# Patient Record
Sex: FEMALE | Race: WHITE | NOT HISPANIC OR LATINO | URBAN - METROPOLITAN AREA
[De-identification: names, ages, dates, MRNs, and addresses within clinical notes are randomized per-mention and may not be internally consistent; named-entity substitution may affect disease eponyms.]

---

## 2017-05-05 ENCOUNTER — OUTPATIENT (OUTPATIENT)
Dept: OUTPATIENT SERVICES | Facility: HOSPITAL | Age: 77
LOS: 1 days | Discharge: HOME | End: 2017-05-05

## 2017-06-28 DIAGNOSIS — I10 ESSENTIAL (PRIMARY) HYPERTENSION: ICD-10-CM

## 2017-06-28 DIAGNOSIS — Z00.00 ENCOUNTER FOR GENERAL ADULT MEDICAL EXAMINATION WITHOUT ABNORMAL FINDINGS: ICD-10-CM

## 2017-10-27 ENCOUNTER — OUTPATIENT (OUTPATIENT)
Dept: OUTPATIENT SERVICES | Facility: HOSPITAL | Age: 77
LOS: 1 days | Discharge: HOME | End: 2017-10-27

## 2017-10-27 DIAGNOSIS — I10 ESSENTIAL (PRIMARY) HYPERTENSION: ICD-10-CM

## 2017-12-01 ENCOUNTER — OUTPATIENT (OUTPATIENT)
Dept: OUTPATIENT SERVICES | Facility: HOSPITAL | Age: 77
LOS: 1 days | Discharge: HOME | End: 2017-12-01

## 2017-12-01 DIAGNOSIS — E87.1 HYPO-OSMOLALITY AND HYPONATREMIA: ICD-10-CM

## 2017-12-15 ENCOUNTER — OUTPATIENT (OUTPATIENT)
Dept: OUTPATIENT SERVICES | Facility: HOSPITAL | Age: 77
LOS: 1 days | Discharge: HOME | End: 2017-12-15

## 2017-12-15 DIAGNOSIS — E87.1 HYPO-OSMOLALITY AND HYPONATREMIA: ICD-10-CM

## 2018-01-24 ENCOUNTER — OUTPATIENT (OUTPATIENT)
Dept: OUTPATIENT SERVICES | Facility: HOSPITAL | Age: 78
LOS: 1 days | Discharge: HOME | End: 2018-01-24

## 2018-01-24 DIAGNOSIS — Z00.00 ENCOUNTER FOR GENERAL ADULT MEDICAL EXAMINATION WITHOUT ABNORMAL FINDINGS: ICD-10-CM

## 2018-01-24 DIAGNOSIS — E88.9 METABOLIC DISORDER, UNSPECIFIED: ICD-10-CM

## 2018-02-22 ENCOUNTER — OUTPATIENT (OUTPATIENT)
Dept: OUTPATIENT SERVICES | Facility: HOSPITAL | Age: 78
LOS: 1 days | Discharge: HOME | End: 2018-02-22

## 2018-02-22 DIAGNOSIS — I10 ESSENTIAL (PRIMARY) HYPERTENSION: ICD-10-CM

## 2018-03-01 ENCOUNTER — INPATIENT (INPATIENT)
Facility: HOSPITAL | Age: 78
LOS: 1 days | Discharge: HOME | End: 2018-03-03
Attending: HOSPITALIST | Admitting: FAMILY MEDICINE

## 2018-03-01 VITALS
TEMPERATURE: 98 F | HEART RATE: 88 BPM | RESPIRATION RATE: 18 BRPM | OXYGEN SATURATION: 96 % | DIASTOLIC BLOOD PRESSURE: 81 MMHG | SYSTOLIC BLOOD PRESSURE: 185 MMHG

## 2018-03-01 DIAGNOSIS — J44.1 CHRONIC OBSTRUCTIVE PULMONARY DISEASE WITH (ACUTE) EXACERBATION: ICD-10-CM

## 2018-03-01 LAB
ALBUMIN SERPL ELPH-MCNC: 4.1 G/DL — SIGNIFICANT CHANGE UP (ref 3–5.5)
ALP SERPL-CCNC: 68 U/L — SIGNIFICANT CHANGE UP (ref 30–115)
ALT FLD-CCNC: 15 U/L — SIGNIFICANT CHANGE UP (ref 0–41)
ANION GAP SERPL CALC-SCNC: 11 MMOL/L — SIGNIFICANT CHANGE UP (ref 7–14)
APTT BLD: 28.6 SEC — SIGNIFICANT CHANGE UP (ref 27–39.2)
AST SERPL-CCNC: 21 U/L — SIGNIFICANT CHANGE UP (ref 0–41)
BASE EXCESS BLDV CALC-SCNC: 2.3 MMOL/L — HIGH (ref -2–2)
BASOPHILS # BLD AUTO: 0.01 K/UL — SIGNIFICANT CHANGE UP (ref 0–0.2)
BASOPHILS NFR BLD AUTO: 0.1 % — SIGNIFICANT CHANGE UP (ref 0–1)
BILIRUB SERPL-MCNC: 1 MG/DL — SIGNIFICANT CHANGE UP (ref 0.2–1.2)
BUN SERPL-MCNC: 15 MG/DL — SIGNIFICANT CHANGE UP (ref 10–20)
CA-I SERPL-SCNC: 1.16 MMOL/L — SIGNIFICANT CHANGE UP (ref 1.12–1.3)
CALCIUM SERPL-MCNC: 9.3 MG/DL — SIGNIFICANT CHANGE UP (ref 8.5–10.1)
CHLORIDE SERPL-SCNC: 94 MMOL/L — LOW (ref 98–110)
CO2 SERPL-SCNC: 25 MMOL/L — SIGNIFICANT CHANGE UP (ref 17–32)
CREAT SERPL-MCNC: 0.8 MG/DL — SIGNIFICANT CHANGE UP (ref 0.7–1.5)
EOSINOPHIL # BLD AUTO: 0 K/UL — SIGNIFICANT CHANGE UP (ref 0–0.7)
EOSINOPHIL NFR BLD AUTO: 0 % — SIGNIFICANT CHANGE UP (ref 0–8)
GAS PNL BLDV: 130 MMOL/L — LOW (ref 136–145)
GAS PNL BLDV: SIGNIFICANT CHANGE UP
GLUCOSE SERPL-MCNC: 138 MG/DL — HIGH (ref 70–110)
HCO3 BLDV-SCNC: 27 MMOL/L — SIGNIFICANT CHANGE UP (ref 22–29)
HCT VFR BLD CALC: 40 % — SIGNIFICANT CHANGE UP (ref 37–47)
HGB BLD CALC-MCNC: 14.2 G/DL — SIGNIFICANT CHANGE UP (ref 14–18)
HGB BLD-MCNC: 13.5 G/DL — SIGNIFICANT CHANGE UP (ref 12–16)
IMM GRANULOCYTES NFR BLD AUTO: 0.5 % — HIGH (ref 0.1–0.3)
INR BLD: 0.9 RATIO — SIGNIFICANT CHANGE UP (ref 0.65–1.3)
LACTATE BLDV-MCNC: 2 MMOL/L — HIGH (ref 0.5–1.6)
LYMPHOCYTES # BLD AUTO: 0.93 K/UL — LOW (ref 1.2–3.4)
LYMPHOCYTES # BLD AUTO: 8.4 % — LOW (ref 20.5–51.1)
MCHC RBC-ENTMCNC: 30.4 PG — SIGNIFICANT CHANGE UP (ref 27–31)
MCHC RBC-ENTMCNC: 33.8 G/DL — SIGNIFICANT CHANGE UP (ref 32–37)
MCV RBC AUTO: 90.1 FL — SIGNIFICANT CHANGE UP (ref 81–99)
MONOCYTES # BLD AUTO: 0.23 K/UL — SIGNIFICANT CHANGE UP (ref 0.1–0.6)
MONOCYTES NFR BLD AUTO: 2.1 % — SIGNIFICANT CHANGE UP (ref 1.7–9.3)
NEUTROPHILS # BLD AUTO: 9.83 K/UL — HIGH (ref 1.4–6.5)
NEUTROPHILS NFR BLD AUTO: 88.9 % — HIGH (ref 42.2–75.2)
NRBC # BLD: 0 /100 WBCS — SIGNIFICANT CHANGE UP (ref 0–0)
PCO2 BLDV: 41 MMHG — SIGNIFICANT CHANGE UP (ref 41–51)
PH BLDV: 7.43 — SIGNIFICANT CHANGE UP (ref 7.26–7.43)
PLATELET # BLD AUTO: 371 K/UL — SIGNIFICANT CHANGE UP (ref 130–400)
PO2 BLDV: 61 MMHG — HIGH (ref 20–40)
POTASSIUM BLDV-SCNC: 4.6 MMOL/L — SIGNIFICANT CHANGE UP (ref 3.3–5.6)
POTASSIUM SERPL-MCNC: 4.6 MMOL/L — SIGNIFICANT CHANGE UP (ref 3.5–5)
POTASSIUM SERPL-SCNC: 4.6 MMOL/L — SIGNIFICANT CHANGE UP (ref 3.5–5)
PROT SERPL-MCNC: 6.8 G/DL — SIGNIFICANT CHANGE UP (ref 6–8)
PROTHROM AB SERPL-ACNC: 9.7 SEC — LOW (ref 9.95–12.87)
RBC # BLD: 4.44 M/UL — SIGNIFICANT CHANGE UP (ref 4.2–5.4)
RBC # FLD: 14.8 % — HIGH (ref 11.5–14.5)
SAO2 % BLDV: 92 % — SIGNIFICANT CHANGE UP
SODIUM SERPL-SCNC: 130 MMOL/L — LOW (ref 135–146)
WBC # BLD: 11.05 K/UL — HIGH (ref 4.8–10.8)
WBC # FLD AUTO: 11.05 K/UL — HIGH (ref 4.8–10.8)

## 2018-03-01 RX ORDER — BUDESONIDE AND FORMOTEROL FUMARATE DIHYDRATE 160; 4.5 UG/1; UG/1
2 AEROSOL RESPIRATORY (INHALATION)
Qty: 0 | Refills: 0 | Status: DISCONTINUED | OUTPATIENT
Start: 2018-03-01 | End: 2018-03-03

## 2018-03-01 RX ORDER — IPRATROPIUM/ALBUTEROL SULFATE 18-103MCG
3 AEROSOL WITH ADAPTER (GRAM) INHALATION EVERY 4 HOURS
Qty: 0 | Refills: 0 | Status: DISCONTINUED | OUTPATIENT
Start: 2018-03-01 | End: 2018-03-02

## 2018-03-01 RX ORDER — ASPIRIN/CALCIUM CARB/MAGNESIUM 324 MG
1 TABLET ORAL
Qty: 0 | Refills: 0 | COMMUNITY

## 2018-03-01 RX ORDER — ALBUTEROL 90 UG/1
2 AEROSOL, METERED ORAL EVERY 4 HOURS
Qty: 0 | Refills: 0 | Status: DISCONTINUED | OUTPATIENT
Start: 2018-03-01 | End: 2018-03-02

## 2018-03-01 RX ORDER — FLUTICASONE PROPIONATE AND SALMETEROL 50; 250 UG/1; UG/1
1 POWDER ORAL; RESPIRATORY (INHALATION)
Qty: 0 | Refills: 0 | COMMUNITY

## 2018-03-01 RX ORDER — HEPARIN SODIUM 5000 [USP'U]/ML
5000 INJECTION INTRAVENOUS; SUBCUTANEOUS EVERY 8 HOURS
Qty: 0 | Refills: 0 | Status: DISCONTINUED | OUTPATIENT
Start: 2018-03-01 | End: 2018-03-03

## 2018-03-01 RX ORDER — PANTOPRAZOLE SODIUM 20 MG/1
40 TABLET, DELAYED RELEASE ORAL
Qty: 0 | Refills: 0 | Status: DISCONTINUED | OUTPATIENT
Start: 2018-03-01 | End: 2018-03-03

## 2018-03-01 RX ORDER — IPRATROPIUM/ALBUTEROL SULFATE 18-103MCG
3 AEROSOL WITH ADAPTER (GRAM) INHALATION ONCE
Qty: 0 | Refills: 0 | Status: COMPLETED | OUTPATIENT
Start: 2018-03-01 | End: 2018-03-01

## 2018-03-01 RX ORDER — AZITHROMYCIN 500 MG/1
500 TABLET, FILM COATED ORAL ONCE
Qty: 0 | Refills: 0 | Status: COMPLETED | OUTPATIENT
Start: 2018-03-01 | End: 2018-03-01

## 2018-03-01 RX ADMIN — AZITHROMYCIN 255 MILLIGRAM(S): 500 TABLET, FILM COATED ORAL at 17:29

## 2018-03-01 RX ADMIN — Medication 3 MILLILITER(S): at 17:29

## 2018-03-01 RX ADMIN — Medication 125 MILLIGRAM(S): at 17:16

## 2018-03-01 RX ADMIN — Medication 3 MILLILITER(S): at 17:16

## 2018-03-01 RX ADMIN — BUDESONIDE AND FORMOTEROL FUMARATE DIHYDRATE 2 PUFF(S): 160; 4.5 AEROSOL RESPIRATORY (INHALATION) at 22:34

## 2018-03-01 NOTE — H&P ADULT - NSHPSOCIALHISTORY_GEN_ALL_CORE
Patient is , independent with ADL, lives alone. Patient is an active smoker 1-.5 ppd for the past 70 years, drinks socially, denies illicit drug use.

## 2018-03-01 NOTE — ED PROVIDER NOTE - CARE PLAN
Principal Discharge DX:	COPD exacerbation  Secondary Diagnosis:	Wheezing  Secondary Diagnosis:	Cough

## 2018-03-01 NOTE — H&P ADULT - NSHPLABSRESULTS_GEN_ALL_CORE
13.5   11.05 )-----------( 371      ( 01 Mar 2018 17:14 )             40.0       03-01    130<L>  |  94<L>  |  15  ----------------------------<  138<H>  4.6   |  25  |  0.8    Ca    9.3      01 Mar 2018 17:14    TPro  6.8  /  Alb  4.1  /  TBili  1.0  /  DBili  x   /  AST  21  /  ALT  15  /  AlkPhos  68  03-01                  PT/INR - ( 01 Mar 2018 17:14 )   PT: 9.70 sec;   INR: 0.90 ratio         PTT - ( 01 Mar 2018 17:14 )  PTT:28.6 sec

## 2018-03-01 NOTE — ED ADULT NURSE NOTE - OBJECTIVE STATEMENT
pt presents with a complaint of SOB started today worsen with exertion. pt was seen by her PCP today and had low pulse ox. pt with hx of COPD and currently smoking.

## 2018-03-01 NOTE — H&P ADULT - PROBLEM SELECTOR PLAN 1
-Continue solumedrol 60mg Q8H  -Nebs standing and PRN  -Start symbicort  -Pulmonary Toilet  -CXR in AM  -F/U Pulmonary evaluation

## 2018-03-01 NOTE — ED PROVIDER NOTE - PHYSICAL EXAMINATION
VITAL SIGNS: I have reviewed nursing notes and confirm.  CONSTITUTIONAL: Well-developed; well-nourished; in no acute distress.  SKIN: Skin exam is warm and dry, no acute rash.  HEAD: Normocephalic; atraumatic.  EYES: PERRL, EOM intact; conjunctiva and sclera clear.  ENT: No nasal discharge; airway clear. TMs clear.  NECK: Supple; non tender.  CARD: S1, S2 normal; no murmurs, gallops, or rubs. Regular rate and rhythm.  RESP: b/l wheezing  ABD: Normal bowel sounds; soft; non-distended; non-tender; no hepatosplenomegaly.  EXT: Normal ROM. No clubbing, cyanosis or edema.  NEURO: Alert, oriented. Grossly unremarkable. No focal deficits.  PSYCH: Cooperative, appropriate.

## 2018-03-01 NOTE — H&P ADULT - ATTENDING COMMENTS
76 yo F with COPD not on home O2 presents from PMD's office s/p follow up visit, found to have low pulse oximetry, in the low 60s as reported by daughter, as well as worsening SOB not improved on PO prednisone. Patient endorses noted wheezing for the past week, as well as SOB associated with baseline productive cough of whitish sputum, patient denies changes in sputum color or frequency. Patient denies constitutional symptoms, sick contacts or recent travel. Patient is currently 96% on room air, denies current SOB, is able to speak in full sentences.  Patient reported not taking all of her prednisone b/c it sometimes makes her jittery. S/P IV steroids and Nebs she is feeling better    A/A/O x3  Lungs- decreased breath sounds and wheezing - expiratory  Heart - S1S2 - NL , Evie III/VI  Abdomen- soft, NT, ND, (+) BS  Exp. No C/C/E  Neuro - No C/C/E    A/P   COPD exacerbation   - IV steroids/ Neb  -pulse ox monitoring and O12 PRN  - agree w/ starting Symbicort  -DVT prohylaxis and GI prophylaxis   anticipate D/C within 24-48 hours

## 2018-03-01 NOTE — ED PROVIDER NOTE - NS ED ROS FT
Review of Systems:  	•	CONSTITUTIONAL - no fever, no diaphoresis, no weight change  	•	SKIN - no rash  	•	HEMATOLOGIC - no bleeding, no bruising  	•	EYES - no eye pain, no blurred vision  	•	ENT - no change in hearing, no pain  	•	RESPIRATORY - +shortness of breath, + cough, +wheezing  	•	CARDIAC - no chest pain, no palpitations  	•	GI - no abd pain, no nausea, no vomiting, no diarrhea, no constipation, no bleeding  	•	ENDO - no polydypsia, no polyurea, no heat/no cold intolerance  	•	MUSCULOSKELETAL - no joint paint, no swelling, no redness  	•	NEUROLOGIC - no weakness, no headache, no anesthesia, no paresthesias

## 2018-03-01 NOTE — H&P ADULT - NSHPPHYSICALEXAM_GEN_ALL_CORE
PHYSICAL EXAM:  GENERAL: NAD, well-groomed, well-developed  HEAD:  Atraumatic, Normocephalic  EYES: EOMI, PERRLA, conjunctiva and sclera clear  ENMT: No tonsillar erythema, exudates, or enlargement; Moist mucous membranes, Good dentition, No lesions  NECK: Supple, No JVD, Normal thyroid  NERVOUS SYSTEM:  Alert & Oriented X3, Good concentration; Motor Strength 5/5 B/L upper and lower extremities; DTRs 2+ intact and symmetric  CHEST/LUNG: Decreased breath sounds bilaterally, positive apical wheezing bilaterally  HEART: Regular rate and rhythm; No murmurs, rubs, or gallops  ABDOMEN: Soft, Nontender, Nondistended; Bowel sounds present  EXTREMITIES:  2+ Peripheral Pulses, No clubbing, cyanosis, or edema  LYMPH: No lymphadenopathy noted  SKIN: No rashes or lesions

## 2018-03-01 NOTE — H&P ADULT - ASSESSMENT
78 yo F presents with worsening SOB and wheezing for one week's duration likely secondary to COPD exacerbation.

## 2018-03-01 NOTE — H&P ADULT - NSHPREVIEWOFSYSTEMS_GEN_ALL_CORE
REVIEW OF SYSTEMS:  CONSTITUTIONAL: No fever, weight loss, or fatigue  EYES: No eye pain, visual disturbances, or discharge  ENMT:  No difficulty hearing, tinnitus, vertigo; No sinus or throat pain  NECK: No pain or stiffness  BREASTS: No pain, masses, or nipple discharge  RESPIRATORY: positive SOB, wheezing  CARDIOVASCULAR: No chest pain, palpitations, dizziness, or leg swelling  GASTROINTESTINAL: No abdominal or epigastric pain. No nausea, vomiting, or hematemesis; No diarrhea or constipation. No melena or hematochezia.  GENITOURINARY: No dysuria, frequency, hematuria, or incontinence  NEUROLOGICAL: No headaches, memory loss, loss of strength, numbness, or tremors  SKIN: No itching, burning, rashes, or lesions   LYMPH NODES: No enlarged glands  ENDOCRINE: No heat or cold intolerance; No hair loss  MUSCULOSKELETAL: No joint pain or swelling; No muscle, back, or extremity pain  PSYCHIATRIC: No depression, anxiety, mood swings, or difficulty sleeping  HEME/LYMPH: No easy bruising, or bleeding gums  ALLERGY AND IMMUNOLOGIC: No hives or eczema

## 2018-03-01 NOTE — ED PROVIDER NOTE - OBJECTIVE STATEMENT
76 yo f with pmh of copd, sent by dr. quinn for sob and wheezing.  pt says worsening x 1 week.  was seen by pmd last week, started on prednisone.  pt has been using nebs at home.  today at office for f/u, still wheezing and was told hypoxic.  here o2 sat is normal.  no fever, no chills.  +mild cough, nonprod, no cp, no leg swelling or pain

## 2018-03-02 DIAGNOSIS — Z02.9 ENCOUNTER FOR ADMINISTRATIVE EXAMINATIONS, UNSPECIFIED: ICD-10-CM

## 2018-03-02 LAB
ALBUMIN SERPL ELPH-MCNC: 3.7 G/DL — SIGNIFICANT CHANGE UP (ref 3–5.5)
ALP SERPL-CCNC: 62 U/L — SIGNIFICANT CHANGE UP (ref 30–115)
ALT FLD-CCNC: 13 U/L — SIGNIFICANT CHANGE UP (ref 0–41)
ANION GAP SERPL CALC-SCNC: 7 MMOL/L — SIGNIFICANT CHANGE UP (ref 7–14)
AST SERPL-CCNC: 17 U/L — SIGNIFICANT CHANGE UP (ref 0–41)
BILIRUB SERPL-MCNC: 0.8 MG/DL — SIGNIFICANT CHANGE UP (ref 0.2–1.2)
BUN SERPL-MCNC: 13 MG/DL — SIGNIFICANT CHANGE UP (ref 10–20)
CALCIUM SERPL-MCNC: 8.9 MG/DL — SIGNIFICANT CHANGE UP (ref 8.5–10.1)
CHLORIDE SERPL-SCNC: 96 MMOL/L — LOW (ref 98–110)
CK MB BLD-MCNC: 13 % — HIGH (ref 0–4)
CK MB CFR SERPL CALC: 3 NG/ML — SIGNIFICANT CHANGE UP (ref 0.6–6.3)
CK SERPL-CCNC: 23 U/L — SIGNIFICANT CHANGE UP (ref 0–225)
CO2 SERPL-SCNC: 24 MMOL/L — SIGNIFICANT CHANGE UP (ref 17–32)
CREAT SERPL-MCNC: 0.6 MG/DL — LOW (ref 0.7–1.5)
GLUCOSE SERPL-MCNC: 130 MG/DL — HIGH (ref 70–110)
HCT VFR BLD CALC: 36.2 % — LOW (ref 37–47)
HGB BLD-MCNC: 12.3 G/DL — SIGNIFICANT CHANGE UP (ref 12–16)
MAGNESIUM SERPL-MCNC: 2 MG/DL — SIGNIFICANT CHANGE UP (ref 1.8–2.4)
MCHC RBC-ENTMCNC: 30.4 PG — SIGNIFICANT CHANGE UP (ref 27–31)
MCHC RBC-ENTMCNC: 34 G/DL — SIGNIFICANT CHANGE UP (ref 32–37)
MCV RBC AUTO: 89.6 FL — SIGNIFICANT CHANGE UP (ref 81–99)
NRBC # BLD: 0 /100 WBCS — SIGNIFICANT CHANGE UP (ref 0–0)
PLATELET # BLD AUTO: 346 K/UL — SIGNIFICANT CHANGE UP (ref 130–400)
POTASSIUM SERPL-MCNC: 4.3 MMOL/L — SIGNIFICANT CHANGE UP (ref 3.5–5)
POTASSIUM SERPL-SCNC: 4.3 MMOL/L — SIGNIFICANT CHANGE UP (ref 3.5–5)
PROT SERPL-MCNC: 5.9 G/DL — LOW (ref 6–8)
RBC # BLD: 4.04 M/UL — LOW (ref 4.2–5.4)
RBC # FLD: 14.8 % — HIGH (ref 11.5–14.5)
SODIUM SERPL-SCNC: 127 MMOL/L — LOW (ref 135–146)
TROPONIN I SERPL-MCNC: <0.02 NG/ML — SIGNIFICANT CHANGE UP (ref 0–0.05)
WBC # BLD: 5.85 K/UL — SIGNIFICANT CHANGE UP (ref 4.8–10.8)
WBC # FLD AUTO: 5.85 K/UL — SIGNIFICANT CHANGE UP (ref 4.8–10.8)

## 2018-03-02 RX ORDER — LABETALOL HCL 100 MG
100 TABLET ORAL ONCE
Qty: 0 | Refills: 0 | Status: COMPLETED | OUTPATIENT
Start: 2018-03-02 | End: 2018-03-02

## 2018-03-02 RX ORDER — ALBUTEROL 90 UG/1
2.5 AEROSOL, METERED ORAL EVERY 6 HOURS
Qty: 0 | Refills: 0 | Status: DISCONTINUED | OUTPATIENT
Start: 2018-03-02 | End: 2018-03-03

## 2018-03-02 RX ORDER — TIOTROPIUM BROMIDE 18 UG/1
1 CAPSULE ORAL; RESPIRATORY (INHALATION) DAILY
Qty: 0 | Refills: 0 | Status: DISCONTINUED | OUTPATIENT
Start: 2018-03-02 | End: 2018-03-03

## 2018-03-02 RX ORDER — AMLODIPINE BESYLATE 2.5 MG/1
5 TABLET ORAL DAILY
Qty: 0 | Refills: 0 | Status: DISCONTINUED | OUTPATIENT
Start: 2018-03-02 | End: 2018-03-03

## 2018-03-02 RX ORDER — AMLODIPINE BESYLATE 2.5 MG/1
5 TABLET ORAL ONCE
Qty: 0 | Refills: 0 | Status: COMPLETED | OUTPATIENT
Start: 2018-03-02 | End: 2018-03-02

## 2018-03-02 RX ADMIN — TIOTROPIUM BROMIDE 1 CAPSULE(S): 18 CAPSULE ORAL; RESPIRATORY (INHALATION) at 11:30

## 2018-03-02 RX ADMIN — Medication 3 MILLILITER(S): at 08:45

## 2018-03-02 RX ADMIN — Medication 40 MILLIGRAM(S): at 17:17

## 2018-03-02 RX ADMIN — AMLODIPINE BESYLATE 5 MILLIGRAM(S): 2.5 TABLET ORAL at 12:01

## 2018-03-02 RX ADMIN — Medication 60 MILLIGRAM(S): at 05:50

## 2018-03-02 RX ADMIN — ALBUTEROL 2.5 MILLIGRAM(S): 90 AEROSOL, METERED ORAL at 13:20

## 2018-03-02 RX ADMIN — BUDESONIDE AND FORMOTEROL FUMARATE DIHYDRATE 2 PUFF(S): 160; 4.5 AEROSOL RESPIRATORY (INHALATION) at 21:17

## 2018-03-02 RX ADMIN — Medication 100 MILLIGRAM(S): at 03:08

## 2018-03-02 RX ADMIN — AMLODIPINE BESYLATE 5 MILLIGRAM(S): 2.5 TABLET ORAL at 06:44

## 2018-03-02 RX ADMIN — Medication 3 MILLILITER(S): at 00:07

## 2018-03-02 NOTE — PROGRESS NOTE ADULT - ASSESSMENT
76 yo F presents with worsening SOB and wheezing for one week's duration likely secondary to COPD exacerbation.     Acute on Chronic COPD exacerbation   - Continue solumedrol 60mg Q8H - consider medrol pack for 5 days starting tomorrow.   - pulse ox monitoring and O1 PRN  - c/w home inhaler spiriva+symbicort  - outpatient follow up with pulmonology     Hyponatremia   - Hx of SIADH with low sodium chronically  - O/p follow up     HTN  - Will increase to amlodipine 10mg po if uncontrolled    DVT ppx and GI ppx  anticipate D/C within 24-48 hours

## 2018-03-02 NOTE — PROGRESS NOTE ADULT - SUBJECTIVE AND OBJECTIVE BOX
s: shortness of breath improved but still not back to normal per patient     Vital Signs Last 24 Hrs  T(C): 35.7 (02 Mar 2018 03:20), Max: 36.7 (02 Mar 2018 02:12)  T(F): 96.2 (02 Mar 2018 03:20), Max: 98 (02 Mar 2018 02:12)  HR: 83 (02 Mar 2018 06:17) (82 - 94)  BP: 183/79 (02 Mar 2018 06:17) (180/80 - 185/81)  BP(mean): --  RR: 18 (02 Mar 2018 03:20) (18 - 18)  SpO2: 92% (02 Mar 2018 06:17) (92% - 96%)    PHYSICAL EXAM:  GENERAL: NAD, well-developed  HEAD:  Atraumatic, Normocephalic  EYES: EOMI, PERRLA, conjunctiva and sclera clear  NECK: Supple, No JVD  CHEST/LUNG: Bilateral expiratory rhonchi   HEART: Regular rate and rhythm; No murmurs, rubs, or gallops  ABDOMEN: Soft, Nontender, Nondistended; Bowel sounds present  EXTREMITIES:  2+ Peripheral Pulses, No clubbing, cyanosis, or edema  PSYCH: AAOx3  NEUROLOGY: non-focal  SKIN: No rashes or lesions

## 2018-03-02 NOTE — PROGRESS NOTE ADULT - ASSESSMENT
assessment and plan     1) Acute COPD exacerbation : improved     C/W IV steroids today   C/W nebs and inhalers   patient get jittery with prednisone can consider medrol pack for 5 days starting tomorrow     2) Hyponatremia: patient says that she was diagnosed with SIADH last year and sodium is chronically low she is going to have further evaluation as OPT     3) HTN: not well controlled, patient is on amlodipine and labetalol. recheck BP if still high increase amlodipine dose to 10 mg po qday     plan was discussed with patient, house staff     4) likely d/C tomorrow pending improvement with respiratory status

## 2018-03-02 NOTE — CONSULT NOTE ADULT - SUBJECTIVE AND OBJECTIVE BOX
Patient is a 77y old  Female who presents with a chief complaint of worsening SOB for 1 week's duration sent from PMD (01 Mar 2018 20:50)    Consult: Patient currently stable. No SOB or audible wheeze appreciated. Patient is AAO x3, sitting up in bed and is able to complete full sentences with no trouble.     HPI:  76 yo F with COPD not on home O2 presents from PMD's office s/p follow up visit, found to have low pulse oximetry, in the low 60s as reported by daughter, as well as worsening SOB not improved on PO prednisone. Patient endorses noted wheezing for the past week, as well as SOB associated with baseline productive cough of whitish sputum, patient denies changes in sputum color or frequency. Patient denies constitutional symptoms, sick contacts or recent travel. Patient is currently 96% on room air, denies current SOB, is able to speak in full sentences. Patient was started on Prednisone taper last Wednesday, at 60mg Q3days, then 40mg Q3days, did not complete last two days of 20mg daily.    In ED, received 125mg of Solumedrol IV x1, Albuterol treatment x1.    CODE STATUS: DNR/DNI    PMD: Dr. Adam  Pulmonologist: Dr. oChn (last seen more than 4 years ago, lost to follow up) (01 Mar 2018 20:50)      PAST MEDICAL & SURGICAL HISTORY:  COPD (chronic obstructive pulmonary disease)      SOCIAL HX:   Smoking 70 Pack/Year history. Currently still smokes 1 PPD                        ETOH                            Other    FAMILY HISTORY:       REVIEW OF SYSTEMS    General: No fever, weight loss, or fatigue	    Skin/Breast:  No itching, burning, rashes, or lesions, No pain, masses, or nipple discharge  	  Ophthalmologic: No eye pain, visual disturbances, or discharge  	  ENMT: No difficulty hearing, tinnitus, vertigo; No sinus or throat pain, No pain or stiffness	    Respiratory and Thorax: no current SOB or wheezing  	  Cardiovascular:	No chest pain, palpitations, dizziness, or leg swelling    Gastrointestinal:	No abdominal or epigastric pain. No nausea, vomiting, or hematemesis; No diarrhea or constipation. No melena or hematochezia.    Musculoskeletal: No joint pain or swelling; No muscle, back, or extremity pain    Neurological: No headaches, memory loss, loss of strength, numbness, or tremors	    Psychiatric: No depression, anxiety, mood swings, or difficulty sleeping	    Hematology/Lymphatics:	 No enlarged glands, No easy bruising, or bleeding gums    Endocrine:  No heat or cold intolerance; No hair loss	    Allergic/Immunologic: No hives or eczema	    Allergies: No Known Allergies      PHYSICAL EXAM  Vital Signs Last 24 Hrs  T(C): 35.7 (02 Mar 2018 03:20), Max: 36.7 (02 Mar 2018 02:12)  T(F): 96.2 (02 Mar 2018 03:20), Max: 98 (02 Mar 2018 02:12)  HR: 83 (02 Mar 2018 06:17) (82 - 94)  BP: 183/79 (02 Mar 2018 06:17) (180/80 - 185/81)  BP(mean): --  RR: 18 (02 Mar 2018 03:20) (18 - 18)  SpO2: 92% (02 Mar 2018 06:17) (92% - 96%)    General: NAD, well-groomed, well-developed  HEENT: Atraumatic, Normocephalic, No tonsillar erythema, exudates, or enlargement; Moist mucous membranes, Good dentition, No lesions            Lymph Nodes: No lymphadenopathy noted  Neck: Supple, No JVD, Normal thyroid      Lungs: Bilateral air entry, no wheezing or stridor  Cardiovascular: Regular rate and rhythm; No murmurs, rubs, or gallops  Abdomen: Soft, Nontender, Nondistended; Bowel sounds present  Extremities: 2+ Peripheral Pulses, No clubbing, cyanosis, or edema  Skin:  No rashes or lesion  Neurological: Alert & Oriented X3, Good concentration; Motor Strength 5/5 B/L upper and lower extremities; DTRs 2+ intact and symmetric      LABS:                          12.3   5.85  )-----------( 346      ( 02 Mar 2018 05:15 )             36.2                                               03-02    127<L>  |  96<L>  |  13  ----------------------------<  130<H>  4.3   |  24  |  0.6<L>    Ca    8.9      02 Mar 2018 05:15  Mg     2.0     03-02    TPro  5.9<L>  /  Alb  3.7  /  TBili  0.8  /  DBili  x   /  AST  17  /  ALT  13  /  AlkPhos  62  03-02      PT/INR - ( 01 Mar 2018 17:14 )   PT: 9.70 sec;   INR: 0.90 ratio         PTT - ( 01 Mar 2018 17:14 )  PTT:28.6 sec                                           CARDIAC MARKERS ( 01 Mar 2018 23:38 )  <0.02 ng/mL / x     / 23 U/L / x     / 3.0 ng/mL                                            LIVER FUNCTIONS - ( 02 Mar 2018 05:15 )  Alb: 3.7 g/dL / Pro: 5.9 g/dL / ALK PHOS: 62 U/L / ALT: 13 U/L / AST: 17 U/L / GGT: x                                                                                                MEDICATIONS  (STANDING):  ALBUTerol    0.083% 2.5 milliGRAM(s) Nebulizer every 6 hours  amLODIPine   Tablet 5 milliGRAM(s) Oral daily  buDESOnide 160 MICROgram(s)/formoterol 4.5 MICROgram(s) Inhaler 2 Puff(s) Inhalation two times a day  heparin  Injectable 5000 Unit(s) SubCutaneous every 8 hours  methylPREDNISolone sodium succinate Injectable 40 milliGRAM(s) IV Push two times a day  pantoprazole    Tablet 40 milliGRAM(s) Oral before breakfast  tiotropium 18 MICROgram(s) Capsule 1 Capsule(s) Inhalation daily    MEDICATIONS  (PRN): Patient is a 77y old  Female who presents with a chief complaint of worsening SOB for 1 week's duration sent from PMD (01 Mar 2018 20:50)    Consult: Patient currently stable. No SOB or audible wheeze appreciated. Patient is AAO x3, sitting up in bed and is able to complete full sentences with no trouble.     HPI:  78 yo F with COPD not on home O2 presents from PMD's office s/p follow up visit, found to have low pulse oximetry, in the low 60s as reported by daughter, as well as worsening SOB not improved on PO prednisone. Patient endorses noted wheezing for the past week, as well as SOB associated with baseline productive cough of whitish sputum, patient denies changes in sputum color or frequency. Patient denies constitutional symptoms, sick contacts or recent travel. Patient is currently 96% on room air, denies current SOB, is able to speak in full sentences. Patient was started on Prednisone taper last Wednesday, at 60mg Q3days, then 40mg Q3days, did not complete last two days of 20mg daily.    In ED, received 125mg of Solumedrol IV x1, Albuterol treatment x1.    CODE STATUS: DNR/DNI    PMD: Dr. Adam  Pulmonologist: Dr. Cohn (last seen more than 4 years ago, lost to follow up) (01 Mar 2018 20:50)      PAST MEDICAL & SURGICAL HISTORY:  COPD (chronic obstructive pulmonary disease)      SOCIAL HX:   Smoking 70 Pack/Year history. Currently still smokes 1 PPD                          FAMILY HISTORY:       REVIEW OF SYSTEMS    General: No fever, weight loss, or fatigue	    Skin/Breast:  No itching, burning, rashes, or lesions, No pain, masses, or nipple discharge  	  Ophthalmologic: No eye pain, visual disturbances, or discharge  	  ENMT: No difficulty hearing, tinnitus, vertigo; No sinus or throat pain, No pain or stiffness	    Respiratory and Thorax: no current SOB or wheezing  	  Cardiovascular:	No chest pain, palpitations, dizziness, or leg swelling    Gastrointestinal:	No abdominal or epigastric pain. No nausea, vomiting, or hematemesis; No diarrhea or constipation. No melena or hematochezia.    Musculoskeletal: No joint pain or swelling; No muscle, back, or extremity pain    Neurological: No headaches, memory loss, loss of strength, numbness, or tremors	    Psychiatric: No depression, anxiety, mood swings, or difficulty sleeping	    Hematology/Lymphatics:	 No enlarged glands, No easy bruising, or bleeding gums    Endocrine:  No heat or cold intolerance; No hair loss	    Allergic/Immunologic: No hives or eczema	    Allergies: No Known Allergies      PHYSICAL EXAM  Vital Signs Last 24 Hrs  T(C): 35.7 (02 Mar 2018 03:20), Max: 36.7 (02 Mar 2018 02:12)  T(F): 96.2 (02 Mar 2018 03:20), Max: 98 (02 Mar 2018 02:12)  HR: 83 (02 Mar 2018 06:17) (82 - 94)  BP: 183/79 (02 Mar 2018 06:17) (180/80 - 185/81)  BP(mean): --  RR: 18 (02 Mar 2018 03:20) (18 - 18)  SpO2: 92% (02 Mar 2018 06:17) (92% - 96%)    General: NAD, well-groomed, well-developed  HEENT: Atraumatic, Normocephalic, No tonsillar erythema, exudates, or enlargement; Moist mucous membranes, Good dentition, No lesions            Lymph Nodes: No lymphadenopathy noted  Neck: Supple, No JVD, Normal thyroid      Lungs: Bilateral air entry, no wheezing or stridor  Cardiovascular: Regular rate and rhythm; No murmurs, rubs, or gallops  Abdomen: Soft, Nontender, Nondistended; Bowel sounds present  Extremities: 2+ Peripheral Pulses, No clubbing, cyanosis, or edema  Skin:  No rashes or lesion  Neurological: Alert & Oriented X3, Good concentration; Motor Strength 5/5 B/L upper and lower extremities; DTRs 2+ intact and symmetric      LABS:                          12.3   5.85  )-----------( 346      ( 02 Mar 2018 05:15 )             36.2                                               03-02    127<L>  |  96<L>  |  13  ----------------------------<  130<H>  4.3   |  24  |  0.6<L>    Ca    8.9      02 Mar 2018 05:15  Mg     2.0     03-02    TPro  5.9<L>  /  Alb  3.7  /  TBili  0.8  /  DBili  x   /  AST  17  /  ALT  13  /  AlkPhos  62  03-02      PT/INR - ( 01 Mar 2018 17:14 )   PT: 9.70 sec;   INR: 0.90 ratio         PTT - ( 01 Mar 2018 17:14 )  PTT:28.6 sec                                           CARDIAC MARKERS ( 01 Mar 2018 23:38 )  <0.02 ng/mL / x     / 23 U/L / x     / 3.0 ng/mL                                            LIVER FUNCTIONS - ( 02 Mar 2018 05:15 )  Alb: 3.7 g/dL / Pro: 5.9 g/dL / ALK PHOS: 62 U/L / ALT: 13 U/L / AST: 17 U/L / GGT: x                                                                                                MEDICATIONS  (STANDING):  ALBUTerol    0.083% 2.5 milliGRAM(s) Nebulizer every 6 hours  amLODIPine   Tablet 5 milliGRAM(s) Oral daily  buDESOnide 160 MICROgram(s)/formoterol 4.5 MICROgram(s) Inhaler 2 Puff(s) Inhalation two times a day  heparin  Injectable 5000 Unit(s) SubCutaneous every 8 hours  methylPREDNISolone sodium succinate Injectable 40 milliGRAM(s) IV Push two times a day  pantoprazole    Tablet 40 milliGRAM(s) Oral before breakfast  tiotropium 18 MICROgram(s) Capsule 1 Capsule(s) Inhalation daily    MEDICATIONS  (PRN):

## 2018-03-02 NOTE — CONSULT NOTE ADULT - ASSESSMENT
Acute excarbation of COPD  active smoker        iv steroids for now  po prednisone starting tomorrow  c/w home inhaler spiriva+symbicort  dvt ppx  needs out pt pulm follow up

## 2018-03-02 NOTE — PROGRESS NOTE ADULT - SUBJECTIVE AND OBJECTIVE BOX
SUBJECTIVE:    Patient is a 77y old Female who presents with a chief complaint of sent from PMD for worsening SOB for 1 week's duration (01 Mar 2018 20:50)    Currently admitted to medicine with the primary diagnosis of COPD exacerbation     Today is hospital day 1d. This morning she is resting comfortably in bed and reports no new issues or overnight events.     PAST MEDICAL & SURGICAL HISTORY  COPD (chronic obstructive pulmonary disease)    SOCIAL HISTORY:  Negative for smoking/alcohol/drug use.     ALLERGIES:  No Known Allergies    MEDICATIONS:  STANDING MEDICATIONS  ALBUTerol    0.083% 2.5 milliGRAM(s) Nebulizer every 6 hours  amLODIPine   Tablet 5 milliGRAM(s) Oral daily  buDESOnide 160 MICROgram(s)/formoterol 4.5 MICROgram(s) Inhaler 2 Puff(s) Inhalation two times a day  heparin  Injectable 5000 Unit(s) SubCutaneous every 8 hours  methylPREDNISolone sodium succinate Injectable 40 milliGRAM(s) IV Push two times a day  pantoprazole    Tablet 40 milliGRAM(s) Oral before breakfast  tiotropium 18 MICROgram(s) Capsule 1 Capsule(s) Inhalation daily    PRN MEDICATIONS    VITALS:   T(F): 96.7  HR: 88  BP: 140/63  RR: 18  SpO2: 92%    LABS:                        12.3   5.85  )-----------( 346      ( 02 Mar 2018 05:15 )             36.2     03-02    127<L>  |  96<L>  |  13  ----------------------------<  130<H>  4.3   |  24  |  0.6<L>    Ca    8.9      02 Mar 2018 05:15  Mg     2.0     03-02    TPro  5.9<L>  /  Alb  3.7  /  TBili  0.8  /  DBili  x   /  AST  17  /  ALT  13  /  AlkPhos  62  03-02    PT/INR - ( 01 Mar 2018 17:14 )   PT: 9.70 sec;   INR: 0.90 ratio         PTT - ( 01 Mar 2018 17:14 )  PTT:28.6 sec      Creatine Kinase, Serum: 23 U/L (03-01-18 @ 23:38)  Troponin I, Serum: <0.02 ng/mL (03-01-18 @ 23:38)      CARDIAC MARKERS ( 01 Mar 2018 23:38 )  <0.02 ng/mL / x     / 23 U/L / x     / 3.0 ng/mL      RADIOLOGY:    < from: Xray Chest 1 View- PORTABLE-Routine (03.02.18 @ 07:28) >    No radiographic evidence of acute cardiopulmonary disease.    < end of copied text >    PHYSICAL EXAM:  GEN: No acute distress  LUNGS: Clear to auscultation bilaterally. No wheezing, rales, rhonchi   HEART: S1/S2 present. RRR.   ABD: Soft, non-tender, non-distended. Bowel sounds present  EXT: NC/NC/NE/2+PP/MERCER  NEURO: AAOX3

## 2018-03-03 ENCOUNTER — TRANSCRIPTION ENCOUNTER (OUTPATIENT)
Age: 78
End: 2018-03-03

## 2018-03-03 VITALS — DIASTOLIC BLOOD PRESSURE: 59 MMHG | HEART RATE: 76 BPM | SYSTOLIC BLOOD PRESSURE: 128 MMHG

## 2018-03-03 RX ORDER — PANTOPRAZOLE SODIUM 20 MG/1
1 TABLET, DELAYED RELEASE ORAL
Qty: 7 | Refills: 0
Start: 2018-03-03 | End: 2018-03-09

## 2018-03-03 RX ORDER — BUDESONIDE AND FORMOTEROL FUMARATE DIHYDRATE 160; 4.5 UG/1; UG/1
2 AEROSOL RESPIRATORY (INHALATION)
Qty: 6 | Refills: 0
Start: 2018-03-03 | End: 2018-03-16

## 2018-03-03 RX ORDER — PREGABALIN 225 MG/1
0 CAPSULE ORAL
Qty: 0 | Refills: 0 | COMMUNITY

## 2018-03-03 RX ORDER — AMLODIPINE BESYLATE 2.5 MG/1
1 TABLET ORAL
Qty: 14 | Refills: 0
Start: 2018-03-03 | End: 2018-03-16

## 2018-03-03 RX ADMIN — ALBUTEROL 2.5 MILLIGRAM(S): 90 AEROSOL, METERED ORAL at 08:08

## 2018-03-03 RX ADMIN — PANTOPRAZOLE SODIUM 40 MILLIGRAM(S): 20 TABLET, DELAYED RELEASE ORAL at 05:45

## 2018-03-03 RX ADMIN — AMLODIPINE BESYLATE 5 MILLIGRAM(S): 2.5 TABLET ORAL at 05:45

## 2018-03-03 RX ADMIN — Medication 40 MILLIGRAM(S): at 05:45

## 2018-03-03 NOTE — DISCHARGE NOTE ADULT - CARE PROVIDER_API CALL
Nancy Adam (DO), Family Medicine  [address] [place] [address]  Phone: [phone]  Fax: [phone]    Varghese Cohn), Critical Care Medicine; Pulmonary Disease; Sleep Medicine  [address] [place] [address]  Phone: [phone]  Fax: [phone]

## 2018-03-03 NOTE — DISCHARGE NOTE ADULT - MEDICATION SUMMARY - MEDICATIONS TO TAKE
I will START or STAY ON the medications listed below when I get home from the hospital:    Spiriva 18 mcg inhalation capsule  -- 1 cap(s) inhaled once a day  -- Indication: For COPD (chronic obstructive pulmonary disease)    ProAir HFA  -- inhaled 4 times a day, As Needed  -- Indication: For COPD (chronic obstructive pulmonary disease)    cyanocobalamin  -- 1 tab(s) by mouth once a day  -- Indication: For Supplement    Vitamin D3 2000 intl units oral tablet  -- 1 tab(s) by mouth once a day  -- Indication: For Supplement I will START or STAY ON the medications listed below when I get home from the hospital:    Medrol Dosepak 4 mg oral tablet  -- 1 application by mouth once a day x 6 days  Dosepak: tapered dosing as prescribed,   -- It is very important that you take or use this exactly as directed.  Do not skip doses or discontinue unless directed by your doctor.  Obtain medical advice before taking any non-prescription drugs as some may affect the action of this medication.  Take with food or milk.    -- Indication: For COPD (chronic obstructive pulmonary disease)    budesonide-formoterol 160 mcg-4.5 mcg/inh inhalation aerosol  -- 2 puff(s) inhaled 2 times a day   -- Indication: For COPD (chronic obstructive pulmonary disease)    Spiriva 18 mcg inhalation capsule  -- 1 cap(s) inhaled once a day  -- Indication: For COPD (chronic obstructive pulmonary disease)    ProAir HFA  -- inhaled 4 times a day, As Needed  -- Indication: For COPD (chronic obstructive pulmonary disease)    amLODIPine 5 mg oral tablet  -- 1 tab(s) by mouth once a day  -- Indication: For Hypertension    pantoprazole 40 mg oral delayed release tablet  -- 1 tab(s) by mouth once a day (before a meal)  -- Indication: For GERD 2/2 Steroid use     Vitamin D3 2000 intl units oral tablet  -- 1 tab(s) by mouth once a day  -- Indication: For Supplement    cyanocobalamin  -- 1 tab(s) by mouth once a day  -- Indication: For Supplement

## 2018-03-03 NOTE — DISCHARGE NOTE ADULT - PLAN OF CARE
Medical Management Acute on Chronic COPD exacerbation   -Complete course of steroids: Medropak starting tomorrow.   - c/w home inhaler spiriva+symbicort with rescue inhaler as needed  - outpatient follow up with pulmonologist in 1 week     Hyponatremia   - Hx of SIADH with low sodium chronically  - O/p follow up Repeat BMP in 1 week with PMD; workup if still abnormal. Encourage oral hydration.

## 2018-03-03 NOTE — DISCHARGE NOTE ADULT - MEDICATION SUMMARY - MEDICATIONS TO STOP TAKING
I will STOP taking the medications listed below when I get home from the hospital:    Advair Diskus 500 mcg-50 mcg inhalation powder  -- 1 puff(s) inhaled 2 times a day    predniSONE 20 mg oral tablet    Aspir 81 oral delayed release tablet  -- 1 tab(s) by mouth once a day

## 2018-03-03 NOTE — DISCHARGE NOTE ADULT - HOSPITAL COURSE
76 yo F presents with worsening SOB and wheezing for one week's duration secondary to COPD exacerbation.   Acute on Chronic COPD exacerbation   - Completed solumedrol 60mg Q8H will taper with PO medrol pack as patient does not tolerated prednisone well   - c/w home inhaler spiriva+symbicort with rescue inhaler   - Plan for outpatient follow up with pulmonology in 1 week.   - Plan to f/u with PMD in 1-2 weeks for routine blood work check BMP

## 2018-03-03 NOTE — DISCHARGE NOTE ADULT - CARE PLAN
Principal Discharge DX:	COPD (chronic obstructive pulmonary disease)  Goal:	Medical Management  Assessment and plan of treatment:	Acute on Chronic COPD exacerbation   -Complete course of steroids: Medropak starting tomorrow.   - c/w home inhaler spiriva+symbicort with rescue inhaler as needed  - outpatient follow up with pulmonologist in 1 week     Hyponatremia   - Hx of SIADH with low sodium chronically  - O/p follow up Principal Discharge DX:	COPD (chronic obstructive pulmonary disease)  Goal:	Medical Management  Assessment and plan of treatment:	Acute on Chronic COPD exacerbation   -Complete course of steroids: Medropak starting tomorrow.   - c/w home inhaler spiriva+symbicort with rescue inhaler as needed  - outpatient follow up with pulmonologist in 1 week     Hyponatremia   - Hx of SIADH with low sodium chronically  - O/p follow up  Secondary Diagnosis:	Hyponatremia  Goal:	Medical Management  Assessment and plan of treatment:	Repeat BMP in 1 week with PMD; workup if still abnormal. Encourage oral hydration.

## 2018-03-03 NOTE — DISCHARGE NOTE ADULT - PATIENT PORTAL LINK FT
You can access the [facility], offered by [facility], by registering with the following website: [email or web address]

## 2018-03-07 DIAGNOSIS — R06.02 SHORTNESS OF BREATH: ICD-10-CM

## 2018-03-07 DIAGNOSIS — E22.2 SYNDROME OF INAPPROPRIATE SECRETION OF ANTIDIURETIC HORMONE: ICD-10-CM

## 2018-03-07 DIAGNOSIS — Z66 DO NOT RESUSCITATE: ICD-10-CM

## 2018-03-07 DIAGNOSIS — F17.210 NICOTINE DEPENDENCE, CIGARETTES, UNCOMPLICATED: ICD-10-CM

## 2018-03-07 DIAGNOSIS — J44.1 CHRONIC OBSTRUCTIVE PULMONARY DISEASE WITH (ACUTE) EXACERBATION: ICD-10-CM

## 2018-03-07 DIAGNOSIS — I10 ESSENTIAL (PRIMARY) HYPERTENSION: ICD-10-CM

## 2018-05-02 ENCOUNTER — OUTPATIENT (OUTPATIENT)
Dept: OUTPATIENT SERVICES | Facility: HOSPITAL | Age: 78
LOS: 1 days | Discharge: HOME | End: 2018-05-02

## 2018-05-02 DIAGNOSIS — E26.09 OTHER PRIMARY HYPERALDOSTERONISM: ICD-10-CM

## 2018-05-02 DIAGNOSIS — I10 ESSENTIAL (PRIMARY) HYPERTENSION: ICD-10-CM

## 2018-05-02 DIAGNOSIS — E87.1 HYPO-OSMOLALITY AND HYPONATREMIA: ICD-10-CM

## 2018-05-02 DIAGNOSIS — E27.1 PRIMARY ADRENOCORTICAL INSUFFICIENCY: ICD-10-CM

## 2018-05-02 DIAGNOSIS — E55.9 VITAMIN D DEFICIENCY, UNSPECIFIED: ICD-10-CM

## 2018-05-02 PROBLEM — J44.9 CHRONIC OBSTRUCTIVE PULMONARY DISEASE, UNSPECIFIED: Chronic | Status: ACTIVE | Noted: 2018-03-01

## 2018-10-12 ENCOUNTER — OUTPATIENT (OUTPATIENT)
Dept: OUTPATIENT SERVICES | Facility: HOSPITAL | Age: 78
LOS: 1 days | Discharge: HOME | End: 2018-10-12

## 2018-10-12 DIAGNOSIS — E11.9 TYPE 2 DIABETES MELLITUS WITHOUT COMPLICATIONS: ICD-10-CM

## 2018-10-12 DIAGNOSIS — D51.9 VITAMIN B12 DEFICIENCY ANEMIA, UNSPECIFIED: ICD-10-CM

## 2018-10-12 DIAGNOSIS — I10 ESSENTIAL (PRIMARY) HYPERTENSION: ICD-10-CM

## 2018-10-12 DIAGNOSIS — E55.9 VITAMIN D DEFICIENCY, UNSPECIFIED: ICD-10-CM

## 2018-10-19 ENCOUNTER — OUTPATIENT (OUTPATIENT)
Dept: OUTPATIENT SERVICES | Facility: HOSPITAL | Age: 78
LOS: 1 days | Discharge: HOME | End: 2018-10-19

## 2018-10-19 DIAGNOSIS — E87.1 HYPO-OSMOLALITY AND HYPONATREMIA: ICD-10-CM

## 2018-10-19 DIAGNOSIS — E87.8 OTHER DISORDERS OF ELECTROLYTE AND FLUID BALANCE, NOT ELSEWHERE CLASSIFIED: ICD-10-CM

## 2019-03-05 ENCOUNTER — OUTPATIENT (OUTPATIENT)
Dept: OUTPATIENT SERVICES | Facility: HOSPITAL | Age: 79
LOS: 1 days | Discharge: HOME | End: 2019-03-05

## 2019-03-05 DIAGNOSIS — J44.9 CHRONIC OBSTRUCTIVE PULMONARY DISEASE, UNSPECIFIED: ICD-10-CM

## 2019-03-05 DIAGNOSIS — R06.00 DYSPNEA, UNSPECIFIED: ICD-10-CM

## 2019-03-19 ENCOUNTER — OUTPATIENT (OUTPATIENT)
Dept: OUTPATIENT SERVICES | Facility: HOSPITAL | Age: 79
LOS: 1 days | Discharge: HOME | End: 2019-03-19

## 2019-03-19 DIAGNOSIS — R07.9 CHEST PAIN, UNSPECIFIED: ICD-10-CM

## 2019-03-19 DIAGNOSIS — R06.00 DYSPNEA, UNSPECIFIED: ICD-10-CM

## 2019-07-03 ENCOUNTER — OUTPATIENT (OUTPATIENT)
Dept: OUTPATIENT SERVICES | Facility: HOSPITAL | Age: 79
LOS: 1 days | Discharge: HOME | End: 2019-07-03

## 2019-07-03 DIAGNOSIS — Z00.00 ENCOUNTER FOR GENERAL ADULT MEDICAL EXAMINATION WITHOUT ABNORMAL FINDINGS: ICD-10-CM

## 2019-07-03 DIAGNOSIS — I10 ESSENTIAL (PRIMARY) HYPERTENSION: ICD-10-CM

## 2022-04-18 PROBLEM — Z00.00 ENCOUNTER FOR PREVENTIVE HEALTH EXAMINATION: Status: ACTIVE | Noted: 2022-04-18

## 2022-05-27 ENCOUNTER — APPOINTMENT (OUTPATIENT)
Age: 82
End: 2022-05-27

## 2022-07-13 ENCOUNTER — APPOINTMENT (OUTPATIENT)
Dept: NEUROSURGERY | Facility: CLINIC | Age: 82
End: 2022-07-13

## 2022-07-13 VITALS — BODY MASS INDEX: 18.65 KG/M2 | HEIGHT: 60 IN | WEIGHT: 95 LBS

## 2022-07-13 PROCEDURE — 99204 OFFICE O/P NEW MOD 45 MIN: CPT

## 2022-07-13 NOTE — PLAN
[FreeTextEntry1] : I discussed the MRI and clinical findings with Ms. Roberto in detail.  She has symptoms correlating with her compression fracture and she has failed over 6 weeks of conservative management.  I recommend a T10, 11 kyphoplasty.  I discussed risks and benefits.  She wishes to proceed.

## 2022-07-13 NOTE — HISTORY OF PRESENT ILLNESS
[FreeTextEntry1] : mid back pain [de-identified] : Ms. Roberto is an 81 year-old female who presents with mid back pain after she bent over to make her bed several weeks ago.  Imaging demonstrated T10 and T11 compression fractures.  She tried physician directed medical management, physical therapy and bracing which made it worse.  She is now using having difficulty ambulating secondary to pain and uses assistive devices.  \par \par MRI demonstrates significant T10, 11 compression fractures with retropulsion at T10, however mild canal stenosis

## 2022-07-27 ENCOUNTER — RESULT REVIEW (OUTPATIENT)
Age: 82
End: 2022-07-27

## 2022-07-27 ENCOUNTER — OUTPATIENT (OUTPATIENT)
Dept: OUTPATIENT SERVICES | Facility: HOSPITAL | Age: 82
LOS: 1 days | Discharge: HOME | End: 2022-07-27

## 2022-07-27 VITALS
SYSTOLIC BLOOD PRESSURE: 170 MMHG | WEIGHT: 85.98 LBS | TEMPERATURE: 98 F | HEART RATE: 98 BPM | RESPIRATION RATE: 18 BRPM | OXYGEN SATURATION: 94 % | DIASTOLIC BLOOD PRESSURE: 80 MMHG

## 2022-07-27 DIAGNOSIS — S22.010D: ICD-10-CM

## 2022-07-27 DIAGNOSIS — Z01.818 ENCOUNTER FOR OTHER PREPROCEDURAL EXAMINATION: ICD-10-CM

## 2022-07-27 DIAGNOSIS — Z98.890 OTHER SPECIFIED POSTPROCEDURAL STATES: Chronic | ICD-10-CM

## 2022-07-27 LAB
ALBUMIN SERPL ELPH-MCNC: 4.3 G/DL — SIGNIFICANT CHANGE UP (ref 3.5–5.2)
ALP SERPL-CCNC: 115 U/L — SIGNIFICANT CHANGE UP (ref 30–115)
ALT FLD-CCNC: 12 U/L — SIGNIFICANT CHANGE UP (ref 0–41)
ANION GAP SERPL CALC-SCNC: 14 MMOL/L — SIGNIFICANT CHANGE UP (ref 7–14)
APTT BLD: 35.9 SEC — SIGNIFICANT CHANGE UP (ref 27–39.2)
AST SERPL-CCNC: 17 U/L — SIGNIFICANT CHANGE UP (ref 0–41)
BASOPHILS # BLD AUTO: 0.05 K/UL — SIGNIFICANT CHANGE UP (ref 0–0.2)
BASOPHILS NFR BLD AUTO: 0.6 % — SIGNIFICANT CHANGE UP (ref 0–1)
BILIRUB SERPL-MCNC: 0.6 MG/DL — SIGNIFICANT CHANGE UP (ref 0.2–1.2)
BLD GP AB SCN SERPL QL: SIGNIFICANT CHANGE UP
BUN SERPL-MCNC: 11 MG/DL — SIGNIFICANT CHANGE UP (ref 10–20)
CALCIUM SERPL-MCNC: 9.5 MG/DL — SIGNIFICANT CHANGE UP (ref 8.5–10.1)
CHLORIDE SERPL-SCNC: 91 MMOL/L — LOW (ref 98–110)
CO2 SERPL-SCNC: 26 MMOL/L — SIGNIFICANT CHANGE UP (ref 17–32)
CREAT SERPL-MCNC: <0.5 MG/DL — LOW (ref 0.7–1.5)
EGFR: 93 ML/MIN/1.73M2 — SIGNIFICANT CHANGE UP
EOSINOPHIL # BLD AUTO: 0.05 K/UL — SIGNIFICANT CHANGE UP (ref 0–0.7)
EOSINOPHIL NFR BLD AUTO: 0.6 % — SIGNIFICANT CHANGE UP (ref 0–8)
GLUCOSE SERPL-MCNC: 102 MG/DL — HIGH (ref 70–99)
HCT VFR BLD CALC: 40.8 % — SIGNIFICANT CHANGE UP (ref 37–47)
HGB BLD-MCNC: 13.1 G/DL — SIGNIFICANT CHANGE UP (ref 12–16)
IMM GRANULOCYTES NFR BLD AUTO: 0.5 % — HIGH (ref 0.1–0.3)
INR BLD: 0.96 RATIO — SIGNIFICANT CHANGE UP (ref 0.65–1.3)
LYMPHOCYTES # BLD AUTO: 1.22 K/UL — SIGNIFICANT CHANGE UP (ref 1.2–3.4)
LYMPHOCYTES # BLD AUTO: 14.8 % — LOW (ref 20.5–51.1)
MCHC RBC-ENTMCNC: 30.3 PG — SIGNIFICANT CHANGE UP (ref 27–31)
MCHC RBC-ENTMCNC: 32.1 G/DL — SIGNIFICANT CHANGE UP (ref 32–37)
MCV RBC AUTO: 94.2 FL — SIGNIFICANT CHANGE UP (ref 81–99)
MONOCYTES # BLD AUTO: 0.81 K/UL — HIGH (ref 0.1–0.6)
MONOCYTES NFR BLD AUTO: 9.8 % — HIGH (ref 1.7–9.3)
NEUTROPHILS # BLD AUTO: 6.1 K/UL — SIGNIFICANT CHANGE UP (ref 1.4–6.5)
NEUTROPHILS NFR BLD AUTO: 73.7 % — SIGNIFICANT CHANGE UP (ref 42.2–75.2)
NRBC # BLD: 0 /100 WBCS — SIGNIFICANT CHANGE UP (ref 0–0)
PLATELET # BLD AUTO: 413 K/UL — HIGH (ref 130–400)
POTASSIUM SERPL-MCNC: 4.6 MMOL/L — SIGNIFICANT CHANGE UP (ref 3.5–5)
POTASSIUM SERPL-SCNC: 4.6 MMOL/L — SIGNIFICANT CHANGE UP (ref 3.5–5)
PROT SERPL-MCNC: 7.3 G/DL — SIGNIFICANT CHANGE UP (ref 6–8)
PROTHROM AB SERPL-ACNC: 11.1 SEC — SIGNIFICANT CHANGE UP (ref 9.95–12.87)
RBC # BLD: 4.33 M/UL — SIGNIFICANT CHANGE UP (ref 4.2–5.4)
RBC # FLD: 13.6 % — SIGNIFICANT CHANGE UP (ref 11.5–14.5)
SODIUM SERPL-SCNC: 131 MMOL/L — LOW (ref 135–146)
WBC # BLD: 8.27 K/UL — SIGNIFICANT CHANGE UP (ref 4.8–10.8)
WBC # FLD AUTO: 8.27 K/UL — SIGNIFICANT CHANGE UP (ref 4.8–10.8)

## 2022-07-27 PROCEDURE — 71046 X-RAY EXAM CHEST 2 VIEWS: CPT | Mod: 26

## 2022-07-27 PROCEDURE — 93010 ELECTROCARDIOGRAM REPORT: CPT

## 2022-07-27 RX ORDER — PREGABALIN 225 MG/1
1 CAPSULE ORAL
Qty: 0 | Refills: 0 | DISCHARGE

## 2022-07-27 RX ORDER — CHOLECALCIFEROL (VITAMIN D3) 125 MCG
1 CAPSULE ORAL
Qty: 0 | Refills: 0 | DISCHARGE

## 2022-07-27 NOTE — H&P PST ADULT - TEACHING/LEARNING EDUCATIONAL LEVEL
The area was draped and prepped and the anatomic location of the suspected foreign body was explored in a bloodless field./forceps used to remove FB [facility]

## 2022-07-27 NOTE — H&P PST ADULT - HISTORY OF PRESENT ILLNESS
Patient is a 81 year old female presenting to PAST in preparation for THORACIC 10, THORACIC 11 KYPHOPLASTY on 8/12 under gen anesthesia by Dr. Vick  Reports history of compression fracture to spine and has in creased pain. Reports pain is 6/10 when she is moving and 0 when she is resting. has been advised to have above  PATIENT CURRENTLY DENIES CHEST PAIN  SHORTNESS OF BREATH  PALPITATIONS,  DYSURIA, OR UPPER RESPIRATORY INFECTION IN PAST 2 WEEKS  EXERCISE  TOLERANCE  1/2 FLIGHT OF STAIRS  WITHOUT SHORTNESS OF BREATH    Anesthesia Alert  NO--Difficult Airway  NO--History of neck surgery or radiation  NO--Limited ROM of neck  NO--History of Malignant hyperthermia  NO--Personal or family history of Pseudocholinesterase deficiency  NO--Prior Anesthesia Complication  NO--Latex Allergy  yes--Loose teeth ( upper and lower dentures)  NO--History of Rheumatoid Arthritis  NO--NICHOLE  NO-- BLEEDING RISK  NO--Other_____    As per patient, this is their complete medical and surgical history, including medications both prescribed or over the counter.  Patient verbalized understanding of instructions and was given the opportunity to ask questions and have them answered.  Patient denies any signs or symptoms of COVID 19 and denies contact with known positive individuals.  They have an appointment for COVID testing pre-procedure and acknowledge its time and place.  They were instructed to quarantine pre-procedure, practice exposure control measures, continue to self-monitor and report any concerns to their proceduralist.

## 2022-07-27 NOTE — H&P PST ADULT - NSICDXPASTMEDICALHX_GEN_ALL_CORE_FT
PAST MEDICAL HISTORY:  COPD (chronic obstructive pulmonary disease)     H/O cholelithiasis     Smoker

## 2022-07-29 ENCOUNTER — APPOINTMENT (OUTPATIENT)
Age: 82
End: 2022-07-29

## 2022-07-29 ENCOUNTER — OUTPATIENT (OUTPATIENT)
Dept: OUTPATIENT SERVICES | Facility: HOSPITAL | Age: 82
LOS: 1 days | Discharge: HOME | End: 2022-07-29

## 2022-07-29 DIAGNOSIS — Z02.9 ENCOUNTER FOR ADMINISTRATIVE EXAMINATIONS, UNSPECIFIED: ICD-10-CM

## 2022-07-29 DIAGNOSIS — Z98.890 OTHER SPECIFIED POSTPROCEDURAL STATES: Chronic | ICD-10-CM

## 2022-07-29 PROBLEM — F17.200 NICOTINE DEPENDENCE, UNSPECIFIED, UNCOMPLICATED: Chronic | Status: ACTIVE | Noted: 2022-07-27

## 2022-07-29 PROBLEM — Z87.19 PERSONAL HISTORY OF OTHER DISEASES OF THE DIGESTIVE SYSTEM: Chronic | Status: ACTIVE | Noted: 2022-07-27

## 2022-07-29 LAB
MRSA PCR RESULT.: NEGATIVE — SIGNIFICANT CHANGE UP
SODIUM SERPL-SCNC: 130 MMOL/L — LOW (ref 135–146)

## 2022-08-03 ENCOUNTER — APPOINTMENT (OUTPATIENT)
Age: 82
End: 2022-08-03

## 2022-08-03 VITALS
DIASTOLIC BLOOD PRESSURE: 70 MMHG | RESPIRATION RATE: 14 BRPM | SYSTOLIC BLOOD PRESSURE: 120 MMHG | WEIGHT: 90 LBS | HEIGHT: 60 IN | BODY MASS INDEX: 17.67 KG/M2

## 2022-08-03 VITALS — OXYGEN SATURATION: 98 %

## 2022-08-03 DIAGNOSIS — Z01.811 ENCOUNTER FOR PREPROCEDURAL RESPIRATORY EXAMINATION: ICD-10-CM

## 2022-08-03 PROCEDURE — 99214 OFFICE O/P EST MOD 30 MIN: CPT

## 2022-08-03 NOTE — HISTORY OF PRESENT ILLNESS
[TextBox_4] : COPD STILL ACTIVELY SMOKING\par SP CXR NEW AYLA NODULE\par BACK PAIN FOR KYPHOPLASTY\par INTERMITTENT COUGH

## 2022-08-03 NOTE — DISCUSSION/SUMMARY
[FreeTextEntry1] : COPD ACTIVE SMOKER ON TRIPLE THERAPY\par LUNG MASS ? HIGH RISK CHEST CT\par PULMONARY STANDPOINT NO CONTRAINDICATION TO PLANNED PROCEDURE, INTERMEDIATE RISK, NEEDS TO USE HER INHALERS AS PRESCRIBED\par \par FOR CXR FINDINGS PATENT / DAUGHTER WELL AWARE HIGH RISK FOR MALIGNANCY/ CHEST CT CAN BE DONE AFTER SX\par \par SMOKING COUNSELING

## 2022-08-16 ENCOUNTER — LABORATORY RESULT (OUTPATIENT)
Age: 82
End: 2022-08-16

## 2022-08-19 ENCOUNTER — RESULT REVIEW (OUTPATIENT)
Age: 82
End: 2022-08-19

## 2022-08-19 ENCOUNTER — TRANSCRIPTION ENCOUNTER (OUTPATIENT)
Age: 82
End: 2022-08-19

## 2022-08-19 ENCOUNTER — APPOINTMENT (OUTPATIENT)
Dept: NEUROSURGERY | Facility: HOSPITAL | Age: 82
End: 2022-08-19

## 2022-08-19 ENCOUNTER — OUTPATIENT (OUTPATIENT)
Dept: OUTPATIENT SERVICES | Facility: HOSPITAL | Age: 82
LOS: 1 days | Discharge: HOME | End: 2022-08-19

## 2022-08-19 VITALS
DIASTOLIC BLOOD PRESSURE: 65 MMHG | OXYGEN SATURATION: 93 % | WEIGHT: 85.98 LBS | SYSTOLIC BLOOD PRESSURE: 160 MMHG | HEART RATE: 84 BPM | TEMPERATURE: 98 F | RESPIRATION RATE: 18 BRPM

## 2022-08-19 VITALS
HEART RATE: 90 BPM | RESPIRATION RATE: 17 BRPM | DIASTOLIC BLOOD PRESSURE: 78 MMHG | OXYGEN SATURATION: 95 % | SYSTOLIC BLOOD PRESSURE: 158 MMHG

## 2022-08-19 DIAGNOSIS — Z98.890 OTHER SPECIFIED POSTPROCEDURAL STATES: Chronic | ICD-10-CM

## 2022-08-19 LAB — SODIUM SERPL-SCNC: 134 MMOL/L — LOW (ref 135–146)

## 2022-08-19 PROCEDURE — 22515 PERQ VERTEBRAL AUGMENTATION: CPT | Mod: AS

## 2022-08-19 PROCEDURE — 22513 PERQ VERTEBRAL AUGMENTATION: CPT

## 2022-08-19 PROCEDURE — 88305 TISSUE EXAM BY PATHOLOGIST: CPT | Mod: 26

## 2022-08-19 PROCEDURE — 22515 PERQ VERTEBRAL AUGMENTATION: CPT

## 2022-08-19 PROCEDURE — 88342 IMHCHEM/IMCYTCHM 1ST ANTB: CPT | Mod: 26

## 2022-08-19 PROCEDURE — 88311 DECALCIFY TISSUE: CPT | Mod: 26

## 2022-08-19 PROCEDURE — 22513 PERQ VERTEBRAL AUGMENTATION: CPT | Mod: AS

## 2022-08-19 RX ORDER — ACETAMINOPHEN 500 MG
1000 TABLET ORAL ONCE
Refills: 0 | Status: COMPLETED | OUTPATIENT
Start: 2022-08-19 | End: 2022-08-19

## 2022-08-19 RX ORDER — APREPITANT 80 MG/1
40 CAPSULE ORAL ONCE
Refills: 0 | Status: COMPLETED | OUTPATIENT
Start: 2022-08-19 | End: 2022-08-19

## 2022-08-19 RX ORDER — SODIUM CHLORIDE 9 MG/ML
1000 INJECTION, SOLUTION INTRAVENOUS
Refills: 0 | Status: DISCONTINUED | OUTPATIENT
Start: 2022-08-19 | End: 2022-09-02

## 2022-08-19 RX ORDER — OXYCODONE AND ACETAMINOPHEN 5; 325 MG/1; MG/1
1 TABLET ORAL
Qty: 15 | Refills: 0
Start: 2022-08-19 | End: 2022-08-23

## 2022-08-19 RX ORDER — DOCUSATE SODIUM 100 MG
1 CAPSULE ORAL
Qty: 15 | Refills: 0
Start: 2022-08-19 | End: 2022-08-23

## 2022-08-19 RX ORDER — HYDROMORPHONE HYDROCHLORIDE 2 MG/ML
0.5 INJECTION INTRAMUSCULAR; INTRAVENOUS; SUBCUTANEOUS
Refills: 0 | Status: DISCONTINUED | OUTPATIENT
Start: 2022-08-19 | End: 2022-08-19

## 2022-08-19 RX ADMIN — Medication 1000 MILLIGRAM(S): at 07:14

## 2022-08-19 NOTE — BRIEF OPERATIVE NOTE - NSICDXBRIEFPROCEDURE_GEN_ALL_CORE_FT
PROCEDURES:  Thoracic kyphoplasty 19-Aug-2022 09:04:17  Silke Vick  Vertegeneva augment, percut, thorac, incl cavity create [facility] device, w/ bilat cannulat, add'l single 19-Aug-2022 09:04:25  Silke Vick

## 2022-08-19 NOTE — CHART NOTE - NSCHARTNOTEFT_GEN_A_CORE
PACU ANESTHESIA ADMISSION NOTE      Procedure: Thoracic kyphoplasty    Verteb augment, percut, thorac, incl cavity create using mech device, w/ bilat cannulat, add&#x27;l single      Post op diagnosis:      ____  Intubated  TV:______       Rate: ______      FiO2: ______    __x__  Patent Airway    __x__  Full return of protective reflexes    __x__  Full recovery from anesthesia / back to baseline status    Vitals:  T(C): 36.7 (08-19-22 @ 07:15), Max: 36.7 (08-19-22 @ 06:57)  HR: 84 (08-19-22 @ 07:15) (84 - 84)  BP: 160/65 (08-19-22 @ 07:15) (160/65 - 160/65)  RR: 18 (08-19-22 @ 07:15) (18 - 18)  SpO2: 93% (08-19-22 @ 07:15) (93% - 93%)    Mental Status:  __x__ Awake   ___x__ Alert   _____ Drowsy   _____ Sedated    Nausea/Vomiting:  __x__ NO  ______Yes,   See Post - Op Orders          Pain Scale (0-10):  _____    Treatment: ____ None    __x__ See Post - Op/PCA Orders    Post - Operative Fluids:   ____ Oral   __x__ See Post - Op Orders    Plan: Discharge:   __x__Home       _____Floor     _____Critical Care    _____  Other:_________________    Comments: Patient had smooth intraoperative event, no anesthesia complication.  PACU Vital signs: HR:  95          BP:   130     /  63        RR:    17         O2 Sat:  100     %     Temp 97F

## 2022-08-19 NOTE — ASU DISCHARGE PLAN (ADULT/PEDIATRIC) - ACCOMPANIED BY
Ortho Note    Pt comfortable without complaints, pain controlled  Denies CP, SOB, N/V, numbness/tingling     Vital Signs Last 24 Hrs  T(C): 36.7 (09-25-19 @ 06:01), Max: 36.7 (09-25-19 @ 06:01)  T(F): 98 (09-25-19 @ 06:01), Max: 98 (09-25-19 @ 06:01)  HR: 80 (09-25-19 @ 06:01) (80 - 80)  BP: 112/70 (09-25-19 @ 06:01) (112/70 - 112/70)  BP(mean): --  RR: 18 (09-25-19 @ 06:01) (18 - 18)  SpO2: 95% (09-25-19 @ 06:01) (95% - 95%)  AVSS    General: Pt Alert and oriented, NAD  DSG - aquacel to L hip CDI  Pulses: +2DP, WWP feet  Sensation: SILT  Motor: 5/5 EHL/FHL/TA/GS                          10.6   14.17 )-----------( 194      ( 24 Sep 2019 05:59 )             32.9   24 Sep 2019 05:59    141    |  106    |  14     ----------------------------<  130    4.3     |  25     |  0.62           A/P: 60yFemale POD#2 s/p left total hip replacement (superior)  - Stable  - Pain Control  - DVT ppx: ASA bid  - PT, WBS: WBAT  - continue bowel regimen  - OOB for meals, I/S  - dispo: home with [facility], pending clearance    Ortho Pager [phone] son/Family

## 2022-08-19 NOTE — ASU DISCHARGE PLAN (ADULT/PEDIATRIC) - NS MD DC FALL RISK RISK
For information on Fall & Injury Prevention, visit: [email or web address] OR  [email or web address] OR  https://www.cdc.gov/steadi/patient.html

## 2022-08-19 NOTE — ASU DISCHARGE PLAN (ADULT/PEDIATRIC) - ASU DC SPECIAL INSTRUCTIONSFT
- Upon discharge,  please call to schedule a follow up with Dr. Vick in 1-2 weeks.  - Upon discharge, please call to make a follow up appointment with your primary care provider to discuss your recent hospitalization/operation.  - Keep dressings dry for 48 hours after which you may remove dressing and cleanse with soap and water in the shower (no scrubbing).  Run water and soap over incision sites and pat dry (no scrubbing). No submerging your incision sites in water (i.e. no swimming or baths) for 2-3 weeks and avoid exposing the area to jets/streams of water.   - You can resume your normal activities as tolerated, but avoid heavy (>15lb.) lifting and strenuous exercise for 4-6 weeks.    - You were prescribed percocet (oxycodone-acetaminophen) for pain, take these only as needed.  Your pain should subside over the next few days.  While taking narcotic pain medications, you should not drive or operate heavy machinery. If taking with other medications containing acetaminophen (Tylenol), reduce your dose of percocet so that you  do not exceed 3000mg of acetaminophen per day.  - Narcotic pain medicine tends to cause constipation, you have been prescribed colace 3 times a day to help prevent that. Hold the colace if you start to have loose stools.  - If you experience fevers, chills, increasing abdominal pain, nausea, vomiting, inability to pass stool or gas, bleeding, or any other acute symptoms, please call your doctor and report to the emergency room immediately for further management.

## 2022-08-19 NOTE — PRE-ANESTHESIA EVALUATION ADULT - NSATTENDATTESTRD_GEN_ALL_CORE
See ACC TE. The patient has been re-examined and I agree with the above assessment or I updated with my findings.

## 2022-08-19 NOTE — ASU DISCHARGE PLAN (ADULT/PEDIATRIC) - CARE PROVIDER_API CALL
Silke Vick)  Neurosurgery  [address], [address]  [place] [address]  Phone: [phone]  Fax: [phone]  Follow Up Time:
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
## 2022-08-22 DIAGNOSIS — M48.54XA COLLAPSED VERTEBRA, NOT ELSEWHERE CLASSIFIED, THORACIC REGION, INITIAL ENCOUNTER FOR FRACTURE: ICD-10-CM
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
## 2022-08-22 LAB — SURGICAL PATHOLOGY STUDY: SIGNIFICANT CHANGE UP

## 2022-09-14 ENCOUNTER — APPOINTMENT (OUTPATIENT)
Dept: NEUROSURGERY | Facility: CLINIC | Age: 82
End: 2022-09-14

## 2022-09-14 VITALS — BODY MASS INDEX: 17.08 KG/M2 | HEIGHT: 60 IN | WEIGHT: 87 LBS

## 2022-09-14 PROCEDURE — 99024 POSTOP FOLLOW-UP VISIT: CPT

## 2022-09-14 RX ORDER — ACETAMINOPHEN AND CODEINE 300; 30 MG/1; MG/1
300-30 TABLET ORAL
Qty: 28 | Refills: 0 | Status: DISCONTINUED | COMMUNITY
Start: 2022-08-18 | End: 2022-09-14

## 2022-09-14 RX ORDER — DOCUSATE SODIUM 100 MG/1
100 CAPSULE ORAL
Qty: 14 | Refills: 0 | Status: DISCONTINUED | COMMUNITY
Start: 2022-08-18 | End: 2022-09-14

## 2022-09-15 NOTE — HISTORY OF PRESENT ILLNESS
[FreeTextEntry1] : Ms. Roberto, s/p T10, T11 kyphoplasty on 8/19. Report of low back discomfort, and 2 weeks ago started having left rib pain. Denies falls. Removed sutures. Surgical site is healed. \par Has not started PT.

## 2022-11-28 DIAGNOSIS — M54.50 LOW BACK PAIN, UNSPECIFIED: ICD-10-CM

## 2022-12-14 ENCOUNTER — APPOINTMENT (OUTPATIENT)
Dept: NEUROSURGERY | Facility: CLINIC | Age: 82
End: 2022-12-14

## 2022-12-14 VITALS — HEIGHT: 60 IN | BODY MASS INDEX: 17.08 KG/M2 | WEIGHT: 87 LBS

## 2022-12-14 DIAGNOSIS — R29.898 OTHER SYMPTOMS AND SIGNS INVOLVING THE MUSCULOSKELETAL SYSTEM: ICD-10-CM

## 2022-12-14 DIAGNOSIS — S22.000A WEDGE COMPRESSION FRACTURE OF UNSPECIFIED THORACIC VERTEBRA, INITIAL ENCOUNTER FOR CLOSED FRACTURE: ICD-10-CM

## 2022-12-14 PROCEDURE — 99214 OFFICE O/P EST MOD 30 MIN: CPT

## 2022-12-14 RX ORDER — TIOTROPIUM BROMIDE INHALATION SPRAY 1.56 UG/1
SPRAY, METERED RESPIRATORY (INHALATION)
Refills: 0 | Status: ACTIVE | COMMUNITY

## 2022-12-14 RX ORDER — FLUTICASONE PROPION/SALMETEROL 500-50 MCG
BLISTER, WITH INHALATION DEVICE INHALATION
Refills: 0 | Status: ACTIVE | COMMUNITY

## 2022-12-14 NOTE — REASON FOR VISIT
[Follow-Up: _____] : a [unfilled] follow-up visit [Family Member] : family member [FreeTextEntry1] : This is an 81-year-old female who presents for neurosurgical revisit, she has a history of T10 and T11 kyphoplasty which took place on August 19, 2022.  With regards to her isolated thoracic pain she notes improvements with a near complete resolution of her preoperative discomfort.  What remains most troublesome is a weakness within the lower extremities.  She notes continued difficulty with ambulation for even brief distances.  She often requires a rolling walker when leaving the house.  She remains largely frustrated with regards to her physical limitations and notes that she had done well while enrolled in at home physical therapy.  I have discussed with the patient and her daughter her x-ray findings at this time.  There is a prior evidence of thoracic compression deformity at T10 and T11 status post kyphoplasty.  With regards to her lumbar spine there is evidence of diffuse degenerative changes but overall no acute findings detected.\par \par PHYSICAL EXAM: \par \par Constitutional: Well appearing, no distress\par HEENT: Normocephalic Atraumatic\par Psychiatric: Alert and oriented to all spheres, normal mood\par Pulmonary: No respiratory distress\par \par Neurologic: \par CN II-XII grossly intact\par Palpation: no pain to palpation \par Strength: Strength reduced diffusely within the lower extremities, 4/5 throughout all muscle groups tested with appreciated muscle atrophy throughout her lower extremities. \par Sensation: Full sensation to light touch in all extremities\par Reflexes: \par  2+ patellar\par  2+ ankle jerk\par \par ROM limited\par \par Signs:\par SLR negative\par \par Gait: unsteady, walking w/ assistance.\par

## 2022-12-14 NOTE — ASSESSMENT
[FreeTextEntry1] : This is an 81-year-old female who presents for neurosurgical revisit with regards to lower extremity weakness complaints.  She has responded well to a kyphoplasty at T10 and T11 which took place in August 2022.  She had done quite well with at home physical therapy and I have encouraged her to proceed with outpatient physical therapy and the appropriate requisition has been given at this time.  She and her daughter are largely agreeable that through exercise she can build the strength within the lower extremities and be more self-sufficient within her home.  She has a goal of ambulating without a rolling walker and will convey her desire on to her physical therapist in order to achieve such goals.\par \par All questions and concerns have been answered at this time and the patient return to the office in approximately 2 to 3 months for additional care and treatment efforts.  She can contact us with any questions or concerns in the interim.\par \par Sanjana Lyons PA-C\par Silke Vick MD

## 2023-01-20 ENCOUNTER — APPOINTMENT (OUTPATIENT)
Age: 83
End: 2023-01-20

## 2023-08-25 RX ORDER — PREDNISONE 20 MG/1
20 TABLET ORAL
Qty: 15 | Refills: 0 | Status: ACTIVE | COMMUNITY
Start: 2022-08-03 | End: 1900-01-01

## 2023-09-22 ENCOUNTER — APPOINTMENT (OUTPATIENT)
Dept: PULMONOLOGY | Facility: CLINIC | Age: 83
End: 2023-09-22
Payer: MEDICARE

## 2023-09-22 VITALS
OXYGEN SATURATION: 91 % | BODY MASS INDEX: 16.3 KG/M2 | HEART RATE: 89 BPM | SYSTOLIC BLOOD PRESSURE: 120 MMHG | HEIGHT: 60 IN | WEIGHT: 83 LBS | DIASTOLIC BLOOD PRESSURE: 80 MMHG

## 2023-09-22 DIAGNOSIS — R91.1 SOLITARY PULMONARY NODULE: ICD-10-CM

## 2023-09-22 DIAGNOSIS — J44.9 CHRONIC OBSTRUCTIVE PULMONARY DISEASE, UNSPECIFIED: ICD-10-CM

## 2023-09-22 PROCEDURE — 99214 OFFICE O/P EST MOD 30 MIN: CPT

## 2023-09-22 RX ORDER — ALBUTEROL SULFATE 2.5 MG/3ML
(2.5 MG/3ML) SOLUTION RESPIRATORY (INHALATION) 4 TIMES DAILY
Qty: 1 | Refills: 3 | Status: ACTIVE | COMMUNITY
Start: 2023-09-22 | End: 1900-01-01

## 2023-12-25 ENCOUNTER — INPATIENT (INPATIENT)
Facility: HOSPITAL | Age: 83
LOS: 4 days | Discharge: HOME | DRG: 480 | End: 2023-12-30
Attending: STUDENT IN AN ORGANIZED HEALTH CARE EDUCATION/TRAINING PROGRAM | Admitting: INTERNAL MEDICINE
Payer: MEDICARE

## 2023-12-25 VITALS
TEMPERATURE: 98 F | DIASTOLIC BLOOD PRESSURE: 85 MMHG | HEART RATE: 90 BPM | WEIGHT: 110.89 LBS | OXYGEN SATURATION: 96 % | HEIGHT: 61 IN | SYSTOLIC BLOOD PRESSURE: 131 MMHG | RESPIRATION RATE: 18 BRPM

## 2023-12-25 DIAGNOSIS — S72.102A UNSPECIFIED TROCHANTERIC FRACTURE OF LEFT FEMUR, INITIAL ENCOUNTER FOR CLOSED FRACTURE: ICD-10-CM

## 2023-12-25 DIAGNOSIS — Z98.890 OTHER SPECIFIED POSTPROCEDURAL STATES: Chronic | ICD-10-CM

## 2023-12-25 LAB
ALBUMIN SERPL ELPH-MCNC: 4 G/DL — SIGNIFICANT CHANGE UP (ref 3.5–5.2)
ALBUMIN SERPL ELPH-MCNC: 4 G/DL — SIGNIFICANT CHANGE UP (ref 3.5–5.2)
ALP SERPL-CCNC: 115 U/L — SIGNIFICANT CHANGE UP (ref 30–115)
ALP SERPL-CCNC: 115 U/L — SIGNIFICANT CHANGE UP (ref 30–115)
ALT FLD-CCNC: 10 U/L — SIGNIFICANT CHANGE UP (ref 0–41)
ALT FLD-CCNC: 10 U/L — SIGNIFICANT CHANGE UP (ref 0–41)
ANION GAP SERPL CALC-SCNC: 15 MMOL/L — HIGH (ref 7–14)
ANION GAP SERPL CALC-SCNC: 15 MMOL/L — HIGH (ref 7–14)
APTT BLD: 32.6 SEC — SIGNIFICANT CHANGE UP (ref 27–39.2)
APTT BLD: 32.6 SEC — SIGNIFICANT CHANGE UP (ref 27–39.2)
AST SERPL-CCNC: 18 U/L — SIGNIFICANT CHANGE UP (ref 0–41)
AST SERPL-CCNC: 18 U/L — SIGNIFICANT CHANGE UP (ref 0–41)
BASOPHILS # BLD AUTO: 0.07 K/UL — SIGNIFICANT CHANGE UP (ref 0–0.2)
BASOPHILS # BLD AUTO: 0.07 K/UL — SIGNIFICANT CHANGE UP (ref 0–0.2)
BASOPHILS NFR BLD AUTO: 0.4 % — SIGNIFICANT CHANGE UP (ref 0–1)
BASOPHILS NFR BLD AUTO: 0.4 % — SIGNIFICANT CHANGE UP (ref 0–1)
BILIRUB SERPL-MCNC: 0.6 MG/DL — SIGNIFICANT CHANGE UP (ref 0.2–1.2)
BILIRUB SERPL-MCNC: 0.6 MG/DL — SIGNIFICANT CHANGE UP (ref 0.2–1.2)
BLD GP AB SCN SERPL QL: SIGNIFICANT CHANGE UP
BLD GP AB SCN SERPL QL: SIGNIFICANT CHANGE UP
BUN SERPL-MCNC: 13 MG/DL — SIGNIFICANT CHANGE UP (ref 10–20)
BUN SERPL-MCNC: 13 MG/DL — SIGNIFICANT CHANGE UP (ref 10–20)
CALCIUM SERPL-MCNC: 9.1 MG/DL — SIGNIFICANT CHANGE UP (ref 8.4–10.5)
CALCIUM SERPL-MCNC: 9.1 MG/DL — SIGNIFICANT CHANGE UP (ref 8.4–10.5)
CHLORIDE SERPL-SCNC: 92 MMOL/L — LOW (ref 98–110)
CHLORIDE SERPL-SCNC: 92 MMOL/L — LOW (ref 98–110)
CK SERPL-CCNC: 46 U/L — SIGNIFICANT CHANGE UP (ref 0–225)
CK SERPL-CCNC: 46 U/L — SIGNIFICANT CHANGE UP (ref 0–225)
CO2 SERPL-SCNC: 23 MMOL/L — SIGNIFICANT CHANGE UP (ref 17–32)
CO2 SERPL-SCNC: 23 MMOL/L — SIGNIFICANT CHANGE UP (ref 17–32)
CREAT SERPL-MCNC: 0.8 MG/DL — SIGNIFICANT CHANGE UP (ref 0.7–1.5)
CREAT SERPL-MCNC: 0.8 MG/DL — SIGNIFICANT CHANGE UP (ref 0.7–1.5)
EGFR: 72 ML/MIN/1.73M2 — SIGNIFICANT CHANGE UP
EGFR: 72 ML/MIN/1.73M2 — SIGNIFICANT CHANGE UP
EOSINOPHIL # BLD AUTO: 0.03 K/UL — SIGNIFICANT CHANGE UP (ref 0–0.7)
EOSINOPHIL # BLD AUTO: 0.03 K/UL — SIGNIFICANT CHANGE UP (ref 0–0.7)
EOSINOPHIL NFR BLD AUTO: 0.2 % — SIGNIFICANT CHANGE UP (ref 0–8)
EOSINOPHIL NFR BLD AUTO: 0.2 % — SIGNIFICANT CHANGE UP (ref 0–8)
GLUCOSE SERPL-MCNC: 134 MG/DL — HIGH (ref 70–99)
GLUCOSE SERPL-MCNC: 134 MG/DL — HIGH (ref 70–99)
HCT VFR BLD CALC: 37.1 % — SIGNIFICANT CHANGE UP (ref 37–47)
HCT VFR BLD CALC: 37.1 % — SIGNIFICANT CHANGE UP (ref 37–47)
HGB BLD-MCNC: 12.5 G/DL — SIGNIFICANT CHANGE UP (ref 12–16)
HGB BLD-MCNC: 12.5 G/DL — SIGNIFICANT CHANGE UP (ref 12–16)
IMM GRANULOCYTES NFR BLD AUTO: 0.4 % — HIGH (ref 0.1–0.3)
IMM GRANULOCYTES NFR BLD AUTO: 0.4 % — HIGH (ref 0.1–0.3)
INR BLD: 1.04 RATIO — SIGNIFICANT CHANGE UP (ref 0.65–1.3)
INR BLD: 1.04 RATIO — SIGNIFICANT CHANGE UP (ref 0.65–1.3)
LACTATE SERPL-SCNC: 1.5 MMOL/L — SIGNIFICANT CHANGE UP (ref 0.7–2)
LACTATE SERPL-SCNC: 1.5 MMOL/L — SIGNIFICANT CHANGE UP (ref 0.7–2)
LIDOCAIN IGE QN: 25 U/L — SIGNIFICANT CHANGE UP (ref 7–60)
LIDOCAIN IGE QN: 25 U/L — SIGNIFICANT CHANGE UP (ref 7–60)
LYMPHOCYTES # BLD AUTO: 1.17 K/UL — LOW (ref 1.2–3.4)
LYMPHOCYTES # BLD AUTO: 1.17 K/UL — LOW (ref 1.2–3.4)
LYMPHOCYTES # BLD AUTO: 7 % — LOW (ref 20.5–51.1)
LYMPHOCYTES # BLD AUTO: 7 % — LOW (ref 20.5–51.1)
MCHC RBC-ENTMCNC: 30.5 PG — SIGNIFICANT CHANGE UP (ref 27–31)
MCHC RBC-ENTMCNC: 30.5 PG — SIGNIFICANT CHANGE UP (ref 27–31)
MCHC RBC-ENTMCNC: 33.7 G/DL — SIGNIFICANT CHANGE UP (ref 32–37)
MCHC RBC-ENTMCNC: 33.7 G/DL — SIGNIFICANT CHANGE UP (ref 32–37)
MCV RBC AUTO: 90.5 FL — SIGNIFICANT CHANGE UP (ref 81–99)
MCV RBC AUTO: 90.5 FL — SIGNIFICANT CHANGE UP (ref 81–99)
MONOCYTES # BLD AUTO: 1.05 K/UL — HIGH (ref 0.1–0.6)
MONOCYTES # BLD AUTO: 1.05 K/UL — HIGH (ref 0.1–0.6)
MONOCYTES NFR BLD AUTO: 6.3 % — SIGNIFICANT CHANGE UP (ref 1.7–9.3)
MONOCYTES NFR BLD AUTO: 6.3 % — SIGNIFICANT CHANGE UP (ref 1.7–9.3)
NEUTROPHILS # BLD AUTO: 14.23 K/UL — HIGH (ref 1.4–6.5)
NEUTROPHILS # BLD AUTO: 14.23 K/UL — HIGH (ref 1.4–6.5)
NEUTROPHILS NFR BLD AUTO: 85.7 % — HIGH (ref 42.2–75.2)
NEUTROPHILS NFR BLD AUTO: 85.7 % — HIGH (ref 42.2–75.2)
NRBC # BLD: 0 /100 WBCS — SIGNIFICANT CHANGE UP (ref 0–0)
NRBC # BLD: 0 /100 WBCS — SIGNIFICANT CHANGE UP (ref 0–0)
PLATELET # BLD AUTO: 368 K/UL — SIGNIFICANT CHANGE UP (ref 130–400)
PLATELET # BLD AUTO: 368 K/UL — SIGNIFICANT CHANGE UP (ref 130–400)
PMV BLD: 9.4 FL — SIGNIFICANT CHANGE UP (ref 7.4–10.4)
PMV BLD: 9.4 FL — SIGNIFICANT CHANGE UP (ref 7.4–10.4)
POTASSIUM SERPL-MCNC: 4.9 MMOL/L — SIGNIFICANT CHANGE UP (ref 3.5–5)
POTASSIUM SERPL-MCNC: 4.9 MMOL/L — SIGNIFICANT CHANGE UP (ref 3.5–5)
POTASSIUM SERPL-SCNC: 4.9 MMOL/L — SIGNIFICANT CHANGE UP (ref 3.5–5)
POTASSIUM SERPL-SCNC: 4.9 MMOL/L — SIGNIFICANT CHANGE UP (ref 3.5–5)
PROT SERPL-MCNC: 6.9 G/DL — SIGNIFICANT CHANGE UP (ref 6–8)
PROT SERPL-MCNC: 6.9 G/DL — SIGNIFICANT CHANGE UP (ref 6–8)
PROTHROM AB SERPL-ACNC: 11.9 SEC — SIGNIFICANT CHANGE UP (ref 9.95–12.87)
PROTHROM AB SERPL-ACNC: 11.9 SEC — SIGNIFICANT CHANGE UP (ref 9.95–12.87)
RBC # BLD: 4.1 M/UL — LOW (ref 4.2–5.4)
RBC # BLD: 4.1 M/UL — LOW (ref 4.2–5.4)
RBC # FLD: 13.8 % — SIGNIFICANT CHANGE UP (ref 11.5–14.5)
RBC # FLD: 13.8 % — SIGNIFICANT CHANGE UP (ref 11.5–14.5)
SODIUM SERPL-SCNC: 130 MMOL/L — LOW (ref 135–146)
SODIUM SERPL-SCNC: 130 MMOL/L — LOW (ref 135–146)
TROPONIN T, HIGH SENSITIVITY RESULT: 22 NG/L — HIGH (ref 6–13)
TROPONIN T, HIGH SENSITIVITY RESULT: 22 NG/L — HIGH (ref 6–13)
WBC # BLD: 16.62 K/UL — HIGH (ref 4.8–10.8)
WBC # BLD: 16.62 K/UL — HIGH (ref 4.8–10.8)
WBC # FLD AUTO: 16.62 K/UL — HIGH (ref 4.8–10.8)
WBC # FLD AUTO: 16.62 K/UL — HIGH (ref 4.8–10.8)

## 2023-12-25 PROCEDURE — 84075 ASSAY ALKALINE PHOSPHATASE: CPT

## 2023-12-25 PROCEDURE — 99223 1ST HOSP IP/OBS HIGH 75: CPT

## 2023-12-25 PROCEDURE — 87635 SARS-COV-2 COVID-19 AMP PRB: CPT

## 2023-12-25 PROCEDURE — 74177 CT ABD & PELVIS W/CONTRAST: CPT | Mod: 26,MA

## 2023-12-25 PROCEDURE — 86334 IMMUNOFIX E-PHORESIS SERUM: CPT

## 2023-12-25 PROCEDURE — 97116 GAIT TRAINING THERAPY: CPT | Mod: GP

## 2023-12-25 PROCEDURE — C1769: CPT

## 2023-12-25 PROCEDURE — 86900 BLOOD TYPING SEROLOGIC ABO: CPT

## 2023-12-25 PROCEDURE — 84484 ASSAY OF TROPONIN QUANT: CPT

## 2023-12-25 PROCEDURE — 86850 RBC ANTIBODY SCREEN: CPT

## 2023-12-25 PROCEDURE — 83615 LACTATE (LD) (LDH) ENZYME: CPT

## 2023-12-25 PROCEDURE — 97162 PT EVAL MOD COMPLEX 30 MIN: CPT | Mod: GP

## 2023-12-25 PROCEDURE — 84165 PROTEIN E-PHORESIS SERUM: CPT

## 2023-12-25 PROCEDURE — 93010 ELECTROCARDIOGRAM REPORT: CPT

## 2023-12-25 PROCEDURE — 84100 ASSAY OF PHOSPHORUS: CPT

## 2023-12-25 PROCEDURE — 72170 X-RAY EXAM OF PELVIS: CPT | Mod: 26

## 2023-12-25 PROCEDURE — 80053 COMPREHEN METABOLIC PANEL: CPT

## 2023-12-25 PROCEDURE — C9399: CPT

## 2023-12-25 PROCEDURE — 97166 OT EVAL MOD COMPLEX 45 MIN: CPT | Mod: GO

## 2023-12-25 PROCEDURE — 84155 ASSAY OF PROTEIN SERUM: CPT

## 2023-12-25 PROCEDURE — 86923 COMPATIBILITY TEST ELECTRIC: CPT

## 2023-12-25 PROCEDURE — 82553 CREATINE MB FRACTION: CPT

## 2023-12-25 PROCEDURE — 99285 EMERGENCY DEPT VISIT HI MDM: CPT | Mod: GC

## 2023-12-25 PROCEDURE — 83970 ASSAY OF PARATHORMONE: CPT

## 2023-12-25 PROCEDURE — 73564 X-RAY EXAM KNEE 4 OR MORE: CPT | Mod: 26,LT

## 2023-12-25 PROCEDURE — 85025 COMPLETE CBC W/AUTO DIFF WBC: CPT

## 2023-12-25 PROCEDURE — 73700 CT LOWER EXTREMITY W/O DYE: CPT | Mod: LT

## 2023-12-25 PROCEDURE — A9579: CPT

## 2023-12-25 PROCEDURE — 88305 TISSUE EXAM BY PATHOLOGIST: CPT

## 2023-12-25 PROCEDURE — 70450 CT HEAD/BRAIN W/O DYE: CPT | Mod: 26,MA

## 2023-12-25 PROCEDURE — 88331 PATH CONSLTJ SURG 1 BLK 1SPC: CPT

## 2023-12-25 PROCEDURE — 80048 BASIC METABOLIC PNL TOTAL CA: CPT

## 2023-12-25 PROCEDURE — 72125 CT NECK SPINE W/O DYE: CPT | Mod: 26,MA

## 2023-12-25 PROCEDURE — 71260 CT THORAX DX C+: CPT | Mod: 26,MA

## 2023-12-25 PROCEDURE — 85027 COMPLETE CBC AUTOMATED: CPT

## 2023-12-25 PROCEDURE — 83735 ASSAY OF MAGNESIUM: CPT

## 2023-12-25 PROCEDURE — 86901 BLOOD TYPING SEROLOGIC RH(D): CPT

## 2023-12-25 PROCEDURE — 36430 TRANSFUSION BLD/BLD COMPNT: CPT

## 2023-12-25 PROCEDURE — 73552 X-RAY EXAM OF FEMUR 2/>: CPT | Mod: 26,LT

## 2023-12-25 PROCEDURE — 88311 DECALCIFY TISSUE: CPT

## 2023-12-25 PROCEDURE — 94640 AIRWAY INHALATION TREATMENT: CPT

## 2023-12-25 PROCEDURE — 97530 THERAPEUTIC ACTIVITIES: CPT | Mod: GP

## 2023-12-25 PROCEDURE — 73501 X-RAY EXAM HIP UNI 1 VIEW: CPT | Mod: LT

## 2023-12-25 PROCEDURE — 85610 PROTHROMBIN TIME: CPT

## 2023-12-25 PROCEDURE — 88342 IMHCHEM/IMCYTCHM 1ST ANTB: CPT

## 2023-12-25 PROCEDURE — 82652 VIT D 1 25-DIHYDROXY: CPT

## 2023-12-25 PROCEDURE — 93005 ELECTROCARDIOGRAM TRACING: CPT

## 2023-12-25 PROCEDURE — 36415 COLL VENOUS BLD VENIPUNCTURE: CPT

## 2023-12-25 PROCEDURE — 97110 THERAPEUTIC EXERCISES: CPT | Mod: GP

## 2023-12-25 PROCEDURE — P9040: CPT

## 2023-12-25 PROCEDURE — 82310 ASSAY OF CALCIUM: CPT

## 2023-12-25 PROCEDURE — 73720 MRI LWR EXTREMITY W/O&W/DYE: CPT | Mod: LT

## 2023-12-25 PROCEDURE — 71045 X-RAY EXAM CHEST 1 VIEW: CPT | Mod: 26

## 2023-12-25 PROCEDURE — C1713: CPT

## 2023-12-25 PROCEDURE — 85730 THROMBOPLASTIN TIME PARTIAL: CPT

## 2023-12-25 RX ORDER — FLUTICASONE PROPIONATE AND SALMETEROL 50; 250 UG/1; UG/1
1 POWDER ORAL; RESPIRATORY (INHALATION)
Refills: 0 | DISCHARGE

## 2023-12-25 RX ORDER — HYDROMORPHONE HYDROCHLORIDE 2 MG/ML
0.5 INJECTION INTRAMUSCULAR; INTRAVENOUS; SUBCUTANEOUS ONCE
Refills: 0 | Status: DISCONTINUED | OUTPATIENT
Start: 2023-12-25 | End: 2023-12-25

## 2023-12-25 RX ORDER — POLYETHYLENE GLYCOL 3350 17 G/17G
17 POWDER, FOR SOLUTION ORAL DAILY
Refills: 0 | Status: DISCONTINUED | OUTPATIENT
Start: 2023-12-25 | End: 2023-12-26

## 2023-12-25 RX ORDER — ALBUTEROL 90 UG/1
0 AEROSOL, METERED ORAL
Qty: 0 | Refills: 0 | DISCHARGE

## 2023-12-25 RX ORDER — ENOXAPARIN SODIUM 100 MG/ML
40 INJECTION SUBCUTANEOUS ONCE
Refills: 0 | Status: COMPLETED | OUTPATIENT
Start: 2023-12-25 | End: 2023-12-25

## 2023-12-25 RX ORDER — MORPHINE SULFATE 50 MG/1
2 CAPSULE, EXTENDED RELEASE ORAL ONCE
Refills: 0 | Status: DISCONTINUED | OUTPATIENT
Start: 2023-12-25 | End: 2023-12-25

## 2023-12-25 RX ORDER — BUDESONIDE AND FORMOTEROL FUMARATE DIHYDRATE 160; 4.5 UG/1; UG/1
2 AEROSOL RESPIRATORY (INHALATION)
Refills: 0 | Status: DISCONTINUED | OUTPATIENT
Start: 2023-12-25 | End: 2023-12-26

## 2023-12-25 RX ORDER — IPRATROPIUM/ALBUTEROL SULFATE 18-103MCG
3 AEROSOL WITH ADAPTER (GRAM) INHALATION EVERY 6 HOURS
Refills: 0 | Status: DISCONTINUED | OUTPATIENT
Start: 2023-12-25 | End: 2023-12-26

## 2023-12-25 RX ORDER — TIOTROPIUM BROMIDE 18 UG/1
2 CAPSULE ORAL; RESPIRATORY (INHALATION) DAILY
Refills: 0 | Status: DISCONTINUED | OUTPATIENT
Start: 2023-12-25 | End: 2023-12-26

## 2023-12-25 RX ORDER — TIOTROPIUM BROMIDE 18 UG/1
1 CAPSULE ORAL; RESPIRATORY (INHALATION)
Qty: 0 | Refills: 0 | DISCHARGE

## 2023-12-25 RX ORDER — SENNA PLUS 8.6 MG/1
2 TABLET ORAL AT BEDTIME
Refills: 0 | Status: DISCONTINUED | OUTPATIENT
Start: 2023-12-25 | End: 2023-12-26

## 2023-12-25 RX ORDER — MORPHINE SULFATE 50 MG/1
4 CAPSULE, EXTENDED RELEASE ORAL ONCE
Refills: 0 | Status: DISCONTINUED | OUTPATIENT
Start: 2023-12-25 | End: 2023-12-25

## 2023-12-25 RX ADMIN — MORPHINE SULFATE 4 MILLIGRAM(S): 50 CAPSULE, EXTENDED RELEASE ORAL at 15:42

## 2023-12-25 RX ADMIN — HYDROMORPHONE HYDROCHLORIDE 0.5 MILLIGRAM(S): 2 INJECTION INTRAMUSCULAR; INTRAVENOUS; SUBCUTANEOUS at 18:18

## 2023-12-25 RX ADMIN — SENNA PLUS 2 TABLET(S): 8.6 TABLET ORAL at 21:39

## 2023-12-25 RX ADMIN — MORPHINE SULFATE 2 MILLIGRAM(S): 50 CAPSULE, EXTENDED RELEASE ORAL at 22:15

## 2023-12-25 RX ADMIN — ENOXAPARIN SODIUM 40 MILLIGRAM(S): 100 INJECTION SUBCUTANEOUS at 21:39

## 2023-12-25 NOTE — ED ADULT NURSE NOTE - NS ED NURSE LEVEL OF CONSCIOUSNESS ORIENTATION
[de-identified] : This is an 84 yo M recently hospitalized for hemoptysis, hiatal hernia, new\par recently trouble holding a fork\par Has [place] for his legs.  \par Walking with a walker with help and a belt.  \par Home 24 /7 5 different people.  \par [facility]  \par OPhtho:  Dr Clark [address]  \par [facility] aids- [facility]  
Oriented - self; Oriented - place; Oriented - time

## 2023-12-25 NOTE — PRE PROCEDURE NOTE - PRE PROCEDURE EVALUATION
ORTHOPEDIC SURGERY PRE-OP NOTE    Diagnosis: Left intertochanteric femur fracture   Planned Procedure: Left femur open reduction internal fixation     Consent: TO BE OBTAINED BY ATTENDING. Risks/benefits/alternatives were discussed with the patient/family and they wish to proceed with surgery.     ANTICIPATED DATE OF PROCEDURE: 12/26  SCHEDULED CASE OR ADD-ON CASE: Add on case    Clearances:   [ ] Medicine  [ ] Pulmonary     T(C): 36.4 (12-25-23 @ 14:52), Max: 36.4 (12-25-23 @ 14:52)  HR: 90 (12-25-23 @ 14:52) (90 - 90)  BP: 131/85 (12-25-23 @ 14:52) (131/85 - 131/85)  RR: 18 (12-25-23 @ 14:52) (18 - 18)  SpO2: 96% (12-25-23 @ 14:52) (96% - 96%)    Labs:                        12.5   16.62 )-----------( 368      ( 25 Dec 2023 15:22 )             37.1     12-25    130<L>  |  92<L>  |  13  ----------------------------<  134<H>  4.9   |  23  |  0.8    Ca    9.1      25 Dec 2023 15:22    TPro  6.9  /  Alb  4.0  /  TBili  0.6  /  DBili  x   /  AST  18  /  ALT  10  /  AlkPhos  115  12-25    PT/INR - ( 25 Dec 2023 15:22 )   PT: 11.90 sec;   INR: 1.04 ratio         PTT - ( 25 Dec 2023 15:22 )  PTT:32.6 sec  Type and Screen x2  [x] 12/25 [ ] pending           [X] EKG - 12/25  [X] CXR - 12/25    DIET: NPO after midnight  IVF: Per primary team    ANTICOAGULATION STATUS: Per primary, Hold AM dose for OR     A/P: Patient is a 83y y/o Female pending left femur open reduction internal fixation tomorrow, 12/26.    - NPO and IVF @ midnight  - Type and Screen x2 required, 2u RBCs on hold for OR  - Pre-Op CBC, CMP/BMP, PT/PTT/INR, Type & Screen x2, CXR, EKG, COVID test  - Patient requires Internal Medicine pre-op clearance / risk stratification / medical optimization  - Pain control/analgesia PRN per primary team   - Incentive Spirometry   - F/U clearance  - F/U pending Labs    Notify Ortho with any questions - Spectra [id number]    [X] ABOVE WAS DISCUSSED WITH PRIMARY TEAM MEMBER: Luis Angel Nogueira  [X] Date and Time DISCUSSED WITH PRIMARY TEAM MEMBER: 10pm

## 2023-12-25 NOTE — ED PROVIDER NOTE - PROGRESS NOTE DETAILS
TWIN Muse.- Spoke to pt and daughter regarding pulmonary nodules. They state they are aware of nodues and they follow up w. Dr. Ledesma.

## 2023-12-25 NOTE — H&P ADULT - NSHPPHYSICALEXAM_GEN_ALL_CORE
General: well appearing elderly female in no acute distress  Head: normocephalic, nontraumatic   Cardio: RRR  Resp: LCTA b/l  Abdomen: soft, nontender, nondistended  Ext: no edema, limited ROM LLE due to pain, skin abrasion on L shin  Neuro: A&Ox3, no focal deficits

## 2023-12-25 NOTE — ED ADULT TRIAGE NOTE - CHIEF COMPLAINT QUOTE
PT TEQUILA had slip and fall off bed around 1230 due to comforter. Family belives she was on the floor for about 2 hours prior to someone finding her. LEft hip pain noted with obvious shortening and rotation

## 2023-12-25 NOTE — ED PROVIDER NOTE - PHYSICAL EXAMINATION
VITAL SIGNS: I have reviewed nursing notes and confirm.  CONSTITUTIONAL: Uncomfortable   SKIN: 2cm skin tear to L shin   HEAD: NCAT  EYES: EOMI, PERRLA  NECK: Supple; non tender. Full ROM. No cervical LAD  CARD: RRR, no murmurs, rubs or gallops  RESP: + expiratory wheezing bilat.   ABD: soft, + BS, non-tender, non-distended, no rebound or guarding  EXT: L leg shortened and externally rotated, + L pedal pulse, + sensation  NEURO: Normal sensory   PSYCH: Cooperative, appropriate.

## 2023-12-25 NOTE — ED ADULT NURSE NOTE - CAS TRG GEN SKIN COLOR
on the discharge service for the patient. I have reviewed and made amendments to the documentation where necessary. Normal for race

## 2023-12-25 NOTE — H&P ADULT - ASSESSMENT
82 y/o female with a PMHx COPD and chronic back pain s/p multiple surgeries presenting to the ER after slipping off her bed. Not on AC, no HT, no LOC. Found to have L intertrochanteric femur fracture.     #Left Intertrochanteric Femur Fracture  - Vitals on admission --> /85, HR 90, RR 18, T97.6, O2 96% RA  - Labs on admission -->   - ECG --> NSR w/ PVCs  - CTH --> nothing acute, 6mm degen anterolisthesis of C7 on T1, mod/sev chronic microvascular ischemic changes  - CT CS --> nothing acute  - CT CAP --> Acute comminuted left intertrochanteric femur fracture, Moderate to severe centrilobular emphysema, Secretions seen in the right mainstem bronchus, Mild bilateral bronchial wall thickening with lower lobe endobronchial opacities, Left upper lobe linear stellate density seen on series 6 image 98 with areas of fat density within, 8mm LL nodule, 9mm LL nodule  - XR Pelvis --> acute left intertrochanteric femur fracture w/ varus angulation, multilevel degenerative changes of the spine and R hip joint  - Ortho Consult --> bed rest, pain control, hold DVT ppx in AM, medicine preop clearance, preop labs / ECG / CXR / ECG, trend Hb, 2u PRBC on hold for OR, NPO for surgery tomorrow 12/26  - S/p morphine and dilaudid in ED  - Admitted to medicine for further management  - Preop labs ordered for 23:30  - 2u PRBC ordered on hold for OR   - Pain control PRN    #Hx of COPD  - C/w home meds    #Hx of Chronic Back Pain  - C/w home meds    #MISC  - Diet: NPO for surgery tomorrow  - DVT: lovenox x 1 dose, holding AM dose for surgery   - GI: protonix  - Activity: Bed Rest  - Pending: preop labs 82 y/o female with a PMHx COPD, OA and chronic back pain s/p multiple surgeries presenting to the ER after slipping off her bed. Not on AC, no HT, no LOC. Found to have L intertrochanteric femur fracture.     #Left Intertrochanteric Femur Fracture  - Vitals on admission --> /85, HR 90, RR 18, T97.6, O2 96% RA  - Labs on admission --> WBC 16, Hb 12.5, HS Trop 22, Na 130, K 4.9, AG 15, CK 46  - ECG --> NSR w/ PVCs  - CTH --> nothing acute, 6mm degen anterolisthesis of C7 on T1, mod/sev chronic microvascular ischemic changes  - CT CS --> nothing acute  - CT CAP --> Acute comminuted left intertrochanteric femur fracture, Moderate to severe centrilobular emphysema, Secretions seen in the right mainstem bronchus, Mild bilateral bronchial wall thickening with lower lobe endobronchial opacities, Left upper lobe linear stellate density seen on series 6 image 98 with areas of fat density within, 8mm LL nodule, 9mm LL nodule  - XR Pelvis --> acute left intertrochanteric femur fracture w/ varus angulation, multilevel degenerative changes of the spine and R hip joint  - Ortho Consult --> bed rest, pain control, hold DVT ppx in AM, medicine preop clearance, preop labs / ECG / CXR / ECG, trend Hb, 2u PRBC on hold for OR, NPO for surgery tomorrow 12/26  - S/p morphine and dilaudid in ED  - Admitted to medicine for further management  - Preop labs ordered for 23:30  - 2u PRBC ordered on hold for OR   - Pain control PRN  - Bowel regimen    #Leukocytosis, likely reactive  - WBC 16 on admission   - No sepsis POA  - Monitor     #Hx of COPD  #Current Smoker  - On Spiriva and Advair at home, unsure of doses, pharmacy closed  - Will start Tiotropium 2.5mcg 2 puffs QD  - Will start Budesonide/Formoterol 80/4.5mcg 2 puffs BID  - Please confirm doses with pharmacy in AM ([facility] [phone])    #Hx of Chronic Back Pain  #Hx of Compression Fracture  #Hx of OA  - Not on home meds  - Monitor     #MISC  - Diet: NPO for surgery tomorrow  - DVT: lovenox x 1 dose, holding AM dose for surgery   - GI: protonix  - Activity: Bed Rest  - Pending: preop labs 82 y/o female with a PMHx COPD, OA and chronic back pain s/p multiple surgeries presenting to the ER after slipping off her bed. Not on AC, no HT, no LOC. Found to have L intertrochanteric femur fracture.     #Left Intertrochanteric Femur Fracture  - Vitals on admission --> /85, HR 90, RR 18, T97.6, O2 96% RA  - Labs on admission --> WBC 16, Hb 12.5, HS Trop 22, Na 130, K 4.9, AG 15, CK 46  - ECG --> NSR w/ PVCs  - CTH --> nothing acute, 6mm degen anterolisthesis of C7 on T1, mod/sev chronic microvascular ischemic changes  - CT CS --> nothing acute  - CT CAP --> Acute comminuted left intertrochanteric femur fracture, Moderate to severe centrilobular emphysema, Secretions seen in the right mainstem bronchus, Mild bilateral bronchial wall thickening with lower lobe endobronchial opacities, Left upper lobe linear stellate density seen on series 6 image 98 with areas of fat density within, 8mm LL nodule, 9mm LL nodule  - XR Pelvis --> acute left intertrochanteric femur fracture w/ varus angulation, multilevel degenerative changes of the spine and R hip joint  - Ortho Consult --> bed rest, pain control, hold DVT ppx in AM, medicine preop clearance, preop labs / ECG / CXR / ECG, trend Hb, 2u PRBC on hold for OR, NPO for surgery tomorrow 12/26  - S/p morphine and dilaudid in ED  - Admitted to medicine for further management  - Preop labs ordered for 23:30  - 2u PRBC ordered on hold for OR   - Pain control PRN  - Bowel regimen    #Leukocytosis, likely reactive  - WBC 16 on admission   - No sepsis POA  - Monitor     #Hx of COPD  #Current Smoker  - On Spiriva and Advair at home, unsure of doses, pharmacy closed  - Will start Tiotropium 2.5mcg 2 puffs QD  - Will start Budesonide/Formoterol 80/4.5mcg 2 puffs BID  - Please confirm doses with pharmacy in AM ([facility] [phone])    #Hx of Chronic Back Pain  #Hx of Compression Fracture  #Hx of OA  - Not on home meds  - Monitor     #MISC  - Diet: NPO for surgery tomorrow  - DVT: lovenox x 1 dose, holding AM dose for surgery   - GI: protonix  - Activity: Bed Rest  - Pending: preop labs 84 y/o female with a PMHx COPD, OA and chronic back pain s/p multiple surgeries presenting to the ER after slipping off her bed. Not on AC, no HT, no LOC. Found to have L intertrochanteric femur fracture.     #Left Intertrochanteric Femur Fracture  - Vitals on admission --> /85, HR 90, RR 18, T97.6, O2 96% RA  - Labs on admission --> WBC 16, Hb 12.5, HS Trop 22, Na 130, K 4.9, AG 15, CK 46  - ECG --> NSR w/ PVCs  - CTH --> nothing acute, 6mm degen anterolisthesis of C7 on T1, mod/sev chronic microvascular ischemic changes  - CT CS --> nothing acute  - CT CAP --> Acute comminuted left intertrochanteric femur fracture, Moderate to severe centrilobular emphysema, Secretions seen in the right mainstem bronchus, Mild bilateral bronchial wall thickening with lower lobe endobronchial opacities, Left upper lobe linear stellate density seen on series 6 image 98 with areas of fat density within, 8mm LL nodule, 9mm LL nodule  - XR Pelvis --> acute left intertrochanteric femur fracture w/ varus angulation, multilevel degenerative changes of the spine and R hip joint  - Ortho Consult --> bed rest, pain control, hold DVT ppx in AM, medicine preop clearance, preop labs / ECG / CXR / ECG, trend Hb, 2u PRBC on hold for OR, NPO for surgery tomorrow 12/26  - S/p morphine and dilaudid in ED  - Admitted to medicine for further management  - PT consult after surgery  - Preop labs ordered for 23:30  - 2u PRBC ordered on hold for OR   - Pain control PRN  - Bowel regimen    #Leukocytosis, likely reactive  - WBC 16 on admission   - No sepsis POA  - Monitor     #Hx of COPD  #Current Smoker  - On Spiriva and Advair at home, unsure of doses, pharmacy closed  - Will start Tiotropium 2.5mcg 2 puffs QD  - Will start Budesonide/Formoterol 80/4.5mcg 2 puffs BID  - Please confirm doses with pharmacy in AM ([facility] [phone])    #Hx of Chronic Back Pain  #Hx of Compression Fracture  #Hx of OA  - Not on home meds  - Monitor     #MISC  - Diet: NPO for surgery tomorrow  - DVT: lovenox x 1 dose, holding AM dose for surgery   - GI: protonix  - Activity: Bed rest for now, pending ortho recs after surgery   - Pending: preop labs 82 y/o female with a PMHx COPD, OA and chronic back pain s/p multiple surgeries presenting to the ER after slipping off her bed. Not on AC, no HT, no LOC. Found to have L intertrochanteric femur fracture.     #Left Intertrochanteric Femur Fracture  - Vitals on admission --> /85, HR 90, RR 18, T97.6, O2 96% RA  - Labs on admission --> WBC 16, Hb 12.5, HS Trop 22, Na 130, K 4.9, AG 15, CK 46  - ECG --> NSR w/ PVCs  - CTH --> nothing acute, 6mm degen anterolisthesis of C7 on T1, mod/sev chronic microvascular ischemic changes  - CT CS --> nothing acute  - CT CAP --> Acute comminuted left intertrochanteric femur fracture, Moderate to severe centrilobular emphysema, Secretions seen in the right mainstem bronchus, Mild bilateral bronchial wall thickening with lower lobe endobronchial opacities, Left upper lobe linear stellate density seen on series 6 image 98 with areas of fat density within, 8mm LL nodule, 9mm LL nodule  - XR Pelvis --> acute left intertrochanteric femur fracture w/ varus angulation, multilevel degenerative changes of the spine and R hip joint  - Ortho Consult --> bed rest, pain control, hold DVT ppx in AM, medicine preop clearance, preop labs / ECG / CXR / ECG, trend Hb, 2u PRBC on hold for OR, NPO for surgery tomorrow 12/26  - S/p morphine and dilaudid in ED  - Admitted to medicine for further management  - PT consult after surgery  - Preop labs ordered for 23:30  - 2u PRBC ordered on hold for OR   - Pain control PRN  - Bowel regimen    #Leukocytosis, likely reactive  - WBC 16 on admission   - No sepsis POA  - Monitor     #Hx of COPD  #Current Smoker  - On Spiriva and Advair at home, unsure of doses, pharmacy closed  - Will start Tiotropium 2.5mcg 2 puffs QD  - Will start Budesonide/Formoterol 80/4.5mcg 2 puffs BID  - Please confirm doses with pharmacy in AM ([facility] [phone])    #Hx of Chronic Back Pain  #Hx of Compression Fracture  #Hx of OA  - Not on home meds  - Monitor     #MISC  - Diet: NPO for surgery tomorrow  - DVT: lovenox x 1 dose, holding AM dose for surgery   - GI: protonix  - Activity: Bed rest for now, pending ortho recs after surgery   - Pending: preop labs

## 2023-12-25 NOTE — ED ADULT NURSE NOTE - NSFALLHARMRISKINTERV_ED_ALL_ED
Assistance OOB with selected safe patient handling equipment if applicable/Assistance with ambulation/Communicate risk of Fall with Harm to all staff, patient, and family/Monitor gait and stability/Provide patient with walking aids/Provide visual cue: red socks, yellow wristband, yellow gown, etc/Reinforce activity limits and safety measures with patient and family/Bed in lowest position, wheels locked, appropriate side rails in place/Call bell, personal items and telephone in reach/Instruct patient to call for assistance before getting out of bed/chair/stretcher/Non-slip footwear applied when patient is off stretcher/Couch to call system/Physically safe environment - no spills, clutter or unnecessary equipment/Purposeful Proactive Rounding/Room/bathroom lighting operational, light cord in reach Assistance OOB with selected safe patient handling equipment if applicable/Assistance with ambulation/Communicate risk of Fall with Harm to all staff, patient, and family/Monitor gait and stability/Provide patient with walking aids/Provide visual cue: red socks, yellow wristband, yellow gown, etc/Reinforce activity limits and safety measures with patient and family/Bed in lowest position, wheels locked, appropriate side rails in place/Call bell, personal items and telephone in reach/Instruct patient to call for assistance before getting out of bed/chair/stretcher/Non-slip footwear applied when patient is off stretcher/[place] to call system/Physically safe environment - no spills, clutter or unnecessary equipment/Purposeful Proactive Rounding/Room/bathroom lighting operational, light cord in reach

## 2023-12-25 NOTE — H&P ADULT - ATTENDING COMMENTS
82 y/o female with a PMHx COPD, OA and chronic back pain s/p multiple surgeries presenting to the ER after slipping off her bed. Not on AC, no HT, no LOC. Fell on coccyx, unable to ambulate after the fall.  Complaining of pain to left hip / upper leg area and a skin scrape on the left shin. Sensation intact b/l LE, denies numbness/tingling, CP/SOB. Remainder of ROS negative. - Ortho Consult --> plan for OR tomorrow     In the ED:   - Vitals on admission --> /85, HR 90, RR 18, T97.6, O2 96% RA  - Labs on admission --> WBC 16, Hb 12.5, HS Trop 22, Na 130, K 4.9, AG 15, CK 46      - S/p morphine and dilaudid in ED  - Admitted to medicine for further management    GENERAL: NAD, lying in bed comfortably  CHEST/LUNG: diffuse expiratory wheeze. prolonged expiratory phase.   HEART: Regular rate and rhythm; No murmurs, rubs, or gallops  ABDOMEN: Bowel sounds present; Soft, Nontender, Nondistended.    EXTREMITIES:  no edema  NERVOUS SYSTEM:  Alert & Oriented X3, speech clear. No deficits   MSK: LLE swelling on lateral side    82 y/o female with a PMHx COPD, OA and chronic back pain s/p multiple surgeries presenting to the ER after slipping off her bed. Not on AC, no HT, no LOC. Found to have L intertrochanteric femur fracture.     #Left Intertrochanteric Femur Fracture  - ECG --> NSR w/ PVCs, RBBB  - CT CS reviewed by me --> significant Emphysema   - discussed with ortho patient will be undergoing general anesthesia  - actively wheezing on exam. no dyspnea or cough. not far from baseline  - Troponin 22- will repeat troponin  - PT consult after surgery  - Preop labs ordered for 23:30  - 2u PRBC ordered on hold for OR   - Pain control PRN  - Bowel regimen  - Pulmonary consult   - Cardio consult if troponin continue to elevate.   - Patient is able to walk up 16 steps . Her functional ability is limited due to fear ever since she suffered a vertebral fracture and underwent a Thoracic kyphoplasty in 2022. However she is still walking around the house and outside when she is with her daughter. She denies any complaints of chest pain, orthopnea, LE edema. On exam patient is euvolemic and no murmur are heard. However patient is actively wheezing and an active smoker. As per her NSQIP, patient has an above average risk for any serious complication. This serves only as a shabbir-operative medical risk stratification and evaluation to predict medical complications risk and mortality. The decision to proceed with the surgery/procedure is made by the performing physician and the patient. If any acute changes happen prior to surgery, this medical clearance is void and will need a new medical clearance.       #Leukocytosis, likely reactive  - WBC 16 on admission   - No sepsis POA  - Monitor     #Hx of COPD  #Current Smoker  - On Spiriva and Advair at home, unsure of doses, pharmacy closed  - Will start Tiotropium 2.5mcg 2 puffs QD  - Will start Budesonide/Formoterol 80/4.5mcg 2 puffs BID  - duoneb prn  - Please confirm doses with pharmacy in AM ([facility] [phone])  - pulm consult     #Hx of Chronic Back Pain  #Hx of Compression Fracture  #Hx of OA  - Not on home meds  - Monitor     #MISC  - Diet: NPO for surgery tomorrow  - DVT: lovenox x 1 dose, holding AM dose for surgery   - GI: protonix  - Activity: Bed rest for now, pending ortho recs after surgery   - Pending: preop labs    the following   --------------------------------Complexity of data reviewed ----------------------------------------------  The Patients complexity of data reviewed  is Low [ ] Moderate [x ] High [ ] due to the following:   Reviewed prior external records [ x]  Considering/ Ordered a unique test:  Labs [x ] Imaging [ ] Stress Test  [ ] Other: Specify [ ]  Reviewed each unique test result [x ]   Assessment requiring an independent historian [ ]  Independent interpretation of:   X-Ray [x ] EKG [ ]  Other: Specify  [CT chest ]  Discussion of management of tests with clinician outside of my group [x ]  -------------------------------------Risk of Morbidity-------------------------------------------------------  The Patients risk of morbidity is Low [ ] Moderate [ ] High [x ] due to the following:   Prescription Drug Management [ ]  Decision regarding elective or emergent: minor surgery [ ] major surgery [ ]  Decision regarding hospitalization or escalation of hospital-level care [ ]  SDOH: Hearing/Vision impaired [ ] Food insecurity [ ] Homelessness/unsafe condition [ ]   Financial strain [ ] un/underemployed [ ] Lack of Transport [ ]  DNR or De-Escalation of care because of poor prognosis [ ]  Drug Therapy requiring intensive monitoring for toxicity [ ]  Parenteral controlled substance [x ] 84 y/o female with a PMHx COPD, OA and chronic back pain s/p multiple surgeries presenting to the ER after slipping off her bed. Not on AC, no HT, no LOC. Fell on coccyx, unable to ambulate after the fall.  Complaining of pain to left hip / upper leg area and a skin scrape on the left shin. Sensation intact b/l LE, denies numbness/tingling, CP/SOB. Remainder of ROS negative. - Ortho Consult --> plan for OR tomorrow     In the ED:   - Vitals on admission --> /85, HR 90, RR 18, T97.6, O2 96% RA  - Labs on admission --> WBC 16, Hb 12.5, HS Trop 22, Na 130, K 4.9, AG 15, CK 46      - S/p morphine and dilaudid in ED  - Admitted to medicine for further management    GENERAL: NAD, lying in bed comfortably  CHEST/LUNG: diffuse expiratory wheeze. prolonged expiratory phase.   HEART: Regular rate and rhythm; No murmurs, rubs, or gallops  ABDOMEN: Bowel sounds present; Soft, Nontender, Nondistended.    EXTREMITIES:  no edema  NERVOUS SYSTEM:  Alert & Oriented X3, speech clear. No deficits   MSK: LLE swelling on lateral side    84 y/o female with a PMHx COPD, OA and chronic back pain s/p multiple surgeries presenting to the ER after slipping off her bed. Not on AC, no HT, no LOC. Found to have L intertrochanteric femur fracture.     #Left Intertrochanteric Femur Fracture  - ECG --> NSR w/ PVCs, RBBB  - CT CS reviewed by me --> significant Emphysema   - discussed with ortho patient will be undergoing general anesthesia  - actively wheezing on exam. no dyspnea or cough. not far from baseline  - Troponin 22- will repeat troponin  - PT consult after surgery  - Preop labs ordered for 23:30  - 2u PRBC ordered on hold for OR   - Pain control PRN  - Bowel regimen  - Pulmonary consult   - Cardio consult if troponin continue to elevate.   - Patient is able to walk up 16 steps . Her functional ability is limited due to fear ever since she suffered a vertebral fracture and underwent a Thoracic kyphoplasty in 2022. However she is still walking around the house and outside when she is with her daughter. She denies any complaints of chest pain, orthopnea, LE edema. On exam patient is euvolemic and no murmur are heard. However patient is actively wheezing and an active smoker. As per her NSQIP, patient has an above average risk for any serious complication. This serves only as a shabbir-operative medical risk stratification and evaluation to predict medical complications risk and mortality. The decision to proceed with the surgery/procedure is made by the performing physician and the patient. If any acute changes happen prior to surgery, this medical clearance is void and will need a new medical clearance.       #Leukocytosis, likely reactive  - WBC 16 on admission   - No sepsis POA  - Monitor     #Hx of COPD  #Current Smoker  - On Spiriva and Advair at home, unsure of doses, pharmacy closed  - Will start Tiotropium 2.5mcg 2 puffs QD  - Will start Budesonide/Formoterol 80/4.5mcg 2 puffs BID  - duoneb prn  - Please confirm doses with pharmacy in AM ([facility] [phone])  - pulm consult     #Hx of Chronic Back Pain  #Hx of Compression Fracture  #Hx of OA  - Not on home meds  - Monitor     #MISC  - Diet: NPO for surgery tomorrow  - DVT: lovenox x 1 dose, holding AM dose for surgery   - GI: protonix  - Activity: Bed rest for now, pending ortho recs after surgery   - Pending: preop labs    the following   --------------------------------Complexity of data reviewed ----------------------------------------------  The Patients complexity of data reviewed  is Low [ ] Moderate [x ] High [ ] due to the following:   Reviewed prior external records [ x]  Considering/ Ordered a unique test:  Labs [x ] Imaging [ ] Stress Test  [ ] Other: Specify [ ]  Reviewed each unique test result [x ]   Assessment requiring an independent historian [ ]  Independent interpretation of:   X-Ray [x ] EKG [ ]  Other: Specify  [CT chest ]  Discussion of management of tests with clinician outside of my group [x ]  -------------------------------------Risk of Morbidity-------------------------------------------------------  The Patients risk of morbidity is Low [ ] Moderate [ ] High [x ] due to the following:   Prescription Drug Management [ ]  Decision regarding elective or emergent: minor surgery [ ] major surgery [ ]  Decision regarding hospitalization or escalation of hospital-level care [ ]  SDOH: Hearing/Vision impaired [ ] Food insecurity [ ] Homelessness/unsafe condition [ ]   Financial strain [ ] un/underemployed [ ] Lack of Transport [ ]  DNR or De-Escalation of care because of poor prognosis [ ]  Drug Therapy requiring intensive monitoring for toxicity [ ]  Parenteral controlled substance [x ]

## 2023-12-25 NOTE — H&P ADULT - HISTORY OF PRESENT ILLNESS
82 y/o female with a PMHx COPD and chronic back pain s/p multiple surgeries presenting to the ER after slipping off her bed. Not on AC, no HT, no LOC. Fell on coccyx, unable to ambulate after the fall. Complaining of pain to left hip / upper leg area and a skin scrape on the left shin.     In the ED:   - Vitals on admission --> /85, HR 90, RR 18, T97.6, O2 96% RA  - Labs on admission -->   - ECG --> NSR w/ PVCs  - CTH --> nothing acute, 6mm degen anterolisthesis of C7 on T1, mod/sev chronic microvascular ischemic changes  - CT CS --> nothing acute  - CT CAP --> Acute comminuted left intertrochanteric femur fracture, Moderate to severe centrilobular emphysema, Secretions seen in the right mainstem bronchus, Mild bilateral bronchial wall thickening with lower lobe endobronchial opacities, Left upper lobe linear stellate density seen on series 6 image 98 with areas of fat density within, 8mm LL nodule, 9mm LL nodule  - XR Pelvis --> acute left intertrochanteric femur fracture w/ varus angulation, multilevel degenerative changes of the spine and R hip joint  - Ortho Consult --> bed rest, pain control, hold DVT ppx in AM, medicine preop clearance, preop labs / ECG / CXR / ECG, trend Hb, 2u PRBC on hold for OR, NPO for surgery tomorrow 12/26  - S/p morphine and dilaudid in ED  - Admitted to medicine for further management   82 y/o female with a PMHx COPD, OA and chronic back pain s/p multiple surgeries presenting to the ER after slipping off her bed. Not on AC, no HT, no LOC. Fell on coccyx, unable to ambulate after the fall.  Complaining of pain to left hip / upper leg area and a skin scrape on the left shin. Sensation intact b/l LE, denies numbness/tingling, CP/SOB. Remainder of ROS negative.     In the ED:   - Vitals on admission --> /85, HR 90, RR 18, T97.6, O2 96% RA  - Labs on admission --> WBC 16, Hb 12.5, HS Trop 22, Na 130, K 4.9, AG 15, CK 46  - ECG --> NSR w/ PVCs  - CTH --> nothing acute, 6mm degen anterolisthesis of C7 on T1, mod/sev chronic microvascular ischemic changes  - CT CS --> nothing acute  - CT CAP --> Acute comminuted left intertrochanteric femur fracture, Moderate to severe centrilobular emphysema, Secretions seen in the right mainstem bronchus, Mild bilateral bronchial wall thickening with lower lobe endobronchial opacities, Left upper lobe linear stellate density seen on series 6 image 98 with areas of fat density within, 8mm LL nodule, 9mm LL nodule  - XR Pelvis --> acute left intertrochanteric femur fracture w/ varus angulation, multilevel degenerative changes of the spine and R hip joint  - Ortho Consult --> bed rest, pain control, hold DVT ppx in AM, medicine preop clearance, preop labs / ECG / CXR / ECG, trend Hb, 2u PRBC on hold for OR, NPO for surgery tomorrow 12/26  - S/p morphine and dilaudid in ED  - Admitted to medicine for further management   84 y/o female with a PMHx COPD, OA and chronic back pain s/p multiple surgeries presenting to the ER after slipping off her bed. Not on AC, no HT, no LOC. Fell on coccyx, unable to ambulate after the fall.  Complaining of pain to left hip / upper leg area and a skin scrape on the left shin. Sensation intact b/l LE, denies numbness/tingling, CP/SOB. Remainder of ROS negative.     In the ED:   - Vitals on admission --> /85, HR 90, RR 18, T97.6, O2 96% RA  - Labs on admission --> WBC 16, Hb 12.5, HS Trop 22, Na 130, K 4.9, AG 15, CK 46  - ECG --> NSR w/ PVCs  - CTH --> nothing acute, 6mm degen anterolisthesis of C7 on T1, mod/sev chronic microvascular ischemic changes  - CT CS --> nothing acute  - CT CAP --> Acute comminuted left intertrochanteric femur fracture, Moderate to severe centrilobular emphysema, Secretions seen in the right mainstem bronchus, Mild bilateral bronchial wall thickening with lower lobe endobronchial opacities, Left upper lobe linear stellate density seen on series 6 image 98 with areas of fat density within, 8mm LL nodule, 9mm LL nodule  - XR Pelvis --> acute left intertrochanteric femur fracture w/ varus angulation, multilevel degenerative changes of the spine and R hip joint  - Ortho Consult --> bed rest, pain control, hold DVT ppx in AM, medicine preop clearance, preop labs / ECG / CXR / ECG, trend Hb, 2u PRBC on hold for OR, NPO for surgery tomorrow 12/26  - S/p morphine and dilaudid in ED  - Admitted to medicine for further management

## 2023-12-25 NOTE — ED PROVIDER NOTE - CONSIDERATION OF ADMISSION OBSERVATION
Consideration of Admission/Observation Patient s/p mechanical fall with (+) hip fracture. Will require admission for orthopedic intervention

## 2023-12-25 NOTE — ED ADULT TRIAGE NOTE - BP NONINVASIVE DIASTOLIC (MM HG)
Called and offered cancellation for 11/17. Patient is currently sick. Will call and offer at another time   83

## 2023-12-25 NOTE — ED ADULT NURSE NOTE - OBJECTIVE STATEMENT
PT TEQUILA had slip and fall off bed around 1230 due to comforter. Family believes she was on the floor for about 2 hours prior to someone finding her. LEft hip pain noted with obvious shortening and rotation.

## 2023-12-25 NOTE — ED PROVIDER NOTE - CLINICAL SUMMARY MEDICAL DECISION MAKING FREE TEXT BOX
Patient presented status post mechanical fall as documented, complaining primarily of left hip pain.  No head trauma or LOC.  Otherwise on arrival, patient afebrile, hemodynamically stable, fully neurovascularly intact, GCS 15, but tenderness to the left hip with deformity.  Obtained labs which were grossly unremarkable including no significant leukocytosis, anemia, signs of dehydration/ALAN, transaminitis or significant electrolyte abnormalities.  Trauma workup in ED revealed left intertrochanteric fracture as documented.  No other traumatic injury.  Consulted Ortho who evaluated the patient in the ED and agreed with plan for admission for likely surgical intervention.  Patient agreeable with plan. Hemodynamically stable at time of admission.

## 2023-12-25 NOTE — H&P ADULT - NSHPLABSRESULTS_GEN_ALL_CORE
LABS:                         12.5   16.62 )-----------( 368      ( 25 Dec 2023 15:22 )             37.1     12-25    130<L>  |  92<L>  |  13  ----------------------------<  134<H>  4.9   |  23  |  0.8    Ca    9.1      25 Dec 2023 15:22    TPro  6.9  /  Alb  4.0  /  TBili  0.6  /  DBili  x   /  AST  18  /  ALT  10  /  AlkPhos  115  12-25      Urinalysis Basic - ( 25 Dec 2023 15:22 )    Color: x / Appearance: x / SG: x / pH: x  Gluc: 134 mg/dL / Ketone: x  / Bili: x / Urobili: x   Blood: x / Protein: x / Nitrite: x   Leuk Esterase: x / RBC: x / WBC x   Sq Epi: x / Non Sq Epi: x / Bacteria: x      RADIOLOGY:    - CTH --> nothing acute, 6mm degen anterolisthesis of C7 on T1, mod/sev chronic microvascular ischemic changes  - CT CS --> nothing acute  - CT CAP --> Acute comminuted left intertrochanteric femur fracture, Moderate to severe centrilobular emphysema, Secretions seen in the right mainstem bronchus, Mild bilateral bronchial wall thickening with lower lobe endobronchial opacities, Left upper lobe linear stellate density seen on series 6 image 98 with areas of fat density within, 8mm LL nodule, 9mm LL nodule  - XR Pelvis --> acute left intertrochanteric femur fracture w/ varus angulation, multilevel degenerative changes of the spine and R hip joint

## 2023-12-25 NOTE — ED PROVIDER NOTE - OBJECTIVE STATEMENT
83yoF, PMH of COPD and multiple back pain surgeries, presents to ED s/p slid down to bed onto carpet floor. Pt. not on AC, denies trauma to head or LOC, but endorses hitting floor with coccyx. Pt. unable to ambulate post fall, L leg noted externally rotated, increased pain to upper leg/hip area. Skin tear to L shin, last tetanus shot x 2 years ago.

## 2023-12-25 NOTE — CONSULT NOTE ADULT - SUBJECTIVE AND OBJECTIVE BOX
ORTHOPAEDIC SURGERY CONSULT NOTE    Reason for Consult: Left intertrochanteric femur fracture     Patient is AAOx3 s/p mechanical fall, reports pain and inability to ambulate after the fall.  Reports pain to the left hip region. Prior to the fall the patient ambulated with rolling walker. Not on blood thinners.   The patient and family are in agreement with the plan for surgery. Risks, benefits and alternatives have been discused and the patient and family.    PMH   COPD    No Known Allergies    Physical exam:  T(C): 36.4 (12-25-23 @ 14:52), Max: 36.4 (12-25-23 @ 14:52)  HR: 90 (12-25-23 @ 14:52) (90 - 90)  BP: 131/85 (12-25-23 @ 14:52) (131/85 - 131/85)  RR: 18 (12-25-23 @ 14:52) (18 - 18)  SpO2: 96% (12-25-23 @ 14:52) (96% - 96%)    LLE:  The LLE is shortened and externally rotated  +TTP groin and hip  NTTP thigh, knee, tib/fib, foot/ankle  ROM of hip deferred   Axial load and log roll deferred   ROM of the foot/ankle full and painless  NVID distally in all distributions with 2+ pulses    Labs:                        12.5   16.62 )-----------( 368      ( 25 Dec 2023 15:22 )             37.1     12-25    130<L>  |  92<L>  |  13  ----------------------------<  134<H>  4.9   |  23  |  0.8    Ca    9.1      25 Dec 2023 15:22    TPro  6.9  /  Alb  4.0  /  TBili  0.6  /  DBili  x   /  AST  18  /  ALT  10  /  AlkPhos  115  12-25    PT/INR - ( 25 Dec 2023 15:22 )   PT: 11.90 sec;   INR: 1.04 ratio         PTT - ( 25 Dec 2023 15:22 )  PTT:32.6 sec    Imaging XR:   Pelvis/Left hip/femur - Displaced intertrochanteric femur fracture     Assesment/Plan: 83 year old w/ a left intertrochanteric femur fracture.    Patient will require surgery to return to ambulation, and decrease morbidity/mortality.  R/B/A of surgery discussed with patient and they wish to proceed with surgery. All questions answered.    - Bed rest  - Pain control per primary team  - DVT PPx per primary, to be held on morning of procedure  - Patient requires Internal Medicine pre-op clearance / risk stratification / medical optimization  - Pre-Op CBC, CMP/BMP, PT/PTT/INR, Type & Screen x2, CXR, EKG, COVID test  - Trend Hgb  - 2u RBC on hold for surgery  - NPO for surgery tomorrow 12/26

## 2023-12-26 LAB
ALBUMIN SERPL ELPH-MCNC: 3.4 G/DL — LOW (ref 3.5–5.2)
ALP SERPL-CCNC: 96 U/L — SIGNIFICANT CHANGE UP (ref 30–115)
ALP SERPL-CCNC: 96 U/L — SIGNIFICANT CHANGE UP (ref 30–115)
ALP SERPL-CCNC: 97 U/L — SIGNIFICANT CHANGE UP (ref 30–115)
ALP SERPL-CCNC: 97 U/L — SIGNIFICANT CHANGE UP (ref 30–115)
ALT FLD-CCNC: 9 U/L — SIGNIFICANT CHANGE UP (ref 0–41)
ANION GAP SERPL CALC-SCNC: 13 MMOL/L — SIGNIFICANT CHANGE UP (ref 7–14)
ANION GAP SERPL CALC-SCNC: 13 MMOL/L — SIGNIFICANT CHANGE UP (ref 7–14)
ANION GAP SERPL CALC-SCNC: 8 MMOL/L — SIGNIFICANT CHANGE UP (ref 7–14)
ANION GAP SERPL CALC-SCNC: 8 MMOL/L — SIGNIFICANT CHANGE UP (ref 7–14)
APTT BLD: 39.9 SEC — HIGH (ref 27–39.2)
APTT BLD: 39.9 SEC — HIGH (ref 27–39.2)
AST SERPL-CCNC: 13 U/L — SIGNIFICANT CHANGE UP (ref 0–41)
BILIRUB SERPL-MCNC: 0.7 MG/DL — SIGNIFICANT CHANGE UP (ref 0.2–1.2)
BUN SERPL-MCNC: 15 MG/DL — SIGNIFICANT CHANGE UP (ref 10–20)
BUN SERPL-MCNC: 15 MG/DL — SIGNIFICANT CHANGE UP (ref 10–20)
BUN SERPL-MCNC: 18 MG/DL — SIGNIFICANT CHANGE UP (ref 10–20)
BUN SERPL-MCNC: 18 MG/DL — SIGNIFICANT CHANGE UP (ref 10–20)
CALCIUM SERPL-MCNC: 8.8 MG/DL — SIGNIFICANT CHANGE UP (ref 8.4–10.5)
CHLORIDE SERPL-SCNC: 90 MMOL/L — LOW (ref 98–110)
CHLORIDE SERPL-SCNC: 90 MMOL/L — LOW (ref 98–110)
CHLORIDE SERPL-SCNC: 93 MMOL/L — LOW (ref 98–110)
CHLORIDE SERPL-SCNC: 93 MMOL/L — LOW (ref 98–110)
CO2 SERPL-SCNC: 23 MMOL/L — SIGNIFICANT CHANGE UP (ref 17–32)
CO2 SERPL-SCNC: 23 MMOL/L — SIGNIFICANT CHANGE UP (ref 17–32)
CO2 SERPL-SCNC: 28 MMOL/L — SIGNIFICANT CHANGE UP (ref 17–32)
CO2 SERPL-SCNC: 28 MMOL/L — SIGNIFICANT CHANGE UP (ref 17–32)
CREAT SERPL-MCNC: 0.7 MG/DL — SIGNIFICANT CHANGE UP (ref 0.7–1.5)
CREAT SERPL-MCNC: 0.7 MG/DL — SIGNIFICANT CHANGE UP (ref 0.7–1.5)
CREAT SERPL-MCNC: 0.9 MG/DL — SIGNIFICANT CHANGE UP (ref 0.7–1.5)
CREAT SERPL-MCNC: 0.9 MG/DL — SIGNIFICANT CHANGE UP (ref 0.7–1.5)
EGFR: 63 ML/MIN/1.73M2 — SIGNIFICANT CHANGE UP
EGFR: 63 ML/MIN/1.73M2 — SIGNIFICANT CHANGE UP
EGFR: 85 ML/MIN/1.73M2 — SIGNIFICANT CHANGE UP
EGFR: 85 ML/MIN/1.73M2 — SIGNIFICANT CHANGE UP
GLUCOSE SERPL-MCNC: 100 MG/DL — HIGH (ref 70–99)
GLUCOSE SERPL-MCNC: 100 MG/DL — HIGH (ref 70–99)
GLUCOSE SERPL-MCNC: 108 MG/DL — HIGH (ref 70–99)
GLUCOSE SERPL-MCNC: 108 MG/DL — HIGH (ref 70–99)
HCT VFR BLD CALC: 29.1 % — LOW (ref 37–47)
HCT VFR BLD CALC: 29.1 % — LOW (ref 37–47)
HCT VFR BLD CALC: 32.1 % — LOW (ref 37–47)
HCT VFR BLD CALC: 32.1 % — LOW (ref 37–47)
HGB BLD-MCNC: 10.8 G/DL — LOW (ref 12–16)
HGB BLD-MCNC: 10.8 G/DL — LOW (ref 12–16)
HGB BLD-MCNC: 9.6 G/DL — LOW (ref 12–16)
HGB BLD-MCNC: 9.6 G/DL — LOW (ref 12–16)
INR BLD: 1.08 RATIO — SIGNIFICANT CHANGE UP (ref 0.65–1.3)
INR BLD: 1.08 RATIO — SIGNIFICANT CHANGE UP (ref 0.65–1.3)
MAGNESIUM SERPL-MCNC: 1.9 MG/DL — SIGNIFICANT CHANGE UP (ref 1.8–2.4)
MAGNESIUM SERPL-MCNC: 1.9 MG/DL — SIGNIFICANT CHANGE UP (ref 1.8–2.4)
MAGNESIUM SERPL-MCNC: 2 MG/DL — SIGNIFICANT CHANGE UP (ref 1.8–2.4)
MAGNESIUM SERPL-MCNC: 2 MG/DL — SIGNIFICANT CHANGE UP (ref 1.8–2.4)
MCHC RBC-ENTMCNC: 30.2 PG — SIGNIFICANT CHANGE UP (ref 27–31)
MCHC RBC-ENTMCNC: 30.2 PG — SIGNIFICANT CHANGE UP (ref 27–31)
MCHC RBC-ENTMCNC: 30.4 PG — SIGNIFICANT CHANGE UP (ref 27–31)
MCHC RBC-ENTMCNC: 30.4 PG — SIGNIFICANT CHANGE UP (ref 27–31)
MCHC RBC-ENTMCNC: 33 G/DL — SIGNIFICANT CHANGE UP (ref 32–37)
MCHC RBC-ENTMCNC: 33 G/DL — SIGNIFICANT CHANGE UP (ref 32–37)
MCHC RBC-ENTMCNC: 33.6 G/DL — SIGNIFICANT CHANGE UP (ref 32–37)
MCHC RBC-ENTMCNC: 33.6 G/DL — SIGNIFICANT CHANGE UP (ref 32–37)
MCV RBC AUTO: 90.4 FL — SIGNIFICANT CHANGE UP (ref 81–99)
MCV RBC AUTO: 90.4 FL — SIGNIFICANT CHANGE UP (ref 81–99)
MCV RBC AUTO: 91.5 FL — SIGNIFICANT CHANGE UP (ref 81–99)
MCV RBC AUTO: 91.5 FL — SIGNIFICANT CHANGE UP (ref 81–99)
NRBC # BLD: 0 /100 WBCS — SIGNIFICANT CHANGE UP (ref 0–0)
PLATELET # BLD AUTO: 296 K/UL — SIGNIFICANT CHANGE UP (ref 130–400)
PLATELET # BLD AUTO: 296 K/UL — SIGNIFICANT CHANGE UP (ref 130–400)
PLATELET # BLD AUTO: 297 K/UL — SIGNIFICANT CHANGE UP (ref 130–400)
PLATELET # BLD AUTO: 297 K/UL — SIGNIFICANT CHANGE UP (ref 130–400)
PMV BLD: 9.5 FL — SIGNIFICANT CHANGE UP (ref 7.4–10.4)
PMV BLD: 9.5 FL — SIGNIFICANT CHANGE UP (ref 7.4–10.4)
PMV BLD: 9.8 FL — SIGNIFICANT CHANGE UP (ref 7.4–10.4)
PMV BLD: 9.8 FL — SIGNIFICANT CHANGE UP (ref 7.4–10.4)
POTASSIUM SERPL-MCNC: 4.8 MMOL/L — SIGNIFICANT CHANGE UP (ref 3.5–5)
POTASSIUM SERPL-MCNC: 4.8 MMOL/L — SIGNIFICANT CHANGE UP (ref 3.5–5)
POTASSIUM SERPL-MCNC: 5.4 MMOL/L — HIGH (ref 3.5–5)
POTASSIUM SERPL-MCNC: 5.4 MMOL/L — HIGH (ref 3.5–5)
POTASSIUM SERPL-SCNC: 4.8 MMOL/L — SIGNIFICANT CHANGE UP (ref 3.5–5)
POTASSIUM SERPL-SCNC: 4.8 MMOL/L — SIGNIFICANT CHANGE UP (ref 3.5–5)
POTASSIUM SERPL-SCNC: 5.4 MMOL/L — HIGH (ref 3.5–5)
POTASSIUM SERPL-SCNC: 5.4 MMOL/L — HIGH (ref 3.5–5)
PROT SERPL-MCNC: 5.9 G/DL — LOW (ref 6–8)
PROT SERPL-MCNC: 5.9 G/DL — LOW (ref 6–8)
PROT SERPL-MCNC: 6.1 G/DL — SIGNIFICANT CHANGE UP (ref 6–8)
PROT SERPL-MCNC: 6.1 G/DL — SIGNIFICANT CHANGE UP (ref 6–8)
PROTHROM AB SERPL-ACNC: 12.3 SEC — SIGNIFICANT CHANGE UP (ref 9.95–12.87)
PROTHROM AB SERPL-ACNC: 12.3 SEC — SIGNIFICANT CHANGE UP (ref 9.95–12.87)
RBC # BLD: 3.18 M/UL — LOW (ref 4.2–5.4)
RBC # BLD: 3.18 M/UL — LOW (ref 4.2–5.4)
RBC # BLD: 3.55 M/UL — LOW (ref 4.2–5.4)
RBC # BLD: 3.55 M/UL — LOW (ref 4.2–5.4)
RBC # FLD: 13.7 % — SIGNIFICANT CHANGE UP (ref 11.5–14.5)
RBC # FLD: 13.7 % — SIGNIFICANT CHANGE UP (ref 11.5–14.5)
RBC # FLD: 13.8 % — SIGNIFICANT CHANGE UP (ref 11.5–14.5)
RBC # FLD: 13.8 % — SIGNIFICANT CHANGE UP (ref 11.5–14.5)
SODIUM SERPL-SCNC: 126 MMOL/L — LOW (ref 135–146)
SODIUM SERPL-SCNC: 126 MMOL/L — LOW (ref 135–146)
SODIUM SERPL-SCNC: 129 MMOL/L — LOW (ref 135–146)
SODIUM SERPL-SCNC: 129 MMOL/L — LOW (ref 135–146)
TROPONIN T, HIGH SENSITIVITY RESULT: 29 NG/L — HIGH (ref 6–13)
TROPONIN T, HIGH SENSITIVITY RESULT: 29 NG/L — HIGH (ref 6–13)
TROPONIN T, HIGH SENSITIVITY RESULT: 30 NG/L — HIGH (ref 6–13)
TROPONIN T, HIGH SENSITIVITY RESULT: 30 NG/L — HIGH (ref 6–13)
WBC # BLD: 11.28 K/UL — HIGH (ref 4.8–10.8)
WBC # BLD: 11.28 K/UL — HIGH (ref 4.8–10.8)
WBC # BLD: 9.12 K/UL — SIGNIFICANT CHANGE UP (ref 4.8–10.8)
WBC # BLD: 9.12 K/UL — SIGNIFICANT CHANGE UP (ref 4.8–10.8)
WBC # FLD AUTO: 11.28 K/UL — HIGH (ref 4.8–10.8)
WBC # FLD AUTO: 11.28 K/UL — HIGH (ref 4.8–10.8)
WBC # FLD AUTO: 9.12 K/UL — SIGNIFICANT CHANGE UP (ref 4.8–10.8)
WBC # FLD AUTO: 9.12 K/UL — SIGNIFICANT CHANGE UP (ref 4.8–10.8)

## 2023-12-26 PROCEDURE — 99232 SBSQ HOSP IP/OBS MODERATE 35: CPT

## 2023-12-26 PROCEDURE — 99223 1ST HOSP IP/OBS HIGH 75: CPT

## 2023-12-26 PROCEDURE — 73720 MRI LWR EXTREMITY W/O&W/DYE: CPT | Mod: 26,LT

## 2023-12-26 RX ORDER — MORPHINE SULFATE 50 MG/1
2 CAPSULE, EXTENDED RELEASE ORAL EVERY 6 HOURS
Refills: 0 | Status: DISCONTINUED | OUTPATIENT
Start: 2023-12-26 | End: 2023-12-27

## 2023-12-26 RX ORDER — TIOTROPIUM BROMIDE 18 UG/1
1 CAPSULE ORAL; RESPIRATORY (INHALATION)
Refills: 0 | DISCHARGE

## 2023-12-26 RX ORDER — POLYETHYLENE GLYCOL 3350 17 G/17G
17 POWDER, FOR SOLUTION ORAL DAILY
Refills: 0 | Status: DISCONTINUED | OUTPATIENT
Start: 2023-12-26 | End: 2023-12-27

## 2023-12-26 RX ORDER — SODIUM CHLORIDE 9 MG/ML
1000 INJECTION, SOLUTION INTRAVENOUS
Refills: 0 | Status: DISCONTINUED | OUTPATIENT
Start: 2023-12-26 | End: 2023-12-27

## 2023-12-26 RX ORDER — ONDANSETRON 8 MG/1
4 TABLET, FILM COATED ORAL ONCE
Refills: 0 | Status: COMPLETED | OUTPATIENT
Start: 2023-12-26 | End: 2023-12-26

## 2023-12-26 RX ORDER — MORPHINE SULFATE 50 MG/1
2 CAPSULE, EXTENDED RELEASE ORAL EVERY 6 HOURS
Refills: 0 | Status: DISCONTINUED | OUTPATIENT
Start: 2023-12-26 | End: 2023-12-26

## 2023-12-26 RX ORDER — KETOROLAC TROMETHAMINE 30 MG/ML
15 SYRINGE (ML) INJECTION ONCE
Refills: 0 | Status: DISCONTINUED | OUTPATIENT
Start: 2023-12-26 | End: 2023-12-26

## 2023-12-26 RX ORDER — TIOTROPIUM BROMIDE 18 UG/1
2 CAPSULE ORAL; RESPIRATORY (INHALATION) DAILY
Refills: 0 | Status: DISCONTINUED | OUTPATIENT
Start: 2023-12-26 | End: 2023-12-27

## 2023-12-26 RX ORDER — SENNA PLUS 8.6 MG/1
2 TABLET ORAL AT BEDTIME
Refills: 0 | Status: DISCONTINUED | OUTPATIENT
Start: 2023-12-26 | End: 2023-12-27

## 2023-12-26 RX ORDER — SODIUM CHLORIDE 9 MG/ML
1000 INJECTION, SOLUTION INTRAVENOUS
Refills: 0 | Status: DISCONTINUED | OUTPATIENT
Start: 2023-12-26 | End: 2023-12-26

## 2023-12-26 RX ORDER — FLUTICASONE PROPIONATE AND SALMETEROL 50; 250 UG/1; UG/1
1 POWDER ORAL; RESPIRATORY (INHALATION)
Refills: 0 | DISCHARGE

## 2023-12-26 RX ORDER — BUDESONIDE AND FORMOTEROL FUMARATE DIHYDRATE 160; 4.5 UG/1; UG/1
2 AEROSOL RESPIRATORY (INHALATION)
Refills: 0 | Status: DISCONTINUED | OUTPATIENT
Start: 2023-12-26 | End: 2023-12-27

## 2023-12-26 RX ORDER — IPRATROPIUM/ALBUTEROL SULFATE 18-103MCG
3 AEROSOL WITH ADAPTER (GRAM) INHALATION EVERY 6 HOURS
Refills: 0 | Status: DISCONTINUED | OUTPATIENT
Start: 2023-12-26 | End: 2023-12-27

## 2023-12-26 RX ADMIN — Medication 40 MILLIGRAM(S): at 22:45

## 2023-12-26 RX ADMIN — TIOTROPIUM BROMIDE 2 PUFF(S): 18 CAPSULE ORAL; RESPIRATORY (INHALATION) at 08:10

## 2023-12-26 RX ADMIN — Medication 3 MILLILITER(S): at 23:04

## 2023-12-26 RX ADMIN — Medication 3 MILLILITER(S): at 08:06

## 2023-12-26 RX ADMIN — Medication 3 MILLILITER(S): at 13:28

## 2023-12-26 RX ADMIN — Medication 60 MILLIGRAM(S): at 07:59

## 2023-12-26 RX ADMIN — MORPHINE SULFATE 2 MILLIGRAM(S): 50 CAPSULE, EXTENDED RELEASE ORAL at 07:58

## 2023-12-26 RX ADMIN — BUDESONIDE AND FORMOTEROL FUMARATE DIHYDRATE 2 PUFF(S): 160; 4.5 AEROSOL RESPIRATORY (INHALATION) at 22:46

## 2023-12-26 RX ADMIN — SENNA PLUS 2 TABLET(S): 8.6 TABLET ORAL at 22:47

## 2023-12-26 RX ADMIN — MORPHINE SULFATE 2 MILLIGRAM(S): 50 CAPSULE, EXTENDED RELEASE ORAL at 14:06

## 2023-12-26 RX ADMIN — Medication 15 MILLIGRAM(S): at 00:19

## 2023-12-26 RX ADMIN — BUDESONIDE AND FORMOTEROL FUMARATE DIHYDRATE 2 PUFF(S): 160; 4.5 AEROSOL RESPIRATORY (INHALATION) at 08:12

## 2023-12-26 RX ADMIN — ONDANSETRON 4 MILLIGRAM(S): 8 TABLET, FILM COATED ORAL at 09:35

## 2023-12-26 RX ADMIN — Medication 3 MILLILITER(S): at 02:21

## 2023-12-26 NOTE — CONSULT NOTE ADULT - SUBJECTIVE AND OBJECTIVE BOX
HPI  84 y/o female with a PMHx COPD, OA and chronic back pain s/p multiple surgeries presenting to the ER after slipping off her bed. Not on AC, no HT, no LOC. Fell on coccyx, unable to ambulate after the fall.  Complaining of pain to left hip / upper leg area and a skin scrape on the left shin. Sensation intact b/l LE, denies numbness/tingling, CP/SOB. Remainder of ROS negative.     In the ED:   - Vitals on admission --> /85, HR 90, RR 18, T97.6, O2 96% RA  - Labs on admission --> WBC 16, Hb 12.5, HS Trop 22, Na 130, K 4.9, AG 15, CK 46  - ECG --> NSR w/ PVCs  - CTH --> nothing acute, 6mm degen anterolisthesis of C7 on T1, mod/sev chronic microvascular ischemic changes  - CT CS --> nothing acute  - CT CAP --> Acute comminuted left intertrochanteric femur fracture, Moderate to severe centrilobular emphysema, Secretions seen in the right mainstem bronchus, Mild bilateral bronchial wall thickening with lower lobe endobronchial opacities, Left upper lobe linear stellate density seen on series 6 image 98 with areas of fat density within, 8mm LL nodule, 9mm LL nodule  - XR Pelvis --> acute left intertrochanteric femur fracture w/ varus angulation, multilevel degenerative changes of the spine and R hip joint  - Ortho Consult --> bed rest, pain control, hold DVT ppx in AM, medicine preop clearance, preop labs / ECG / CXR / ECG, trend Hb, 2u PRBC on hold for OR, NPO for surgery tomorrow 12/26  - S/p morphine and dilaudid in ED  - Admitted to medicine for further management    CARDIOLOGY CONSULT    CArdio  PAST MEDICAL & SURGICAL HISTORY  COPD (chronic obstructive pulmonary disease)    H/O cholelithiasis    Smoker    History of surgery  orif right ankle        FAMILY HISTORY  FAMILY HISTORY:  Known health problems: none (Father, Mother)        SOCIAL HISTORY  []smoker  []Alcohol  []Drug    ALLERGIES  No Known Allergies      MEDICATIONS:  MEDICATIONS  (STANDING):  albuterol/ipratropium for Nebulization 3 milliLiter(s) Nebulizer every 6 hours  budesonide  80 MICROgram(s)/formoterol 4.5 MICROgram(s) Inhaler 2 Puff(s) Inhalation two times a day  methylPREDNISolone sodium succinate Injectable 40 milliGRAM(s) IV Push every 12 hours  polyethylene glycol 3350 17 Gram(s) Oral daily  senna 2 Tablet(s) Oral at bedtime  tiotropium 2.5 MICROgram(s) Inhaler 2 Puff(s) Inhalation daily    MEDICATIONS  (PRN):  morphine  - Injectable 2 milliGRAM(s) IV Push every 6 hours PRN Moderate Pain (4 - 6)      HOME MEDICATIONS  Home Medications:  Advair Diskus 500 mcg-50 mcg inhalation powder: 1 puff(s) inhaled 2 times a day (26 Dec 2023 10:09)  Spiriva 18 mcg inhalation capsule: 1 cap(s) inhaled once a day (25 Dec 2023 21:11)      VITALS   T(F): 96.1 (12-26 @ 08:22), Max: 98.8 (12-25 @ 22:37)  HR: 109 (12-26 @ 10:50) (72 - 109)  BP: 104/55 (12-26 @ 10:50) (104/55 - 131/85)  BP(mean): --  RR: 18 (12-26 @ 10:50) (17 - 18)  SpO2: 98% (12-26 @ 10:50) (96% - 98%)    I&O's Summary      REVIEW OF SYSTEMS  CONSTITUTIONAL: No weakness, fevers or chills  EYES: No visual changes  ENT: No vertigo or throat pain   NECK: No pain or stiffness  RESPIRATORY: No cough, wheezing, hemoptysis; No shortness of breath  CARDIOVASCULAR: No chest pain or palpitations  GASTROINTESTINAL: No abdominal or epigastric pain. No nausea, vomiting, or hematemesis; No diarrhea or constipation. No melena or hematochezia.  GENITOURINARY: No dysuria, frequency or hematuria  NEUROLOGICAL: No numbness or weakness  SKIN: No itching, no rashes  MSK: No pain    PHYSICAL EXAM  NEURO: patient is awake , alert and oriented  GEN: Not in acute distress  NECK: no thyroid enlargement, no JVD  LUNGS: Clear to auscultation bilaterally   CARDIOVASCULAR: S1/S2 present, RRR , no murmurs or rubs, no carotid bruits,  + PP bilaterally  ABD: Soft, non-tender, non-distended, +BS  EXT: No MARCIE  SKIN: Intact    LABS:                        9.6    11.28 )-----------( 297      ( 26 Dec 2023 07:08 )             29.1     12-26    129<L>  |  93<L>  |  18  ----------------------------<  100<H>  5.4<H>   |  23  |  0.9    Ca    8.8      26 Dec 2023 07:08  Mg     2.0     12-26    TPro  5.9<L>  /  Alb  3.4<L>  /  TBili  0.7  /  DBili  x   /  AST  13  /  ALT  9   /  AlkPhos  96  12-26    PT/INR - ( 26 Dec 2023 00:43 )   PT: 12.30 sec;   INR: 1.08 ratio         PTT - ( 26 Dec 2023 00:43 )  PTT:39.9 sec    Troponin Trend:            RADIOLOGY  -CXR:  -TTE:  -CCTA:  -STRESS TEST:  -CATHETERIZATION:    ECG    TELEMETRY EVENTS   HPI  82 y/o female with a PMHx COPD, OA and chronic back pain s/p multiple surgeries presenting to the ER after slipping off her bed. Not on AC, no HT, no LOC. Fell on coccyx, unable to ambulate after the fall.  Complaining of pain to left hip / upper leg area and a skin scrape on the left shin. Sensation intact b/l LE, denies numbness/tingling, CP/SOB. Remainder of ROS negative.     In the ED:   - Vitals on admission --> /85, HR 90, RR 18, T97.6, O2 96% RA  - Labs on admission --> WBC 16, Hb 12.5, HS Trop 22, Na 130, K 4.9, AG 15, CK 46  - ECG --> NSR w/ PVCs  - CTH --> nothing acute, 6mm degen anterolisthesis of C7 on T1, mod/sev chronic microvascular ischemic changes  - CT CS --> nothing acute  - CT CAP --> Acute comminuted left intertrochanteric femur fracture, Moderate to severe centrilobular emphysema, Secretions seen in the right mainstem bronchus, Mild bilateral bronchial wall thickening with lower lobe endobronchial opacities, Left upper lobe linear stellate density seen on series 6 image 98 with areas of fat density within, 8mm LL nodule, 9mm LL nodule  - XR Pelvis --> acute left intertrochanteric femur fracture w/ varus angulation, multilevel degenerative changes of the spine and R hip joint  - Ortho Consult --> bed rest, pain control, hold DVT ppx in AM, medicine preop clearance, preop labs / ECG / CXR / ECG, trend Hb, 2u PRBC on hold for OR, NPO for surgery tomorrow 12/26  - S/p morphine and dilaudid in ED  - Admitted to medicine for further management    CARDIOLOGY CONSULT    CArdio  PAST MEDICAL & SURGICAL HISTORY  COPD (chronic obstructive pulmonary disease)    H/O cholelithiasis    Smoker    History of surgery  orif right ankle        FAMILY HISTORY  FAMILY HISTORY:  Known health problems: none (Father, Mother)        SOCIAL HISTORY  []smoker  []Alcohol  []Drug    ALLERGIES  No Known Allergies      MEDICATIONS:  MEDICATIONS  (STANDING):  albuterol/ipratropium for Nebulization 3 milliLiter(s) Nebulizer every 6 hours  budesonide  80 MICROgram(s)/formoterol 4.5 MICROgram(s) Inhaler 2 Puff(s) Inhalation two times a day  methylPREDNISolone sodium succinate Injectable 40 milliGRAM(s) IV Push every 12 hours  polyethylene glycol 3350 17 Gram(s) Oral daily  senna 2 Tablet(s) Oral at bedtime  tiotropium 2.5 MICROgram(s) Inhaler 2 Puff(s) Inhalation daily    MEDICATIONS  (PRN):  morphine  - Injectable 2 milliGRAM(s) IV Push every 6 hours PRN Moderate Pain (4 - 6)      HOME MEDICATIONS  Home Medications:  Advair Diskus 500 mcg-50 mcg inhalation powder: 1 puff(s) inhaled 2 times a day (26 Dec 2023 10:09)  Spiriva 18 mcg inhalation capsule: 1 cap(s) inhaled once a day (25 Dec 2023 21:11)      VITALS   T(F): 96.1 (12-26 @ 08:22), Max: 98.8 (12-25 @ 22:37)  HR: 109 (12-26 @ 10:50) (72 - 109)  BP: 104/55 (12-26 @ 10:50) (104/55 - 131/85)  BP(mean): --  RR: 18 (12-26 @ 10:50) (17 - 18)  SpO2: 98% (12-26 @ 10:50) (96% - 98%)    I&O's Summary      REVIEW OF SYSTEMS  CONSTITUTIONAL: No weakness, fevers or chills  EYES: No visual changes  ENT: No vertigo or throat pain   NECK: No pain or stiffness  RESPIRATORY: No cough, wheezing, hemoptysis; No shortness of breath  CARDIOVASCULAR: No chest pain or palpitations  GASTROINTESTINAL: No abdominal or epigastric pain. No nausea, vomiting, or hematemesis; No diarrhea or constipation. No melena or hematochezia.  GENITOURINARY: No dysuria, frequency or hematuria  NEUROLOGICAL: No numbness or weakness  SKIN: No itching, no rashes  MSK: No pain    PHYSICAL EXAM  NEURO: patient is awake , alert and oriented  GEN: Not in acute distress  NECK: no thyroid enlargement, no JVD  LUNGS: Clear to auscultation bilaterally   CARDIOVASCULAR: S1/S2 present, RRR , no murmurs or rubs, no carotid bruits,  + PP bilaterally  ABD: Soft, non-tender, non-distended, +BS  EXT: No MARCIE  SKIN: Intact    LABS:                        9.6    11.28 )-----------( 297      ( 26 Dec 2023 07:08 )             29.1     12-26    129<L>  |  93<L>  |  18  ----------------------------<  100<H>  5.4<H>   |  23  |  0.9    Ca    8.8      26 Dec 2023 07:08  Mg     2.0     12-26    TPro  5.9<L>  /  Alb  3.4<L>  /  TBili  0.7  /  DBili  x   /  AST  13  /  ALT  9   /  AlkPhos  96  12-26    PT/INR - ( 26 Dec 2023 00:43 )   PT: 12.30 sec;   INR: 1.08 ratio         PTT - ( 26 Dec 2023 00:43 )  PTT:39.9 sec    Troponin Trend:            RADIOLOGY  -CXR:  -TTE:  -CCTA:  -STRESS TEST:  -CATHETERIZATION:    ECG    TELEMETRY EVENTS   HPI  82 y/o female with a PMHx COPD, OA and chronic back pain s/p multiple surgeries presenting to the ER after slipping off her bed. Not on AC, no HT, no LOC. Fell on coccyx, unable to ambulate after the fall.  Complaining of pain to left hip / upper leg area and a skin scrape on the left shin. Sensation intact b/l LE, denies numbness/tingling, CP/SOB. Remainder of ROS negative.     In the ED:   - Vitals on admission --> /85, HR 90, RR 18, T97.6, O2 96% RA  - Labs on admission --> WBC 16, Hb 12.5, HS Trop 22, Na 130, K 4.9, AG 15, CK 46  - ECG --> NSR w/ PVCs  - CTH --> nothing acute, 6mm degen anterolisthesis of C7 on T1, mod/sev chronic microvascular ischemic changes  - CT CS --> nothing acute  - CT CAP --> Acute comminuted left intertrochanteric femur fracture, Moderate to severe centrilobular emphysema, Secretions seen in the right mainstem bronchus, Mild bilateral bronchial wall thickening with lower lobe endobronchial opacities, Left upper lobe linear stellate density seen on series 6 image 98 with areas of fat density within, 8mm LL nodule, 9mm LL nodule  - XR Pelvis --> acute left intertrochanteric femur fracture w/ varus angulation, multilevel degenerative changes of the spine and R hip joint  - Ortho Consult --> bed rest, pain control, hold DVT ppx in AM, medicine preop clearance, preop labs / ECG / CXR / ECG, trend Hb, 2u PRBC on hold for OR, NPO for surgery tomorrow 12/26  - S/p morphine and dilaudid in ED  - Admitted to medicine for further management    CARDIOLOGY CONSULT    Cardio was consulted Preop risk stratification. Pt has no prior cardiac history. Says follows Dr. Martin but takes no cardiac medication nor has any pathology. She is able to climb 1 flight of stairs slowly, can walk 2 blocks but has to stop 2-3 times due to breathing difficulty. She has no active complaints now.       PAST MEDICAL & SURGICAL HISTORY  COPD (chronic obstructive pulmonary disease)  H/O cholelithiasis  Smoker  History of surgery  orif right ankle    FAMILY HISTORY  FAMILY HISTORY:  Known health problems: none (Father, Mother)    SOCIAL HISTORY  Active smoker    ALLERGIES  No Known Allergies      MEDICATIONS:  MEDICATIONS  (STANDING):  albuterol/ipratropium for Nebulization 3 milliLiter(s) Nebulizer every 6 hours  budesonide  80 MICROgram(s)/formoterol 4.5 MICROgram(s) Inhaler 2 Puff(s) Inhalation two times a day  methylPREDNISolone sodium succinate Injectable 40 milliGRAM(s) IV Push every 12 hours  polyethylene glycol 3350 17 Gram(s) Oral daily  senna 2 Tablet(s) Oral at bedtime  tiotropium 2.5 MICROgram(s) Inhaler 2 Puff(s) Inhalation daily    MEDICATIONS  (PRN):  morphine  - Injectable 2 milliGRAM(s) IV Push every 6 hours PRN Moderate Pain (4 - 6)      HOME MEDICATIONS  Home Medications:  Advair Diskus 500 mcg-50 mcg inhalation powder: 1 puff(s) inhaled 2 times a day (26 Dec 2023 10:09)  Spiriva 18 mcg inhalation capsule: 1 cap(s) inhaled once a day (25 Dec 2023 21:11)      VITALS   T(F): 96.1 (12-26 @ 08:22), Max: 98.8 (12-25 @ 22:37)  HR: 109 (12-26 @ 10:50) (72 - 109)  BP: 104/55 (12-26 @ 10:50) (104/55 - 131/85)  BP(mean): --  RR: 18 (12-26 @ 10:50) (17 - 18)  SpO2: 98% (12-26 @ 10:50) (96% - 98%)    I&O's Summary      REVIEW OF SYSTEMS  CONSTITUTIONAL: No weakness, fevers or chills  EYES: No visual changes  ENT: No vertigo or throat pain   NECK: No pain or stiffness  RESPIRATORY: No cough, wheezing, hemoptysis; No shortness of breath  CARDIOVASCULAR: No chest pain or palpitations  GASTROINTESTINAL: No abdominal or epigastric pain. No nausea, vomiting, or hematemesis; No diarrhea or constipation. No melena or hematochezia.  GENITOURINARY: No dysuria, frequency or hematuria  NEUROLOGICAL: No numbness or weakness  SKIN: No itching, no rashes  MSK: No pain    PHYSICAL EXAM  NEURO: patient is awake , alert and oriented  GEN: Not in acute distress  NECK: no thyroid enlargement, no JVD  LUNGS: Clear to auscultation bilaterally   CARDIOVASCULAR: S1/S2 present, RRR , no murmurs or rubs, no carotid bruits,  + PP bilaterally  ABD: Soft, non-tender, non-distended, +BS  EXT: No MARCIE  SKIN: Intact    LABS:                        9.6    11.28 )-----------( 297      ( 26 Dec 2023 07:08 )             29.1     12-26    129<L>  |  93<L>  |  18  ----------------------------<  100<H>  5.4<H>   |  23  |  0.9    Ca    8.8      26 Dec 2023 07:08  Mg     2.0     12-26    TPro  5.9<L>  /  Alb  3.4<L>  /  TBili  0.7  /  DBili  x   /  AST  13  /  ALT  9   /  AlkPhos  96  12-26    PT/INR - ( 26 Dec 2023 00:43 )   PT: 12.30 sec;   INR: 1.08 ratio         PTT - ( 26 Dec 2023 00:43 )  PTT:39.9 sec    Troponin Trend: 22--->30---->29       RADIOLOGY  -CXR:Bibasilar atelectasis. No pneumothorax.  -TTE: NA    ECG  - RBBB with PAC's, no obvious ischemia changes    HPI  84 y/o female with a PMHx COPD, OA and chronic back pain s/p multiple surgeries presenting to the ER after slipping off her bed. Not on AC, no HT, no LOC. Fell on coccyx, unable to ambulate after the fall.  Complaining of pain to left hip / upper leg area and a skin scrape on the left shin. Sensation intact b/l LE, denies numbness/tingling, CP/SOB. Remainder of ROS negative.     In the ED:   - Vitals on admission --> /85, HR 90, RR 18, T97.6, O2 96% RA  - Labs on admission --> WBC 16, Hb 12.5, HS Trop 22, Na 130, K 4.9, AG 15, CK 46  - ECG --> NSR w/ PVCs  - CTH --> nothing acute, 6mm degen anterolisthesis of C7 on T1, mod/sev chronic microvascular ischemic changes  - CT CS --> nothing acute  - CT CAP --> Acute comminuted left intertrochanteric femur fracture, Moderate to severe centrilobular emphysema, Secretions seen in the right mainstem bronchus, Mild bilateral bronchial wall thickening with lower lobe endobronchial opacities, Left upper lobe linear stellate density seen on series 6 image 98 with areas of fat density within, 8mm LL nodule, 9mm LL nodule  - XR Pelvis --> acute left intertrochanteric femur fracture w/ varus angulation, multilevel degenerative changes of the spine and R hip joint  - Ortho Consult --> bed rest, pain control, hold DVT ppx in AM, medicine preop clearance, preop labs / ECG / CXR / ECG, trend Hb, 2u PRBC on hold for OR, NPO for surgery tomorrow 12/26  - S/p morphine and dilaudid in ED  - Admitted to medicine for further management    CARDIOLOGY CONSULT    Cardio was consulted Preop risk stratification. Pt has no prior cardiac history. Says follows Dr. Martin but takes no cardiac medication nor has any pathology. She is able to climb 1 flight of stairs slowly, can walk 2 blocks but has to stop 2-3 times due to breathing difficulty. She has no active complaints now.         PAST MEDICAL & SURGICAL HISTORY  COPD (chronic obstructive pulmonary disease)  H/O cholelithiasis  Smoker  History of surgery  orif right ankle    FAMILY HISTORY  Known health problems: none (Father, Mother)    SOCIAL HISTORY  Active smoker    ALLERGIES  No Known Allergies      MEDICATIONS:  MEDICATIONS  (STANDING):  albuterol/ipratropium for Nebulization 3 milliLiter(s) Nebulizer every 6 hours  budesonide  80 MICROgram(s)/formoterol 4.5 MICROgram(s) Inhaler 2 Puff(s) Inhalation two times a day  methylPREDNISolone sodium succinate Injectable 40 milliGRAM(s) IV Push every 12 hours  polyethylene glycol 3350 17 Gram(s) Oral daily  senna 2 Tablet(s) Oral at bedtime  tiotropium 2.5 MICROgram(s) Inhaler 2 Puff(s) Inhalation daily    MEDICATIONS  (PRN):  morphine  - Injectable 2 milliGRAM(s) IV Push every 6 hours PRN Moderate Pain (4 - 6)      HOME MEDICATIONS  Home Medications:  Advair Diskus 500 mcg-50 mcg inhalation powder: 1 puff(s) inhaled 2 times a day (26 Dec 2023 10:09)  Spiriva 18 mcg inhalation capsule: 1 cap(s) inhaled once a day (25 Dec 2023 21:11)      VITALS   T(F): 96.1 (12-26 @ 08:22), Max: 98.8 (12-25 @ 22:37)  HR: 109 (12-26 @ 10:50) (72 - 109)  BP: 104/55 (12-26 @ 10:50) (104/55 - 131/85)  BP(mean): --  RR: 18 (12-26 @ 10:50) (17 - 18)  SpO2: 98% (12-26 @ 10:50) (96% - 98%)      REVIEW OF SYSTEMS:  CONSTITUTIONAL: No fever, weight loss, fatigue  NECK: No pain or stiffness  RESPIRATORY: See HPI  CARDIOVASCULAR: See HPI  GASTROINTESTINAL: No abdominal/epigastric pain, nausea, vomiting, hematemesis, diarrhea, constipation, melena or hematochezia  GENITOURINARY: No dysuria, frequency, hematuria, incontinence  NEUROLOGICAL: No headaches, memory loss, loss of strength, numbness, tremors  SKIN: No itching, burning, rashes, lesions   ENDOCRINE: No heat/cold intolerance or hair loss  MUSCULOSKELETAL: No joint pain or swelling  HEME/LYMPH: No easy bruising or bleeding gums      PHYSICAL EXAM:  General: NAD, AAOx3  HEENT: NCAT  Neck: supple, no JVD  CV: RRR, S1S2 nl, no murmurs, no edema  Respiratory: CTA bilaterally, no wheeze, rales or rhonchi	  Abdomen: soft, NT/ND, +BS  Extremities: warm, no edema      LABS:                        9.6    11.28 )-----------( 297      ( 26 Dec 2023 07:08 )             29.1     12-26    129<L>  |  93<L>  |  18  ----------------------------<  100<H>  5.4<H>   |  23  |  0.9    Ca    8.8      26 Dec 2023 07:08  Mg     2.0     12-26    TPro  5.9<L>  /  Alb  3.4<L>  /  TBili  0.7  /  DBili  x   /  AST  13  /  ALT  9   /  AlkPhos  96  12-26    PT/INR - ( 26 Dec 2023 00:43 )   PT: 12.30 sec;   INR: 1.08 ratio      PTT - ( 26 Dec 2023 00:43 )  PTT:39.9 sec    Troponin Trend: 22--->30---->29       RADIOLOGY  -CXR:Bibasilar atelectasis. No pneumothorax.  -TTE: NA    ECG  - RBBB with PAC's, no obvious ischemia changes    HPI  82 y/o female with a PMHx COPD, OA and chronic back pain s/p multiple surgeries presenting to the ER after slipping off her bed. Not on AC, no HT, no LOC. Fell on coccyx, unable to ambulate after the fall.  Complaining of pain to left hip / upper leg area and a skin scrape on the left shin. Sensation intact b/l LE, denies numbness/tingling, CP/SOB. Remainder of ROS negative.     In the ED:   - Vitals on admission --> /85, HR 90, RR 18, T97.6, O2 96% RA  - Labs on admission --> WBC 16, Hb 12.5, HS Trop 22, Na 130, K 4.9, AG 15, CK 46  - ECG --> NSR w/ PVCs  - CTH --> nothing acute, 6mm degen anterolisthesis of C7 on T1, mod/sev chronic microvascular ischemic changes  - CT CS --> nothing acute  - CT CAP --> Acute comminuted left intertrochanteric femur fracture, Moderate to severe centrilobular emphysema, Secretions seen in the right mainstem bronchus, Mild bilateral bronchial wall thickening with lower lobe endobronchial opacities, Left upper lobe linear stellate density seen on series 6 image 98 with areas of fat density within, 8mm LL nodule, 9mm LL nodule  - XR Pelvis --> acute left intertrochanteric femur fracture w/ varus angulation, multilevel degenerative changes of the spine and R hip joint  - Ortho Consult --> bed rest, pain control, hold DVT ppx in AM, medicine preop clearance, preop labs / ECG / CXR / ECG, trend Hb, 2u PRBC on hold for OR, NPO for surgery tomorrow 12/26  - S/p morphine and dilaudid in ED  - Admitted to medicine for further management    CARDIOLOGY CONSULT    Cardio was consulted Preop risk stratification. Pt has no prior cardiac history. Says follows Dr. Martin but takes no cardiac medication nor has any pathology. She is able to climb 1 flight of stairs slowly, can walk 2 blocks but has to stop 2-3 times due to breathing difficulty. She has no active complaints now.         PAST MEDICAL & SURGICAL HISTORY  COPD (chronic obstructive pulmonary disease)  H/O cholelithiasis  Smoker  History of surgery  orif right ankle    FAMILY HISTORY  Known health problems: none (Father, Mother)    SOCIAL HISTORY  Active smoker    ALLERGIES  No Known Allergies      MEDICATIONS:  MEDICATIONS  (STANDING):  albuterol/ipratropium for Nebulization 3 milliLiter(s) Nebulizer every 6 hours  budesonide  80 MICROgram(s)/formoterol 4.5 MICROgram(s) Inhaler 2 Puff(s) Inhalation two times a day  methylPREDNISolone sodium succinate Injectable 40 milliGRAM(s) IV Push every 12 hours  polyethylene glycol 3350 17 Gram(s) Oral daily  senna 2 Tablet(s) Oral at bedtime  tiotropium 2.5 MICROgram(s) Inhaler 2 Puff(s) Inhalation daily    MEDICATIONS  (PRN):  morphine  - Injectable 2 milliGRAM(s) IV Push every 6 hours PRN Moderate Pain (4 - 6)      HOME MEDICATIONS  Home Medications:  Advair Diskus 500 mcg-50 mcg inhalation powder: 1 puff(s) inhaled 2 times a day (26 Dec 2023 10:09)  Spiriva 18 mcg inhalation capsule: 1 cap(s) inhaled once a day (25 Dec 2023 21:11)      VITALS   T(F): 96.1 (12-26 @ 08:22), Max: 98.8 (12-25 @ 22:37)  HR: 109 (12-26 @ 10:50) (72 - 109)  BP: 104/55 (12-26 @ 10:50) (104/55 - 131/85)  BP(mean): --  RR: 18 (12-26 @ 10:50) (17 - 18)  SpO2: 98% (12-26 @ 10:50) (96% - 98%)      REVIEW OF SYSTEMS:  CONSTITUTIONAL: No fever, weight loss, fatigue  NECK: No pain or stiffness  RESPIRATORY: See HPI  CARDIOVASCULAR: See HPI  GASTROINTESTINAL: No abdominal/epigastric pain, nausea, vomiting, hematemesis, diarrhea, constipation, melena or hematochezia  GENITOURINARY: No dysuria, frequency, hematuria, incontinence  NEUROLOGICAL: No headaches, memory loss, loss of strength, numbness, tremors  SKIN: No itching, burning, rashes, lesions   ENDOCRINE: No heat/cold intolerance or hair loss  MUSCULOSKELETAL: No joint pain or swelling  HEME/LYMPH: No easy bruising or bleeding gums      PHYSICAL EXAM:  General: NAD, AAOx3  HEENT: NCAT  Neck: supple, no JVD  CV: RRR, S1S2 nl, no murmurs, no edema  Respiratory: CTA bilaterally, no wheeze, rales or rhonchi	  Abdomen: soft, NT/ND, +BS  Extremities: warm, no edema      LABS:                        9.6    11.28 )-----------( 297      ( 26 Dec 2023 07:08 )             29.1     12-26    129<L>  |  93<L>  |  18  ----------------------------<  100<H>  5.4<H>   |  23  |  0.9    Ca    8.8      26 Dec 2023 07:08  Mg     2.0     12-26    TPro  5.9<L>  /  Alb  3.4<L>  /  TBili  0.7  /  DBili  x   /  AST  13  /  ALT  9   /  AlkPhos  96  12-26    PT/INR - ( 26 Dec 2023 00:43 )   PT: 12.30 sec;   INR: 1.08 ratio      PTT - ( 26 Dec 2023 00:43 )  PTT:39.9 sec    Troponin Trend: 22--->30---->29       RADIOLOGY  -CXR:Bibasilar atelectasis. No pneumothorax.  -TTE: NA    ECG  - RBBB with PAC's, no obvious ischemia changes

## 2023-12-26 NOTE — PATIENT PROFILE ADULT - FUNCTIONAL ASSESSMENT - BASIC MOBILITY 6.
2-calculated by average/Not able to assess (calculate score using [facility] averaging method)  2-calculated by average/Not able to assess (calculate score using [facility] averaging method)

## 2023-12-26 NOTE — CONSULT NOTE ADULT - ASSESSMENT
ASSESSMENT    82 y/o female with a PMHx COPD, OA and chronic back pain s/p multiple surgeries presenting to the ER after slipping off her bed. Not on AC, no HT, no LOC. Fell on coccyx, unable to ambulate after the fall.      High-Risk Patient  [  ] Recent (<30 days) or active MI  [  ] Unstable or severe angina  [  ] Decompensated heart failure, or worsening or new-onset heart failure  [  ] Severe valvular disease  [  ] Significant arrhythmia (Tachy- or Bradyarrhythmia)    Revised Cardiac Risk Index (RCRI)  [  ] History of ischemic heart disease   [  ] History of congestive heart failure   [  ] History of cerebrovascular disease (stroke or transient ischemic attack)   [  ] History of diabetes requiring preoperative insulin use   [  ] Chronic kidney disease (creatinine > 2 mg/dL)   [  ] Undergoing suprainguinal vascular, intraperitoneal, or intrathoracic surgery    - Overall this patient's RCRI score is 0 predictors = 3.9%  - Class I risk: 3.9% risk of major cardiac event (defined as death, myocardial infarction, or cardiac arrest at 30 days after noncardiac surgery)    Patient-Based Characteristics (Functional Capacity)     [ x  ] Patient is NOT able to achieve more than 4 MET    Surgery/Procedure-Based Characteristics (Type of Surgery)  [  ] Low-risk procedure (outpatient procedure, elective, endoscopy, etc...)  [ x ] Elevated or Moderate-risk procedure (Inpatient)  [  ] High-risk procedure (urgent/emergent procedure, Intrathoracic, vascular, etc...)    IMPRESSION   - Pt has no prior cardiac history   - ECG - RBBB, PAC's; no ischemia changes   - Elevated troponins - Likely demand ischemia, troponins stable after peaking at 30    RECOMMENDATIONS  - Moderate-risk patient for a Low (<1%) / Moderate / High (>7%) -risk surgery/procedure  - No further cardiac work-up is needed at the moment  - There are no current cardiac contraindications to prevent from proceeding with the scheduled surgery.    This consult serves only as a shabbir-operative cardiac risk stratification and evaluation to predict 30-day cardiac complications, risks, and mortality. The decision to proceed with the surgery/procedure is made by the performing physician and the patient.   ASSESSMENT    82 y/o female with a PMHx COPD, OA and chronic back pain s/p multiple surgeries presenting to the ER after slipping off her bed. Not on AC, no HT, no LOC. Fell on coccyx, unable to ambulate after the fall.      High-Risk Patient  [  ] Recent (<30 days) or active MI  [  ] Unstable or severe angina  [  ] Decompensated heart failure, or worsening or new-onset heart failure  [  ] Severe valvular disease  [  ] Significant arrhythmia (Tachy- or Bradyarrhythmia)    Revised Cardiac Risk Index (RCRI)  [  ] History of ischemic heart disease   [  ] History of congestive heart failure   [  ] History of cerebrovascular disease (stroke or transient ischemic attack)   [  ] History of diabetes requiring preoperative insulin use   [  ] Chronic kidney disease (creatinine > 2 mg/dL)   [  ] Undergoing suprainguinal vascular, intraperitoneal, or intrathoracic surgery    - Overall this patient's RCRI score is 0 predictors = 3.9%  - Class I risk: 3.9% risk of major cardiac event (defined as death, myocardial infarction, or cardiac arrest at 30 days after noncardiac surgery)    Patient-Based Characteristics (Functional Capacity)     [ x  ] Patient is NOT able to achieve more than 4 MET    Surgery/Procedure-Based Characteristics (Type of Surgery)  [  ] Low-risk procedure (outpatient procedure, elective, endoscopy, etc...)  [ x ] Elevated or Moderate-risk procedure (Inpatient)  [  ] High-risk procedure (urgent/emergent procedure, Intrathoracic, vascular, etc...)    IMPRESSION   - Pt has no prior cardiac history   - ECG - RBBB, PAC's; no ischemia changes   - Elevated troponins - Likely demand ischemia, troponins stable after peaking at 30    RECOMMENDATIONS  - Moderate-risk patient for a Moderate-risk surgery  - No further cardiac work-up is needed at the moment  - There are no current cardiac contraindications to prevent from proceeding with the scheduled surgery.    This consult serves only as a shabbir-operative cardiac risk stratification and evaluation to predict 30-day cardiac complications, risks, and mortality. The decision to proceed with the surgery/procedure is made by the performing physician and the patient.   ASSESSMENT    84 y/o female with a PMHx COPD, OA and chronic back pain s/p multiple surgeries presenting to the ER after slipping off her bed. Not on AC, no HT, no LOC. Fell on coccyx, unable to ambulate after the fall.      High-Risk Patient  [  ] Recent (<30 days) or active MI  [  ] Unstable or severe angina  [  ] Decompensated heart failure, or worsening or new-onset heart failure  [  ] Severe valvular disease  [  ] Significant arrhythmia (Tachy- or Bradyarrhythmia)    Revised Cardiac Risk Index (RCRI)  [  ] History of ischemic heart disease   [  ] History of congestive heart failure   [  ] History of cerebrovascular disease (stroke or transient ischemic attack)   [  ] History of diabetes requiring preoperative insulin use   [  ] Chronic kidney disease (creatinine > 2 mg/dL)   [  ] Undergoing suprainguinal vascular, intraperitoneal, or intrathoracic surgery    - Overall this patient's RCRI score is 0 predictors = 3.9%  - Class I risk: 3.9% risk of major cardiac event (defined as death, myocardial infarction, or cardiac arrest at 30 days after noncardiac surgery)    Patient-Based Characteristics (Functional Capacity)     [ x  ] Patient is NOT able to achieve more than 4 MET    Surgery/Procedure-Based Characteristics (Type of Surgery)  [  ] Low-risk procedure (outpatient procedure, elective, endoscopy, etc...)  [ x ] Elevated or Moderate-risk procedure (Inpatient)  [  ] High-risk procedure (urgent/emergent procedure, Intrathoracic, vascular, etc...)    IMPRESSION   - Pt has no prior cardiac history   - ECG - RBBB, PAC's; no ischemia changes   - Elevated troponins - Likely demand ischemia, troponins stable after peaking at 30    RECOMMENDATIONS  - Moderate-risk patient for a Moderate-risk surgery  - No further cardiac work-up is needed at the moment  - There are no current cardiac contraindications to prevent from proceeding with the scheduled surgery.    This consult serves only as a shabbir-operative cardiac risk stratification and evaluation to predict 30-day cardiac complications, risks, and mortality. The decision to proceed with the surgery/procedure is made by the performing physician and the patient.   ASSESSMENT    82 y/o female with a PMHx COPD, OA and chronic back pain s/p multiple surgeries presenting to the ER after slipping off her bed. Not on AC, no HT, no LOC. Fell on coccyx, unable to ambulate after the fall.      High-Risk Patient  [  ] Recent (<30 days) or active MI  [  ] Unstable or severe angina  [  ] Decompensated heart failure, or worsening or new-onset heart failure  [  ] Severe valvular disease  [  ] Significant arrhythmia (Tachy- or Bradyarrhythmia)    Revised Cardiac Risk Index (RCRI)  [  ] History of ischemic heart disease   [  ] History of congestive heart failure   [  ] History of cerebrovascular disease (stroke or transient ischemic attack)   [  ] History of diabetes requiring preoperative insulin use   [  ] Chronic kidney disease (creatinine > 2 mg/dL)   [  ] Undergoing suprainguinal vascular, intraperitoneal, or intrathoracic surgery    - Overall this patient's RCRI score is 0 predictors = 3.9%  - Class I risk: 3.9% risk of major cardiac event (defined as death, myocardial infarction, or cardiac arrest at 30 days after noncardiac surgery)    Patient-Based Characteristics (Functional Capacity)     [ x  ] Patient is NOT able to achieve more than 4 MET    Surgery/Procedure-Based Characteristics (Type of Surgery)  [  ] Low-risk procedure (outpatient procedure, elective, endoscopy, etc...)  [ x ] Elevated or Moderate-risk procedure (Inpatient)  [  ] High-risk procedure (urgent/emergent procedure, Intrathoracic, vascular, etc...)    IMPRESSION   - Pt has no prior cardiac history   - ECG - RBBB, PAC's; no ischemia changes   - Elevated troponins - Likely demand ischemia - stable after peaking at 30    RECOMMENDATIONS  - Moderate to high-risk patient for a Moderate-risk surgery  - No further cardiac work-up is needed at the moment  - There are no current cardiac contraindications to prevent from proceeding with the scheduled surgery.    This consult serves only as a shabbir-operative cardiac risk stratification and evaluation to predict 30-day cardiac complications, risks, and mortality. The decision to proceed with the surgery/procedure is made by the performing physician and the patient.   ASSESSMENT    82 y/o female with a PMHx COPD, OA and chronic back pain s/p multiple surgeries presenting to the ER after slipping off her bed. Not on AC, no HT, no LOC. Fell on coccyx, unable to ambulate after the fall.      High-Risk Patient  [  ] Recent (<30 days) or active MI  [  ] Unstable or severe angina  [  ] Decompensated heart failure, or worsening or new-onset heart failure  [  ] Severe valvular disease  [  ] Significant arrhythmia (Tachy- or Bradyarrhythmia)    Revised Cardiac Risk Index (RCRI)  [  ] History of ischemic heart disease   [  ] History of congestive heart failure   [  ] History of cerebrovascular disease (stroke or transient ischemic attack)   [  ] History of diabetes requiring preoperative insulin use   [  ] Chronic kidney disease (creatinine > 2 mg/dL)   [  ] Undergoing suprainguinal vascular, intraperitoneal, or intrathoracic surgery    - Overall this patient's RCRI score is 0 predictors = 3.9%  - Class I risk: 3.9% risk of major cardiac event (defined as death, myocardial infarction, or cardiac arrest at 30 days after noncardiac surgery)    Patient-Based Characteristics (Functional Capacity)  [ x ] Patient is NOT able to achieve more than 4 MET    Surgery/Procedure-Based Characteristics (Type of Surgery)  [ x ] Elevated or Moderate-risk procedure (Inpatient)      IMPRESSION   - Pt has no prior cardiac history   - ECG - RBBB, PAC's; no ischemic changes   - Elevated troponins likely demand ischemia      RECOMMENDATIONS  - High-risk patient for a moderate-risk surgery  - There are no current cardiac contraindications to prevent from proceeding with the scheduled surgery    This consult serves only as a shabbir-operative cardiac risk stratification and evaluation to predict 30-day cardiac complications, risks, and mortality. The decision to proceed with the surgery/procedure is made by the performing physician and the patient.   ASSESSMENT    84 y/o female with a PMHx COPD, OA and chronic back pain s/p multiple surgeries presenting to the ER after slipping off her bed. Not on AC, no HT, no LOC. Fell on coccyx, unable to ambulate after the fall.      High-Risk Patient  [  ] Recent (<30 days) or active MI  [  ] Unstable or severe angina  [  ] Decompensated heart failure, or worsening or new-onset heart failure  [  ] Severe valvular disease  [  ] Significant arrhythmia (Tachy- or Bradyarrhythmia)    Revised Cardiac Risk Index (RCRI)  [  ] History of ischemic heart disease   [  ] History of congestive heart failure   [  ] History of cerebrovascular disease (stroke or transient ischemic attack)   [  ] History of diabetes requiring preoperative insulin use   [  ] Chronic kidney disease (creatinine > 2 mg/dL)   [  ] Undergoing suprainguinal vascular, intraperitoneal, or intrathoracic surgery    - Overall this patient's RCRI score is 0 predictors = 3.9%  - Class I risk: 3.9% risk of major cardiac event (defined as death, myocardial infarction, or cardiac arrest at 30 days after noncardiac surgery)    Patient-Based Characteristics (Functional Capacity)  [ x ] Patient is NOT able to achieve more than 4 MET    Surgery/Procedure-Based Characteristics (Type of Surgery)  [ x ] Elevated or Moderate-risk procedure (Inpatient)      IMPRESSION   - Pt has no prior cardiac history   - ECG - RBBB, PAC's; no ischemic changes   - Elevated troponins likely demand ischemia      RECOMMENDATIONS  - High-risk patient for a moderate-risk surgery  - There are no current cardiac contraindications to prevent from proceeding with the scheduled surgery    This consult serves only as a shabbir-operative cardiac risk stratification and evaluation to predict 30-day cardiac complications, risks, and mortality. The decision to proceed with the surgery/procedure is made by the performing physician and the patient.

## 2023-12-26 NOTE — PROGRESS NOTE ADULT - SUBJECTIVE AND OBJECTIVE BOX
ORTHOPEDIC UPDATE NOTE    83F with left IT fracture sustained after ground level fall. She has a history of COPD and underwent pulmonology and cardiology clearance today.    Upon further review of the imaging today, it was noted that there was a questionable pathologic lytic lesion in the left femoral shaft, distal to the fracture site. Due to the concern of a newly diagnosed lesion, the possibility of a bone tumor (metastatic vs MM vs possible primary sarcoma) cannot be excluded. Therefore, the surgery was canceled in order to obtain further workup.    Plan:  -Surgery will be canceled tonight in order to obtain further workup given concern for lytic lesion in the left femur  -Diet OK for tonight, no plans for surgery until further workup is obtained  -Please obtain CT L femur  -Please obtain MRI L femur  -Please obtain the following tumor labs: SPEP, UPEP, PTH, Vit D, Alk phos, LDH, Ca, Phos  -Please give one stat dose of LVX tonight given high VTE risk given prolonged immobilization  -Please hold LVX tomorrow in preparation for possible surgery  -Full surgical plan to follow based on above workup  -Please keep NPO / IVF tonight in preparation for surgery tomorrow ORTHOPEDIC UPDATE NOTE    83F with left IT fracture sustained after ground level fall. She has a history of COPD and underwent pulmonology and cardiology clearance today.    Upon further review of the imaging today, it was noted that there was a questionable pathologic lytic lesion in the left femoral shaft, distal to the fracture site. Due to the concern of a newly diagnosed lesion, the possibility of a bone tumor (metastatic vs MM vs possible primary sarcoma) cannot be excluded. Therefore, the surgery was canceled in order to obtain further workup.    Plan:  -Surgery will be canceled tonight in order to obtain further workup given concern for lytic lesion in the left femur  -Diet OK for tonight, no plans for surgery until further workup is obtained  -Please obtain CT L femur w/ and w/out contrast  -Please obtain MRI L femur w/ and w/out contrast  -Please obtain the following tumor labs: SPEP, UPEP, PTH, Vit D, Alk phos, LDH, Ca, Phos  -Please give one stat dose of LVX tonight given high VTE risk given prolonged immobilization  -Please hold LVX tomorrow in preparation for possible surgery  -Full surgical plan to follow based on above workup  -Please keep NPO / IVF tonight in preparation for surgery tomorrow

## 2023-12-26 NOTE — PATIENT PROFILE ADULT - FALL HARM RISK - HARM RISK INTERVENTIONS
Assistance with ambulation/Assistance OOB with selected safe patient handling equipment/Communicate Risk of Fall with Harm to all staff/Discuss with provider need for PT consult/Monitor gait and stability/Provide patient with walking aids - walker, cane, crutches/Reinforce activity limits and safety measures with patient and family/Tailored Fall Risk Interventions/Visual Cue: Yellow wristband and red socks/Bed in lowest position, wheels locked, appropriate side rails in place/Call bell, personal items and telephone in reach/Instruct patient to call for assistance before getting out of bed or chair/Non-slip footwear when patient is out of bed/[place] to call system/Physically safe environment - no spills, clutter or unnecessary equipment/Purposeful Proactive Rounding/Room/bathroom lighting operational, light cord in reach Assistance with ambulation/Assistance OOB with selected safe patient handling equipment/Communicate Risk of Fall with Harm to all staff/Discuss with provider need for PT consult/Monitor gait and stability/Provide patient with walking aids - walker, cane, crutches/Reinforce activity limits and safety measures with patient and family/Tailored Fall Risk Interventions/Visual Cue: Yellow wristband and red socks/Bed in lowest position, wheels locked, appropriate side rails in place/Call bell, personal items and telephone in reach/Instruct patient to call for assistance before getting out of bed or chair/Non-slip footwear when patient is out of bed/[place] to call system/Physically safe environment - no spills, clutter or unnecessary equipment/Purposeful Proactive Rounding/Room/bathroom lighting operational, light cord in reach

## 2023-12-26 NOTE — PRE PROCEDURE NOTE - PRE PROCEDURE EVALUATION
ORTHOPEDIC SURGERY PRE-OP NOTE    Diagnosis: Left intertochanteric femur fracture   Planned Procedure: Left femur open reduction internal fixation     Consent: TO BE OBTAINED BY ATTENDING. Risks/benefits/alternatives were discussed with the patient/family and they wish to proceed with surgery.     ANTICIPATED DATE OF PROCEDURE: 12/27  SCHEDULED CASE OR ADD-ON CASE: Scheduled case    Clearances:   [x] Medicine  [x] Pulmonary   [x] Cardiology    T(C): 36.4 (12-25-23 @ 14:52), Max: 36.4 (12-25-23 @ 14:52)  HR: 90 (12-25-23 @ 14:52) (90 - 90)  BP: 131/85 (12-25-23 @ 14:52) (131/85 - 131/85)  RR: 18 (12-25-23 @ 14:52) (18 - 18)  SpO2: 96% (12-25-23 @ 14:52) (96% - 96%)    Labs:                        12.5   16.62 )-----------( 368      ( 25 Dec 2023 15:22 )             37.1     12-25    130<L>  |  92<L>  |  13  ----------------------------<  134<H>  4.9   |  23  |  0.8    Ca    9.1      25 Dec 2023 15:22    TPro  6.9  /  Alb  4.0  /  TBili  0.6  /  DBili  x   /  AST  18  /  ALT  10  /  AlkPhos  115  12-25    PT/INR - ( 25 Dec 2023 15:22 )   PT: 11.90 sec;   INR: 1.04 ratio         PTT - ( 25 Dec 2023 15:22 )  PTT:32.6 sec  Type and Screen x2  [x] 12/25 [x] 12/26          [X] EKG - 12/25  [X] CXR - 12/25    DIET: NPO after midnight  IVF: Per primary team    ANTICOAGULATION STATUS: Per primary, Hold AM dose for OR     A/P: Patient is a 83y y/o Female pending left femur open reduction internal fixation tomorrow, 12/27.    - NPO and IVF @ midnight  - Type and Screen x2 required, 2u RBCs on hold for OR  -Please obtain CT L femur w/ and w/out contrast  -Please obtain MRI L femur w/ and w/out contrast  -Please obtain the following tumor labs: SPEP, UPEP, PTH, Vit D, Alk phos, LDH, Ca, Phos  -Please give one stat dose of LVX tonight given high VTE risk given prolonged immobilization  -Please hold LVX tomorrow in preparation for possible surgery  -Full surgical plan to follow based on above workup  -Please keep NPO / IVF tonight in preparation for surgery tomorrow    Notify Ortho with any questions - [facility] [phone]    [X] ABOVE WAS DISCUSSED WITH PRIMARY TEAM MEMBER: Javy Juares  [X] Date and Time DISCUSSED WITH PRIMARY TEAM MEMBER: [id number] ORTHOPEDIC SURGERY PRE-OP NOTE    Diagnosis: Left intertochanteric femur fracture   Planned Procedure: Left femur open reduction internal fixation     Consent: TO BE OBTAINED BY ATTENDING. Risks/benefits/alternatives were discussed with the patient/family and they wish to proceed with surgery.     ANTICIPATED DATE OF PROCEDURE: 12/27  SCHEDULED CASE OR ADD-ON CASE: Scheduled case    Clearances:   [x] Medicine  [x] Pulmonary   [x] Cardiology    T(C): 36.4 (12-25-23 @ 14:52), Max: 36.4 (12-25-23 @ 14:52)  HR: 90 (12-25-23 @ 14:52) (90 - 90)  BP: 131/85 (12-25-23 @ 14:52) (131/85 - 131/85)  RR: 18 (12-25-23 @ 14:52) (18 - 18)  SpO2: 96% (12-25-23 @ 14:52) (96% - 96%)    Labs:                        12.5   16.62 )-----------( 368      ( 25 Dec 2023 15:22 )             37.1     12-25    130<L>  |  92<L>  |  13  ----------------------------<  134<H>  4.9   |  23  |  0.8    Ca    9.1      25 Dec 2023 15:22    TPro  6.9  /  Alb  4.0  /  TBili  0.6  /  DBili  x   /  AST  18  /  ALT  10  /  AlkPhos  115  12-25    PT/INR - ( 25 Dec 2023 15:22 )   PT: 11.90 sec;   INR: 1.04 ratio         PTT - ( 25 Dec 2023 15:22 )  PTT:32.6 sec  Type and Screen x2  [x] 12/25 [x] 12/26          [X] EKG - 12/25  [X] CXR - 12/25    DIET: NPO after midnight  IVF: Per primary team    ANTICOAGULATION STATUS: Per primary, Hold AM dose for OR     A/P: Patient is a 83y y/o Female pending left femur open reduction internal fixation tomorrow, 12/27.    - NPO and IVF @ midnight  - Type and Screen x2 required, 2u RBCs on hold for OR  -Please obtain CT L femur w/ and w/out contrast  -Please obtain MRI L femur w/ and w/out contrast  -Please obtain the following tumor labs: SPEP, UPEP, PTH, Vit D, Alk phos, LDH, Ca, Phos  -Please give one stat dose of LVX tonight given high VTE risk given prolonged immobilization  -Please hold LVX tomorrow in preparation for possible surgery  -Full surgical plan to follow based on above workup  -Please keep NPO / IVF tonight in preparation for surgery tomorrow    Notify Ortho with any questions - [facility] [phone]    [X] ABOVE WAS DISCUSSED WITH PRIMARY TEAM MEMBER: Javy Juares  [X] Date and Time DISCUSSED WITH PRIMARY TEAM MEMBER: [id number]

## 2023-12-26 NOTE — PRE PROCEDURE NOTE - ATTENDING COMMENTS
Dx: Left IT fx  Procedure: ORIF L hip  I met patient and daughter. Discussed goals of surgery including early WB fracture healing and mobilization. Discussed risks including infection, wound healing, non union, malunion, shabbir implant fracture intraop or post op, bleeding, nerve injury, organ damage, blood clot, death. Patient is higher risk. Medical notes labs and imaging reviewed. Informed consent signed.    Addendum: upon further review of Left femur xray I saw lesion in diaphysis of bone. Made decision to cancel case until can work up lesion better. Will get CT femur to confirm bony lesion and based on that can make decision on IMR vs ORIF vs biopsy

## 2023-12-26 NOTE — PROGRESS NOTE ADULT - ASSESSMENT
84 y/o female with a PMHx COPD, OA and chronic back pain s/p multiple surgeries presenting to the ER after slipping off her bed. Not on AC, no HT, no LOC. Found to have L intertrochanteric femur fracture.     #Left Intertrochanteric Femur Fracture  ECG --> NSR w/ PVCs, RBBB  CT CS reviewed by me --> significant Emphysema   - discussed with ortho patient will be undergoing general anesthesia  - PT consult after surgery  - Preop labs ordered for 23:30  - 2u PRBC ordered on hold for OR   - Pain control PRN  - Bowel regimen  - Plan for ORIF today       - Patient would be at elevated risk for moderate risk procedure. Further cardiac/pulmonary workup is not indicated. Recommend extubation to BIPAP prior to downtitrating to NC/RA.   This serves only as a shabbir-operative medical risk stratification and evaluation to predict medical complications risk and mortality. The decision to proceed with the surgery/procedure is made by the performing physician and the patient.    #Hx of COPD  #Current Smoker  - On Spiriva and Advair at home, unsure of doses, pharmacy closed  - Cont Tiotropium 2.5mcg 2 puffs QD  - Cont Budesonide/Formoterol 80/4.5mcg 2 puffs BID  - duoneb prn  - pulm consult   - Continue IV solumedrol 40 BID for now    #Hx of Chronic Back Pain  #Hx of Compression Fracture  #Hx of OA  - Not on home meds  - Monitor     Diet: NPO for surgery tomorrow  DVT ppx, held for procedure    #Progress Note Handoff  Pending (specify): ORIF  Family discussion: diego pt and family regarding pending ORIF  Disposition: SELENE 82 y/o female with a PMHx COPD, OA and chronic back pain s/p multiple surgeries presenting to the ER after slipping off her bed. Not on AC, no HT, no LOC. Found to have L intertrochanteric femur fracture.     #Left Intertrochanteric Femur Fracture  ECG --> NSR w/ PVCs, RBBB  CT CS reviewed by me --> significant Emphysema   - discussed with ortho patient will be undergoing general anesthesia  - PT consult after surgery  - Preop labs ordered for 23:30  - 2u PRBC ordered on hold for OR   - Pain control PRN  - Bowel regimen  - Plan for ORIF today       - Patient would be at elevated risk for moderate risk procedure. Further cardiac/pulmonary workup is not indicated. Recommend extubation to BIPAP prior to downtitrating to NC/RA.   This serves only as a shabbir-operative medical risk stratification and evaluation to predict medical complications risk and mortality. The decision to proceed with the surgery/procedure is made by the performing physician and the patient.    #Hx of COPD  #Current Smoker  - On Spiriva and Advair at home, unsure of doses, pharmacy closed  - Cont Tiotropium 2.5mcg 2 puffs QD  - Cont Budesonide/Formoterol 80/4.5mcg 2 puffs BID  - duoneb prn  - pulm consult   - Continue IV solumedrol 40 BID for now    #Hx of Chronic Back Pain  #Hx of Compression Fracture  #Hx of OA  - Not on home meds  - Monitor     Diet: NPO for surgery tomorrow  DVT ppx, held for procedure    #Progress Note Handoff  Pending (specify): ORIF  Family discussion: diego pt and family regarding pending ORIF  Disposition: SELENE 82 y/o female with a PMHx COPD, OA and chronic back pain s/p multiple surgeries presenting to the ER after slipping off her bed. Not on AC, no HT, no LOC. Found to have L intertrochanteric femur fracture.     #Left Intertrochanteric Femur Fracture  ECG --> NSR w/ PVCs, RBBB  CT CS reviewed by me --> significant Emphysema   - discussed with ortho patient will be undergoing general anesthesia  - PT consult after surgery  - Preop labs ordered for 23:30  - 2u PRBC ordered on hold for OR   - Pain control PRN  - Bowel regimen  - Plan for ORIF today       - Patient would be at elevated risk for moderate risk procedure. Troponins elevated and patient is wheezing. Recommend pulmonary and cardio eval prior to procedure. Recommend extubation to BIPAP prior to downtitrating to NC/RA.   This serves only as a shabbri-operative medical risk stratification and evaluation to predict medical complications risk and mortality. The decision to proceed with the surgery/procedure is made by the performing physician and the patient.    #Hx of COPD  #Current Smoker  - On Spiriva and Advair at home, unsure of doses, pharmacy closed  - Cont Tiotropium 2.5mcg 2 puffs QD  - Cont Budesonide/Formoterol 80/4.5mcg 2 puffs BID  - duoneb prn  - pulm consult   - Continue IV solumedrol 40 BID for now    #Hx of Chronic Back Pain  #Hx of Compression Fracture  #Hx of OA  - Not on home meds  - Monitor     DVT ppx, held for procedure    #Progress Note Handoff  Pending (specify): ORIF  Family discussion: diego pt and family regarding pending ORIF  Disposition: SELENE 82 y/o female with a PMHx COPD, OA and chronic back pain s/p multiple surgeries presenting to the ER after slipping off her bed. Not on AC, no HT, no LOC. Found to have L intertrochanteric femur fracture.     #Left Intertrochanteric Femur Fracture  ECG --> NSR w/ PVCs, RBBB  CT CS reviewed by me --> significant Emphysema   - discussed with ortho patient will be undergoing general anesthesia  - PT consult after surgery  - Preop labs ordered for 23:30  - 2u PRBC ordered on hold for OR   - Pain control PRN  - Bowel regimen  - Plan for ORIF today       - Patient would be at elevated risk for moderate risk procedure. Troponins elevated and patient is wheezing. Recommend pulmonary and cardio eval prior to procedure. Recommend extubation to BIPAP prior to downtitrating to NC/RA.   This serves only as a shabbir-operative medical risk stratification and evaluation to predict medical complications risk and mortality. The decision to proceed with the surgery/procedure is made by the performing physician and the patient.    #Hx of COPD  #Current Smoker  - On Spiriva and Advair at home, unsure of doses, pharmacy closed  - Cont Tiotropium 2.5mcg 2 puffs QD  - Cont Budesonide/Formoterol 80/4.5mcg 2 puffs BID  - duoneb prn  - pulm consult   - Continue IV solumedrol 40 BID for now    #Hx of Chronic Back Pain  #Hx of Compression Fracture  #Hx of OA  - Not on home meds  - Monitor     DVT ppx, held for procedure    #Progress Note Handoff  Pending (specify): ORIF  Family discussion: diego pt and family regarding pending ORIF  Disposition: SELENE 82 y/o female with a PMHx COPD, OA and chronic back pain s/p multiple surgeries presenting to the ER after slipping off her bed. Not on AC, no HT, no LOC. Found to have L intertrochanteric femur fracture.     #Left Intertrochanteric Femur Fracture  ECG --> NSR w/ PVCs, RBBB  CT CS reviewed by me --> significant Emphysema   - discussed with ortho patient will be undergoing general anesthesia  - PT consult after surgery  - Preop labs ordered for 23:30  - 2u PRBC ordered on hold for OR   - Pain control PRN  - Bowel regimen  - Plan for ORIF today       - Patient would be at elevated risk for moderate risk procedure. Further cardiac workup not indicated prior to surgery. Recommend extubation to BIPAP prior to downtitrating to NC/RA.   This serves only as a shabbir-operative medical risk stratification and evaluation to predict medical complications risk and mortality. The decision to proceed with the surgery/procedure is made by the performing physician and the patient.    #Hx of COPD  #Current Smoker  - On Spiriva and Advair at home, unsure of doses, pharmacy closed  - Cont Tiotropium 2.5mcg 2 puffs QD  - Cont Budesonide/Formoterol 80/4.5mcg 2 puffs BID  - duoneb prn  - pulm consult   - Continue IV solumedrol 40 BID for now    #Hx of Chronic Back Pain  #Hx of Compression Fracture  #Hx of OA  - Not on home meds  - Monitor     DVT ppx, held for procedure    #Progress Note Handoff  Pending (specify): ORIF  Family discussion: diego pt and family regarding pending ORIF  Disposition: SELENE 84 y/o female with a PMHx COPD, OA and chronic back pain s/p multiple surgeries presenting to the ER after slipping off her bed. Not on AC, no HT, no LOC. Found to have L intertrochanteric femur fracture.     #Left Intertrochanteric Femur Fracture  ECG --> NSR w/ PVCs, RBBB  CT CS reviewed by me --> significant Emphysema   - discussed with ortho patient will be undergoing general anesthesia  - PT consult after surgery  - Preop labs ordered for 23:30  - 2u PRBC ordered on hold for OR   - Pain control PRN  - Bowel regimen  - Plan for ORIF today       - Patient would be at elevated risk for moderate risk procedure. Further cardiac workup not indicated prior to surgery. Recommend extubation to BIPAP prior to downtitrating to NC/RA.   This serves only as a shabbir-operative medical risk stratification and evaluation to predict medical complications risk and mortality. The decision to proceed with the surgery/procedure is made by the performing physician and the patient.    #Hx of COPD  #Current Smoker  - On Spiriva and Advair at home, unsure of doses, pharmacy closed  - Cont Tiotropium 2.5mcg 2 puffs QD  - Cont Budesonide/Formoterol 80/4.5mcg 2 puffs BID  - duoneb prn  - pulm consult   - Continue IV solumedrol 40 BID for now    #Hx of Chronic Back Pain  #Hx of Compression Fracture  #Hx of OA  - Not on home meds  - Monitor     DVT ppx, held for procedure    #Progress Note Handoff  Pending (specify): ORIF  Family discussion: diego pt and family regarding pending ORIF  Disposition: SELENE 82 y/o female with a PMHx COPD, OA and chronic back pain s/p multiple surgeries presenting to the ER after slipping off her bed. Not on AC, no HT, no LOC. Found to have L intertrochanteric femur fracture.     #Left Intertrochanteric Femur Fracture  ECG --> NSR w/ PVCs, RBBB  CT CS reviewed by me --> significant Emphysema   - discussed with ortho patient will be undergoing general anesthesia  - PT consult after surgery  - Preop labs ordered for 23:30  - 2u PRBC ordered on hold for OR   - Pain control PRN  - Bowel regimen  - Plan for ORIF today       - Patient would be at elevated risk for moderate risk procedure. Further cardiac workup not indicated prior to surgery. Recommend extubation to BIPAP prior to downtitrating to NC/RA. Anesthesiology team requesting pulm and cardio eval prior to procedure   This serves only as a shabbir-operative medical risk stratification and evaluation to predict medical complications risk and mortality. The decision to proceed with the surgery/procedure is made by the performing physician and the patient.    #Hx of COPD  #Current Smoker  - On Spiriva and Advair at home  - Cont Tiotropium 2.5mcg 2 puffs QD  - Cont Budesonide/Formoterol 80/4.5mcg 2 puffs BID  - duoneb prn  - pulm consult   - Continue IV solumedrol 40 BID for now    #Elevated troponins  Likely demand ischemia, troponins stable after peaking at 30  - f/u cardiology eval    #Hx of Chronic Back Pain  #Hx of Compression Fracture  #Hx of OA  - Not on home meds  - Monitor     DVT ppx, held for procedure    #Progress Note Handoff  Pending (specify): ORIF  Family discussion: diego pt and family regarding pending ORIF  Disposition: TBD

## 2023-12-26 NOTE — CONSULT NOTE ADULT - ATTENDING COMMENTS
Type 2 MI secondary to demand ischemia and presumed underlying CAD  COPD  Hyponatremia  Hyperkalemia  Current smoker    High-risk for perioperative major adverse cardiac events  If surgery is postponed, check Echo to evaluate LVEF (although it will not change overall risk assessment)  Correct electrolyte abnormalities  Avoid volume overload    This consult serves only as a perioperative cardiac risk stratification and evaluation to predict 30-day cardiac complications risk and mortality.  The decision to proceed with the surgery/procedure is made by the performing physician and the patient. Type 2 MI secondary to demand ischemia and presumed underlying CAD  COPD  Hyponatremia  Hyperkalemia  Current smoker    High-risk for perioperative major adverse cardiac events  If surgery is postponed, check Echo to evaluate LVEF (although it will not change overall risk assessment)  Correct electrolyte abnormalities  Avoid volume overload  Check CK-MB    This consult serves only as a perioperative cardiac risk stratification and evaluation to predict 30-day cardiac complications risk and mortality.  The decision to proceed with the surgery/procedure is made by the performing physician and the patient.

## 2023-12-26 NOTE — CONSULT NOTE ADULT - ASSESSMENT
Impression    COPD not on Home o2  LLL lung nodules  Chronic back pain  Left intertrochanteric femur fracture    Plan     Impression    COPD not on Home o2  BL lung nodules  Chronic back pain  Left intertrochanteric femur fracture  Current smoker    Plan    ARISCAT score 51  Pt is High risk for Moderate risk procedure  Discussed with patient and daughter at bedside, they understand the risks and would like to proceed with the surgery anyway  Duonebs PRN  Restart home inhalers  S/p dose of solumedrol prior to surgery, c/w for 5 days total  Wean off o2 as tolerated goal SaO2 > 92-96%  HOB 45 degrees  Perioperative pulmonary precautions  Aspiration precautions post procedure  NIV post extubation  Cardiac to follow up elevated troponin level  Social work for smoking cessation counseling  Nicotine replacement therapy  Pain control PRN   Followup outpatient regarding lung nodules and COPD Impression    COPD not on Home o2  Bilateral lung nodules likely MAC.  Can't RO malignancy   Chronic back pain  Left intertrochanteric femur fracture  Current smoker    Plan;    Duonebs PRN  Restart home inhalers  S/p dose of solumedrol prior to surgery, c/w for 5 days total  Wean off o2 as tolerated goal SaO2 > 92-96%  HOB 45 degrees  Perioperative pulmonary precautions  Aspiration precautions post procedure  Cardiac to follow up elevated troponin level  Social work for smoking cessation counseling  Nicotine replacement therapy  Pain control PRN   Followup outpatient with Dr. Gentile.

## 2023-12-26 NOTE — PROGRESS NOTE ADULT - SUBJECTIVE AND OBJECTIVE BOX
SHANELL MORENO  83y, Female  Allergy: No Known Allergies    Hospital Day: 1d    Patient seen and examined. No acute events overnight    PMH/PSH:  PAST MEDICAL & SURGICAL HISTORY:  COPD (chronic obstructive pulmonary disease)      H/O cholelithiasis      Smoker      History of surgery  orif right ankle          VITALS:  T(F): 96.1 (12-26-23 @ 08:22), Max: 98.8 (12-25-23 @ 22:37)  HR: 109 (12-26-23 @ 08:22)  BP: 104/55 (12-26-23 @ 08:22) (104/55 - 131/85)  RR: 18 (12-26-23 @ 08:22)  SpO2: 98% (12-26-23 @ 08:22)    TESTS & MEASUREMENTS:  Weight (Kg): 50.3 (12-25-23 @ 14:52)  BMI (kg/m2): 21 (12-25)                          9.6    11.28 )-----------( 297      ( 26 Dec 2023 07:08 )             29.1     PT/INR - ( 26 Dec 2023 00:43 )   PT: 12.30 sec;   INR: 1.08 ratio         PTT - ( 26 Dec 2023 00:43 )  PTT:39.9 sec  12-26    129<L>  |  93<L>  |  18  ----------------------------<  100<H>  5.4<H>   |  23  |  0.9    Ca    8.8      26 Dec 2023 07:08  Mg     2.0     12-26    TPro  5.9<L>  /  Alb  3.4<L>  /  TBili  0.7  /  DBili  x   /  AST  13  /  ALT  9   /  AlkPhos  96  12-26    LIVER FUNCTIONS - ( 26 Dec 2023 07:08 )  Alb: 3.4 g/dL / Pro: 5.9 g/dL / ALK PHOS: 96 U/L / ALT: 9 U/L / AST: 13 U/L / GGT: x           CARDIAC MARKERS ( 25 Dec 2023 15:22 )  x     / x     / 46 U/L / x     / x            Urinalysis Basic - ( 26 Dec 2023 07:08 )    Color: x / Appearance: x / SG: x / pH: x  Gluc: 100 mg/dL / Ketone: x  / Bili: x / Urobili: x   Blood: x / Protein: x / Nitrite: x   Leuk Esterase: x / RBC: x / WBC x   Sq Epi: x / Non Sq Epi: x / Bacteria: x        RADIOLOGY & ADDITIONAL TESTS:    RECENT DIAGNOSTIC ORDERS:  Magnesium: AM Sched. Collection: 27-Dec-2023 04:30 (12-26-23 @ 10:44)  Comprehensive Metabolic Panel: AM Sched. Collection: 27-Dec-2023 04:30 (12-26-23 @ 10:44)  Complete Blood Count + Automated Diff: AM Sched. Collection: 27-Dec-2023 04:30 (12-26-23 @ 10:44)  Basic Metabolic Panel: 16:00 (12-26-23 @ 08:46)  Packed Red Cells Order:  1 Unit  Indication: Other: To hold for orthopedic surgery tomorrow  Infuse Unit : 3 Hours --- Please hold for OR tomorrow (12-25-23 @ 20:23)  Packed Red Cells Order:  1 Unit  Indication: Other: To hold for orthopedic surgery tomorrow  Infuse Unit : 2.5 Hours --- Please hold for OR tomorrow (12-25-23 @ 20:23)  Diet, NPO after Midnight:      NPO Start Date: 25-Dec-2023,   NPO Start Time: 23:59  Except Medications (12-25-23 @ 20:23)  COVID-19 PCR: STAT  Specimen Source: Nasopharyngeal (12-25-23 @ 20:23)  ABO Rh Confirmatory Specimen: 23:30  Addl Info: Conditional: ABO Rh Confirmatory Specimen (12-25-23 @ 20:23)  ABO Rh Confirmatory Specimen: 23:30  Addl Info: Conditional: ABO Rh Confirmatory Specimen (12-25-23 @ 20:23)  POCUS ED Chest: Urgent   Indication: trauma  Transport: Portable (12-25-23 @ 15:09)  POCUS ED Abdomen Limited: Urgent   Indication: trauma  Transport: Portable (12-25-23 @ 15:09)  Xray Knee 4 Views, Left: Urgent   Indication: fall  Transport: Stretcher-Crib  Exam Completed (12-25-23 @ 15:06)  Xray Femur 2 Views, Left: Urgent   Indication: fall  Transport: Stretcher-Crib  Exam Completed (12-25-23 @ 15:06)  Xray Chest 1 View AP/PA: Urgent   Indication: Trauma Code  Transport: Portable  Exam Completed (12-25-23 @ 15:05)  ABO Rh Confirmatory Specimen: STAT (12-25-23 @ 15:05)  Urinalysis: STAT (12-25-23 @ 15:05)      MEDICATIONS:  MEDICATIONS  (STANDING):  albuterol/ipratropium for Nebulization 3 milliLiter(s) Nebulizer every 6 hours  budesonide  80 MICROgram(s)/formoterol 4.5 MICROgram(s) Inhaler 2 Puff(s) Inhalation two times a day  polyethylene glycol 3350 17 Gram(s) Oral daily  senna 2 Tablet(s) Oral at bedtime  tiotropium 2.5 MICROgram(s) Inhaler 2 Puff(s) Inhalation daily    MEDICATIONS  (PRN):  morphine  - Injectable 2 milliGRAM(s) IV Push every 6 hours PRN Moderate Pain (4 - 6)      HOME MEDICATIONS:  Advair Diskus 500 mcg-50 mcg inhalation powder (12-26)  Spiriva 18 mcg inhalation capsule (12-25)      REVIEW OF SYSTEMS:  All other review of systems is negative unless indicated above.     PHYSICAL EXAM:  PHYSICAL EXAM:  GENERAL: NAD  HEAD:  Atraumatic, Normocephalic  NECK: Supple, No JVD  CHEST/LUNG: Mild expiratory wheezes b/l  HEART: Regular rate and rhythm; No murmurs, rubs, or gallops  ABDOMEN: Soft, Nontender, Nondistended; Bowel sounds present  EXTREMITIES:  2+ Peripheral Pulses, No clubbing, cyanosis, or edema  SKIN: No rashes or lesions

## 2023-12-26 NOTE — CONSULT NOTE ADULT - SUBJECTIVE AND OBJECTIVE BOX
Patient is a 83y old  Female who presents with a chief complaint of Fall (26 Dec 2023 10:50)      HPI:  84 y/o female with a PMHx COPD, OA and chronic back pain s/p multiple surgeries presenting to the ER after slipping off her bed. Not on AC, no HT, no LOC. Fell on coccyx, unable to ambulate after the fall.  Complaining of pain to left hip / upper leg area and a skin scrape on the left shin. Sensation intact b/l LE, denies numbness/tingling, CP/SOB. Remainder of ROS negative.     In the ED:   - Vitals on admission --> /85, HR 90, RR 18, T97.6, O2 96% RA  - Labs on admission --> WBC 16, Hb 12.5, HS Trop 22, Na 130, K 4.9, AG 15, CK 46  - ECG --> NSR w/ PVCs  - CTH --> nothing acute, 6mm degen anterolisthesis of C7 on T1, mod/sev chronic microvascular ischemic changes  - CT CS --> nothing acute  - CT CAP --> Acute comminuted left intertrochanteric femur fracture, Moderate to severe centrilobular emphysema, Secretions seen in the right mainstem bronchus, Mild bilateral bronchial wall thickening with lower lobe endobronchial opacities, Left upper lobe linear stellate density seen on series 6 image 98 with areas of fat density within, 8mm LL nodule, 9mm LL nodule  - XR Pelvis --> acute left intertrochanteric femur fracture w/ varus angulation, multilevel degenerative changes of the spine and R hip joint  - Ortho Consult --> bed rest, pain control, hold DVT ppx in AM, medicine preop clearance, preop labs / ECG / CXR / ECG, trend Hb, 2u PRBC on hold for OR, NPO for surgery tomorrow 12/26  - S/p morphine and dilaudid in ED  - Admitted to medicine for further management   (25 Dec 2023 20:24)      PAST MEDICAL & SURGICAL HISTORY:  COPD (chronic obstructive pulmonary disease)      H/O cholelithiasis      Smoker      History of surgery  orif right ankle          SOCIAL HX:   Smoking                         ETOH                            Other    FAMILY HISTORY:  Known health problems: none (Father, Mother)    .  No cardiovascular or pulmonary family history     REVIEW OF SYSTEMS:    All ROS are negative exept per HPI       Allergies    No Known Allergies    Intolerances          PHYSICAL EXAM  Vital Signs Last 24 Hrs  T(C): 35.6 (26 Dec 2023 10:50), Max: 37.1 (25 Dec 2023 22:37)  T(F): 96.1 (26 Dec 2023 08:22), Max: 98.8 (25 Dec 2023 22:37)  HR: 109 (26 Dec 2023 10:50) (72 - 109)  BP: 104/55 (26 Dec 2023 10:50) (104/55 - 131/85)  BP(mean): --  RR: 18 (26 Dec 2023 10:50) (17 - 18)  SpO2: 98% (26 Dec 2023 10:50) (96% - 98%)    Parameters below as of 25 Dec 2023 14:52  Patient On (Oxygen Delivery Method): room air        CONSTITUTIONAL:  Well nourished.  NAD    ENT:   Airway patent,   No thrush    EYES:   Clear bilaterally,   pupils equal,   round and reactive to light.    CARDIAC:   Normal rate,   regular rhythm.    no edema      RESPIRATORY:   No wheezing   Normal chest expansion  Not tachypneic,  No use of accessory muscles    GASTROINTESTINAL:  Abdomen soft, non-tender,   No guarding,   Positive BS    MUSCULOSKELETAL:   Range of motion is not limited,  No clubbing, cyanosis    NEUROLOGICAL:   Alert and oriented   No motor deficits.    SKIN:   Skin normal color for race,   No evidence of rash.      HEME LYMPH:   No cervical  lymphadenopathy.  no inguinal lymphadenopathy          LABS:                          9.6    11.28 )-----------( 297      ( 26 Dec 2023 07:08 )             29.1                                               12-26    129<L>  |  93<L>  |  18  ----------------------------<  100<H>  5.4<H>   |  23  |  0.9    Ca    8.8      26 Dec 2023 07:08  Mg     2.0     12-26    TPro  5.9<L>  /  Alb  3.4<L>  /  TBili  0.7  /  DBili  x   /  AST  13  /  ALT  9   /  AlkPhos  96  12-26      PT/INR - ( 26 Dec 2023 00:43 )   PT: 12.30 sec;   INR: 1.08 ratio         PTT - ( 26 Dec 2023 00:43 )  PTT:39.9 sec                                       Urinalysis Basic - ( 26 Dec 2023 07:08 )    Color: x / Appearance: x / SG: x / pH: x  Gluc: 100 mg/dL / Ketone: x  / Bili: x / Urobili: x   Blood: x / Protein: x / Nitrite: x   Leuk Esterase: x / RBC: x / WBC x   Sq Epi: x / Non Sq Epi: x / Bacteria: x        CARDIAC MARKERS ( 25 Dec 2023 15:22 )  x     / x     / 46 U/L / x     / x                                                LIVER FUNCTIONS - ( 26 Dec 2023 07:08 )  Alb: 3.4 g/dL / Pro: 5.9 g/dL / ALK PHOS: 96 U/L / ALT: 9 U/L / AST: 13 U/L / GGT: x                                                                                                MEDICATIONS  (STANDING):  albuterol/ipratropium for Nebulization 3 milliLiter(s) Nebulizer every 6 hours  budesonide  80 MICROgram(s)/formoterol 4.5 MICROgram(s) Inhaler 2 Puff(s) Inhalation two times a day  methylPREDNISolone sodium succinate Injectable 40 milliGRAM(s) IV Push every 12 hours  polyethylene glycol 3350 17 Gram(s) Oral daily  senna 2 Tablet(s) Oral at bedtime  tiotropium 2.5 MICROgram(s) Inhaler 2 Puff(s) Inhalation daily    MEDICATIONS  (PRN):  morphine  - Injectable 2 milliGRAM(s) IV Push every 6 hours PRN Moderate Pain (4 - 6)      X-Rays reviewed: Patient is a 83y old  Female who presents with a chief complaint of Fall (26 Dec 2023 10:50)      HPI:  82 y/o female with a PMHx COPD, OA and chronic back pain s/p multiple surgeries presenting to the ER after slipping off her bed. Not on AC, no HT, no LOC. Fell on coccyx, unable to ambulate after the fall.  Complaining of pain to left hip / upper leg area and a skin scrape on the left shin. Sensation intact b/l LE, denies numbness/tingling, CP/SOB. Remainder of ROS negative.     In the ED:   - Vitals on admission --> /85, HR 90, RR 18, T97.6, O2 96% RA  - Labs on admission --> WBC 16, Hb 12.5, HS Trop 22, Na 130, K 4.9, AG 15, CK 46  - ECG --> NSR w/ PVCs  - CTH --> nothing acute, 6mm degen anterolisthesis of C7 on T1, mod/sev chronic microvascular ischemic changes  - CT CS --> nothing acute  - CT CAP --> Acute comminuted left intertrochanteric femur fracture, Moderate to severe centrilobular emphysema, Secretions seen in the right mainstem bronchus, Mild bilateral bronchial wall thickening with lower lobe endobronchial opacities, Left upper lobe linear stellate density seen on series 6 image 98 with areas of fat density within, 8mm LL nodule, 9mm LL nodule  - XR Pelvis --> acute left intertrochanteric femur fracture w/ varus angulation, multilevel degenerative changes of the spine and R hip joint  - Ortho Consult --> bed rest, pain control, hold DVT ppx in AM, medicine preop clearance, preop labs / ECG / CXR / ECG, trend Hb, 2u PRBC on hold for OR, NPO for surgery tomorrow 12/26  - S/p morphine and dilaudid in ED  - Admitted to medicine for further management   (25 Dec 2023 20:24)      PAST MEDICAL & SURGICAL HISTORY:  COPD (chronic obstructive pulmonary disease)      H/O cholelithiasis      Smoker      History of surgery  orif right ankle          SOCIAL HX:   Smoking                         ETOH                            Other    FAMILY HISTORY:  Known health problems: none (Father, Mother)    .  No cardiovascular or pulmonary family history     REVIEW OF SYSTEMS:    All ROS are negative exept per HPI       Allergies    No Known Allergies    Intolerances          PHYSICAL EXAM  Vital Signs Last 24 Hrs  T(C): 35.6 (26 Dec 2023 10:50), Max: 37.1 (25 Dec 2023 22:37)  T(F): 96.1 (26 Dec 2023 08:22), Max: 98.8 (25 Dec 2023 22:37)  HR: 109 (26 Dec 2023 10:50) (72 - 109)  BP: 104/55 (26 Dec 2023 10:50) (104/55 - 131/85)  BP(mean): --  RR: 18 (26 Dec 2023 10:50) (17 - 18)  SpO2: 98% (26 Dec 2023 10:50) (96% - 98%)    Parameters below as of 25 Dec 2023 14:52  Patient On (Oxygen Delivery Method): room air        CONSTITUTIONAL:  Well nourished.  NAD    ENT:   Airway patent,   No thrush    EYES:   Clear bilaterally,   pupils equal,   round and reactive to light.    CARDIAC:   Normal rate,   regular rhythm.    no edema      RESPIRATORY:   No wheezing   Normal chest expansion  Not tachypneic,  No use of accessory muscles    GASTROINTESTINAL:  Abdomen soft, non-tender,   No guarding,   Positive BS    MUSCULOSKELETAL:   Range of motion is not limited,  No clubbing, cyanosis    NEUROLOGICAL:   Alert and oriented   No motor deficits.    SKIN:   Skin normal color for race,   No evidence of rash.      HEME LYMPH:   No cervical  lymphadenopathy.  no inguinal lymphadenopathy          LABS:                          9.6    11.28 )-----------( 297      ( 26 Dec 2023 07:08 )             29.1                                               12-26    129<L>  |  93<L>  |  18  ----------------------------<  100<H>  5.4<H>   |  23  |  0.9    Ca    8.8      26 Dec 2023 07:08  Mg     2.0     12-26    TPro  5.9<L>  /  Alb  3.4<L>  /  TBili  0.7  /  DBili  x   /  AST  13  /  ALT  9   /  AlkPhos  96  12-26      PT/INR - ( 26 Dec 2023 00:43 )   PT: 12.30 sec;   INR: 1.08 ratio         PTT - ( 26 Dec 2023 00:43 )  PTT:39.9 sec                                       Urinalysis Basic - ( 26 Dec 2023 07:08 )    Color: x / Appearance: x / SG: x / pH: x  Gluc: 100 mg/dL / Ketone: x  / Bili: x / Urobili: x   Blood: x / Protein: x / Nitrite: x   Leuk Esterase: x / RBC: x / WBC x   Sq Epi: x / Non Sq Epi: x / Bacteria: x        CARDIAC MARKERS ( 25 Dec 2023 15:22 )  x     / x     / 46 U/L / x     / x                                                LIVER FUNCTIONS - ( 26 Dec 2023 07:08 )  Alb: 3.4 g/dL / Pro: 5.9 g/dL / ALK PHOS: 96 U/L / ALT: 9 U/L / AST: 13 U/L / GGT: x                                                                                                MEDICATIONS  (STANDING):  albuterol/ipratropium for Nebulization 3 milliLiter(s) Nebulizer every 6 hours  budesonide  80 MICROgram(s)/formoterol 4.5 MICROgram(s) Inhaler 2 Puff(s) Inhalation two times a day  methylPREDNISolone sodium succinate Injectable 40 milliGRAM(s) IV Push every 12 hours  polyethylene glycol 3350 17 Gram(s) Oral daily  senna 2 Tablet(s) Oral at bedtime  tiotropium 2.5 MICROgram(s) Inhaler 2 Puff(s) Inhalation daily    MEDICATIONS  (PRN):  morphine  - Injectable 2 milliGRAM(s) IV Push every 6 hours PRN Moderate Pain (4 - 6)      X-Rays reviewed: Patient is a 83y old  Female who presents with a chief complaint of Fall (26 Dec 2023 10:50)      HPI:  82 y/o female with a PMHx COPD, OA and chronic back pain s/p multiple surgeries presenting to the ER after slipping off her bed. Not on AC, no HT, no LOC. Fell on coccyx, unable to ambulate after the fall.  Complaining of pain to left hip / upper leg area and a skin scrape on the left shin. Sensation intact b/l LE, denies numbness/tingling, CP/SOB. Remainder of ROS negative.     In the ED:   - Vitals on admission --> /85, HR 90, RR 18, T97.6, O2 96% RA  - Labs on admission --> WBC 16, Hb 12.5, HS Trop 22, Na 130, K 4.9, AG 15, CK 46  - ECG --> NSR w/ PVCs  - CTH --> nothing acute, 6mm degen anterolisthesis of C7 on T1, mod/sev chronic microvascular ischemic changes  - CT CS --> nothing acute  - CT CAP --> Acute comminuted left intertrochanteric femur fracture, Moderate to severe centrilobular emphysema, Secretions seen in the right mainstem bronchus, Mild bilateral bronchial wall thickening with lower lobe endobronchial opacities, Left upper lobe linear stellate density seen on series 6 image 98 with areas of fat density within, 8mm LL nodule, 9mm LL nodule  - XR Pelvis --> acute left intertrochanteric femur fracture w/ varus angulation, multilevel degenerative changes of the spine and R hip joint  - Ortho Consult --> bed rest, pain control, hold DVT ppx in AM, medicine preop clearance, preop labs / ECG / CXR / ECG, trend Hb, 2u PRBC on hold for OR, NPO for surgery tomorrow 12/26  - S/p morphine and dilaudid in ED  - Admitted to medicine for further management   (25 Dec 2023 20:24)      PAST MEDICAL & SURGICAL HISTORY:  COPD (chronic obstructive pulmonary disease)      H/O cholelithiasis      Smoker      History of surgery  orif right ankle          SOCIAL HX:   Smoking      40pack/year, current smoker                   ETOH                            Other    FAMILY HISTORY:  Known health problems: none (Father, Mother)    .  No cardiovascular or pulmonary family history     REVIEW OF SYSTEMS:    All ROS are negative exept per HPI       Allergies    No Known Allergies    Intolerances        PHYSICAL EXAM  Vital Signs Last 24 Hrs  T(C): 35.6 (26 Dec 2023 10:50), Max: 37.1 (25 Dec 2023 22:37)  T(F): 96.1 (26 Dec 2023 08:22), Max: 98.8 (25 Dec 2023 22:37)  HR: 109 (26 Dec 2023 10:50) (72 - 109)  BP: 104/55 (26 Dec 2023 10:50) (104/55 - 131/85)  BP(mean): --  RR: 18 (26 Dec 2023 10:50) (17 - 18)  SpO2: 98% (26 Dec 2023 10:50) (96% - 98%)    Parameters below as of 25 Dec 2023 14:52  Patient On (Oxygen Delivery Method): room air      CONSTITUTIONAL:  Appears uncomfortable from her hip pain    CARDIAC:   Normal rate,   regular rhythm.    no edema    RESPIRATORY:   No wheezing   Normal chest expansion  Not tachypneic,  No use of accessory muscles    GASTROINTESTINAL:  Abdomen soft, non-tender,   No guarding,   Positive BS    MUSCULOSKELETAL:   Left hip pain      SKIN:   Skin normal color for race,   No evidence of rash.          LABS:                          9.6    11.28 )-----------( 297      ( 26 Dec 2023 07:08 )             29.1                                               12-26    129<L>  |  93<L>  |  18  ----------------------------<  100<H>  5.4<H>   |  23  |  0.9    Ca    8.8      26 Dec 2023 07:08  Mg     2.0     12-26    TPro  5.9<L>  /  Alb  3.4<L>  /  TBili  0.7  /  DBili  x   /  AST  13  /  ALT  9   /  AlkPhos  96  12-26      PT/INR - ( 26 Dec 2023 00:43 )   PT: 12.30 sec;   INR: 1.08 ratio         PTT - ( 26 Dec 2023 00:43 )  PTT:39.9 sec                                       Urinalysis Basic - ( 26 Dec 2023 07:08 )    Color: x / Appearance: x / SG: x / pH: x  Gluc: 100 mg/dL / Ketone: x  / Bili: x / Urobili: x   Blood: x / Protein: x / Nitrite: x   Leuk Esterase: x / RBC: x / WBC x   Sq Epi: x / Non Sq Epi: x / Bacteria: x        CARDIAC MARKERS ( 25 Dec 2023 15:22 )  x     / x     / 46 U/L / x     / x                                                LIVER FUNCTIONS - ( 26 Dec 2023 07:08 )  Alb: 3.4 g/dL / Pro: 5.9 g/dL / ALK PHOS: 96 U/L / ALT: 9 U/L / AST: 13 U/L / GGT: x                                                                                                MEDICATIONS  (STANDING):  albuterol/ipratropium for Nebulization 3 milliLiter(s) Nebulizer every 6 hours  budesonide  80 MICROgram(s)/formoterol 4.5 MICROgram(s) Inhaler 2 Puff(s) Inhalation two times a day  methylPREDNISolone sodium succinate Injectable 40 milliGRAM(s) IV Push every 12 hours  polyethylene glycol 3350 17 Gram(s) Oral daily  senna 2 Tablet(s) Oral at bedtime  tiotropium 2.5 MICROgram(s) Inhaler 2 Puff(s) Inhalation daily    MEDICATIONS  (PRN):  morphine  - Injectable 2 milliGRAM(s) IV Push every 6 hours PRN Moderate Pain (4 - 6)      X-Rays reviewed:

## 2023-12-27 ENCOUNTER — RESULT REVIEW (OUTPATIENT)
Age: 83
End: 2023-12-27

## 2023-12-27 LAB
ALBUMIN SERPL ELPH-MCNC: 3.3 G/DL — LOW (ref 3.5–5.2)
ALBUMIN SERPL ELPH-MCNC: 3.3 G/DL — LOW (ref 3.5–5.2)
ALP SERPL-CCNC: 100 U/L — SIGNIFICANT CHANGE UP (ref 30–115)
ALP SERPL-CCNC: 100 U/L — SIGNIFICANT CHANGE UP (ref 30–115)
ALP SERPL-CCNC: 98 U/L — SIGNIFICANT CHANGE UP (ref 30–115)
ALP SERPL-CCNC: 98 U/L — SIGNIFICANT CHANGE UP (ref 30–115)
ALT FLD-CCNC: 12 U/L — SIGNIFICANT CHANGE UP (ref 0–41)
ALT FLD-CCNC: 12 U/L — SIGNIFICANT CHANGE UP (ref 0–41)
ANION GAP SERPL CALC-SCNC: 13 MMOL/L — SIGNIFICANT CHANGE UP (ref 7–14)
ANION GAP SERPL CALC-SCNC: 13 MMOL/L — SIGNIFICANT CHANGE UP (ref 7–14)
ANION GAP SERPL CALC-SCNC: 14 MMOL/L — SIGNIFICANT CHANGE UP (ref 7–14)
ANION GAP SERPL CALC-SCNC: 14 MMOL/L — SIGNIFICANT CHANGE UP (ref 7–14)
AST SERPL-CCNC: 18 U/L — SIGNIFICANT CHANGE UP (ref 0–41)
AST SERPL-CCNC: 18 U/L — SIGNIFICANT CHANGE UP (ref 0–41)
BASOPHILS # BLD AUTO: 0 K/UL — SIGNIFICANT CHANGE UP (ref 0–0.2)
BASOPHILS # BLD AUTO: 0 K/UL — SIGNIFICANT CHANGE UP (ref 0–0.2)
BASOPHILS NFR BLD AUTO: 0 % — SIGNIFICANT CHANGE UP (ref 0–1)
BASOPHILS NFR BLD AUTO: 0 % — SIGNIFICANT CHANGE UP (ref 0–1)
BILIRUB SERPL-MCNC: 0.5 MG/DL — SIGNIFICANT CHANGE UP (ref 0.2–1.2)
BILIRUB SERPL-MCNC: 0.5 MG/DL — SIGNIFICANT CHANGE UP (ref 0.2–1.2)
BUN SERPL-MCNC: 33 MG/DL — HIGH (ref 10–20)
BUN SERPL-MCNC: 33 MG/DL — HIGH (ref 10–20)
BUN SERPL-MCNC: 37 MG/DL — HIGH (ref 10–20)
BUN SERPL-MCNC: 37 MG/DL — HIGH (ref 10–20)
CALCIUM SERPL-MCNC: 8.1 MG/DL — LOW (ref 8.4–10.5)
CALCIUM SERPL-MCNC: 8.1 MG/DL — LOW (ref 8.4–10.5)
CALCIUM SERPL-MCNC: 8.2 MG/DL — LOW (ref 8.4–10.5)
CALCIUM SERPL-MCNC: 8.2 MG/DL — LOW (ref 8.4–10.5)
CALCIUM SERPL-MCNC: 8.4 MG/DL — SIGNIFICANT CHANGE UP (ref 8.4–10.5)
CALCIUM SERPL-MCNC: 8.4 MG/DL — SIGNIFICANT CHANGE UP (ref 8.4–10.5)
CHLORIDE SERPL-SCNC: 91 MMOL/L — LOW (ref 98–110)
CO2 SERPL-SCNC: 23 MMOL/L — SIGNIFICANT CHANGE UP (ref 17–32)
CO2 SERPL-SCNC: 23 MMOL/L — SIGNIFICANT CHANGE UP (ref 17–32)
CO2 SERPL-SCNC: 24 MMOL/L — SIGNIFICANT CHANGE UP (ref 17–32)
CO2 SERPL-SCNC: 24 MMOL/L — SIGNIFICANT CHANGE UP (ref 17–32)
CREAT SERPL-MCNC: 1.3 MG/DL — SIGNIFICANT CHANGE UP (ref 0.7–1.5)
CREAT SERPL-MCNC: 1.3 MG/DL — SIGNIFICANT CHANGE UP (ref 0.7–1.5)
CREAT SERPL-MCNC: 1.4 MG/DL — SIGNIFICANT CHANGE UP (ref 0.7–1.5)
CREAT SERPL-MCNC: 1.4 MG/DL — SIGNIFICANT CHANGE UP (ref 0.7–1.5)
EGFR: 37 ML/MIN/1.73M2 — LOW
EGFR: 37 ML/MIN/1.73M2 — LOW
EGFR: 40 ML/MIN/1.73M2 — LOW
EGFR: 40 ML/MIN/1.73M2 — LOW
EOSINOPHIL # BLD AUTO: 0 K/UL — SIGNIFICANT CHANGE UP (ref 0–0.7)
EOSINOPHIL # BLD AUTO: 0 K/UL — SIGNIFICANT CHANGE UP (ref 0–0.7)
EOSINOPHIL NFR BLD AUTO: 0 % — SIGNIFICANT CHANGE UP (ref 0–8)
EOSINOPHIL NFR BLD AUTO: 0 % — SIGNIFICANT CHANGE UP (ref 0–8)
GLUCOSE SERPL-MCNC: 122 MG/DL — HIGH (ref 70–99)
GLUCOSE SERPL-MCNC: 122 MG/DL — HIGH (ref 70–99)
GLUCOSE SERPL-MCNC: 140 MG/DL — HIGH (ref 70–99)
GLUCOSE SERPL-MCNC: 140 MG/DL — HIGH (ref 70–99)
HCT VFR BLD CALC: 22.9 % — LOW (ref 37–47)
HCT VFR BLD CALC: 22.9 % — LOW (ref 37–47)
HGB BLD-MCNC: 7.8 G/DL — LOW (ref 12–16)
HGB BLD-MCNC: 7.8 G/DL — LOW (ref 12–16)
LDH SERPL L TO P-CCNC: 322 — HIGH (ref 50–242)
LDH SERPL L TO P-CCNC: 322 — HIGH (ref 50–242)
LYMPHOCYTES # BLD AUTO: 0.22 K/UL — LOW (ref 1.2–3.4)
LYMPHOCYTES # BLD AUTO: 0.22 K/UL — LOW (ref 1.2–3.4)
LYMPHOCYTES # BLD AUTO: 0.9 % — LOW (ref 20.5–51.1)
LYMPHOCYTES # BLD AUTO: 0.9 % — LOW (ref 20.5–51.1)
MAGNESIUM SERPL-MCNC: 2 MG/DL — SIGNIFICANT CHANGE UP (ref 1.8–2.4)
MAGNESIUM SERPL-MCNC: 2 MG/DL — SIGNIFICANT CHANGE UP (ref 1.8–2.4)
MCHC RBC-ENTMCNC: 30.6 PG — SIGNIFICANT CHANGE UP (ref 27–31)
MCHC RBC-ENTMCNC: 30.6 PG — SIGNIFICANT CHANGE UP (ref 27–31)
MCHC RBC-ENTMCNC: 34.1 G/DL — SIGNIFICANT CHANGE UP (ref 32–37)
MCHC RBC-ENTMCNC: 34.1 G/DL — SIGNIFICANT CHANGE UP (ref 32–37)
MCV RBC AUTO: 89.8 FL — SIGNIFICANT CHANGE UP (ref 81–99)
MCV RBC AUTO: 89.8 FL — SIGNIFICANT CHANGE UP (ref 81–99)
MONOCYTES # BLD AUTO: 0.64 K/UL — HIGH (ref 0.1–0.6)
MONOCYTES # BLD AUTO: 0.64 K/UL — HIGH (ref 0.1–0.6)
MONOCYTES NFR BLD AUTO: 2.6 % — SIGNIFICANT CHANGE UP (ref 1.7–9.3)
MONOCYTES NFR BLD AUTO: 2.6 % — SIGNIFICANT CHANGE UP (ref 1.7–9.3)
NEUTROPHILS # BLD AUTO: 23.85 K/UL — HIGH (ref 1.4–6.5)
NEUTROPHILS # BLD AUTO: 23.85 K/UL — HIGH (ref 1.4–6.5)
NEUTROPHILS NFR BLD AUTO: 96.5 % — HIGH (ref 42.2–75.2)
NEUTROPHILS NFR BLD AUTO: 96.5 % — HIGH (ref 42.2–75.2)
PHOSPHATE SERPL-MCNC: 7.2 MG/DL — HIGH (ref 2.1–4.9)
PHOSPHATE SERPL-MCNC: 7.2 MG/DL — HIGH (ref 2.1–4.9)
PLATELET # BLD AUTO: 310 K/UL — SIGNIFICANT CHANGE UP (ref 130–400)
PLATELET # BLD AUTO: 310 K/UL — SIGNIFICANT CHANGE UP (ref 130–400)
PMV BLD: 10.1 FL — SIGNIFICANT CHANGE UP (ref 7.4–10.4)
PMV BLD: 10.1 FL — SIGNIFICANT CHANGE UP (ref 7.4–10.4)
POTASSIUM SERPL-MCNC: 5.1 MMOL/L — HIGH (ref 3.5–5)
POTASSIUM SERPL-MCNC: 5.1 MMOL/L — HIGH (ref 3.5–5)
POTASSIUM SERPL-MCNC: 5.8 MMOL/L — HIGH (ref 3.5–5)
POTASSIUM SERPL-MCNC: 5.8 MMOL/L — HIGH (ref 3.5–5)
POTASSIUM SERPL-SCNC: 5.1 MMOL/L — HIGH (ref 3.5–5)
POTASSIUM SERPL-SCNC: 5.1 MMOL/L — HIGH (ref 3.5–5)
POTASSIUM SERPL-SCNC: 5.8 MMOL/L — HIGH (ref 3.5–5)
POTASSIUM SERPL-SCNC: 5.8 MMOL/L — HIGH (ref 3.5–5)
PROT SERPL-MCNC: 5.6 G/DL — LOW (ref 6–8)
PROT SERPL-MCNC: 5.6 G/DL — LOW (ref 6–8)
PTH-INTACT FLD-MCNC: 18 PG/ML — SIGNIFICANT CHANGE UP (ref 15–65)
PTH-INTACT FLD-MCNC: 18 PG/ML — SIGNIFICANT CHANGE UP (ref 15–65)
RBC # BLD: 2.55 M/UL — LOW (ref 4.2–5.4)
RBC # BLD: 2.55 M/UL — LOW (ref 4.2–5.4)
RBC # FLD: 14 % — SIGNIFICANT CHANGE UP (ref 11.5–14.5)
RBC # FLD: 14 % — SIGNIFICANT CHANGE UP (ref 11.5–14.5)
SODIUM SERPL-SCNC: 128 MMOL/L — LOW (ref 135–146)
WBC # BLD: 24.72 K/UL — HIGH (ref 4.8–10.8)
WBC # BLD: 24.72 K/UL — HIGH (ref 4.8–10.8)
WBC # FLD AUTO: 24.72 K/UL — HIGH (ref 4.8–10.8)
WBC # FLD AUTO: 24.72 K/UL — HIGH (ref 4.8–10.8)

## 2023-12-27 PROCEDURE — 99233 SBSQ HOSP IP/OBS HIGH 50: CPT

## 2023-12-27 PROCEDURE — 27245 TREAT THIGH FRACTURE: CPT | Mod: LT

## 2023-12-27 PROCEDURE — 88342 IMHCHEM/IMCYTCHM 1ST ANTB: CPT | Mod: 26

## 2023-12-27 PROCEDURE — 88311 DECALCIFY TISSUE: CPT | Mod: 26

## 2023-12-27 PROCEDURE — 88331 PATH CONSLTJ SURG 1 BLK 1SPC: CPT | Mod: 26

## 2023-12-27 PROCEDURE — 73700 CT LOWER EXTREMITY W/O DYE: CPT | Mod: 26,LT

## 2023-12-27 PROCEDURE — 88305 TISSUE EXAM BY PATHOLOGIST: CPT | Mod: 26

## 2023-12-27 RX ORDER — TIOTROPIUM BROMIDE 18 UG/1
2 CAPSULE ORAL; RESPIRATORY (INHALATION) DAILY
Refills: 0 | Status: DISCONTINUED | OUTPATIENT
Start: 2023-12-27 | End: 2023-12-30

## 2023-12-27 RX ORDER — MORPHINE SULFATE 50 MG/1
2 CAPSULE, EXTENDED RELEASE ORAL EVERY 6 HOURS
Refills: 0 | Status: DISCONTINUED | OUTPATIENT
Start: 2023-12-27 | End: 2023-12-30

## 2023-12-27 RX ORDER — ENOXAPARIN SODIUM 100 MG/ML
40 INJECTION SUBCUTANEOUS ONCE
Refills: 0 | Status: DISCONTINUED | OUTPATIENT
Start: 2023-12-28 | End: 2023-12-28

## 2023-12-27 RX ORDER — BUDESONIDE AND FORMOTEROL FUMARATE DIHYDRATE 160; 4.5 UG/1; UG/1
2 AEROSOL RESPIRATORY (INHALATION)
Refills: 0 | Status: DISCONTINUED | OUTPATIENT
Start: 2023-12-27 | End: 2023-12-30

## 2023-12-27 RX ORDER — SENNA PLUS 8.6 MG/1
2 TABLET ORAL AT BEDTIME
Refills: 0 | Status: DISCONTINUED | OUTPATIENT
Start: 2023-12-27 | End: 2023-12-30

## 2023-12-27 RX ORDER — INSULIN HUMAN 100 [IU]/ML
5 INJECTION, SOLUTION SUBCUTANEOUS ONCE
Refills: 0 | Status: COMPLETED | OUTPATIENT
Start: 2023-12-27 | End: 2023-12-27

## 2023-12-27 RX ORDER — SODIUM ZIRCONIUM CYCLOSILICATE 10 G/10G
10 POWDER, FOR SUSPENSION ORAL ONCE
Refills: 0 | Status: COMPLETED | OUTPATIENT
Start: 2023-12-27 | End: 2023-12-27

## 2023-12-27 RX ORDER — DEXTROSE 50 % IN WATER 50 %
50 SYRINGE (ML) INTRAVENOUS ONCE
Refills: 0 | Status: COMPLETED | OUTPATIENT
Start: 2023-12-27 | End: 2023-12-27

## 2023-12-27 RX ORDER — SODIUM ZIRCONIUM CYCLOSILICATE 10 G/10G
5 POWDER, FOR SUSPENSION ORAL ONCE
Refills: 0 | Status: COMPLETED | OUTPATIENT
Start: 2023-12-27 | End: 2023-12-27

## 2023-12-27 RX ORDER — ENOXAPARIN SODIUM 100 MG/ML
40 INJECTION SUBCUTANEOUS ONCE
Refills: 0 | Status: COMPLETED | OUTPATIENT
Start: 2023-12-27 | End: 2023-12-27

## 2023-12-27 RX ORDER — SODIUM CHLORIDE 9 MG/ML
1000 INJECTION, SOLUTION INTRAVENOUS
Refills: 0 | Status: DISCONTINUED | OUTPATIENT
Start: 2023-12-27 | End: 2023-12-30

## 2023-12-27 RX ORDER — HYDROMORPHONE HYDROCHLORIDE 2 MG/ML
0.5 INJECTION INTRAMUSCULAR; INTRAVENOUS; SUBCUTANEOUS
Refills: 0 | Status: DISCONTINUED | OUTPATIENT
Start: 2023-12-27 | End: 2023-12-28

## 2023-12-27 RX ORDER — POLYETHYLENE GLYCOL 3350 17 G/17G
17 POWDER, FOR SOLUTION ORAL DAILY
Refills: 0 | Status: DISCONTINUED | OUTPATIENT
Start: 2023-12-27 | End: 2023-12-30

## 2023-12-27 RX ORDER — CEFAZOLIN SODIUM 1 G
1000 VIAL (EA) INJECTION EVERY 8 HOURS
Refills: 0 | Status: COMPLETED | OUTPATIENT
Start: 2023-12-28 | End: 2023-12-28

## 2023-12-27 RX ADMIN — Medication 50 MILLILITER(S): at 04:03

## 2023-12-27 RX ADMIN — Medication 3 MILLILITER(S): at 13:33

## 2023-12-27 RX ADMIN — SODIUM CHLORIDE 100 MILLILITER(S): 9 INJECTION, SOLUTION INTRAVENOUS at 22:06

## 2023-12-27 RX ADMIN — SODIUM CHLORIDE 100 MILLILITER(S): 9 INJECTION, SOLUTION INTRAVENOUS at 14:28

## 2023-12-27 RX ADMIN — Medication 3 MILLILITER(S): at 08:37

## 2023-12-27 RX ADMIN — SODIUM ZIRCONIUM CYCLOSILICATE 10 GRAM(S): 10 POWDER, FOR SUSPENSION ORAL at 04:16

## 2023-12-27 RX ADMIN — INSULIN HUMAN 5 UNIT(S): 100 INJECTION, SOLUTION SUBCUTANEOUS at 04:01

## 2023-12-27 RX ADMIN — Medication 40 MILLIGRAM(S): at 10:11

## 2023-12-27 RX ADMIN — BUDESONIDE AND FORMOTEROL FUMARATE DIHYDRATE 2 PUFF(S): 160; 4.5 AEROSOL RESPIRATORY (INHALATION) at 08:37

## 2023-12-27 RX ADMIN — TIOTROPIUM BROMIDE 2 PUFF(S): 18 CAPSULE ORAL; RESPIRATORY (INHALATION) at 09:47

## 2023-12-27 RX ADMIN — SODIUM ZIRCONIUM CYCLOSILICATE 5 GRAM(S): 10 POWDER, FOR SUSPENSION ORAL at 10:11

## 2023-12-27 RX ADMIN — ENOXAPARIN SODIUM 40 MILLIGRAM(S): 100 INJECTION SUBCUTANEOUS at 02:52

## 2023-12-27 RX ADMIN — POLYETHYLENE GLYCOL 3350 17 GRAM(S): 17 POWDER, FOR SOLUTION ORAL at 11:42

## 2023-12-27 NOTE — BRIEF OPERATIVE NOTE - SPECIMENS
L femur reamings - Reamings sent as frozen to pathology. Pathology confirmed intra-op no evidence of malignancy on reamings.

## 2023-12-27 NOTE — PROGRESS NOTE ADULT - SUBJECTIVE AND OBJECTIVE BOX
Orthopaedic Surgery Postoperative Check Note    Procedure: Open reduction internal fixation left femur   EBL: 150mL    Pt S&E after above procedure. Pt resting comfortably. Pain well controlled. Denies f/c/cp/sob/n/v/d    Vitals:  T(C): 36.5 (12-27-23 @ 22:00), Max: 36.7 (12-27-23 @ 16:08)  HR: 92 (12-27-23 @ 22:45) (92 - 103)  BP: 155/71 (12-27-23 @ 22:45) (111/55 - 192/82)  RR: 11 (12-27-23 @ 22:45) (11 - 27)  SpO2: 94% (12-27-23 @ 22:45) (93% - 98%)    P/E:  NAD, Awake, alert  Nonlabored breathing    LLE  Dressing in place, c/d/i  Compartments soft/compressible, no pain w/ passive stretch  SILT sp/dp/t/sural/saph  Firing ta/ehl/fhl/gs  DP pulse 2+, foot wwp    Labs:                        7.8    24.72 )-----------( 310      ( 27 Dec 2023 06:32 )             22.9       A/P:   Pt in stable condition after above procedure    Weight bearing: WBAT LLE  AM labs  DVT ppx: lovenox/heparin, scds  Postop abx for 24 hrs  Diet  Pain control  Home medications  PT/OT/rehab  Please page Ortho w/ any questions/concerns

## 2023-12-27 NOTE — PRE-ANESTHESIA EVALUATION ADULT - NSANTHOSAYNRD_GEN_A_CORE
No. NICHOLE screening performed.  STOP BANG Legend: 0-2 = LOW Risk; 3-4 = INTERMEDIATE Risk; 5-8 = HIGH Risk
No. NICHOLE screening performed.  STOP BANG Legend: 0-2 = LOW Risk; 3-4 = INTERMEDIATE Risk; 5-8 = HIGH Risk

## 2023-12-27 NOTE — PROGRESS NOTE ADULT - ASSESSMENT
82 y/o female with a PMHx COPD, OA and chronic back pain s/p multiple surgeries presenting to the ER after slipping off her bed. Not on AC, no HT, no LOC. Found to have L intertrochanteric femur fracture.     #Left Intertrochanteric Femur Fracture with hemorrhage  #Acute blood loss anemia   -ECG --> NSR w/ PVCs, RBBB  -CT and MRI reviewed; concern for hemorrhage   - discussed with ortho patient will be undergoing general anesthesia  - PT consult after surgery   -giving 1u PRBC now   - 2u PRBC ordered on hold for OR   - Pain control PRN  - Bowel regimen   - Plan for ORIF today  -cardio and pulm consults appreciated    #Hx of COPD  #Current Smoker  - On Spiriva and Advair at home  - Cont Tiotropium 2.5mcg 2 puffs QD  - Cont Budesonide/Formoterol 80/4.5mcg 2 puffs BID  - duoneb prn  - pulm consult appreciated   - Continue IV solumedrol 40 BID for now and for 5 days total     #Elevated troponins  -Likely demand ischemia, troponins stable after peaking at 30  -cardio eval appreciated:   High-risk for perioperative major adverse cardiac events  If surgery is postponed, check Echo to evaluate LVEF (although it will not change overall risk assessment)- can do after surgery   Correct electrolyte abnormalities  Avoid volume overload  Check CK-MB     #Hx of Chronic Back Pain  #Hx of Compression Fracture  #Hx of OA  - Not on home meds  - Monitor     DVT ppx: restart when cleared by ortho       #Progress Note Handoff  Pending (specify): ORIF, monitor Hb, PT/rehab after procedure   Family discussion: Updated daughter Mary at bedside, answered all questions.  Disposition: Acute  High risk given above acuity and comorbidities.     84 y/o female with a PMHx COPD, OA and chronic back pain s/p multiple surgeries presenting to the ER after slipping off her bed. Not on AC, no HT, no LOC. Found to have L intertrochanteric femur fracture.     #Left Intertrochanteric Femur Fracture with hemorrhage  #Acute blood loss anemia   -ECG --> NSR w/ PVCs, RBBB  -CT and MRI reviewed; concern for hemorrhage   - discussed with ortho patient will be undergoing general anesthesia  - PT consult after surgery   -giving 1u PRBC now   - 2u PRBC ordered on hold for OR   - Pain control PRN  - Bowel regimen   - Plan for ORIF today  -cardio and pulm consults appreciated    #Hx of COPD  #Current Smoker  - On Spiriva and Advair at home  - Cont Tiotropium 2.5mcg 2 puffs QD  - Cont Budesonide/Formoterol 80/4.5mcg 2 puffs BID  - duoneb prn  - pulm consult appreciated   - Continue IV solumedrol 40 BID for now and for 5 days total     #Elevated troponins  -Likely demand ischemia, troponins stable after peaking at 30  -cardio eval appreciated:   High-risk for perioperative major adverse cardiac events  If surgery is postponed, check Echo to evaluate LVEF (although it will not change overall risk assessment)- can do after surgery   Correct electrolyte abnormalities  Avoid volume overload  Check CK-MB     #Hx of Chronic Back Pain  #Hx of Compression Fracture  #Hx of OA  - Not on home meds  - Monitor     DVT ppx: restart when cleared by ortho       #Progress Note Handoff  Pending (specify): ORIF, monitor Hb, PT/rehab after procedure   Family discussion: Updated daughter Mary at bedside, answered all questions.  Disposition: Acute  High risk given above acuity and comorbidities.     84 y/o female with a PMHx COPD, OA and chronic back pain s/p multiple surgeries presenting to the ER after slipping off her bed. Not on AC, no HT, no LOC. Found to have L intertrochanteric femur fracture.     #Left Intertrochanteric Femur Fracture with hemorrhage  #Acute blood loss anemia   -ECG --> NSR w/ PVCs, RBBB  -CT and MRI reviewed; concern for hemorrhage   - discussed with ortho patient will be undergoing general anesthesia  - PT consult after surgery   -giving 1u PRBC now   - 2u PRBC ordered on hold for OR   - Pain control PRN  - Bowel regimen   - Plan for ORIF today  -cardio and pulm consults appreciated    #Hx of COPD  #Current Smoker  - On Spiriva and Advair at home  - Cont Tiotropium 2.5mcg 2 puffs QD  - Cont Budesonide/Formoterol 80/4.5mcg 2 puffs BID  - duoneb prn  - pulm consult appreciated   - Continue IV solumedrol 40 BID for now and for 5 days total     #Hyponatremia  #H/o SIADH  -sodium always high 120's low 130's   -monitor BMP closely    #Hyperkalemia- improved   -s/p Lokelma; continue prn if K >5.5    #Leukocytosis  -on steroids; no fevers, monitor  -also losing blood, could be worsened by anemia     #Elevated troponins  -Likely demand ischemia, troponins stable after peaking at 30  -cardio eval appreciated:   High-risk for perioperative major adverse cardiac events  If surgery is postponed, check Echo to evaluate LVEF (although it will not change overall risk assessment)- can do after surgery   Correct electrolyte abnormalities  Avoid volume overload  Check CK-MB     #Hx of Chronic Back Pain  #Hx of Compression Fracture  #Hx of OA  - Not on home meds  - Monitor     DVT ppx: restart when cleared by ortho       #Progress Note Handoff  Pending (specify): ORIF, monitor Hb, PT/rehab after procedure   Family discussion: Updated daughter Mary at bedside, answered all questions.  Disposition: Acute  High risk given above acuity and comorbidities.

## 2023-12-27 NOTE — PROGRESS NOTE ADULT - SUBJECTIVE AND OBJECTIVE BOX
SHANELL MORENO  SI-N 4C (Back) 026 A (SI-N 4C (Back))    ***My note supersedes ALL resident notes that I sign.  My corrections for their notes are in my note.***    Patient is a 83y old  Female who presents with a chief complaint of Fall (26 Dec 2023 17:07)        Interval events:   Patient seen and examined at bedside. She says she has pain in leg when moving. Getting PRBC now. No chest pain, denies SOB though is on NC. Daughter Mary at bedside.     -PMHx: COPD (chronic obstructive pulmonary disease)    H/O cholelithiasis    Smoker      -PSHx:History of surgery            REVIEW OF SYSTEMS:  CONSTITUTIONAL: No fever, weight loss, or fatigue  RESPIRATORY: No cough, wheezing, chills or hemoptysis; No shortness of breath  CARDIOVASCULAR: No chest pain, palpitations, dizziness, or leg swelling  GASTROINTESTINAL: No abdominal or epigastric pain. No nausea, vomiting, or hematemesis; No diarrhea or constipation. No melena or hematochezia.  NEUROLOGICAL: No headaches  LYMPH NODES: No enlarged glands  MUSCULOSKELETAL: as above       T(C): , Max: 37.2 (12-26-23 @ 16:00)  HR: 97 (12-27-23 @ 09:08)  BP: 117/57 (12-27-23 @ 09:08)  RR: 18 (12-27-23 @ 09:08)  SpO2: 97% (12-27-23 @ 09:08)  CAPILLARY BLOOD GLUCOSE          PHYSICAL EXAM:  GENERAL: NAD; chronically ill-appearing, on NC; thin   HEAD:  Atraumatic, Normocephalic  NECK: Supple, No JVD  CHEST/LUNG: Mild expiratory wheezes b/l  HEART: Regular rate and rhythm; No murmurs, rubs, or gallops  ABDOMEN: Soft, Nontender, Nondistended; Bowel sounds present  EXTREMITIES:  2+ Peripheral Pulses, No clubbing, cyanosis, or edema  SKIN: No rashes or lesions    Consultant(s) Notes Reviewed:  [x ] YES  [ ] NO  Care Discussed with Consultants/Other Providers [ x] YES  [ ] NO      LABS:          7.8  24.72  )-------(310          22.9  N=96.5  L=0.9  MCV=89.8    128<L>|91<L>|37<H>  ------------------<140<H>  5.1<H>|24|1.3  eGFR:--  Ca:8.1<L>  128<L>|91<L>|33<H>  ------------------<122<H>  5.8<H>|23|1.4  eGFR:--  Ca:8.2<L>      PT/INR - ( 26 Dec 2023 00:43 )   PT: 12.30 sec;   INR: 1.08 ratio         PTT - ( 26 Dec 2023 00:43 )  PTT:39.9 sec      Microbiology:      RADIOLOGY & ADDITIONAL TESTS:  < from: CT Femur No Cont, Left (12.27.23 @ 00:47) >  IMPRESSION:    1. Acute, comminuted intertrochanteric fracture of the left femur with   varus angulation.  2. Scattered lesions in the intramedullary canal, likely foci of   hemorrhage. Nodefinite evidence for pathologic femoral lesion.    < end of copied text >    < from: MR Femur w/wo IV Cont, Left (12.26.23 @ 20:38) >    IMPRESSION:    1. Acute, displaced intertrochanteric fracture of the left femur with   varus angulation.  2. Evidence of hemorrhagic foci throughout the intramedullary canal. No   abnormal enhancing femoral masses identified.    < end of copied text >    < from: 12 Lead ECG (12.25.23 @ 15:10) >    Diagnosis Line Sinus rhythm with Premature supraventricular complexes  Indeterminate axis  Right bundle branch block  Abnormal ECG    < end of copied text >        Medications:  albuterol/ipratropium for Nebulization 3 milliLiter(s) Nebulizer every 6 hours  budesonide  80 MICROgram(s)/formoterol 4.5 MICROgram(s) Inhaler 2 Puff(s) Inhalation two times a day  lactated ringers. 1000 milliLiter(s) IV Continuous <Continuous>  methylPREDNISolone sodium succinate Injectable 40 milliGRAM(s) IV Push every 12 hours  morphine  - Injectable 2 milliGRAM(s) IV Push every 6 hours PRN  polyethylene glycol 3350 17 Gram(s) Oral daily  senna 2 Tablet(s) Oral at bedtime  tiotropium 2.5 MICROgram(s) Inhaler 2 Puff(s) Inhalation daily

## 2023-12-27 NOTE — BRIEF OPERATIVE NOTE - OPERATION/FINDINGS
11 x 380 nail  85mm lag screw  45mm distal interlock  47.5mm distal interlock    Reamings sent as frozen to pathology. Pathology confirmed intra-op no evidence of malignancy on reamings.

## 2023-12-27 NOTE — CHART NOTE - NSCHARTNOTEFT_GEN_A_CORE
PACU ANESTHESIA ADMISSION NOTE      Procedure:   Post op diagnosis:      ____  Intubated  TV:______       Rate: ______      FiO2: ______    __x__  Patent Airway    __x__  Full return of protective reflexes    __x__  Full recovery from anesthesia / back to baseline status    Vitals  HR: 100  BP: 163/70  RR: 18  O2 Sat: 95%  Temp: 97.6    Mental Status:  __x__ Awake   _____ Alert   _____ Drowsy   _____ Sedated    Nausea/Vomiting:  __x__ NO  ______Yes,   See Post - Op Orders          Pain Scale (0-10):  _____    Treatment: ____ None    __x__ See Post - Op/PCA Orders    Post - Operative Fluids:   ____ Oral   __x__ See Post - Op Orders    Plan: Discharge when criteria met:   ____Home       __x___Floor     _____Critical Care   Other:_________________    Comments: Patient had smooth intraoperative event, no anesthesia complication.
PACU ANESTHESIA ADMISSION NOTE      Procedure: L hip ORIF - case cancelled in OR prior to procedure due to CT finding  Post op diagnosis:  L hip fx    ____  Intubated  TV:______       Rate: ______      FiO2: ______    __x__  Patent Airway    __x__  Full return of protective reflexes    __x__  Full recovery from anesthesia / back to baseline status    Vitals:  T(C): 37.2 (12-26-23 @ 16:00), Max: 37.2 (12-26-23 @ 16:00)  HR: 111 (12-26-23 @ 16:00) (72 - 111)  BP: 96/50 (12-26-23 @ 16:00) (96/50 - 128/60)  RR: 15 (12-26-23 @ 16:00) (15 - 18)  SpO2: 95% (12-26-23 @ 16:00) (95% - 98%)    Mental Status:  __x__ Awake   ___x__ Alert   _____ Drowsy   _____ Sedated    Nausea/Vomiting:  __x__ NO  ______Yes,   See Post - Op Orders          Pain Scale (0-10):  _____    Treatment: ____ None    __x__ See Post - Op/PCA Orders    Post - Operative Fluids:   ____ Oral   __x__ See Post - Op Orders    Plan: Discharge:   ____Home       __x___Floor     _____Critical Care    _____  Other:_________________    Comments: Patient had smooth intraoperative event, no anesthesia complication.

## 2023-12-28 LAB
ANION GAP SERPL CALC-SCNC: 11 MMOL/L — SIGNIFICANT CHANGE UP (ref 7–14)
ANION GAP SERPL CALC-SCNC: 11 MMOL/L — SIGNIFICANT CHANGE UP (ref 7–14)
ANION GAP SERPL CALC-SCNC: 16 MMOL/L — HIGH (ref 7–14)
ANION GAP SERPL CALC-SCNC: 16 MMOL/L — HIGH (ref 7–14)
BUN SERPL-MCNC: 33 MG/DL — HIGH (ref 10–20)
BUN SERPL-MCNC: 33 MG/DL — HIGH (ref 10–20)
BUN SERPL-MCNC: 37 MG/DL — HIGH (ref 10–20)
BUN SERPL-MCNC: 37 MG/DL — HIGH (ref 10–20)
CALCIUM SERPL-MCNC: 7.8 MG/DL — LOW (ref 8.4–10.5)
CALCIUM SERPL-MCNC: 7.8 MG/DL — LOW (ref 8.4–10.5)
CALCIUM SERPL-MCNC: 8.1 MG/DL — LOW (ref 8.4–10.5)
CALCIUM SERPL-MCNC: 8.1 MG/DL — LOW (ref 8.4–10.5)
CHLORIDE SERPL-SCNC: 95 MMOL/L — LOW (ref 98–110)
CK MB CFR SERPL CALC: 8.9 NG/ML — HIGH (ref 0.6–6.3)
CK MB CFR SERPL CALC: 8.9 NG/ML — HIGH (ref 0.6–6.3)
CO2 SERPL-SCNC: 21 MMOL/L — SIGNIFICANT CHANGE UP (ref 17–32)
CO2 SERPL-SCNC: 21 MMOL/L — SIGNIFICANT CHANGE UP (ref 17–32)
CO2 SERPL-SCNC: 26 MMOL/L — SIGNIFICANT CHANGE UP (ref 17–32)
CO2 SERPL-SCNC: 26 MMOL/L — SIGNIFICANT CHANGE UP (ref 17–32)
CREAT SERPL-MCNC: 0.7 MG/DL — SIGNIFICANT CHANGE UP (ref 0.7–1.5)
CREAT SERPL-MCNC: 0.7 MG/DL — SIGNIFICANT CHANGE UP (ref 0.7–1.5)
CREAT SERPL-MCNC: 0.8 MG/DL — SIGNIFICANT CHANGE UP (ref 0.7–1.5)
CREAT SERPL-MCNC: 0.8 MG/DL — SIGNIFICANT CHANGE UP (ref 0.7–1.5)
EGFR: 72 ML/MIN/1.73M2 — SIGNIFICANT CHANGE UP
EGFR: 72 ML/MIN/1.73M2 — SIGNIFICANT CHANGE UP
EGFR: 85 ML/MIN/1.73M2 — SIGNIFICANT CHANGE UP
EGFR: 85 ML/MIN/1.73M2 — SIGNIFICANT CHANGE UP
GLUCOSE SERPL-MCNC: 109 MG/DL — HIGH (ref 70–99)
GLUCOSE SERPL-MCNC: 109 MG/DL — HIGH (ref 70–99)
GLUCOSE SERPL-MCNC: 142 MG/DL — HIGH (ref 70–99)
GLUCOSE SERPL-MCNC: 142 MG/DL — HIGH (ref 70–99)
HCT VFR BLD CALC: 25.7 % — LOW (ref 37–47)
HCT VFR BLD CALC: 25.7 % — LOW (ref 37–47)
HGB BLD-MCNC: 8.8 G/DL — LOW (ref 12–16)
HGB BLD-MCNC: 8.8 G/DL — LOW (ref 12–16)
MCHC RBC-ENTMCNC: 31.4 PG — HIGH (ref 27–31)
MCHC RBC-ENTMCNC: 31.4 PG — HIGH (ref 27–31)
MCHC RBC-ENTMCNC: 34.2 G/DL — SIGNIFICANT CHANGE UP (ref 32–37)
MCHC RBC-ENTMCNC: 34.2 G/DL — SIGNIFICANT CHANGE UP (ref 32–37)
MCV RBC AUTO: 91.8 FL — SIGNIFICANT CHANGE UP (ref 81–99)
MCV RBC AUTO: 91.8 FL — SIGNIFICANT CHANGE UP (ref 81–99)
NRBC # BLD: 0 /100 WBCS — SIGNIFICANT CHANGE UP (ref 0–0)
NRBC # BLD: 0 /100 WBCS — SIGNIFICANT CHANGE UP (ref 0–0)
PLATELET # BLD AUTO: 277 K/UL — SIGNIFICANT CHANGE UP (ref 130–400)
PLATELET # BLD AUTO: 277 K/UL — SIGNIFICANT CHANGE UP (ref 130–400)
PMV BLD: 9.9 FL — SIGNIFICANT CHANGE UP (ref 7.4–10.4)
PMV BLD: 9.9 FL — SIGNIFICANT CHANGE UP (ref 7.4–10.4)
POTASSIUM SERPL-MCNC: 4.9 MMOL/L — SIGNIFICANT CHANGE UP (ref 3.5–5)
POTASSIUM SERPL-MCNC: 4.9 MMOL/L — SIGNIFICANT CHANGE UP (ref 3.5–5)
POTASSIUM SERPL-MCNC: 5.6 MMOL/L — HIGH (ref 3.5–5)
POTASSIUM SERPL-MCNC: 5.6 MMOL/L — HIGH (ref 3.5–5)
POTASSIUM SERPL-SCNC: 4.9 MMOL/L — SIGNIFICANT CHANGE UP (ref 3.5–5)
POTASSIUM SERPL-SCNC: 4.9 MMOL/L — SIGNIFICANT CHANGE UP (ref 3.5–5)
POTASSIUM SERPL-SCNC: 5.6 MMOL/L — HIGH (ref 3.5–5)
POTASSIUM SERPL-SCNC: 5.6 MMOL/L — HIGH (ref 3.5–5)
RBC # BLD: 2.8 M/UL — LOW (ref 4.2–5.4)
RBC # BLD: 2.8 M/UL — LOW (ref 4.2–5.4)
RBC # FLD: 13.7 % — SIGNIFICANT CHANGE UP (ref 11.5–14.5)
RBC # FLD: 13.7 % — SIGNIFICANT CHANGE UP (ref 11.5–14.5)
SODIUM SERPL-SCNC: 132 MMOL/L — LOW (ref 135–146)
VIT D25+D1,25 OH+D1,25 PNL SERPL-MCNC: 30.6 PG/ML — SIGNIFICANT CHANGE UP (ref 19.9–79.3)
VIT D25+D1,25 OH+D1,25 PNL SERPL-MCNC: 30.6 PG/ML — SIGNIFICANT CHANGE UP (ref 19.9–79.3)
WBC # BLD: 20.61 K/UL — HIGH (ref 4.8–10.8)
WBC # BLD: 20.61 K/UL — HIGH (ref 4.8–10.8)
WBC # FLD AUTO: 20.61 K/UL — HIGH (ref 4.8–10.8)
WBC # FLD AUTO: 20.61 K/UL — HIGH (ref 4.8–10.8)

## 2023-12-28 PROCEDURE — 99233 SBSQ HOSP IP/OBS HIGH 50: CPT

## 2023-12-28 RX ORDER — SODIUM ZIRCONIUM CYCLOSILICATE 10 G/10G
10 POWDER, FOR SUSPENSION ORAL ONCE
Refills: 0 | Status: COMPLETED | OUTPATIENT
Start: 2023-12-28 | End: 2023-12-28

## 2023-12-28 RX ORDER — OXYCODONE HYDROCHLORIDE 5 MG/1
5 TABLET ORAL ONCE
Refills: 0 | Status: DISCONTINUED | OUTPATIENT
Start: 2023-12-28 | End: 2023-12-28

## 2023-12-28 RX ORDER — ENOXAPARIN SODIUM 100 MG/ML
40 INJECTION SUBCUTANEOUS EVERY 24 HOURS
Refills: 0 | Status: DISCONTINUED | OUTPATIENT
Start: 2023-12-28 | End: 2023-12-30

## 2023-12-28 RX ADMIN — SENNA PLUS 2 TABLET(S): 8.6 TABLET ORAL at 21:07

## 2023-12-28 RX ADMIN — Medication 40 MILLIGRAM(S): at 19:00

## 2023-12-28 RX ADMIN — SODIUM ZIRCONIUM CYCLOSILICATE 10 GRAM(S): 10 POWDER, FOR SUSPENSION ORAL at 18:19

## 2023-12-28 RX ADMIN — Medication 40 MILLIGRAM(S): at 05:30

## 2023-12-28 RX ADMIN — POLYETHYLENE GLYCOL 3350 17 GRAM(S): 17 POWDER, FOR SOLUTION ORAL at 11:49

## 2023-12-28 RX ADMIN — TIOTROPIUM BROMIDE 2 PUFF(S): 18 CAPSULE ORAL; RESPIRATORY (INHALATION) at 08:17

## 2023-12-28 RX ADMIN — Medication 100 MILLIGRAM(S): at 18:30

## 2023-12-28 RX ADMIN — Medication 100 MILLIGRAM(S): at 01:34

## 2023-12-28 RX ADMIN — ENOXAPARIN SODIUM 40 MILLIGRAM(S): 100 INJECTION SUBCUTANEOUS at 11:49

## 2023-12-28 RX ADMIN — BUDESONIDE AND FORMOTEROL FUMARATE DIHYDRATE 2 PUFF(S): 160; 4.5 AEROSOL RESPIRATORY (INHALATION) at 08:16

## 2023-12-28 RX ADMIN — Medication 100 MILLIGRAM(S): at 09:15

## 2023-12-28 NOTE — PROGRESS NOTE ADULT - ASSESSMENT
82 y/o female with a PMHx COPD, OA and chronic back pain s/p multiple surgeries presenting to the ER after slipping off her bed. Not on AC, no HT, no LOC. Found to have L intertrochanteric femur fracture.     #Left Intertrochanteric Femur Fracture with hemorrhage; s/p left hip ORIF 12/27/23   #Acute blood loss anemia   -ECG --> NSR w/ PVCs, RBBB  -CT and MRI reviewed; concern for hemorrhage   -pt/ot/rehab  -s/p 1u prbc    - Pain control PRN  - Bowel regimen      #Hx of COPD  #Current Smoker  - On Spiriva and Advair at home  - Cont Tiotropium 2.5mcg 2 puffs QD  - Cont Budesonide/Formoterol 80/4.5mcg 2 puffs BID  - duoneb prn  - pulm consult appreciated   - Continue IV solumedrol 40 BID for now and for 5 days total     #Hyponatremia  #H/o SIADH  -sodium always high 120's low 130's   -monitor BMP closely  -Na 132 today     #Hyperkalemia- worse   -give lokelma prn if >5.5     #Leukocytosis- improved   -on steroids; no fevers, monitor  -also losing blood, could be worsened by anemia     #Elevated troponins  -Likely demand ischemia, troponins stable after peaking at 30  -cardio eval appreciated:    check Echo to evaluate LVEF (although it will not change overall risk assessment)- can do after surgery   Correct electrolyte abnormalities  Avoid volume overload  Check CK-MB     #Hx of Chronic Back Pain  #Hx of Compression Fracture  #Hx of OA  - Not on home meds  - Monitor     DVT ppx: LVX 40mg sq qd       #Progress Note Handoff  Pending (specify): monitor Hb, PT/rehab, wean O2 as tolerated, correct K   Family discussion: Updated daughter Mary at bedside, answered all questions.  Disposition: Acute  High risk given above acuity and comorbidities. 84 y/o female with a PMHx COPD, OA and chronic back pain s/p multiple surgeries presenting to the ER after slipping off her bed. Not on AC, no HT, no LOC. Found to have L intertrochanteric femur fracture.     #Left Intertrochanteric Femur Fracture with hemorrhage; s/p left hip ORIF 12/27/23   #Acute blood loss anemia   -ECG --> NSR w/ PVCs, RBBB  -CT and MRI reviewed; concern for hemorrhage   -pt/ot/rehab  -s/p 1u prbc    - Pain control PRN  - Bowel regimen      #Hx of COPD  #Current Smoker  - On Spiriva and Advair at home  - Cont Tiotropium 2.5mcg 2 puffs QD  - Cont Budesonide/Formoterol 80/4.5mcg 2 puffs BID  - duoneb prn  - pulm consult appreciated   - Continue IV solumedrol 40 BID for now and for 5 days total     #Hyponatremia  #H/o SIADH  -sodium always high 120's low 130's   -monitor BMP closely  -Na 132 today     #Hyperkalemia- worse   -give lokelma prn if >5.5     #Leukocytosis- improved   -on steroids; no fevers, monitor  -also losing blood, could be worsened by anemia     #Elevated troponins  -Likely demand ischemia, troponins stable after peaking at 30  -cardio eval appreciated:    check Echo to evaluate LVEF (although it will not change overall risk assessment)- can do after surgery   Correct electrolyte abnormalities  Avoid volume overload  Check CK-MB     #Hx of Chronic Back Pain  #Hx of Compression Fracture  #Hx of OA  - Not on home meds  - Monitor     DVT ppx: LVX 40mg sq qd       #Progress Note Handoff  Pending (specify): monitor Hb, PT/rehab, wean O2 as tolerated, correct K   Family discussion: Updated daughter Mary at bedside, answered all questions.  Disposition: Acute  High risk given above acuity and comorbidities.

## 2023-12-28 NOTE — PROGRESS NOTE ADULT - SUBJECTIVE AND OBJECTIVE BOX
SHANELL MORENO  SI-N 4C (Back) 026 A ([facility] 4C (Back))    ***My note supersedes ALL resident notes that I sign.  My corrections for their notes are in my note.***    Patient is a 83y old  Female who presents with a chief complaint of Fall (28 Dec 2023 05:44)        Interval events:   Patient seen and examined at bedside. S/p ORIF left femur on 12/27. Says pain is controlled currently. No bleeding. Hb stable. Worked with PT. Says breathing is "fine". Still on 2L NC.     -PMHx: COPD (chronic obstructive pulmonary disease)    H/O cholelithiasis    Smoker      -PSHx:History of surgery            REVIEW OF SYSTEMS:  CONSTITUTIONAL: No fever, weight loss, or fatigue  RESPIRATORY: as above   CARDIOVASCULAR: No chest pain, palpitations, dizziness, or leg swelling  GASTROINTESTINAL: No abdominal or epigastric pain. No nausea, vomiting, or hematemesis; No diarrhea or constipation. No melena or hematochezia.  NEUROLOGICAL: No headaches  LYMPH NODES: No enlarged glands  MUSCULOSKELETAL: as above       T(C): , Max: 36.7 (12-27-23 @ 16:08)  HR: 97 (12-28-23 @ 11:47)  BP: 137/50 (12-28-23 @ 11:47)  RR: 18 (12-28-23 @ 11:47)  SpO2: 90% (12-28-23 @ 11:47)  CAPILLARY BLOOD GLUCOSE        PHYSICAL EXAM:  GENERAL: NAD; chronically ill-appearing, on NC; thin   HEAD:  Atraumatic, Normocephalic  NECK: Supple, No JVD  CHEST/LUNG: Mild expiratory wheezes b/l  HEART: Regular rate and rhythm; No murmurs, rubs, or gallops  ABDOMEN: Soft, Nontender, Nondistended; Bowel sounds present  EXTREMITIES:  2+ Peripheral Pulses, No clubbing, cyanosis, or edema; LLE dressing c/d/i   SKIN: No rashes or lesions     Consultant(s) Notes Reviewed:  [x ] YES  [ ] NO  Care Discussed with Consultants/Other Providers [ x] YES  [ ] NO        LABS:          8.8  20.61  )-------(277          25.7  N=--  L=--   MCV=91.8    132<L>|95<L>|37<H>  ------------------<109<H>  5.6<H>|21|0.8  eGFR:--  Ca:8.1<L>            Microbiology:      RADIOLOGY & ADDITIONAL TESTS:        Medications:  budesonide  80 MICROgram(s)/formoterol 4.5 MICROgram(s) Inhaler 2 Puff(s) Inhalation two times a day  ceFAZolin   IVPB 1000 milliGRAM(s) IV Intermittent every 8 hours  enoxaparin Injectable 40 milliGRAM(s) SubCutaneous every 24 hours  lactated ringers. 1000 milliLiter(s) IV Continuous <Continuous>  methylPREDNISolone sodium succinate Injectable 40 milliGRAM(s) IV Push every 12 hours  morphine  - Injectable 2 milliGRAM(s) IV Push every 6 hours PRN  oxycodone    5 mG/acetaminophen 325 mG 1 Tablet(s) Oral every 6 hours PRN  polyethylene glycol 3350 17 Gram(s) Oral daily  senna 2 Tablet(s) Oral at bedtime  sodium zirconium cyclosilicate 10 Gram(s) Oral once  tiotropium 2.5 MICROgram(s) Inhaler 2 Puff(s) Inhalation daily

## 2023-12-28 NOTE — CONSULT NOTE ADULT - ASSESSMENT
IMPRESSION: Rehab of left hip IT fracture, s/p ORIF. WBAT on the LLE. She requires analgesics before PT. She has COPD and osteoporosis. She amb one step with a RW with max assist with PT today. She lives alone. She is pleasant and cooperative.     PRECAUTIONS: [ x ] Cardiac  [ x ] Respiratory  [  ] Seizures [  ] Contact Isolation  [  ] Droplet Isolation  [  ] Other    Weight Bearing Status:     RECOMMENDATION:  WBAT LLE    Out of Bed to Chair     DVT/Decubiti Prophylaxis    REHAB PLAN:     [ x  ] Bedside P/T 3-5 times a week   [   ]   Bedside O/T  2-3 times a week             [   ] No Rehab Therapy Indicated                   [   ]  Speech Therapy   Conditioning/ROM                                    ADL  Bed Mobility                                               Conditioning/ROM  Transfers                                                     Bed Mobility  Sitting /Standing Balance                         Transfers                                        Gait Training                                               Sitting/Standing Balance  Stair Training [   ]Applicable                    Home equipment Eval                                                                        Splinting  [   ] Only      GOALS:   ADL   [ x  ]   Independent                    Transfers  [ x  ] Independent                          Ambulation  [  x ] Independent     [x    ] With device                            [   ]  CG                                                         [   ]  CG                                                                  [   ] CG                            [    ] Min A                                                   [   ] Min A                                                              [   ] Min  A          DISCHARGE PLAN:   [   ]  Good candidate for Intensive Rehabilitation/Hospital based-[facility]A [facility]                                             Will tolerate 3hrs Intensive Rehab Daily                                       [ x   ]  Short Term Rehab in Skilled Nursing Facility                                       [    ]  Home with Outpatient or VN services                                         [    ]  Possible Candidate for Intensive Hospital based Rehab

## 2023-12-28 NOTE — CONSULT NOTE ADULT - SUBJECTIVE AND OBJECTIVE BOX
HPI:  84 y/o female with a PMHx COPD, OA and chronic back pain s/p multiple surgeries presenting to the ER after slipping off her bed. Not on AC, no HT, no LOC. Fell on coccyx, unable to ambulate after the fall.  Complaining of pain to left hip / upper leg area and a skin scrape on the left shin. Sensation intact b/l LE, denies numbness/tingling, CP/SOB. Remainder of ROS negative.     In the ED:   - Vitals on admission --> /85, HR 90, RR 18, T97.6, O2 96% RA  - Labs on admission --> WBC 16, Hb 12.5, HS Trop 22, Na 130, K 4.9, AG 15, CK 46  - ECG --> NSR w/ PVCs  - CTH --> nothing acute, 6mm degen anterolisthesis of C7 on T1, mod/sev chronic microvascular ischemic changes  - CT CS --> nothing acute  - CT CAP --> Acute comminuted left intertrochanteric femur fracture, Moderate to severe centrilobular emphysema, Secretions seen in the right mainstem bronchus, Mild bilateral bronchial wall thickening with lower lobe endobronchial opacities, Left upper lobe linear stellate density seen on series 6 image 98 with areas of fat density within, 8mm LL nodule, 9mm LL nodule  - XR Pelvis --> acute left intertrochanteric femur fracture w/ varus angulation, multilevel degenerative changes of the spine and R hip joint  - Ortho Consult --> bed rest, pain control, hold DVT ppx in AM, medicine preop clearance, preop labs / ECG / CXR / ECG, trend Hb, 2u PRBC on hold for OR, NPO for surgery tomorrow 12/26  - S/p morphine and dilaudid in ED  - Admitted to medicine for further management   (25 Dec 2023 20:24)      PAST MEDICAL & SURGICAL HISTORY:  COPD (chronic obstructive pulmonary disease)      H/O cholelithiasis      Smoker      History of surgery  orif right ankle          Hospital Course:    TODAY'S SUBJECTIVE & REVIEW OF SYMPTOMS:     Constitutional WNL   Cardio WNL   Resp WNL   GI WNL  Heme WNL  Endo WNL  Skin WNL  MSK WNL  Neuro WNL  Cognitive WNL  Psych WNL      MEDICATIONS  (STANDING):  budesonide  80 MICROgram(s)/formoterol 4.5 MICROgram(s) Inhaler 2 Puff(s) Inhalation two times a day  enoxaparin Injectable 40 milliGRAM(s) SubCutaneous every 24 hours  lactated ringers. 1000 milliLiter(s) (100 mL/Hr) IV Continuous <Continuous>  methylPREDNISolone sodium succinate Injectable 40 milliGRAM(s) IV Push every 12 hours  polyethylene glycol 3350 17 Gram(s) Oral daily  senna 2 Tablet(s) Oral at bedtime  tiotropium 2.5 MICROgram(s) Inhaler 2 Puff(s) Inhalation daily    MEDICATIONS  (PRN):  morphine  - Injectable 2 milliGRAM(s) IV Push every 6 hours PRN Moderate Pain (4 - 6)  oxycodone    5 mG/acetaminophen 325 mG 1 Tablet(s) Oral every 6 hours PRN Severe Pain (7 - 10)      FAMILY HISTORY:  Known health problems: none (Father, Mother)        Allergies    No Known Allergies    Intolerances        SOCIAL HISTORY:    [  ] Etoh  [ x ] Smoking  [  ] Substance abuse     Home Environment:  [x  ] Home Alone  [  ] Lives with Family  [  ] Home Health Aid    Dwelling:  [  ] Apartment  [  ] Private House  [  ] Adult Home  [  ] Skilled Nursing Facility      [  ] Short Term  [  ] Long Term  [x  ] Stairs-5 LUDWIG and 13 inside       Elevator [  ]    FUNCTIONAL STATUS PTA: (Check all that apply)  Ambulation: [x   ]Independent    [  ] Dependent     [  ] Non-Ambulatory  Assistive Device: [  ] SA Cane  [  ]  Q Cane  [  ] Walker  [  ]  Wheelchair  ADL : [ x ] Independent  [  ]  Dependent       Vital Signs Last 24 Hrs  T(C): 36.3 (28 Dec 2023 15:45), Max: 36.5 (27 Dec 2023 22:00)  T(F): 97.3 (28 Dec 2023 15:45), Max: 97.7 (27 Dec 2023 22:00)  HR: 94 (28 Dec 2023 15:45) (92 - 107)  BP: 170/72 (28 Dec 2023 15:45) (105/59 - 192/82)  BP(mean): 95 (27 Dec 2023 23:50) (95 - 95)  RR: 18 (28 Dec 2023 15:45) (11 - 27)  SpO2: 90% (28 Dec 2023 11:47) (90% - 98%)    Parameters below as of 28 Dec 2023 09:00  Patient On (Oxygen Delivery Method): room air          PHYSICAL EXAM: Alert & Oriented X3  GENERAL: NAD, well-groomed, well-developed  HEAD:  Atraumatic, Normocephalic  EYES: EOMI, PERRLA, conjunctiva and sclera clear  NECK: Supple, No JVD, Normal thyroid  CHEST/LUNG: mild wheeze  HEART: Regular rate and rhythm; No murmurs, rubs, or gallops  ABDOMEN: Soft, Nontender, Nondistended; Bowel sounds present  EXTREMITIES:  2+ Peripheral Pulses, No clubbing, cyanosis, or edema    NERVOUS SYSTEM:  Cranial Nerves 2-12 intact [  ] Abnormal  [  ]  ROM: WFL all extremities [  ]  Abnormal [  ]  Motor Strength: WFL all extremities  [  ]  Abnormal [ x ] left hip 2/5; RLE 4+/5  Sensation: intact to light touch [  ] Abnormal [  ]  Reflexes: Symmetric [  ]  Abnormal [  ]    FUNCTIONAL STATUS:  Bed Mobility: Independent [  ]  Supervision [  ]  Needs Assistance [  ]  N/A [  ]  Transfers: Independent [  ]  Supervision [  ]  Needs Assistance [  ]  N/A [  ]   Ambulation: Independent [  ]  Supervision [  ]  Needs Assistance [  ]  N/A [  ]  ADL: Independent [  ] Requires Assistance [  ] N/A [  ]      LABS:                        8.8    20.61 )-----------( 277      ( 28 Dec 2023 06:00 )             25.7     12-28    132<L>  |  95<L>  |  33<H>  ----------------------------<  142<H>  4.9   |  26  |  0.7    Ca    7.8<L>      28 Dec 2023 17:35  Phos  7.2     12-27  Mg     2.0     12-27    TPro  5.6<L>  /  Alb  3.3<L>  /  TBili  0.5  /  DBili  x   /  AST  18  /  ALT  12  /  AlkPhos  100  12-27      Urinalysis Basic - ( 28 Dec 2023 17:35 )    Color: x / Appearance: x / SG: x / pH: x  Gluc: 142 mg/dL / Ketone: x  / Bili: x / Urobili: x   Blood: x / Protein: x / Nitrite: x   Leuk Esterase: x / RBC: x / WBC x   Sq Epi: x / Non Sq Epi: x / Bacteria: x        RADIOLOGY & ADDITIONAL STUDIES:    Assesment: HPI:  82 y/o female with a PMHx COPD, OA and chronic back pain s/p multiple surgeries presenting to the ER after slipping off her bed. Not on AC, no HT, no LOC. Fell on coccyx, unable to ambulate after the fall.  Complaining of pain to left hip / upper leg area and a skin scrape on the left shin. Sensation intact b/l LE, denies numbness/tingling, CP/SOB. Remainder of ROS negative.     In the ED:   - Vitals on admission --> /85, HR 90, RR 18, T97.6, O2 96% RA  - Labs on admission --> WBC 16, Hb 12.5, HS Trop 22, Na 130, K 4.9, AG 15, CK 46  - ECG --> NSR w/ PVCs  - CTH --> nothing acute, 6mm degen anterolisthesis of C7 on T1, mod/sev chronic microvascular ischemic changes  - CT CS --> nothing acute  - CT CAP --> Acute comminuted left intertrochanteric femur fracture, Moderate to severe centrilobular emphysema, Secretions seen in the right mainstem bronchus, Mild bilateral bronchial wall thickening with lower lobe endobronchial opacities, Left upper lobe linear stellate density seen on series 6 image 98 with areas of fat density within, 8mm LL nodule, 9mm LL nodule  - XR Pelvis --> acute left intertrochanteric femur fracture w/ varus angulation, multilevel degenerative changes of the spine and R hip joint  - Ortho Consult --> bed rest, pain control, hold DVT ppx in AM, medicine preop clearance, preop labs / ECG / CXR / ECG, trend Hb, 2u PRBC on hold for OR, NPO for surgery tomorrow 12/26  - S/p morphine and dilaudid in ED  - Admitted to medicine for further management   (25 Dec 2023 20:24)      PAST MEDICAL & SURGICAL HISTORY:  COPD (chronic obstructive pulmonary disease)      H/O cholelithiasis      Smoker      History of surgery  orif right ankle          Hospital Course:    TODAY'S SUBJECTIVE & REVIEW OF SYMPTOMS:     Constitutional WNL   Cardio WNL   Resp WNL   GI WNL  Heme WNL  Endo WNL  Skin WNL  MSK WNL  Neuro WNL  Cognitive WNL  Psych WNL      MEDICATIONS  (STANDING):  budesonide  80 MICROgram(s)/formoterol 4.5 MICROgram(s) Inhaler 2 Puff(s) Inhalation two times a day  enoxaparin Injectable 40 milliGRAM(s) SubCutaneous every 24 hours  lactated ringers. 1000 milliLiter(s) (100 mL/Hr) IV Continuous <Continuous>  methylPREDNISolone sodium succinate Injectable 40 milliGRAM(s) IV Push every 12 hours  polyethylene glycol 3350 17 Gram(s) Oral daily  senna 2 Tablet(s) Oral at bedtime  tiotropium 2.5 MICROgram(s) Inhaler 2 Puff(s) Inhalation daily    MEDICATIONS  (PRN):  morphine  - Injectable 2 milliGRAM(s) IV Push every 6 hours PRN Moderate Pain (4 - 6)  oxycodone    5 mG/acetaminophen 325 mG 1 Tablet(s) Oral every 6 hours PRN Severe Pain (7 - 10)      FAMILY HISTORY:  Known health problems: none (Father, Mother)        Allergies    No Known Allergies    Intolerances        SOCIAL HISTORY:    [  ] Etoh  [ x ] Smoking  [  ] Substance abuse     Home Environment:  [x  ] Home Alone  [  ] Lives with Family  [  ] Home Health Aid    Dwelling:  [  ] Apartment  [  ] Private House  [  ] Adult Home  [  ] Skilled Nursing Facility      [  ] Short Term  [  ] Long Term  [x  ] Stairs-5 LUDWIG and 13 inside       Elevator [  ]    FUNCTIONAL STATUS PTA: (Check all that apply)  Ambulation: [x   ]Independent    [  ] Dependent     [  ] Non-Ambulatory  Assistive Device: [  ] SA Cane  [  ]  Q Cane  [  ] Walker  [  ]  Wheelchair  ADL : [ x ] Independent  [  ]  Dependent       Vital Signs Last 24 Hrs  T(C): 36.3 (28 Dec 2023 15:45), Max: 36.5 (27 Dec 2023 22:00)  T(F): 97.3 (28 Dec 2023 15:45), Max: 97.7 (27 Dec 2023 22:00)  HR: 94 (28 Dec 2023 15:45) (92 - 107)  BP: 170/72 (28 Dec 2023 15:45) (105/59 - 192/82)  BP(mean): 95 (27 Dec 2023 23:50) (95 - 95)  RR: 18 (28 Dec 2023 15:45) (11 - 27)  SpO2: 90% (28 Dec 2023 11:47) (90% - 98%)    Parameters below as of 28 Dec 2023 09:00  Patient On (Oxygen Delivery Method): room air          PHYSICAL EXAM: Alert & Oriented X3  GENERAL: NAD, well-groomed, well-developed  HEAD:  Atraumatic, Normocephalic  EYES: EOMI, PERRLA, conjunctiva and sclera clear  NECK: Supple, No JVD, Normal thyroid  CHEST/LUNG: mild wheeze  HEART: Regular rate and rhythm; No murmurs, rubs, or gallops  ABDOMEN: Soft, Nontender, Nondistended; Bowel sounds present  EXTREMITIES:  2+ Peripheral Pulses, No clubbing, cyanosis, or edema    NERVOUS SYSTEM:  Cranial Nerves 2-12 intact [  ] Abnormal  [  ]  ROM: WFL all extremities [  ]  Abnormal [  ]  Motor Strength: WFL all extremities  [  ]  Abnormal [ x ] left hip 2/5; RLE 4+/5  Sensation: intact to light touch [  ] Abnormal [  ]  Reflexes: Symmetric [  ]  Abnormal [  ]    FUNCTIONAL STATUS:  Bed Mobility: Independent [  ]  Supervision [  ]  Needs Assistance [  ]  N/A [  ]  Transfers: Independent [  ]  Supervision [  ]  Needs Assistance [  ]  N/A [  ]   Ambulation: Independent [  ]  Supervision [  ]  Needs Assistance [  ]  N/A [  ]  ADL: Independent [  ] Requires Assistance [  ] N/A [  ]      LABS:                        8.8    20.61 )-----------( 277      ( 28 Dec 2023 06:00 )             25.7     12-28    132<L>  |  95<L>  |  33<H>  ----------------------------<  142<H>  4.9   |  26  |  0.7    Ca    7.8<L>      28 Dec 2023 17:35  Phos  7.2     12-27  Mg     2.0     12-27    TPro  5.6<L>  /  Alb  3.3<L>  /  TBili  0.5  /  DBili  x   /  AST  18  /  ALT  12  /  AlkPhos  100  12-27      Urinalysis Basic - ( 28 Dec 2023 17:35 )    Color: x / Appearance: x / SG: x / pH: x  Gluc: 142 mg/dL / Ketone: x  / Bili: x / Urobili: x   Blood: x / Protein: x / Nitrite: x   Leuk Esterase: x / RBC: x / WBC x   Sq Epi: x / Non Sq Epi: x / Bacteria: x        RADIOLOGY & ADDITIONAL STUDIES:    Assesment:

## 2023-12-28 NOTE — PHYSICAL THERAPY INITIAL EVALUATION ADULT - GENERAL OBSERVATIONS, REHAB EVAL
Patient was seen from 11:25-11:50 for PT IE. Patient was rec'd and left sitting in b/s chair, +2 L/min O2 via NC, +primafit, +IVL, NAD, agreeable to participate in PT.

## 2023-12-28 NOTE — PROGRESS NOTE ADULT - SUBJECTIVE AND OBJECTIVE BOX
SUBJECTIVE: Patient seen and examined. Pain controlled.  Pt did well o/n  No f/c/n/v/cp/sob.     OBJECTIVE:  NAD  Vital Signs Last 24 Hrs  T(C): 35.9 (28 Dec 2023 04:50), Max: 36.7 (27 Dec 2023 16:08)  T(F): 96.6 (28 Dec 2023 04:50), Max: 98.1 (27 Dec 2023 16:08)  HR: 107 (28 Dec 2023 04:50) (92 - 107)  BP: 105/59 (28 Dec 2023 04:50) (105/59 - 192/82)  BP(mean): 95 (27 Dec 2023 23:50) (94 - 95)  RR: 18 (28 Dec 2023 04:50) (11 - 27)  SpO2: 94% (27 Dec 2023 23:50) (93% - 98%)    Parameters below as of 27 Dec 2023 23:50  Patient On (Oxygen Delivery Method): nasal cannula  O2 Flow (L/min): 2      Affected extremity:   LLE         Dressing: clean/dry/intact            Sensation: SILT         Motor exam: 5/5 TA/GS/EHL         warm well perfused; capillary refill <3 seconds                         7.8    24.72 )-----------( 310      ( 27 Dec 2023 06:32 )             22.9     12-27    128<L>  |  91<L>  |  37<H>  ----------------------------<  140<H>  5.1<H>   |  24  |  1.3    Ca    8.1<L>      27 Dec 2023 06:32  Phos  7.2     12-27  Mg     2.0     12-27    TPro  5.6<L>  /  Alb  3.3<L>  /  TBili  0.5  /  DBili  x   /  AST  18  /  ALT  12  /  AlkPhos  100  12-27    A/P :  Pt is a 82yo Female s/p ORIF left femur on 12/27, POD#1, doing well.     -    Abx 24hrs    -    AM labs; recommend to transfuse if hgb drops <7   -    Pain control  -    DVT ppx: SCD+LVX     -    Weight bearing status: WBAT LLE  -    Physical Therapy/Occupational therapy   -    Dispo: Pending  SUBJECTIVE: Patient seen and examined. Pain controlled.  Pt did well o/n  No f/c/n/v/cp/sob.     OBJECTIVE:  NAD  Vital Signs Last 24 Hrs  T(C): 35.9 (28 Dec 2023 04:50), Max: 36.7 (27 Dec 2023 16:08)  T(F): 96.6 (28 Dec 2023 04:50), Max: 98.1 (27 Dec 2023 16:08)  HR: 107 (28 Dec 2023 04:50) (92 - 107)  BP: 105/59 (28 Dec 2023 04:50) (105/59 - 192/82)  BP(mean): 95 (27 Dec 2023 23:50) (94 - 95)  RR: 18 (28 Dec 2023 04:50) (11 - 27)  SpO2: 94% (27 Dec 2023 23:50) (93% - 98%)    Parameters below as of 27 Dec 2023 23:50  Patient On (Oxygen Delivery Method): nasal cannula  O2 Flow (L/min): 2      Affected extremity:   LLE         Dressing: clean/dry/intact            Sensation: SILT         Motor exam: 5/5 TA/GS/EHL         warm well perfused; capillary refill <3 seconds                         7.8    24.72 )-----------( 310      ( 27 Dec 2023 06:32 )             22.9     12-27    128<L>  |  91<L>  |  37<H>  ----------------------------<  140<H>  5.1<H>   |  24  |  1.3    Ca    8.1<L>      27 Dec 2023 06:32  Phos  7.2     12-27  Mg     2.0     12-27    TPro  5.6<L>  /  Alb  3.3<L>  /  TBili  0.5  /  DBili  x   /  AST  18  /  ALT  12  /  AlkPhos  100  12-27    A/P :  Pt is a 84yo Female s/p ORIF left femur on 12/27, POD#1, doing well.     -    Abx 24hrs    -    AM labs; recommend to transfuse if hgb drops <7   -    Pain control  -    DVT ppx: SCD+LVX     -    Weight bearing status: WBAT LLE  -    Physical Therapy/Occupational therapy   -    Dispo: Pending

## 2023-12-28 NOTE — PHYSICAL THERAPY INITIAL EVALUATION ADULT - PERTINENT HX OF CURRENT PROBLEM, REHAB EVAL
82 y/o female with a PMHx COPD, OA and chronic back pain s/p multiple surgeries presenting to the ER after slipping off her bed. Not on AC, no HT, no LOC. Fell on coccyx, unable to ambulate after the fall.  Complaining of pain to left hip / upper leg area and a skin scrape on the left shin. Sensation intact b/l LE, denies numbness/tingling, CP/SOB. Remainder of ROS negative. 84 y/o female with a PMHx COPD, OA and chronic back pain s/p multiple surgeries presenting to the ER after slipping off her bed. Not on AC, no HT, no LOC. Fell on coccyx, unable to ambulate after the fall.  Complaining of pain to left hip / upper leg area and a skin scrape on the left shin. Sensation intact b/l LE, denies numbness/tingling, CP/SOB. Remainder of ROS negative.

## 2023-12-29 LAB
ALBUMIN SERPL ELPH-MCNC: 3.1 G/DL — LOW (ref 3.5–5.2)
ALBUMIN SERPL ELPH-MCNC: 3.1 G/DL — LOW (ref 3.5–5.2)
ALP SERPL-CCNC: 82 U/L — SIGNIFICANT CHANGE UP (ref 30–115)
ALP SERPL-CCNC: 82 U/L — SIGNIFICANT CHANGE UP (ref 30–115)
ALT FLD-CCNC: 8 U/L — SIGNIFICANT CHANGE UP (ref 0–41)
ALT FLD-CCNC: 8 U/L — SIGNIFICANT CHANGE UP (ref 0–41)
ANION GAP SERPL CALC-SCNC: 12 MMOL/L — SIGNIFICANT CHANGE UP (ref 7–14)
ANION GAP SERPL CALC-SCNC: 12 MMOL/L — SIGNIFICANT CHANGE UP (ref 7–14)
AST SERPL-CCNC: 19 U/L — SIGNIFICANT CHANGE UP (ref 0–41)
AST SERPL-CCNC: 19 U/L — SIGNIFICANT CHANGE UP (ref 0–41)
BASOPHILS # BLD AUTO: 0.02 K/UL — SIGNIFICANT CHANGE UP (ref 0–0.2)
BASOPHILS # BLD AUTO: 0.02 K/UL — SIGNIFICANT CHANGE UP (ref 0–0.2)
BASOPHILS NFR BLD AUTO: 0.2 % — SIGNIFICANT CHANGE UP (ref 0–1)
BASOPHILS NFR BLD AUTO: 0.2 % — SIGNIFICANT CHANGE UP (ref 0–1)
BILIRUB SERPL-MCNC: 0.5 MG/DL — SIGNIFICANT CHANGE UP (ref 0.2–1.2)
BILIRUB SERPL-MCNC: 0.5 MG/DL — SIGNIFICANT CHANGE UP (ref 0.2–1.2)
BUN SERPL-MCNC: 23 MG/DL — HIGH (ref 10–20)
BUN SERPL-MCNC: 23 MG/DL — HIGH (ref 10–20)
CALCIUM SERPL-MCNC: 7.9 MG/DL — LOW (ref 8.4–10.5)
CALCIUM SERPL-MCNC: 7.9 MG/DL — LOW (ref 8.4–10.5)
CHLORIDE SERPL-SCNC: 92 MMOL/L — LOW (ref 98–110)
CHLORIDE SERPL-SCNC: 92 MMOL/L — LOW (ref 98–110)
CO2 SERPL-SCNC: 25 MMOL/L — SIGNIFICANT CHANGE UP (ref 17–32)
CO2 SERPL-SCNC: 25 MMOL/L — SIGNIFICANT CHANGE UP (ref 17–32)
CREAT SERPL-MCNC: 0.6 MG/DL — LOW (ref 0.7–1.5)
CREAT SERPL-MCNC: 0.6 MG/DL — LOW (ref 0.7–1.5)
EGFR: 88 ML/MIN/1.73M2 — SIGNIFICANT CHANGE UP
EGFR: 88 ML/MIN/1.73M2 — SIGNIFICANT CHANGE UP
EOSINOPHIL # BLD AUTO: 0 K/UL — SIGNIFICANT CHANGE UP (ref 0–0.7)
EOSINOPHIL # BLD AUTO: 0 K/UL — SIGNIFICANT CHANGE UP (ref 0–0.7)
EOSINOPHIL NFR BLD AUTO: 0 % — SIGNIFICANT CHANGE UP (ref 0–8)
EOSINOPHIL NFR BLD AUTO: 0 % — SIGNIFICANT CHANGE UP (ref 0–8)
GLUCOSE SERPL-MCNC: 131 MG/DL — HIGH (ref 70–99)
GLUCOSE SERPL-MCNC: 131 MG/DL — HIGH (ref 70–99)
HCT VFR BLD CALC: 21.5 % — LOW (ref 37–47)
HCT VFR BLD CALC: 21.5 % — LOW (ref 37–47)
HGB BLD-MCNC: 7.2 G/DL — LOW (ref 12–16)
HGB BLD-MCNC: 7.2 G/DL — LOW (ref 12–16)
IMM GRANULOCYTES NFR BLD AUTO: 0.6 % — HIGH (ref 0.1–0.3)
IMM GRANULOCYTES NFR BLD AUTO: 0.6 % — HIGH (ref 0.1–0.3)
LYMPHOCYTES # BLD AUTO: 0.37 K/UL — LOW (ref 1.2–3.4)
LYMPHOCYTES # BLD AUTO: 0.37 K/UL — LOW (ref 1.2–3.4)
LYMPHOCYTES # BLD AUTO: 3.3 % — LOW (ref 20.5–51.1)
LYMPHOCYTES # BLD AUTO: 3.3 % — LOW (ref 20.5–51.1)
MAGNESIUM SERPL-MCNC: 1.9 MG/DL — SIGNIFICANT CHANGE UP (ref 1.8–2.4)
MAGNESIUM SERPL-MCNC: 1.9 MG/DL — SIGNIFICANT CHANGE UP (ref 1.8–2.4)
MCHC RBC-ENTMCNC: 30.8 PG — SIGNIFICANT CHANGE UP (ref 27–31)
MCHC RBC-ENTMCNC: 30.8 PG — SIGNIFICANT CHANGE UP (ref 27–31)
MCHC RBC-ENTMCNC: 33.5 G/DL — SIGNIFICANT CHANGE UP (ref 32–37)
MCHC RBC-ENTMCNC: 33.5 G/DL — SIGNIFICANT CHANGE UP (ref 32–37)
MCV RBC AUTO: 91.9 FL — SIGNIFICANT CHANGE UP (ref 81–99)
MCV RBC AUTO: 91.9 FL — SIGNIFICANT CHANGE UP (ref 81–99)
MONOCYTES # BLD AUTO: 0.29 K/UL — SIGNIFICANT CHANGE UP (ref 0.1–0.6)
MONOCYTES # BLD AUTO: 0.29 K/UL — SIGNIFICANT CHANGE UP (ref 0.1–0.6)
MONOCYTES NFR BLD AUTO: 2.6 % — SIGNIFICANT CHANGE UP (ref 1.7–9.3)
MONOCYTES NFR BLD AUTO: 2.6 % — SIGNIFICANT CHANGE UP (ref 1.7–9.3)
NEUTROPHILS # BLD AUTO: 10.57 K/UL — HIGH (ref 1.4–6.5)
NEUTROPHILS # BLD AUTO: 10.57 K/UL — HIGH (ref 1.4–6.5)
NEUTROPHILS NFR BLD AUTO: 93.3 % — HIGH (ref 42.2–75.2)
NEUTROPHILS NFR BLD AUTO: 93.3 % — HIGH (ref 42.2–75.2)
NRBC # BLD: 0 /100 WBCS — SIGNIFICANT CHANGE UP (ref 0–0)
NRBC # BLD: 0 /100 WBCS — SIGNIFICANT CHANGE UP (ref 0–0)
PLATELET # BLD AUTO: 245 K/UL — SIGNIFICANT CHANGE UP (ref 130–400)
PLATELET # BLD AUTO: 245 K/UL — SIGNIFICANT CHANGE UP (ref 130–400)
PMV BLD: 10.1 FL — SIGNIFICANT CHANGE UP (ref 7.4–10.4)
PMV BLD: 10.1 FL — SIGNIFICANT CHANGE UP (ref 7.4–10.4)
POTASSIUM SERPL-MCNC: 4.5 MMOL/L — SIGNIFICANT CHANGE UP (ref 3.5–5)
POTASSIUM SERPL-MCNC: 4.5 MMOL/L — SIGNIFICANT CHANGE UP (ref 3.5–5)
POTASSIUM SERPL-SCNC: 4.5 MMOL/L — SIGNIFICANT CHANGE UP (ref 3.5–5)
POTASSIUM SERPL-SCNC: 4.5 MMOL/L — SIGNIFICANT CHANGE UP (ref 3.5–5)
PROT SERPL-MCNC: 5.4 G/DL — LOW (ref 6–8)
PROT SERPL-MCNC: 5.4 G/DL — LOW (ref 6–8)
RBC # BLD: 2.34 M/UL — LOW (ref 4.2–5.4)
RBC # BLD: 2.34 M/UL — LOW (ref 4.2–5.4)
RBC # FLD: 14.1 % — SIGNIFICANT CHANGE UP (ref 11.5–14.5)
RBC # FLD: 14.1 % — SIGNIFICANT CHANGE UP (ref 11.5–14.5)
SARS-COV-2 RNA SPEC QL NAA+PROBE: SIGNIFICANT CHANGE UP
SARS-COV-2 RNA SPEC QL NAA+PROBE: SIGNIFICANT CHANGE UP
SODIUM SERPL-SCNC: 129 MMOL/L — LOW (ref 135–146)
SODIUM SERPL-SCNC: 129 MMOL/L — LOW (ref 135–146)
WBC # BLD: 11.32 K/UL — HIGH (ref 4.8–10.8)
WBC # BLD: 11.32 K/UL — HIGH (ref 4.8–10.8)
WBC # FLD AUTO: 11.32 K/UL — HIGH (ref 4.8–10.8)
WBC # FLD AUTO: 11.32 K/UL — HIGH (ref 4.8–10.8)

## 2023-12-29 PROCEDURE — 93010 ELECTROCARDIOGRAM REPORT: CPT

## 2023-12-29 PROCEDURE — 99233 SBSQ HOSP IP/OBS HIGH 50: CPT

## 2023-12-29 RX ORDER — CHLORHEXIDINE GLUCONATE 213 G/1000ML
1 SOLUTION TOPICAL
Refills: 0 | Status: DISCONTINUED | OUTPATIENT
Start: 2023-12-29 | End: 2023-12-30

## 2023-12-29 RX ADMIN — Medication 40 MILLIGRAM(S): at 05:20

## 2023-12-29 RX ADMIN — TIOTROPIUM BROMIDE 2 PUFF(S): 18 CAPSULE ORAL; RESPIRATORY (INHALATION) at 07:46

## 2023-12-29 RX ADMIN — POLYETHYLENE GLYCOL 3350 17 GRAM(S): 17 POWDER, FOR SOLUTION ORAL at 11:23

## 2023-12-29 RX ADMIN — ENOXAPARIN SODIUM 40 MILLIGRAM(S): 100 INJECTION SUBCUTANEOUS at 11:23

## 2023-12-29 RX ADMIN — Medication 40 MILLIGRAM(S): at 17:17

## 2023-12-29 NOTE — PROGRESS NOTE ADULT - SUBJECTIVE AND OBJECTIVE BOX
Orthopaedic Surgery Progress Note    S&E at bedside this AM. Pain well controlled. Denies fever/chills/CP/SOB. No other complaints      T(C): 36.6 (12-29-23 @ 04:25), Max: 36.7 (12-28-23 @ 21:07)  HR: 99 (12-29-23 @ 04:25) (94 - 99)  BP: 123/60 (12-29-23 @ 04:25) (115/56 - 170/72)  RR: 18 (12-29-23 @ 04:25) (17 - 18)  SpO2: 90% (12-28-23 @ 11:47) (90% - 90%)    P/E:  Alert, NAD  Nonlabored breathing    LLE  Dressing: clean/dry/intact     Sensation: SILT  Motor exam: 5/5 TA/GS/EHL  warm well perfused; capillary refill <3 seconds     Labs                        8.8    20.61 )-----------( 277      ( 28 Dec 2023 06:00 )             25.7     12-28    132<L>  |  95<L>  |  33<H>  ----------------------------<  142<H>  4.9   |  26  |  0.7    Ca    7.8<L>      28 Dec 2023 17:35      A/P: Pt is a 82yo Female s/p ORIF left femur on 12/27, POD#2, doing well.     -    Abx 24hrs    -    AM labs; recommend to transfuse if hgb drops <7   -    Pain control  -    DVT ppx: SCD+LVX     -    Weight bearing status: WBAT LLE  -    Physical Therapy/Occupational therapy   -    Dispo: Pending

## 2023-12-29 NOTE — PROGRESS NOTE ADULT - TIME BILLING
Total time spent to complete patient's bedside assessment, physical examination, review medical chart including labs & imaging, discuss medical plan of care with housestaff was more than 50 minutes.

## 2023-12-29 NOTE — PROGRESS NOTE ADULT - SUBJECTIVE AND OBJECTIVE BOX
SHANELL MORENO  SI-N 4C (Back) 026 A (SI-N 4C (Back))    ***My note supersedes ALL resident notes that I sign.  My corrections for their notes are in my note.***    Patient is a 83y old  Female who presents with a chief complaint of Fall (29 Dec 2023 14:42)        Interval events:   Patient seen and examined at bedside. No acute events overnight. Denies chest pain. No overt bleeding. Hb low, getting another unit of PRBC. Denies SOB.     -PMHx: COPD (chronic obstructive pulmonary disease)    H/O cholelithiasis    Smoker      -PSHx:History of surgery            REVIEW OF SYSTEMS:  CONSTITUTIONAL: No fever, weight loss, or fatigue  RESPIRATORY: No cough, wheezing, chills or hemoptysis; No shortness of breath  CARDIOVASCULAR: No chest pain, palpitations, dizziness, or leg swelling  GASTROINTESTINAL: No abdominal or epigastric pain. No nausea, vomiting, or hematemesis; No diarrhea or constipation. No melena or hematochezia.  NEUROLOGICAL: No headaches  LYMPH NODES: No enlarged glands  MUSCULOSKELETAL: No joint pain or swelling; No muscle, back, or extremity pain      T(C): , Max: 36.7 (12-28-23 @ 21:07)  HR: 87 (12-29-23 @ 08:36)  BP: 145/65 (12-29-23 @ 08:36)  RR: 18 (12-29-23 @ 08:36)  SpO2: --  CAPILLARY BLOOD GLUCOSE          PHYSICAL EXAM:  GENERAL: NAD; chronically ill-appearing, on NC; thin   HEAD:  Atraumatic, Normocephalic  NECK: Supple, No JVD  CHEST/LUNG: Mild expiratory wheezes b/l  HEART: Regular rate and rhythm; No murmurs, rubs, or gallops  ABDOMEN: Soft, Nontender, Nondistended; Bowel sounds present  EXTREMITIES:  2+ Peripheral Pulses, No clubbing, cyanosis, or edema; LLE dressing c/d/i   SKIN: No rashes or lesions     Consultant(s) Notes Reviewed:  [x ] YES  [ ] NO  Care Discussed with Consultants/Other Providers [ x] YES  [ ] NO      LABS:          7.2  11.32  )-------(245          21.5  N=93.3  L=3.3  MCV=91.9    129<L>|92<L>|23<H>  ------------------<131<H>  4.5|25|0.6<L>  eGFR:--  Ca:7.9<L>  132<L>|95<L>|33<H>  ------------------<142<H>  4.9|26|0.7  eGFR:--  Ca:7.8<L>            Microbiology:      RADIOLOGY & ADDITIONAL TESTS:  < from: 12 Lead ECG (12.25.23 @ 15:10) >    Diagnosis Line Sinus rhythm with Premature supraventricular complexes  Indeterminate axis  Right bundle branch block  Abnormal ECG    < end of copied text >         Medications:  budesonide  80 MICROgram(s)/formoterol 4.5 MICROgram(s) Inhaler 2 Puff(s) Inhalation two times a day  chlorhexidine 4% Liquid 1 Application(s) Topical <User Schedule>  enoxaparin Injectable 40 milliGRAM(s) SubCutaneous every 24 hours  lactated ringers. 1000 milliLiter(s) IV Continuous <Continuous>  methylPREDNISolone sodium succinate Injectable 40 milliGRAM(s) IV Push every 12 hours  morphine  - Injectable 2 milliGRAM(s) IV Push every 6 hours PRN  oxycodone    5 mG/acetaminophen 325 mG 1 Tablet(s) Oral every 6 hours PRN  polyethylene glycol 3350 17 Gram(s) Oral daily  senna 2 Tablet(s) Oral at bedtime  tiotropium 2.5 MICROgram(s) Inhaler 2 Puff(s) Inhalation daily

## 2023-12-29 NOTE — OCCUPATIONAL THERAPY INITIAL EVALUATION ADULT - LEVEL OF INDEPENDENCE: GROOMING, OT EVAL
----- Message from INDIA Moy sent at 8/9/2019  4:31 PM CDT -----  She does not have gonorrhea or chlamydia   seated/independent

## 2023-12-29 NOTE — CONSULT NOTE ADULT - SUBJECTIVE AND OBJECTIVE BOX
Patient is a 83y old  Female who presents with a chief complaint of Fall (29 Dec 2023 07:13)      HPI:  84 y/o female with a PMHx COPD, OA and chronic back pain s/p multiple surgeries presenting to the ER after slipping off her bed. Not on AC, no HT, no LOC. Fell on coccyx, unable to ambulate after the fall.  Complaining of pain to left hip / upper leg area and a skin scrape on the left shin. Sensation intact b/l LE, denies numbness/tingling, CP/SOB. Remainder of ROS negative.     In the ED:   - Vitals on admission --> /85, HR 90, RR 18, T97.6, O2 96% RA  - Labs on admission --> WBC 16, Hb 12.5, HS Trop 22, Na 130, K 4.9, AG 15, CK 46  - ECG --> NSR w/ PVCs  - CTH --> nothing acute, 6mm degen anterolisthesis of C7 on T1, mod/sev chronic microvascular ischemic changes  - CT CS --> nothing acute  - CT CAP --> Acute comminuted left intertrochanteric femur fracture, Moderate to severe centrilobular emphysema, Secretions seen in the right mainstem bronchus, Mild bilateral bronchial wall thickening with lower lobe endobronchial opacities, Left upper lobe linear stellate density seen on series 6 image 98 with areas of fat density within, 8mm LL nodule, 9mm LL nodule  - XR Pelvis --> acute left intertrochanteric femur fracture w/ varus angulation, multilevel degenerative changes of the spine and R hip joint  - Ortho Consult --> bed rest, pain control, hold DVT ppx in AM, medicine preop clearance, preop labs / ECG / CXR / ECG, trend Hb, 2u PRBC on hold for OR, NPO for surgery tomorrow 12/26  - S/p morphine and dilaudid in ED  - Admitted to medicine for further management   (25 Dec 2023 20:24)      PAST MEDICAL & SURGICAL HISTORY:  COPD (chronic obstructive pulmonary disease)      H/O cholelithiasis      Smoker      History of surgery  orif right ankle          PREVIOUS DIAGNOSTIC TESTING:      ECHO  FINDINGS:    STRESS  FINDINGS:    CATHETERIZATION  FINDINGS:    MEDICATIONS  (STANDING):  budesonide  80 MICROgram(s)/formoterol 4.5 MICROgram(s) Inhaler 2 Puff(s) Inhalation two times a day  chlorhexidine 4% Liquid 1 Application(s) Topical <User Schedule>  enoxaparin Injectable 40 milliGRAM(s) SubCutaneous every 24 hours  lactated ringers. 1000 milliLiter(s) (100 mL/Hr) IV Continuous <Continuous>  methylPREDNISolone sodium succinate Injectable 40 milliGRAM(s) IV Push every 12 hours  polyethylene glycol 3350 17 Gram(s) Oral daily  senna 2 Tablet(s) Oral at bedtime  tiotropium 2.5 MICROgram(s) Inhaler 2 Puff(s) Inhalation daily    MEDICATIONS  (PRN):  morphine  - Injectable 2 milliGRAM(s) IV Push every 6 hours PRN Moderate Pain (4 - 6)  oxycodone    5 mG/acetaminophen 325 mG 1 Tablet(s) Oral every 6 hours PRN Severe Pain (7 - 10)      FAMILY HISTORY:  Negative for premature CAD         SOCIAL HISTORY:  CIGARETTES: smoke     ALCOHOL: None     REVIEW OF SYSTEMS:  CONSTITUTIONAL: No fever, weight loss, or fatigue  NECK: No pain or stiffness  RESPIRATORY: No cough, wheezing, chills or hemoptysis; No shortness of breath  CARDIOVASCULAR: No chest pain, palpitations, dizziness, or leg swelling  GASTROINTESTINAL: No abdominal or epigastric pain. No nausea, vomiting, or hematemesis; No diarrhea or constipation. No melena or hematochezia.  GENITOURINARY: No dysuria, frequency, hematuria, or incontinence  NEUROLOGICAL: No headaches, memory loss, loss of strength, numbness, or tremors  SKIN: No itching, burning, rashes, or lesions   ENDOCRINE: No heat or cold intolerance; No hair loss  MUSCULOSKELETAL: No joint pain or swelling; No muscle, back, or extremity pain  HEME/LYMPH: No easy bruising, or bleeding gums          Vital Signs Last 24 Hrs  T(C): 36.6 (29 Dec 2023 08:36), Max: 36.7 (28 Dec 2023 21:07)  T(F): 97.8 (29 Dec 2023 08:36), Max: 98.1 (28 Dec 2023 21:07)  HR: 87 (29 Dec 2023 08:36) (87 - 99)  BP: 145/65 (29 Dec 2023 08:36) (115/56 - 170/72)  BP(mean): --  RR: 18 (29 Dec 2023 08:36) (18 - 18)  SpO2: --            PHYSICAL EXAM:  GENERAL: NAD, well-groomed, well-developed  HEAD:  Atraumatic, Normocephalic  NECK: Supple, No JVD, Normal thyroid  NERVOUS SYSTEM:  Alert & Oriented X3, Good concentration  CHEST/LUNG: Clear to percussion bilaterally; No rales, rhonchi, wheezing, or rubs  HEART: Regular rate and rhythm; + SE murmur. No rubs, or gallops  ABDOMEN: Soft, Nontender, Nondistended; Bowel sounds present  EXTREMITIES:  No clubbing, cyanosis, or edema  SKIN: No rashes or lesions    INTERPRETATION OF TELEMETRY:    ECG: Sinus/RBBB     I&O's Detail    28 Dec 2023 07:01  -  29 Dec 2023 07:00  --------------------------------------------------------  IN:    Lactated Ringers: 1200 mL  Total IN: 1200 mL    OUT:    Voided (mL): 1450 mL  Total OUT: 1450 mL    Total NET: -250 mL          LABS:                        7.2    11.32 )-----------( 245      ( 29 Dec 2023 11:20 )             21.5     12-29    129<L>  |  92<L>  |  23<H>  ----------------------------<  131<H>  4.5   |  25  |  0.6<L>    Ca    7.9<L>      29 Dec 2023 11:20  Mg     1.9     12-29    TPro  5.4<L>  /  Alb  3.1<L>  /  TBili  0.5  /  DBili  x   /  AST  19  /  ALT  8   /  AlkPhos  82  12-29    CARDIAC MARKERS ( 28 Dec 2023 17:35 )  x     / x     / x     / x     / 8.9 ng/mL        Urinalysis Basic - ( 29 Dec 2023 11:20 )    Color: x / Appearance: x / SG: x / pH: x  Gluc: 131 mg/dL / Ketone: x  / Bili: x / Urobili: x   Blood: x / Protein: x / Nitrite: x   Leuk Esterase: x / RBC: x / WBC x   Sq Epi: x / Non Sq Epi: x / Bacteria: x      I&O's Summary    28 Dec 2023 07:01  -  29 Dec 2023 07:00  --------------------------------------------------------  IN: 1200 mL / OUT: 1450 mL / NET: -250 mL        RADIOLOGY & ADDITIONAL STUDIES:

## 2023-12-29 NOTE — CONSULT NOTE ADULT - ASSESSMENT
Continue current care as per medical team   DVT/GI prophylaxis   Trend Troponins  Serial ECGs  Keep Hb > 8  Correct electrolytes   DAPT for now   Physical therapy/Rehab   Will F/U

## 2023-12-29 NOTE — OCCUPATIONAL THERAPY INITIAL EVALUATION ADULT - PERTINENT HX OF CURRENT PROBLEM, REHAB EVAL
84 y/o female with a PMHx COPD, OA and chronic back pain s/p multiple surgeries presenting to the ER after slipping off her bed. Pt sustained a Left Intertrochanteric Femur Fracture and is now s/p L hip ORIF 12/27. 82 y/o female with a PMHx COPD, OA and chronic back pain s/p multiple surgeries presenting to the ER after slipping off her bed. Pt sustained a Left Intertrochanteric Femur Fracture and is now s/p L hip ORIF 12/27.

## 2023-12-29 NOTE — PROGRESS NOTE ADULT - ASSESSMENT
84 y/o female with a PMHx COPD, OA and chronic back pain s/p multiple surgeries presenting to the ER after slipping off her bed. Not on AC, no HT, no LOC. Found to have L intertrochanteric femur fracture.     #Left Intertrochanteric Femur Fracture with hemorrhage; s/p left hip ORIF 12/27/23   #Acute blood loss anemia   -CT and MRI reviewed; concern for hemorrhage   -pt/ot/rehab  -s/p 1u prbc; Hb 7.2 today, getting another PRBC  - Pain control PRN  - Bowel regimen      #Hx of COPD  #Current Smoker  - On Spiriva and Advair at home  - Cont Tiotropium 2.5mcg 2 puffs QD  - Cont Budesonide/Formoterol 80/4.5mcg 2 puffs BID  - duoneb prn  - pulm consult appreciated    - Continue IV solumedrol 40 BID for now and for 5 days total     #Hyponatremia  #H/o SIADH  -sodium always high 120's low 130's   -monitor BMP closely  -Na 129 today     #Hyperkalemia- resolved   -give lokelma prn if >5.5     #Leukocytosis- improved   -on steroids; no fevers, monitor  -also losing blood, could be worsened by anemia     #Elevated troponins   -ECG --> NSR w/ PVCs, RBBB  -Likely demand ischemia, troponins stable after peaking at 30  -cardio Dr. Garcia consulted; will trend trops  -get ECHO      #Hx of Chronic Back Pain  #Hx of Compression Fracture  #Hx of OA  - Not on home meds  - Monitor     DVT ppx: LVX 40mg sq qd       #Progress Note Handoff  Pending (specify): trend trops, ECHO, d/c planning   Family discussion: Updated daughter Selin at bedside, answered all questions.  Disposition: Acute for now   High risk given above acuity and comorbidities.     82 y/o female with a PMHx COPD, OA and chronic back pain s/p multiple surgeries presenting to the ER after slipping off her bed. Not on AC, no HT, no LOC. Found to have L intertrochanteric femur fracture.     #Left Intertrochanteric Femur Fracture with hemorrhage; s/p left hip ORIF 12/27/23   #Acute blood loss anemia   -CT and MRI reviewed; concern for hemorrhage   -pt/ot/rehab  -s/p 1u prbc; Hb 7.2 today, getting another PRBC  - Pain control PRN  - Bowel regimen      #Hx of COPD  #Current Smoker  - On Spiriva and Advair at home  - Cont Tiotropium 2.5mcg 2 puffs QD  - Cont Budesonide/Formoterol 80/4.5mcg 2 puffs BID  - duoneb prn  - pulm consult appreciated    - Continue IV solumedrol 40 BID for now and for 5 days total     #Hyponatremia  #H/o SIADH  -sodium always high 120's low 130's   -monitor BMP closely  -Na 129 today     #Hyperkalemia- resolved   -give lokelma prn if >5.5     #Leukocytosis- improved   -on steroids; no fevers, monitor  -also losing blood, could be worsened by anemia     #Elevated troponins   -ECG --> NSR w/ PVCs, RBBB  -Likely demand ischemia, troponins stable after peaking at 30  -cardio Dr. Garcia consulted; will trend trops  -get ECHO      #Hx of Chronic Back Pain  #Hx of Compression Fracture  #Hx of OA  - Not on home meds  - Monitor     DVT ppx: LVX 40mg sq qd       #Progress Note Handoff  Pending (specify): trend trops, ECHO, d/c planning   Family discussion: Updated daughter Selin at bedside, answered all questions.  Disposition: Acute for now   High risk given above acuity and comorbidities.

## 2023-12-29 NOTE — CONSULT NOTE ADULT - CONSULT REASON
Cardiac risk stratification
Positive troponins
Pre op clearance
Left intertrochanteric femur fracture
left hip IT FX

## 2023-12-29 NOTE — OCCUPATIONAL THERAPY INITIAL EVALUATION ADULT - GENERAL OBSERVATIONS, REHAB EVAL
Pt encountered and left seated in bedside recliner. +2 liters 02 via NC, +IV locked per RN. Daughter at the bedside. Agreeable to OT IE. Pt presents with generalized weakness and fatigue, decreased balance, and decreased weightbearing tolerance LLE impacting functional performance at this time. Patient left as found. RN made aware. Family at the bedside.

## 2023-12-29 NOTE — OCCUPATIONAL THERAPY INITIAL EVALUATION ADULT - ADL RETRAINING, OT EVAL
Pt will increase LB dressing to MOD A by discharge to increase independence and return to life roles.

## 2023-12-30 ENCOUNTER — TRANSCRIPTION ENCOUNTER (OUTPATIENT)
Age: 83
End: 2023-12-30

## 2023-12-30 VITALS — RESPIRATION RATE: 18 BRPM | TEMPERATURE: 98 F | HEART RATE: 88 BPM | OXYGEN SATURATION: 95 %

## 2023-12-30 LAB
BASOPHILS # BLD AUTO: 0.01 K/UL — SIGNIFICANT CHANGE UP (ref 0–0.2)
BASOPHILS # BLD AUTO: 0.01 K/UL — SIGNIFICANT CHANGE UP (ref 0–0.2)
BASOPHILS NFR BLD AUTO: 0.1 % — SIGNIFICANT CHANGE UP (ref 0–1)
BASOPHILS NFR BLD AUTO: 0.1 % — SIGNIFICANT CHANGE UP (ref 0–1)
EOSINOPHIL # BLD AUTO: 0 K/UL — SIGNIFICANT CHANGE UP (ref 0–0.7)
EOSINOPHIL # BLD AUTO: 0 K/UL — SIGNIFICANT CHANGE UP (ref 0–0.7)
EOSINOPHIL NFR BLD AUTO: 0 % — SIGNIFICANT CHANGE UP (ref 0–8)
EOSINOPHIL NFR BLD AUTO: 0 % — SIGNIFICANT CHANGE UP (ref 0–8)
HCT VFR BLD CALC: 29.2 % — LOW (ref 37–47)
HCT VFR BLD CALC: 29.2 % — LOW (ref 37–47)
HGB BLD-MCNC: 10.2 G/DL — LOW (ref 12–16)
HGB BLD-MCNC: 10.2 G/DL — LOW (ref 12–16)
IMM GRANULOCYTES NFR BLD AUTO: 1.1 % — HIGH (ref 0.1–0.3)
IMM GRANULOCYTES NFR BLD AUTO: 1.1 % — HIGH (ref 0.1–0.3)
LYMPHOCYTES # BLD AUTO: 0.63 K/UL — LOW (ref 1.2–3.4)
LYMPHOCYTES # BLD AUTO: 0.63 K/UL — LOW (ref 1.2–3.4)
LYMPHOCYTES # BLD AUTO: 5.8 % — LOW (ref 20.5–51.1)
LYMPHOCYTES # BLD AUTO: 5.8 % — LOW (ref 20.5–51.1)
MCHC RBC-ENTMCNC: 30.2 PG — SIGNIFICANT CHANGE UP (ref 27–31)
MCHC RBC-ENTMCNC: 30.2 PG — SIGNIFICANT CHANGE UP (ref 27–31)
MCHC RBC-ENTMCNC: 34.9 G/DL — SIGNIFICANT CHANGE UP (ref 32–37)
MCHC RBC-ENTMCNC: 34.9 G/DL — SIGNIFICANT CHANGE UP (ref 32–37)
MCV RBC AUTO: 86.4 FL — SIGNIFICANT CHANGE UP (ref 81–99)
MCV RBC AUTO: 86.4 FL — SIGNIFICANT CHANGE UP (ref 81–99)
MONOCYTES # BLD AUTO: 0.53 K/UL — SIGNIFICANT CHANGE UP (ref 0.1–0.6)
MONOCYTES # BLD AUTO: 0.53 K/UL — SIGNIFICANT CHANGE UP (ref 0.1–0.6)
MONOCYTES NFR BLD AUTO: 4.9 % — SIGNIFICANT CHANGE UP (ref 1.7–9.3)
MONOCYTES NFR BLD AUTO: 4.9 % — SIGNIFICANT CHANGE UP (ref 1.7–9.3)
NEUTROPHILS # BLD AUTO: 9.48 K/UL — HIGH (ref 1.4–6.5)
NEUTROPHILS # BLD AUTO: 9.48 K/UL — HIGH (ref 1.4–6.5)
NEUTROPHILS NFR BLD AUTO: 88.1 % — HIGH (ref 42.2–75.2)
NEUTROPHILS NFR BLD AUTO: 88.1 % — HIGH (ref 42.2–75.2)
NRBC # BLD: 0 /100 WBCS — SIGNIFICANT CHANGE UP (ref 0–0)
NRBC # BLD: 0 /100 WBCS — SIGNIFICANT CHANGE UP (ref 0–0)
PLATELET # BLD AUTO: 257 K/UL — SIGNIFICANT CHANGE UP (ref 130–400)
PLATELET # BLD AUTO: 257 K/UL — SIGNIFICANT CHANGE UP (ref 130–400)
PMV BLD: 10 FL — SIGNIFICANT CHANGE UP (ref 7.4–10.4)
PMV BLD: 10 FL — SIGNIFICANT CHANGE UP (ref 7.4–10.4)
RBC # BLD: 3.38 M/UL — LOW (ref 4.2–5.4)
RBC # BLD: 3.38 M/UL — LOW (ref 4.2–5.4)
RBC # FLD: 15.4 % — HIGH (ref 11.5–14.5)
RBC # FLD: 15.4 % — HIGH (ref 11.5–14.5)
TROPONIN T, HIGH SENSITIVITY RESULT: 19 NG/L — HIGH (ref 6–13)
TROPONIN T, HIGH SENSITIVITY RESULT: 19 NG/L — HIGH (ref 6–13)
WBC # BLD: 10.77 K/UL — SIGNIFICANT CHANGE UP (ref 4.8–10.8)
WBC # BLD: 10.77 K/UL — SIGNIFICANT CHANGE UP (ref 4.8–10.8)
WBC # FLD AUTO: 10.77 K/UL — SIGNIFICANT CHANGE UP (ref 4.8–10.8)
WBC # FLD AUTO: 10.77 K/UL — SIGNIFICANT CHANGE UP (ref 4.8–10.8)

## 2023-12-30 PROCEDURE — 99239 HOSP IP/OBS DSCHRG MGMT >30: CPT

## 2023-12-30 RX ORDER — OXYCODONE AND ACETAMINOPHEN 5; 325 MG/1; MG/1
1 TABLET ORAL
Qty: 0 | Refills: 0 | DISCHARGE
Start: 2023-12-30

## 2023-12-30 RX ORDER — POLYETHYLENE GLYCOL 3350 17 G/17G
17 POWDER, FOR SOLUTION ORAL
Qty: 0 | Refills: 0 | DISCHARGE
Start: 2023-12-30

## 2023-12-30 RX ORDER — SENNA PLUS 8.6 MG/1
2 TABLET ORAL
Qty: 0 | Refills: 0 | DISCHARGE
Start: 2023-12-30

## 2023-12-30 RX ORDER — ENOXAPARIN SODIUM 100 MG/ML
40 INJECTION SUBCUTANEOUS
Qty: 0 | Refills: 0 | DISCHARGE
Start: 2023-12-30

## 2023-12-30 RX ORDER — ASPIRIN/CALCIUM CARB/MAGNESIUM 324 MG
1 TABLET ORAL
Qty: 180 | Refills: 0
Start: 2023-12-30 | End: 2024-06-26

## 2023-12-30 RX ADMIN — Medication 40 MILLIGRAM(S): at 05:19

## 2023-12-30 RX ADMIN — MORPHINE SULFATE 2 MILLIGRAM(S): 50 CAPSULE, EXTENDED RELEASE ORAL at 11:25

## 2023-12-30 RX ADMIN — POLYETHYLENE GLYCOL 3350 17 GRAM(S): 17 POWDER, FOR SOLUTION ORAL at 11:04

## 2023-12-30 RX ADMIN — MORPHINE SULFATE 2 MILLIGRAM(S): 50 CAPSULE, EXTENDED RELEASE ORAL at 11:04

## 2023-12-30 NOTE — PROGRESS NOTE ADULT - ASSESSMENT
82 y/o female with a PMHx COPD, OA and chronic back pain s/p multiple surgeries presenting to the ER after slipping off her bed. Not on AC, no HT, no LOC. Found to have L intertrochanteric femur fracture.     Left Intertrochanteric Femur Fracture with hemorrhage; s/p left hip ORIF 12/27/23   Acute blood loss anemia    Hx of COPD/Current Smoker  Hyponatremia/SIADH  Elevated troponins (HS)    Chronic Back Pain/s/p of Compression Fracture/OA      Continue current care as per medical team   Monitor H/H  Pulmonary F/U   Ok to do TTE as out patient   Monitor Na level (improved)   DVT prophylaxis   Ok to maintain her on single antiplatelet for now but DVT prophylaxis until more active   Will F/U as out patient       Plan of care discussed with Dr. Christianson    84 y/o female with a PMHx COPD, OA and chronic back pain s/p multiple surgeries presenting to the ER after slipping off her bed. Not on AC, no HT, no LOC. Found to have L intertrochanteric femur fracture.     Left Intertrochanteric Femur Fracture with hemorrhage; s/p left hip ORIF 12/27/23   Acute blood loss anemia    Hx of COPD/Current Smoker  Hyponatremia/SIADH  Elevated troponins (HS)    Chronic Back Pain/s/p of Compression Fracture/OA      Continue current care as per medical team   Monitor H/H  Pulmonary F/U   Ok to do TTE as out patient   Monitor Na level (improved)   DVT prophylaxis   Ok to maintain her on single antiplatelet for now but DVT prophylaxis until more active   Will F/U as out patient       Plan of care discussed with Dr. Christianson

## 2023-12-30 NOTE — DISCHARGE NOTE PROVIDER - HOSPITAL COURSE
82 y/o female with a PMHx COPD, OA and chronic back pain s/p multiple surgeries presenting to the ER after slipping off her bed. Not on AC, no HT, no LOC. Found to have L intertrochanteric femur fracture. S/p left hip ORIF 12/27/23. Course complicated by acute blood loss anemia, s/p 2u PRBC. Hb stable now. Stable for d/c to SNF.    84 y/o female with a PMHx COPD, OA and chronic back pain s/p multiple surgeries presenting to the ER after slipping off her bed. Not on AC, no HT, no LOC. Found to have L intertrochanteric femur fracture. S/p left hip ORIF 12/27/23. Course complicated by acute blood loss anemia, s/p 2u PRBC. Hb stable now. Stable for d/c to SNF.

## 2023-12-30 NOTE — DISCHARGE NOTE PROVIDER - NSDCMRMEDTOKEN_GEN_ALL_CORE_FT
Advair Diskus 500 mcg-50 mcg inhalation powder: 1 puff(s) inhaled 2 times a day  enoxaparin: 40 milligram(s) subcutaneous once a day  oxycodone-acetaminophen 5 mg-325 mg oral tablet: 1 tab(s) orally every 6 hours As needed Severe Pain (7 - 10)  polyethylene glycol 3350 oral powder for reconstitution: 17 gram(s) orally once a day  predniSONE 20 mg oral tablet: 2 tab(s) orally once a day  senna leaf extract oral tablet: 2 tab(s) orally once a day (at bedtime)  Spiriva 18 mcg inhalation capsule: 1 cap(s) inhaled once a day   Advair Diskus 500 mcg-50 mcg inhalation powder: 1 puff(s) inhaled 2 times a day  aspirin 81 mg oral delayed release tablet: 1 tab(s) orally once a day  enoxaparin: 40 milligram(s) subcutaneous once a day  oxycodone-acetaminophen 5 mg-325 mg oral tablet: 1 tab(s) orally every 6 hours As needed Severe Pain (7 - 10)  polyethylene glycol 3350 oral powder for reconstitution: 17 gram(s) orally once a day  predniSONE 20 mg oral tablet: 2 tab(s) orally once a day  senna leaf extract oral tablet: 2 tab(s) orally once a day (at bedtime)  Spiriva 18 mcg inhalation capsule: 1 cap(s) inhaled once a day

## 2023-12-30 NOTE — DISCHARGE NOTE NURSING/CASE MANAGEMENT/SOCIAL WORK - NSDCPEFALRISK_GEN_ALL_CORE
For information on Fall & Injury Prevention, visit: [email or web address] OR  [email or web address] OR  https://www.cdc.gov/steadi/patient.html For information on Fall & Injury Prevention, visit: [email or web address] OR  [email or web address] OR  https://www.cdc.gov/steadi/patient.html

## 2023-12-30 NOTE — PROGRESS NOTE ADULT - SUBJECTIVE AND OBJECTIVE BOX
Orthopaedic Surgery Progress Note    S&E this AM. Pain well controlled. Denies fever/chills/CP/SOB. No other complaints      T(C): 36.4 (12-30-23 @ 09:36), Max: 36.8 (12-29-23 @ 15:27)  HR: 88 (12-30-23 @ 09:36) (79 - 98)  BP: 165/75 (12-30-23 @ 05:12) (122/63 - 184/86)  RR: 18 (12-30-23 @ 09:36) (18 - 18)  SpO2: 95% (12-30-23 @ 09:36) (92% - 96%)    P/E:  Alert, NAD  Nonlabored breathing    LLE  Dressing c/d/i  Comp soft/compressible  SILT SP/DP/T/Sural/Saph  Firing TA/EHL/FHL/GS  Foot WWP, BCR    Labs                        10.2   10.77 )-----------( 257      ( 29 Dec 2023 23:48 )             29.2     12-29    129<L>  |  92<L>  |  23<H>  ----------------------------<  131<H>  4.5   |  25  |  0.6<L>    Ca    7.9<L>      29 Dec 2023 11:20  Mg     1.9     12-29    TPro  5.4<L>  /  Alb  3.1<L>  /  TBili  0.5  /  DBili  x   /  AST  19  /  ALT  8   /  AlkPhos  82  12-29    LIVER FUNCTIONS - ( 29 Dec 2023 11:20 )  Alb: 3.1 g/dL / Pro: 5.4 g/dL / ALK PHOS: 82 U/L / ALT: 8 U/L / AST: 19 U/L / GGT: x                     A/P:   82yo Female s/p ORIF left femur on 12/27/23, POD#3, doing well. Cleared for discharge from orthopedic standpoint.    -    Abx 24hrs completed  -    AM labs; recommend to transfuse if hgb drops <7   -    Pain control  -    DVT ppx: SCD+LVX     -    Weight bearing status: WBAT LLE  -    Physical Therapy/Occupational therapy   -    Dispo: Pending     - Upon discharge, patient should follow up with Dr. Dariana Schwartz at [address] [facility] phone [phone].  - Patient should be discharged on 6 weeks (from surgery date) of Aspirin 81mg BID for DVT prophylaxis as their mobility/function/ambulation will be decreased due to lower extremity orthopaedic surgery.         Orthopaedic Surgery Progress Note    S&E this AM. Pain well controlled. Denies fever/chills/CP/SOB. No other complaints      T(C): 36.4 (12-30-23 @ 09:36), Max: 36.8 (12-29-23 @ 15:27)  HR: 88 (12-30-23 @ 09:36) (79 - 98)  BP: 165/75 (12-30-23 @ 05:12) (122/63 - 184/86)  RR: 18 (12-30-23 @ 09:36) (18 - 18)  SpO2: 95% (12-30-23 @ 09:36) (92% - 96%)    P/E:  Alert, NAD  Nonlabored breathing    LLE  Dressing c/d/i  Comp soft/compressible  SILT SP/DP/T/Sural/Saph  Firing TA/EHL/FHL/GS  Foot WWP, BCR    Labs                        10.2   10.77 )-----------( 257      ( 29 Dec 2023 23:48 )             29.2     12-29    129<L>  |  92<L>  |  23<H>  ----------------------------<  131<H>  4.5   |  25  |  0.6<L>    Ca    7.9<L>      29 Dec 2023 11:20  Mg     1.9     12-29    TPro  5.4<L>  /  Alb  3.1<L>  /  TBili  0.5  /  DBili  x   /  AST  19  /  ALT  8   /  AlkPhos  82  12-29    LIVER FUNCTIONS - ( 29 Dec 2023 11:20 )  Alb: 3.1 g/dL / Pro: 5.4 g/dL / ALK PHOS: 82 U/L / ALT: 8 U/L / AST: 19 U/L / GGT: x                     A/P:   82yo Female s/p ORIF left femur on 12/27/23, POD#3, doing well. Cleared for discharge from orthopedic standpoint.    -    Abx 24hrs completed  -    AM labs; recommend to transfuse if hgb drops <7   -    Pain control  -    DVT ppx: SCD+LVX     -    Weight bearing status: WBAT LLE  -    Physical Therapy/Occupational therapy   -    Dispo: Pending     - Upon discharge, patient should follow up with Dr. Dariana Schwartz at [address] [facility] phone [phone].  - Patient should be discharged on 6 weeks (from surgery date) of Aspirin 81mg BID for DVT prophylaxis as their mobility/function/ambulation will be decreased due to lower extremity orthopaedic surgery.

## 2023-12-30 NOTE — PROGRESS NOTE ADULT - SUBJECTIVE AND OBJECTIVE BOX
SUBJ:      MEDICATIONS  (STANDING):  budesonide  80 MICROgram(s)/formoterol 4.5 MICROgram(s) Inhaler 2 Puff(s) Inhalation two times a day  chlorhexidine 4% Liquid 1 Application(s) Topical <User Schedule>  enoxaparin Injectable 40 milliGRAM(s) SubCutaneous every 24 hours  lactated ringers. 1000 milliLiter(s) (100 mL/Hr) IV Continuous <Continuous>  methylPREDNISolone sodium succinate Injectable 40 milliGRAM(s) IV Push every 12 hours  polyethylene glycol 3350 17 Gram(s) Oral daily  senna 2 Tablet(s) Oral at bedtime  tiotropium 2.5 MICROgram(s) Inhaler 2 Puff(s) Inhalation daily    MEDICATIONS  (PRN):  morphine  - Injectable 2 milliGRAM(s) IV Push every 6 hours PRN Moderate Pain (4 - 6)  oxycodone    5 mG/acetaminophen 325 mG 1 Tablet(s) Oral every 6 hours PRN Severe Pain (7 - 10)            Vital Signs Last 24 Hrs  T(C): 36.4 (30 Dec 2023 09:36), Max: 36.8 (29 Dec 2023 15:27)  T(F): 97.6 (30 Dec 2023 09:36), Max: 98.2 (29 Dec 2023 15:27)  HR: 88 (30 Dec 2023 09:36) (79 - 98)  BP: 165/75 (30 Dec 2023 05:12) (122/63 - 184/86)  BP(mean): --  RR: 18 (30 Dec 2023 09:36) (18 - 18)  SpO2: 95% (30 Dec 2023 09:36) (92% - 96%)    Parameters below as of 30 Dec 2023 09:36  Patient On (Oxygen Delivery Method): nasal cannula         REVIEW OF SYSTEMS:  CONSTITUTIONAL: No fever, weight loss, or fatigue  CARDIOLOGY: PAtient denies chest pain, shortness of breath or syncopal episodes.   RESPIRATORY: denies shortness of breath, wheezeing.   NEUROLOGICAL: NO weakness, no focal deficits to report.  GI: no BRBPR, no N,V,diarrhea.    PSYCHIATRY: normal mood and affect  HEENT: no nasal discharge, no ecchymosis  SKIN: no ecchymosis, no breakdown  MUSCULOSKELETAL: Full range of motion x4.        PHYSICAL EXAM:  · CONSTITUTIONAL:	Well-developed, well nourished    BMI-  ·RESPIRATORY:   airway patent; breath sounds equal; good air movement; respirations non-labored; clear to auscultation bilaterally; no chest wall tenderness; no intercostal retractions; no rales,rhonchi or wheeze  · CARDIOVASCULAR	regular rate and rhythm  no rub  no murmur  normal PMI  · EXTREMITIES: No cyanosis, clubbing or edema  · VASCULAR: 	Equal and normal pulses (carotid, femoral, dorsalis pedis)  	  TELEMETRY:    ECG:    TTE:    LABS:                        10.2   10.77 )-----------( 257      ( 29 Dec 2023 23:48 )             29.2     12-29    129<L>  |  92<L>  |  23<H>  ----------------------------<  131<H>  4.5   |  25  |  0.6<L>    Ca    7.9<L>      29 Dec 2023 11:20  Mg     1.9     12-29    TPro  5.4<L>  /  Alb  3.1<L>  /  TBili  0.5  /  DBili  x   /  AST  19  /  ALT  8   /  AlkPhos  82  12-29    CARDIAC MARKERS ( 28 Dec 2023 17:35 )  x     / x     / x     / x     / 8.9 ng/mL          I&O's Summary    29 Dec 2023 07:01  -  30 Dec 2023 07:00  --------------------------------------------------------  IN: 0 mL / OUT: 3250 mL / NET: -3250 mL      BNP  RADIOLOGY & ADDITIONAL STUDIES:    IMPRESSION AND PLAN:         Denies any chest pain, SOB or palpitations   She is discharged home today       MEDICATIONS  (STANDING):  budesonide  80 MICROgram(s)/formoterol 4.5 MICROgram(s) Inhaler 2 Puff(s) Inhalation two times a day  chlorhexidine 4% Liquid 1 Application(s) Topical <User Schedule>  enoxaparin Injectable 40 milliGRAM(s) SubCutaneous every 24 hours  lactated ringers. 1000 milliLiter(s) (100 mL/Hr) IV Continuous <Continuous>  methylPREDNISolone sodium succinate Injectable 40 milliGRAM(s) IV Push every 12 hours  polyethylene glycol 3350 17 Gram(s) Oral daily  senna 2 Tablet(s) Oral at bedtime  tiotropium 2.5 MICROgram(s) Inhaler 2 Puff(s) Inhalation daily    MEDICATIONS  (PRN):  morphine  - Injectable 2 milliGRAM(s) IV Push every 6 hours PRN Moderate Pain (4 - 6)  oxycodone    5 mG/acetaminophen 325 mG 1 Tablet(s) Oral every 6 hours PRN Severe Pain (7 - 10)            Vital Signs Last 24 Hrs  T(C): 36.4 (30 Dec 2023 09:36), Max: 36.8 (29 Dec 2023 15:27)  T(F): 97.6 (30 Dec 2023 09:36), Max: 98.2 (29 Dec 2023 15:27)  HR: 88 (30 Dec 2023 09:36) (79 - 98)  BP: 165/75 (30 Dec 2023 05:12) (122/63 - 184/86)  BP(mean): --  RR: 18 (30 Dec 2023 09:36) (18 - 18)  SpO2: 95% (30 Dec 2023 09:36) (92% - 96%)    Parameters below as of 30 Dec 2023 09:36  Patient On (Oxygen Delivery Method): nasal cannula         REVIEW OF SYSTEMS:  CONSTITUTIONAL: No fever, weight loss, or fatigue  CARDIOLOGY: Patient denies chest pain, shortness of breath or syncopal episodes.   RESPIRATORY: denies shortness of breath, wheezeing.   NEUROLOGICAL: NO weakness, no focal deficits to report.  GI: no BRBPR, no N,V,diarrhea.    PSYCHIATRY: normal mood and affect  HEENT: no nasal discharge, no ecchymosis  SKIN: no ecchymosis, no breakdown  MUSCULOSKELETAL: Full range of motion x4.        PHYSICAL EXAM:  · CONSTITUTIONAL:	Well-developed, well nourished     · RESPIRATORY:   airway patent; breath sounds equal; good air movement; respirations non-labored; clear to auscultation bilaterally  · CARDIOVASCULAR	regular rate and rhythm  no rub  + SE murmur  normal PMI  · EXTREMITIES: No cyanosis, clubbing or edema  · VASCULAR: absent PT bilaterally   	    ECG: Sinus/RBBB      LABS:                        10.2   10.77 )-----------( 257      ( 29 Dec 2023 23:48 )             29.2     12-29    129<L>  |  92<L>  |  23<H>  ----------------------------<  131<H>  4.5   |  25  |  0.6<L>    Ca    7.9<L>      29 Dec 2023 11:20  Mg     1.9     12-29    TPro  5.4<L>  /  Alb  3.1<L>  /  TBili  0.5  /  DBili  x   /  AST  19  /  ALT  8   /  AlkPhos  82  12-29    CARDIAC MARKERS ( 28 Dec 2023 17:35 )  x     / x     / x     / x     / 8.9 ng/mL          I&O's Summary    29 Dec 2023 07:01  -  30 Dec 2023 07:00  --------------------------------------------------------  IN: 0 mL / OUT: 3250 mL / NET: -3250 mL      BNP  RADIOLOGY & ADDITIONAL STUDIES:    IMPRESSION AND PLAN:

## 2023-12-30 NOTE — DISCHARGE NOTE PROVIDER - NSDCFUSCHEDAPPT_GEN_ALL_CORE_FT
Efra Gentile  [facility]  PULMMED [address] [place] [address] Av  Scheduled Appointment: 01/08/2024     Efra Gentile  [facility]  PULMMED [address] [place] [address] Av  Scheduled Appointment: 01/08/2024

## 2023-12-30 NOTE — DISCHARGE NOTE PROVIDER - PROVIDER TOKENS
PROVIDER:[TOKEN:[[id number]],FOLLOWUP:[2 weeks]],PROVIDER:[TOKEN:[[id number]],FOLLOWUP:[2 weeks]] PROVIDER:[TOKEN:[[id number]],FOLLOWUP:[2 weeks]],PROVIDER:[TOKEN:[[id number]],FOLLOWUP:[2 weeks]]

## 2023-12-30 NOTE — DISCHARGE NOTE PROVIDER - CARE PROVIDER_API CALL
Dariana Schwartz  Orthopaedic Surgery  [address] [place] [address]  Phone: [phone]  Fax: [phone]  Follow Up Time: 2 weeks    Stevan Garcia  Interventional Cardiology  [address] [place] [address]  Phone: [phone]  Fax: [phone]  Follow Up Time: 2 weeks   Dariana Schwartz  Orthopaedic Surgery  [address] [place] [address]  Phone: [phone]  Fax: [phone]  Follow Up Time: 2 weeks    Stevan Garcia  Interventional Cardiology  [address] [place] [address]  Phone: [phone]  Fax: [phone]  Follow Up Time: 2 weeks

## 2023-12-30 NOTE — DISCHARGE NOTE PROVIDER - ATTENDING DISCHARGE PHYSICAL EXAMINATION:
Patient seen and examined at bedside. No acute events overnight. Says her breathing is fine and pain under control. No bleeding. Patient stable for d/c.      PHYSICAL EXAM:  GENERAL: NAD; chronically ill-appearing, on NC; thin   HEAD:  Atraumatic, Normocephalic  NECK: Supple, No JVD  CHEST/LUNG: Mild expiratory wheezes b/l  HEART: Regular rate and rhythm; No murmurs, rubs, or gallops  ABDOMEN: Soft, Nontender, Nondistended; Bowel sounds present  EXTREMITIES:  2+ Peripheral Pulses, No clubbing, cyanosis, or edema; LLE dressing c/d/i   SKIN: No rashes or lesions     Consultant(s) Notes Reviewed:  [x ] YES  [ ] NO  Care Discussed with Consultants/Other Providers [ x] YES  [ ] NO    Vital Signs Last 24 Hrs  T(C): 36.4 (30 Dec 2023 09:36), Max: 36.8 (29 Dec 2023 15:27)  T(F): 97.6 (30 Dec 2023 09:36), Max: 98.2 (29 Dec 2023 15:27)  HR: 88 (30 Dec 2023 09:36) (79 - 98)  BP: 165/75 (30 Dec 2023 05:12) (122/63 - 184/86)  BP(mean): --  RR: 18 (30 Dec 2023 09:36) (18 - 18)  SpO2: 95% (30 Dec 2023 09:36) (92% - 96%)    Parameters below as of 30 Dec 2023 09:36  Patient On (Oxygen Delivery Method): nasal cannula     Patient seen and examined at bedside. No acute events overnight. Says her breathing is fine and pain under control. No bleeding. Patient stable for d/c. Spoke with cardiologist marin Corey with doing LVX as dvt ppx and only doing ASA 81mg qd for now.       PHYSICAL EXAM:  GENERAL: NAD; chronically ill-appearing, on NC; thin   HEAD:  Atraumatic, Normocephalic  NECK: Supple, No JVD  CHEST/LUNG: Mild expiratory wheezes b/l  HEART: Regular rate and rhythm; No murmurs, rubs, or gallops  ABDOMEN: Soft, Nontender, Nondistended; Bowel sounds present  EXTREMITIES:  2+ Peripheral Pulses, No clubbing, cyanosis, or edema; LLE dressing c/d/i   SKIN: No rashes or lesions     Consultant(s) Notes Reviewed:  [x ] YES  [ ] NO  Care Discussed with Consultants/Other Providers [ x] YES  [ ] NO    Vital Signs Last 24 Hrs  T(C): 36.4 (30 Dec 2023 09:36), Max: 36.8 (29 Dec 2023 15:27)  T(F): 97.6 (30 Dec 2023 09:36), Max: 98.2 (29 Dec 2023 15:27)  HR: 88 (30 Dec 2023 09:36) (79 - 98)  BP: 165/75 (30 Dec 2023 05:12) (122/63 - 184/86)  BP(mean): --  RR: 18 (30 Dec 2023 09:36) (18 - 18)  SpO2: 95% (30 Dec 2023 09:36) (92% - 96%)    Parameters below as of 30 Dec 2023 09:36  Patient On (Oxygen Delivery Method): nasal cannula

## 2023-12-30 NOTE — DISCHARGE NOTE PROVIDER - NSDCCPCAREPLAN_GEN_ALL_CORE_FT
PRINCIPAL DISCHARGE DIAGNOSIS  Diagnosis: Trochanteric fracture of left femur  Assessment and Plan of Treatment: You had a hip fracture, which you underwent an ORIF on 12/27/23. You need to participate in PT. Be sure to take prophlaxis for blood clots. Follow up with Dr. Dariana Schwartz at [address] [place] [address]., phone [phone].        SECONDARY DISCHARGE DIAGNOSES  Diagnosis: Elevated troponin  Assessment and Plan of Treatment: You had elevated troponin, which could be due to increased oxygen demand in the body from the fall, fracture, and blood loss anemia. You were seen by your cardiologist, and need to follow up with Dr. Garcia as an outpatient for further workup.    Diagnosis: Anemia due to acute blood loss  Assessment and Plan of Treatment: You had blood loss anemia, likely due to the fracture and from surgery. You got 2 units of blood. Your blood levels are stable now. Be sure to follow up with blood work with your primary care doctor.     PRINCIPAL DISCHARGE DIAGNOSIS  Diagnosis: Trochanteric fracture of left femur  Assessment and Plan of Treatment: You had a hip fracture, which you underwent an ORIF on 12/27/23. You need to participate in PT. Be sure to take prophlaxis for blood clots. Follow up with Dr. Dariana Schwartz at [address] [place] [address]., phone [phone].        SECONDARY DISCHARGE DIAGNOSES  Diagnosis: Elevated troponin  Assessment and Plan of Treatment: You had elevated troponin, which could be due to increased oxygen demand in the body from the fall, fracture, and blood loss anemia. You were seen by your cardiologist, and need to follow up with Dr. Garcia as an outpatient for further workup.    Diagnosis: Anemia due to acute blood loss  Assessment and Plan of Treatment: You had blood loss anemia, likely due to the fracture and from surgery. You got 2 units of blood. Your blood levels are stable now. Be sure to follow up with blood work with your primary care doctor.     PRINCIPAL DISCHARGE DIAGNOSIS  Diagnosis: Trochanteric fracture of left femur  Assessment and Plan of Treatment: You had a hip fracture, which you underwent an ORIF on 12/27/23. You need to participate in PT. Be sure to take prophlaxis for blood clots. Follow up with Dr. Dariana Schwartz at [address] [facility] phone [phone].  You need to be on DVT prophylaxis for 6 weeks. While in rehab, can do Lovenox injection. You will also need to be on Aspirin daily for your heart.         SECONDARY DISCHARGE DIAGNOSES  Diagnosis: Elevated troponin  Assessment and Plan of Treatment: You had elevated troponin, which could be due to increased oxygen demand in the body from the fall, fracture, and blood loss anemia. You were seen by your cardiologist, and need to follow up with Dr. Garcia as an outpatient for further workup.    Diagnosis: Anemia due to acute blood loss  Assessment and Plan of Treatment: You had blood loss anemia, likely due to the fracture and from surgery. You got 2 units of blood. Your blood levels are stable now. Be sure to follow up with blood work with your primary care doctor.     PRINCIPAL DISCHARGE DIAGNOSIS  Diagnosis: Trochanteric fracture of left femur  Assessment and Plan of Treatment: You had a hip fracture, which you underwent an ORIF on 12/27/23. You need to participate in PT. Be sure to take prophlaxis for blood clots. Follow up with Dr. Dariana Schwartz at [address] [facility] phone [phone].  You need to be on DVT prophylaxis for 6 weeks. While in rehab, can do Lovenox injection. You will also need to be on Aspirin daily for your heart.         SECONDARY DISCHARGE DIAGNOSES  Diagnosis: Elevated troponin  Assessment and Plan of Treatment: You had elevated troponin, which could be due to increased oxygen demand in the body from the fall, fracture, and blood loss anemia. You were seen by your cardiologist, and need to follow up with Dr. Garcia as an outpatient for further workup.    Diagnosis: Anemia due to acute blood loss  Assessment and Plan of Treatment: You had blood loss anemia, likely due to the fracture and from surgery. You got 2 units of blood. Your blood levels are stable now. Be sure to follow up with blood work with your primary care doctor.

## 2023-12-30 NOTE — DISCHARGE NOTE NURSING/CASE MANAGEMENT/SOCIAL WORK - PATIENT PORTAL LINK FT
You can access the FollowMyHealth Patient Portal offered by [facility] by registering at the following website: [email or web address]. By joining [employer]’s FollowMyHealth portal, you will also be able to view your health information using other applications (apps) compatible with our system. You can access the FollowMyHealth Patient Portal offered by [facility] by registering at the following website: [email or web address]. By joining "[employer]"’s FollowMyHealth portal, you will also be able to view your health information using other applications (apps) compatible with our system.

## 2023-12-30 NOTE — PROGRESS NOTE ADULT - PROVIDER SPECIALTY LIST ADULT
Hospitalist
Orthopedics
Orthopedics
Hospitalist
Orthopedics
Orthopedics
Cardiology
Hospitalist
Orthopedics
Hospitalist

## 2023-12-31 ENCOUNTER — INPATIENT (INPATIENT)
Facility: HOSPITAL | Age: 83
LOS: 9 days | Discharge: NURSING, REHAB OR LONG-TERM CARE | DRG: 871 | End: 2024-01-10
Attending: INTERNAL MEDICINE | Admitting: STUDENT IN AN ORGANIZED HEALTH CARE EDUCATION/TRAINING PROGRAM
Payer: MEDICARE

## 2023-12-31 VITALS — HEIGHT: 61 IN

## 2023-12-31 DIAGNOSIS — Z98.890 OTHER SPECIFIED POSTPROCEDURAL STATES: Chronic | ICD-10-CM

## 2023-12-31 DIAGNOSIS — R06.02 SHORTNESS OF BREATH: ICD-10-CM

## 2023-12-31 DIAGNOSIS — R09.89 OTHER SPECIFIED SYMPTOMS AND SIGNS INVOLVING THE CIRCULATORY AND RESPIRATORY SYSTEMS: ICD-10-CM

## 2023-12-31 LAB
ACANTHOCYTES BLD QL SMEAR: SLIGHT — SIGNIFICANT CHANGE UP
ACANTHOCYTES BLD QL SMEAR: SLIGHT — SIGNIFICANT CHANGE UP
ALBUMIN SERPL ELPH-MCNC: 3.5 G/DL — SIGNIFICANT CHANGE UP (ref 3.5–5.2)
ALBUMIN SERPL ELPH-MCNC: 3.5 G/DL — SIGNIFICANT CHANGE UP (ref 3.5–5.2)
ALP SERPL-CCNC: 95 U/L — SIGNIFICANT CHANGE UP (ref 30–115)
ALP SERPL-CCNC: 95 U/L — SIGNIFICANT CHANGE UP (ref 30–115)
ALT FLD-CCNC: 11 U/L — SIGNIFICANT CHANGE UP (ref 0–41)
ALT FLD-CCNC: 11 U/L — SIGNIFICANT CHANGE UP (ref 0–41)
ANION GAP SERPL CALC-SCNC: 10 MMOL/L — SIGNIFICANT CHANGE UP (ref 7–14)
ANION GAP SERPL CALC-SCNC: 10 MMOL/L — SIGNIFICANT CHANGE UP (ref 7–14)
AST SERPL-CCNC: 21 U/L — SIGNIFICANT CHANGE UP (ref 0–41)
AST SERPL-CCNC: 21 U/L — SIGNIFICANT CHANGE UP (ref 0–41)
BASE EXCESS BLDV CALC-SCNC: 3.8 MMOL/L — HIGH (ref -2–3)
BASE EXCESS BLDV CALC-SCNC: 3.8 MMOL/L — HIGH (ref -2–3)
BASE EXCESS BLDV CALC-SCNC: 4 MMOL/L — HIGH (ref -2–3)
BASE EXCESS BLDV CALC-SCNC: 4 MMOL/L — HIGH (ref -2–3)
BASOPHILS # BLD AUTO: 0 K/UL — SIGNIFICANT CHANGE UP (ref 0–0.2)
BASOPHILS # BLD AUTO: 0 K/UL — SIGNIFICANT CHANGE UP (ref 0–0.2)
BASOPHILS NFR BLD AUTO: 0 % — SIGNIFICANT CHANGE UP (ref 0–1)
BASOPHILS NFR BLD AUTO: 0 % — SIGNIFICANT CHANGE UP (ref 0–1)
BILIRUB SERPL-MCNC: 1.4 MG/DL — HIGH (ref 0.2–1.2)
BILIRUB SERPL-MCNC: 1.4 MG/DL — HIGH (ref 0.2–1.2)
BUN SERPL-MCNC: 29 MG/DL — HIGH (ref 10–20)
BUN SERPL-MCNC: 29 MG/DL — HIGH (ref 10–20)
BURR CELLS BLD QL SMEAR: PRESENT — SIGNIFICANT CHANGE UP
BURR CELLS BLD QL SMEAR: PRESENT — SIGNIFICANT CHANGE UP
CA-I SERPL-SCNC: 1.11 MMOL/L — LOW (ref 1.15–1.33)
CA-I SERPL-SCNC: 1.11 MMOL/L — LOW (ref 1.15–1.33)
CALCIUM SERPL-MCNC: 8.4 MG/DL — SIGNIFICANT CHANGE UP (ref 8.4–10.5)
CALCIUM SERPL-MCNC: 8.4 MG/DL — SIGNIFICANT CHANGE UP (ref 8.4–10.5)
CHLORIDE SERPL-SCNC: 85 MMOL/L — LOW (ref 98–110)
CHLORIDE SERPL-SCNC: 85 MMOL/L — LOW (ref 98–110)
CO2 SERPL-SCNC: 29 MMOL/L — SIGNIFICANT CHANGE UP (ref 17–32)
CO2 SERPL-SCNC: 29 MMOL/L — SIGNIFICANT CHANGE UP (ref 17–32)
CREAT SERPL-MCNC: 0.9 MG/DL — SIGNIFICANT CHANGE UP (ref 0.7–1.5)
CREAT SERPL-MCNC: 0.9 MG/DL — SIGNIFICANT CHANGE UP (ref 0.7–1.5)
EGFR: 63 ML/MIN/1.73M2 — SIGNIFICANT CHANGE UP
EGFR: 63 ML/MIN/1.73M2 — SIGNIFICANT CHANGE UP
EOSINOPHIL # BLD AUTO: 0 K/UL — SIGNIFICANT CHANGE UP (ref 0–0.7)
EOSINOPHIL # BLD AUTO: 0 K/UL — SIGNIFICANT CHANGE UP (ref 0–0.7)
EOSINOPHIL NFR BLD AUTO: 0 % — SIGNIFICANT CHANGE UP (ref 0–8)
EOSINOPHIL NFR BLD AUTO: 0 % — SIGNIFICANT CHANGE UP (ref 0–8)
FLUAV AG NPH QL: SIGNIFICANT CHANGE UP
FLUAV AG NPH QL: SIGNIFICANT CHANGE UP
FLUBV AG NPH QL: SIGNIFICANT CHANGE UP
FLUBV AG NPH QL: SIGNIFICANT CHANGE UP
GAS PNL BLDV: 119 MMOL/L — CRITICAL LOW (ref 136–145)
GAS PNL BLDV: 119 MMOL/L — CRITICAL LOW (ref 136–145)
GAS PNL BLDV: 124 MMOL/L — LOW (ref 136–145)
GAS PNL BLDV: 124 MMOL/L — LOW (ref 136–145)
GAS PNL BLDV: SIGNIFICANT CHANGE UP
GIANT PLATELETS BLD QL SMEAR: PRESENT — SIGNIFICANT CHANGE UP
GIANT PLATELETS BLD QL SMEAR: PRESENT — SIGNIFICANT CHANGE UP
GLUCOSE SERPL-MCNC: 133 MG/DL — HIGH (ref 70–99)
GLUCOSE SERPL-MCNC: 133 MG/DL — HIGH (ref 70–99)
HCO3 BLDV-SCNC: 31 MMOL/L — HIGH (ref 22–29)
HCO3 BLDV-SCNC: 31 MMOL/L — HIGH (ref 22–29)
HCO3 BLDV-SCNC: 32 MMOL/L — HIGH (ref 22–29)
HCO3 BLDV-SCNC: 32 MMOL/L — HIGH (ref 22–29)
HCT VFR BLD CALC: 28.8 % — LOW (ref 37–47)
HCT VFR BLD CALC: 28.8 % — LOW (ref 37–47)
HCT VFR BLDA CALC: 28 % — LOW (ref 34.5–46.5)
HCT VFR BLDA CALC: 28 % — LOW (ref 34.5–46.5)
HGB BLD CALC-MCNC: 9.3 G/DL — LOW (ref 11.7–16.1)
HGB BLD CALC-MCNC: 9.3 G/DL — LOW (ref 11.7–16.1)
HGB BLD-MCNC: 10 G/DL — LOW (ref 12–16)
HGB BLD-MCNC: 10 G/DL — LOW (ref 12–16)
HYPOCHROMIA BLD QL: SLIGHT — SIGNIFICANT CHANGE UP
HYPOCHROMIA BLD QL: SLIGHT — SIGNIFICANT CHANGE UP
LACTATE BLDV-MCNC: 2.4 MMOL/L — HIGH (ref 0.5–2)
LACTATE BLDV-MCNC: 2.4 MMOL/L — HIGH (ref 0.5–2)
LACTATE BLDV-MCNC: 2.9 MMOL/L — HIGH (ref 0.5–2)
LACTATE BLDV-MCNC: 2.9 MMOL/L — HIGH (ref 0.5–2)
LACTATE SERPL-SCNC: 2.2 MMOL/L — HIGH (ref 0.7–2)
LACTATE SERPL-SCNC: 2.2 MMOL/L — HIGH (ref 0.7–2)
LYMPHOCYTES # BLD AUTO: 0.69 K/UL — LOW (ref 1.2–3.4)
LYMPHOCYTES # BLD AUTO: 0.69 K/UL — LOW (ref 1.2–3.4)
LYMPHOCYTES # BLD AUTO: 3.5 % — LOW (ref 20.5–51.1)
LYMPHOCYTES # BLD AUTO: 3.5 % — LOW (ref 20.5–51.1)
MANUAL SMEAR VERIFICATION: SIGNIFICANT CHANGE UP
MANUAL SMEAR VERIFICATION: SIGNIFICANT CHANGE UP
MCHC RBC-ENTMCNC: 31.2 PG — HIGH (ref 27–31)
MCHC RBC-ENTMCNC: 31.2 PG — HIGH (ref 27–31)
MCHC RBC-ENTMCNC: 34.7 G/DL — SIGNIFICANT CHANGE UP (ref 32–37)
MCHC RBC-ENTMCNC: 34.7 G/DL — SIGNIFICANT CHANGE UP (ref 32–37)
MCV RBC AUTO: 89.7 FL — SIGNIFICANT CHANGE UP (ref 81–99)
MCV RBC AUTO: 89.7 FL — SIGNIFICANT CHANGE UP (ref 81–99)
MONOCYTES # BLD AUTO: 1.89 K/UL — HIGH (ref 0.1–0.6)
MONOCYTES # BLD AUTO: 1.89 K/UL — HIGH (ref 0.1–0.6)
MONOCYTES NFR BLD AUTO: 9.6 % — HIGH (ref 1.7–9.3)
MONOCYTES NFR BLD AUTO: 9.6 % — HIGH (ref 1.7–9.3)
MYELOCYTES NFR BLD: 0.9 % — HIGH (ref 0–0)
MYELOCYTES NFR BLD: 0.9 % — HIGH (ref 0–0)
NEUTROPHILS # BLD AUTO: 16.55 K/UL — HIGH (ref 1.4–6.5)
NEUTROPHILS # BLD AUTO: 16.55 K/UL — HIGH (ref 1.4–6.5)
NEUTROPHILS NFR BLD AUTO: 84.2 % — HIGH (ref 42.2–75.2)
NEUTROPHILS NFR BLD AUTO: 84.2 % — HIGH (ref 42.2–75.2)
NRBC # BLD: 1 /100 WBCS — HIGH (ref 0–0)
NRBC # BLD: 1 /100 WBCS — HIGH (ref 0–0)
NRBC # BLD: SIGNIFICANT CHANGE UP /100 WBCS (ref 0–0)
NRBC # BLD: SIGNIFICANT CHANGE UP /100 WBCS (ref 0–0)
NT-PROBNP SERPL-SCNC: 9190 PG/ML — HIGH (ref 0–300)
NT-PROBNP SERPL-SCNC: 9190 PG/ML — HIGH (ref 0–300)
PCO2 BLDV: 58 MMHG — HIGH (ref 39–42)
PCO2 BLDV: 58 MMHG — HIGH (ref 39–42)
PCO2 BLDV: 60 MMHG — HIGH (ref 39–42)
PCO2 BLDV: 60 MMHG — HIGH (ref 39–42)
PH BLDV: 7.33 — SIGNIFICANT CHANGE UP (ref 7.32–7.43)
PLAT MORPH BLD: ABNORMAL
PLAT MORPH BLD: ABNORMAL
PLATELET # BLD AUTO: 297 K/UL — SIGNIFICANT CHANGE UP (ref 130–400)
PLATELET # BLD AUTO: 297 K/UL — SIGNIFICANT CHANGE UP (ref 130–400)
PMV BLD: 9.7 FL — SIGNIFICANT CHANGE UP (ref 7.4–10.4)
PMV BLD: 9.7 FL — SIGNIFICANT CHANGE UP (ref 7.4–10.4)
PO2 BLDV: 19 MMHG — LOW (ref 25–45)
PO2 BLDV: 19 MMHG — LOW (ref 25–45)
PO2 BLDV: 22 MMHG — LOW (ref 25–45)
PO2 BLDV: 22 MMHG — LOW (ref 25–45)
POIKILOCYTOSIS BLD QL AUTO: SLIGHT — SIGNIFICANT CHANGE UP
POIKILOCYTOSIS BLD QL AUTO: SLIGHT — SIGNIFICANT CHANGE UP
POLYCHROMASIA BLD QL SMEAR: SLIGHT — SIGNIFICANT CHANGE UP
POLYCHROMASIA BLD QL SMEAR: SLIGHT — SIGNIFICANT CHANGE UP
POTASSIUM BLDV-SCNC: 3.9 MMOL/L — SIGNIFICANT CHANGE UP (ref 3.5–5.1)
POTASSIUM BLDV-SCNC: 3.9 MMOL/L — SIGNIFICANT CHANGE UP (ref 3.5–5.1)
POTASSIUM SERPL-MCNC: 4.2 MMOL/L — SIGNIFICANT CHANGE UP (ref 3.5–5)
POTASSIUM SERPL-MCNC: 4.2 MMOL/L — SIGNIFICANT CHANGE UP (ref 3.5–5)
POTASSIUM SERPL-SCNC: 4.2 MMOL/L — SIGNIFICANT CHANGE UP (ref 3.5–5)
POTASSIUM SERPL-SCNC: 4.2 MMOL/L — SIGNIFICANT CHANGE UP (ref 3.5–5)
PROMYELOCYTES # FLD: 0.9 % — HIGH (ref 0–0)
PROMYELOCYTES # FLD: 0.9 % — HIGH (ref 0–0)
PROT SERPL-MCNC: 6.1 G/DL — SIGNIFICANT CHANGE UP (ref 6–8)
PROT SERPL-MCNC: 6.1 G/DL — SIGNIFICANT CHANGE UP (ref 6–8)
RBC # BLD: 3.21 M/UL — LOW (ref 4.2–5.4)
RBC # BLD: 3.21 M/UL — LOW (ref 4.2–5.4)
RBC # FLD: 15.4 % — HIGH (ref 11.5–14.5)
RBC # FLD: 15.4 % — HIGH (ref 11.5–14.5)
RBC BLD AUTO: ABNORMAL
RBC BLD AUTO: ABNORMAL
RSV RNA NPH QL NAA+NON-PROBE: SIGNIFICANT CHANGE UP
RSV RNA NPH QL NAA+NON-PROBE: SIGNIFICANT CHANGE UP
SAO2 % BLDV: 19.6 % — LOW (ref 67–88)
SAO2 % BLDV: 19.6 % — LOW (ref 67–88)
SAO2 % BLDV: 24.7 % — LOW (ref 67–88)
SAO2 % BLDV: 24.7 % — LOW (ref 67–88)
SARS-COV-2 RNA SPEC QL NAA+PROBE: DETECTED
SARS-COV-2 RNA SPEC QL NAA+PROBE: DETECTED
SODIUM SERPL-SCNC: 124 MMOL/L — LOW (ref 135–146)
SODIUM SERPL-SCNC: 124 MMOL/L — LOW (ref 135–146)
STOMATOCYTES BLD QL SMEAR: SLIGHT — SIGNIFICANT CHANGE UP
STOMATOCYTES BLD QL SMEAR: SLIGHT — SIGNIFICANT CHANGE UP
TROPONIN SAMPLING TIME: SIGNIFICANT CHANGE UP
TROPONIN SAMPLING TIME: SIGNIFICANT CHANGE UP
TROPONIN T, HIGH SENSITIVITY RESULT: 171 NG/L — CRITICAL HIGH (ref 6–13)
TROPONIN T, HIGH SENSITIVITY RESULT: 171 NG/L — CRITICAL HIGH (ref 6–13)
TROPONIN T, HIGH SENSITIVITY RESULT: 192 NG/L — CRITICAL HIGH (ref 6–13)
TROPONIN T, HIGH SENSITIVITY RESULT: 192 NG/L — CRITICAL HIGH (ref 6–13)
TROPONIN T, HIGH SENSITIVITY RESULT: 208 NG/L — CRITICAL HIGH (ref 6–13)
TROPONIN T, HIGH SENSITIVITY RESULT: 208 NG/L — CRITICAL HIGH (ref 6–13)
VARIANT LYMPHS # BLD: 0.9 % — SIGNIFICANT CHANGE UP (ref 0–5)
VARIANT LYMPHS # BLD: 0.9 % — SIGNIFICANT CHANGE UP (ref 0–5)
WBC # BLD: 19.66 K/UL — HIGH (ref 4.8–10.8)
WBC # BLD: 19.66 K/UL — HIGH (ref 4.8–10.8)
WBC # FLD AUTO: 19.66 K/UL — HIGH (ref 4.8–10.8)
WBC # FLD AUTO: 19.66 K/UL — HIGH (ref 4.8–10.8)

## 2023-12-31 PROCEDURE — 83935 ASSAY OF URINE OSMOLALITY: CPT

## 2023-12-31 PROCEDURE — 84145 PROCALCITONIN (PCT): CPT

## 2023-12-31 PROCEDURE — 85379 FIBRIN DEGRADATION QUANT: CPT

## 2023-12-31 PROCEDURE — 82803 BLOOD GASES ANY COMBINATION: CPT

## 2023-12-31 PROCEDURE — 84132 ASSAY OF SERUM POTASSIUM: CPT

## 2023-12-31 PROCEDURE — 36415 COLL VENOUS BLD VENIPUNCTURE: CPT

## 2023-12-31 PROCEDURE — 84443 ASSAY THYROID STIM HORMONE: CPT

## 2023-12-31 PROCEDURE — 36430 TRANSFUSION BLD/BLD COMPNT: CPT

## 2023-12-31 PROCEDURE — 80061 LIPID PANEL: CPT

## 2023-12-31 PROCEDURE — 83735 ASSAY OF MAGNESIUM: CPT

## 2023-12-31 PROCEDURE — 84295 ASSAY OF SERUM SODIUM: CPT

## 2023-12-31 PROCEDURE — 93306 TTE W/DOPPLER COMPLETE: CPT

## 2023-12-31 PROCEDURE — C9113: CPT

## 2023-12-31 PROCEDURE — 93010 ELECTROCARDIOGRAM REPORT: CPT

## 2023-12-31 PROCEDURE — P9016: CPT

## 2023-12-31 PROCEDURE — 99223 1ST HOSP IP/OBS HIGH 75: CPT

## 2023-12-31 PROCEDURE — 87640 STAPH A DNA AMP PROBE: CPT

## 2023-12-31 PROCEDURE — 85027 COMPLETE CBC AUTOMATED: CPT

## 2023-12-31 PROCEDURE — 83540 ASSAY OF IRON: CPT

## 2023-12-31 PROCEDURE — 83605 ASSAY OF LACTIC ACID: CPT

## 2023-12-31 PROCEDURE — 87641 MR-STAPH DNA AMP PROBE: CPT

## 2023-12-31 PROCEDURE — 93005 ELECTROCARDIOGRAM TRACING: CPT

## 2023-12-31 PROCEDURE — 85018 HEMOGLOBIN: CPT

## 2023-12-31 PROCEDURE — 82330 ASSAY OF CALCIUM: CPT

## 2023-12-31 PROCEDURE — 93970 EXTREMITY STUDY: CPT

## 2023-12-31 PROCEDURE — 80053 COMPREHEN METABOLIC PANEL: CPT

## 2023-12-31 PROCEDURE — 86901 BLOOD TYPING SEROLOGIC RH(D): CPT

## 2023-12-31 PROCEDURE — 85025 COMPLETE CBC W/AUTO DIFF WBC: CPT

## 2023-12-31 PROCEDURE — 71275 CT ANGIOGRAPHY CHEST: CPT | Mod: 26

## 2023-12-31 PROCEDURE — 84156 ASSAY OF PROTEIN URINE: CPT

## 2023-12-31 PROCEDURE — 87635 SARS-COV-2 COVID-19 AMP PRB: CPT

## 2023-12-31 PROCEDURE — 86923 COMPATIBILITY TEST ELECTRIC: CPT

## 2023-12-31 PROCEDURE — 97163 PT EVAL HIGH COMPLEX 45 MIN: CPT | Mod: GP

## 2023-12-31 PROCEDURE — 86850 RBC ANTIBODY SCREEN: CPT

## 2023-12-31 PROCEDURE — 80048 BASIC METABOLIC PNL TOTAL CA: CPT

## 2023-12-31 PROCEDURE — 94640 AIRWAY INHALATION TREATMENT: CPT

## 2023-12-31 PROCEDURE — 86900 BLOOD TYPING SEROLOGIC ABO: CPT

## 2023-12-31 PROCEDURE — 82570 ASSAY OF URINE CREATININE: CPT

## 2023-12-31 PROCEDURE — 83550 IRON BINDING TEST: CPT

## 2023-12-31 PROCEDURE — 86140 C-REACTIVE PROTEIN: CPT

## 2023-12-31 PROCEDURE — 71275 CT ANGIOGRAPHY CHEST: CPT | Mod: MA

## 2023-12-31 PROCEDURE — 85014 HEMATOCRIT: CPT

## 2023-12-31 PROCEDURE — 70450 CT HEAD/BRAIN W/O DYE: CPT | Mod: 26,MA

## 2023-12-31 PROCEDURE — 97530 THERAPEUTIC ACTIVITIES: CPT | Mod: GP

## 2023-12-31 PROCEDURE — 84540 ASSAY OF URINE/UREA-N: CPT

## 2023-12-31 PROCEDURE — 84300 ASSAY OF URINE SODIUM: CPT

## 2023-12-31 PROCEDURE — 84484 ASSAY OF TROPONIN QUANT: CPT

## 2023-12-31 PROCEDURE — 71045 X-RAY EXAM CHEST 1 VIEW: CPT

## 2023-12-31 PROCEDURE — 99285 EMERGENCY DEPT VISIT HI MDM: CPT

## 2023-12-31 PROCEDURE — 71045 X-RAY EXAM CHEST 1 VIEW: CPT | Mod: 26

## 2023-12-31 PROCEDURE — 97110 THERAPEUTIC EXERCISES: CPT | Mod: GP

## 2023-12-31 RX ORDER — IPRATROPIUM/ALBUTEROL SULFATE 18-103MCG
3 AEROSOL WITH ADAPTER (GRAM) INHALATION
Refills: 0 | Status: COMPLETED | OUTPATIENT
Start: 2023-12-31 | End: 2023-12-31

## 2023-12-31 RX ORDER — HEPARIN SODIUM 5000 [USP'U]/ML
5000 INJECTION INTRAVENOUS; SUBCUTANEOUS ONCE
Refills: 0 | Status: COMPLETED | OUTPATIENT
Start: 2023-12-31 | End: 2023-12-31

## 2023-12-31 RX ORDER — POLYETHYLENE GLYCOL 3350 17 G/17G
17 POWDER, FOR SOLUTION ORAL DAILY
Refills: 0 | Status: DISCONTINUED | OUTPATIENT
Start: 2023-12-31 | End: 2024-01-10

## 2023-12-31 RX ORDER — REMDESIVIR 5 MG/ML
200 INJECTION INTRAVENOUS EVERY 24 HOURS
Refills: 0 | Status: COMPLETED | OUTPATIENT
Start: 2023-12-31 | End: 2023-12-31

## 2023-12-31 RX ORDER — ACETAMINOPHEN 500 MG
650 TABLET ORAL EVERY 6 HOURS
Refills: 0 | Status: DISCONTINUED | OUTPATIENT
Start: 2023-12-31 | End: 2024-01-05

## 2023-12-31 RX ORDER — SENNA PLUS 8.6 MG/1
2 TABLET ORAL AT BEDTIME
Refills: 0 | Status: DISCONTINUED | OUTPATIENT
Start: 2023-12-31 | End: 2024-01-10

## 2023-12-31 RX ORDER — LANOLIN ALCOHOL/MO/W.PET/CERES
3 CREAM (GRAM) TOPICAL AT BEDTIME
Refills: 0 | Status: DISCONTINUED | OUTPATIENT
Start: 2023-12-31 | End: 2024-01-10

## 2023-12-31 RX ORDER — ENOXAPARIN SODIUM 100 MG/ML
40 INJECTION SUBCUTANEOUS EVERY 24 HOURS
Refills: 0 | Status: DISCONTINUED | OUTPATIENT
Start: 2023-12-31 | End: 2024-01-10

## 2023-12-31 RX ORDER — SODIUM CHLORIDE 9 MG/ML
1000 INJECTION, SOLUTION INTRAVENOUS ONCE
Refills: 0 | Status: COMPLETED | OUTPATIENT
Start: 2023-12-31 | End: 2023-12-31

## 2023-12-31 RX ORDER — SODIUM CHLORIDE 9 MG/ML
2000 INJECTION, SOLUTION INTRAVENOUS ONCE
Refills: 0 | Status: COMPLETED | OUTPATIENT
Start: 2023-12-31 | End: 2023-12-31

## 2023-12-31 RX ORDER — BUDESONIDE AND FORMOTEROL FUMARATE DIHYDRATE 160; 4.5 UG/1; UG/1
2 AEROSOL RESPIRATORY (INHALATION)
Refills: 0 | Status: DISCONTINUED | OUTPATIENT
Start: 2023-12-31 | End: 2024-01-10

## 2023-12-31 RX ORDER — MORPHINE SULFATE 50 MG/1
2 CAPSULE, EXTENDED RELEASE ORAL ONCE
Refills: 0 | Status: DISCONTINUED | OUTPATIENT
Start: 2023-12-31 | End: 2023-12-31

## 2023-12-31 RX ORDER — KETOROLAC TROMETHAMINE 30 MG/ML
15 SYRINGE (ML) INJECTION ONCE
Refills: 0 | Status: DISCONTINUED | OUTPATIENT
Start: 2023-12-31 | End: 2023-12-31

## 2023-12-31 RX ORDER — PANTOPRAZOLE SODIUM 20 MG/1
40 TABLET, DELAYED RELEASE ORAL ONCE
Refills: 0 | Status: COMPLETED | OUTPATIENT
Start: 2023-12-31 | End: 2023-12-31

## 2023-12-31 RX ORDER — TIOTROPIUM BROMIDE 18 UG/1
2 CAPSULE ORAL; RESPIRATORY (INHALATION) DAILY
Refills: 0 | Status: DISCONTINUED | OUTPATIENT
Start: 2023-12-31 | End: 2024-01-03

## 2023-12-31 RX ORDER — DEXAMETHASONE 0.5 MG/5ML
6 ELIXIR ORAL DAILY
Refills: 0 | Status: DISCONTINUED | OUTPATIENT
Start: 2023-12-31 | End: 2024-01-02

## 2023-12-31 RX ORDER — CEFEPIME 1 G/1
2000 INJECTION, POWDER, FOR SOLUTION INTRAMUSCULAR; INTRAVENOUS ONCE
Refills: 0 | Status: COMPLETED | OUTPATIENT
Start: 2023-12-31 | End: 2023-12-31

## 2023-12-31 RX ORDER — ONDANSETRON 8 MG/1
4 TABLET, FILM COATED ORAL EVERY 8 HOURS
Refills: 0 | Status: DISCONTINUED | OUTPATIENT
Start: 2023-12-31 | End: 2024-01-10

## 2023-12-31 RX ORDER — ENOXAPARIN SODIUM 100 MG/ML
50 INJECTION SUBCUTANEOUS ONCE
Refills: 0 | Status: DISCONTINUED | OUTPATIENT
Start: 2023-12-31 | End: 2023-12-31

## 2023-12-31 RX ORDER — CEFEPIME 1 G/1
2000 INJECTION, POWDER, FOR SOLUTION INTRAMUSCULAR; INTRAVENOUS EVERY 12 HOURS
Refills: 0 | Status: DISCONTINUED | OUTPATIENT
Start: 2023-12-31 | End: 2023-12-31

## 2023-12-31 RX ORDER — ASPIRIN/CALCIUM CARB/MAGNESIUM 324 MG
81 TABLET ORAL DAILY
Refills: 0 | Status: DISCONTINUED | OUTPATIENT
Start: 2024-01-01 | End: 2024-01-10

## 2023-12-31 RX ADMIN — Medication 3 MILLILITER(S): at 13:55

## 2023-12-31 RX ADMIN — SODIUM CHLORIDE 2000 MILLILITER(S): 9 INJECTION, SOLUTION INTRAVENOUS at 13:53

## 2023-12-31 RX ADMIN — Medication 125 MILLIGRAM(S): at 13:51

## 2023-12-31 RX ADMIN — Medication 3 MILLILITER(S): at 13:50

## 2023-12-31 RX ADMIN — PANTOPRAZOLE SODIUM 40 MILLIGRAM(S): 20 TABLET, DELAYED RELEASE ORAL at 19:59

## 2023-12-31 RX ADMIN — CEFEPIME 100 MILLIGRAM(S): 1 INJECTION, POWDER, FOR SOLUTION INTRAMUSCULAR; INTRAVENOUS at 15:37

## 2023-12-31 RX ADMIN — MORPHINE SULFATE 2 MILLIGRAM(S): 50 CAPSULE, EXTENDED RELEASE ORAL at 15:37

## 2023-12-31 RX ADMIN — Medication 6 MILLIGRAM(S): at 20:55

## 2023-12-31 RX ADMIN — REMDESIVIR 200 MILLIGRAM(S): 5 INJECTION INTRAVENOUS at 20:55

## 2023-12-31 RX ADMIN — Medication 3 MILLILITER(S): at 13:54

## 2023-12-31 RX ADMIN — SODIUM CHLORIDE 1000 MILLILITER(S): 9 INJECTION, SOLUTION INTRAVENOUS at 19:44

## 2023-12-31 NOTE — ED PROVIDER NOTE - PROGRESS NOTE DETAILS
SG: Patient assessed at bedside.  Patient's family member produced images of document signed by patient healthcare proxy which noted the patient is DNR/DNI. Will continue to monitor and reassess. SG: Patient assessed at bedside.  Patient admitted to stepdown unit.  Discussed possibility of.  Pulmonary embolus given lab findings, patient now noted to be COVID-positive.  Plan to order CT angio to rule out PE, admitted to stepdown with results to be followed by MAR.  Consider giving heparin because CT head is negative Will continue to monitor and reassess.

## 2023-12-31 NOTE — ED ADULT NURSE NOTE - CHIEF COMPLAINT QUOTE
pt BIBEMS  from [facility] states she was hypoxic to the 70s, Pt  Aox2,  100% on NRB. pt BIBEMS  from [facility] states she was hypoxic to the 70s, Pt  Aox2,  100% on NRB.

## 2023-12-31 NOTE — ED PROVIDER NOTE - OBJECTIVE STATEMENT
83 female with a PMHx COPD, OA, chronic back pain s/p multiple surgeries, and recent admission for left intertrochanteric femur fracture s/p ORIF 12/27/2023 brought in from [facility] for shortness of breath.  EMS reports that she was hypoxic on room air to the 70s, patient initially AO times year, after being on NRB and EMS, patient mental status improving.  Daughter reports the patient was doing okay yesterday, change in status is acute.  Patient currently AOx3, appears short of breath, denying any other complaints at this time. 83 female with a PMHx COPD, OA, chronic back pain s/p multiple surgeries, and recent admission for left intertrochanteric femur fracture s/p ORIF 12/27/2023 brought in from [facility] for shortness of breath.  EMS reports that she was hypoxic on room air to the 70s, patient initially AO times year, after being on NRB and EMS, patient mental status improving.  Daughter reports the patient was doing okay yesterday, change in status is acute.  Patient currently AOx3, appears short of breath, denying any other complaints at this time. 83 female that is DNR/DNI with a PMHx COPD, OA, chronic back pain s/p multiple surgeries, and recent admission for left intertrochanteric femur fracture s/p ORIF 12/27/2023 brought in from [facility] for shortness of breath.  EMS reports that she was hypoxic on room air to the 70s, patient initially AO times year, after being on NRB and EMS, patient mental status improving.  Daughter reports the patient was doing okay yesterday, change in status is acute.  Patient currently AOx3, appears short of breath, denying any other complaints at this time. 83 female that is DNR/DNI with a PMHx COPD, OA, chronic back pain s/p multiple surgeries, and recent admission for left intertrochanteric femur fracture s/p ORIF 12/27/2023 brought in from [facility] for shortness of breath.  EMS reports that she was hypoxic on room air to the 70s, patient initially AO times year, after being on NRB and EMS, patient mental status improving.  Daughter reports the patient was doing okay yesterday, change in status is acute.  Patient currently AOx3, appears short of breath, denying any other complaints at this time. 83 female that is DNR/DNI with a PMHx COPD, OA, chronic back pain s/p multiple surgeries, and recent admission for left intertrochanteric femur fracture s/p ORIF 12/27/2023 brought in from [facility] for shortness of breath.  EMS reports that she was hypoxic on room air to the 70s, patient initially AOx0, after being on NRB in EMS, patient mental status improving with improved oxygenation.  Daughter reports the patient was doing okay yesterday, change in status is acute.  Patient currently AOx3, appears short of breath, denying any other complaints at this time. 83 female that is DNR/DNI with a PMHx COPD, OA, chronic back pain s/p multiple surgeries, and recent admission for left intertrochanteric femur fracture s/p ORIF 12/27/2023 brought in from [facility] for shortness of breath.  EMS reports that she was hypoxic on room air to the 70s, patient initially AOx0, after being on NRB in EMS, patient mental status improving with improved oxygenation.  Daughter reports the patient was doing okay yesterday, change in status is acute.  Patient currently AOx3, appears short of breath, denying any other complaints at this time.

## 2023-12-31 NOTE — H&P ADULT - HISTORY OF PRESENT ILLNESS
83F w/ PMHx COPD, OA, chronic back pain s/p multiple surgeries and recent admission for left intertrochanteric femur fracture s/p ORIF 2023 brought in from [facility] for shortness of breath. EMS reports that she was hypoxic on room air to the 70s. Patient was initially AOx0, after being on NRB in EMS, patient mental status improving with improved oxygenation. Daughter reports the patient was doing okay yesterday and that this was an acute change. Patient currently AOx3, appears short of breath, denying any other complaints at this time.    Vitals: BP 84/53, HR 99, RR 22, SpO2 100% on NRB    Labs: WBC 19.66, Hgb 10.0 (at previous baseline), Na 124, Cr 0.9 (previously 0.6), BNP 9190, Trop 208>192 (previously 19), VBG lactate 2.9>2.4    COVID +ve    EKG - Sinus Tachycardia w/ possible PACs    Imagin. CT head shows no acute intracranial pathology  2. CTA Chest shows:  - no evidence of acute pulmonary embolus.  - Bilateral lower lobe consolidative opacities which can be seen with pneumonia    In the ED:  - s/p Cefepime IV x1  - s/p Levofloxacin IV x1    Admitted to SDU for management of acute hypoxic respiratory failure 83F w/ PMHx COPD, OA, chronic back pain s/p multiple surgeries and recent admission for left intertrochanteric femur fracture s/p ORIF 2023 brought in from [facility] for shortness of breath. EMS reports that she was hypoxic on room air to the 70s. Patient was initially AOx0, after being on NRB in EMS, patient mental status improving with improved oxygenation. Daughter reports the patient was doing okay yesterday and that this was an acute change. Patient currently AOx3, appears short of breath, denying any other complaints at this time.    Vitals: BP 84/53, HR 99, RR 22, SpO2 100% on NRB    Labs: WBC 19.66, Hgb 10.0 (at previous baseline), Na 124, Cr 0.9 (previously 0.6), BNP 9190, Trop 208>192 (previously 19), VBG lactate 2.9>2.4    COVID +ve    EKG - Sinus Tachycardia w/ possible PACs    Imagin. CT head shows no acute intracranial pathology  2. CTA Chest shows:  - no evidence of acute pulmonary embolus.  - Bilateral lower lobe consolidative opacities which can be seen with pneumonia    In the ED:  - s/p Cefepime IV x1  - s/p Levofloxacin IV x1    Admitted to SDU for management of acute hypoxic respiratory failure

## 2023-12-31 NOTE — ED ADULT NURSE NOTE - NSFALLHARMRISKINTERV_ED_ALL_ED
Assistance OOB with selected safe patient handling equipment if applicable/Assistance with ambulation/Communicate risk of Fall with Harm to all staff, patient, and family/Provide visual cue: red socks, yellow wristband, yellow gown, etc/Reinforce activity limits and safety measures with patient and family/Bed in lowest position, wheels locked, appropriate side rails in place/Call bell, personal items and telephone in reach/Instruct patient to call for assistance before getting out of bed/chair/stretcher/Non-slip footwear applied when patient is off stretcher/[place] to call system/Physically safe environment - no spills, clutter or unnecessary equipment/Purposeful Proactive Rounding/Room/bathroom lighting operational, light cord in reach Assistance OOB with selected safe patient handling equipment if applicable/Assistance with ambulation/Communicate risk of Fall with Harm to all staff, patient, and family/Provide visual cue: red socks, yellow wristband, yellow gown, etc/Reinforce activity limits and safety measures with patient and family/Bed in lowest position, wheels locked, appropriate side rails in place/Call bell, personal items and telephone in reach/Instruct patient to call for assistance before getting out of bed/chair/stretcher/Non-slip footwear applied when patient is off stretcher/[place] to call system/Physically safe environment - no spills, clutter or unnecessary equipment/Purposeful Proactive Rounding/Room/bathroom lighting operational, light cord in reach

## 2023-12-31 NOTE — ED PROVIDER NOTE - PHYSICAL EXAMINATION
CONSTITUTIONAL: in acute distress  SKIN: Warm dry  HEAD: NCAT  EYES: NL inspection  ENT: MMM  CARD: tachycardic  RESP: tachypneic  ABD: Soft, non tender, no rebound or guarding   EXT: no pedal edema  NEURO: AOx0

## 2023-12-31 NOTE — H&P ADULT - ATTENDING COMMENTS
HPI:  83F w/ PMHx COPD, OA, chronic back pain s/p multiple surgeries and recent admission for left intertrochanteric femur fracture s/p ORIF 2023 brought in from [facility] for shortness of breath. EMS reports that she was hypoxic on room air to the 70s. Patient was initially AOx0, after being on NRB in EMS, patient mental status improving with improved oxygenation. Daughter reports the patient was doing okay yesterday and that this was an acute change. Patient currently AOx3, appears short of breath, denying any other complaints at this time.    Vitals: BP 84/53, HR 99, RR 22, SpO2 100% on NRB    Labs: WBC 19.66, Hgb 10.0 (at previous baseline), Na 124, Cr 0.9 (previously 0.6), BNP 9190, Trop 208>192 (previously 19), VBG lactate 2.9>2.4    COVID +ve    EKG - Sinus Tachycardia w/ possible PACs    Imagin. CT head shows no acute intracranial pathology  2. CTA Chest shows:  - no evidence of acute pulmonary embolus.  - Bilateral lower lobe consolidative opacities which can be seen with pneumonia    In the ED:  - s/p Cefepime IV x1  - s/p Levofloxacin IV x1    Admitted to SDU for management of acute hypoxic respiratory failure (31 Dec 2023 18:45)    REVIEW OF SYSTEMS: see cc/HPI   CONSTITUTIONAL: No weakness, fevers or chills  EYES/ENT: No visual changes;  No vertigo or throat pain   NECK: No pain or stiffness  RESPIRATORY: No cough, wheezing, hemoptysis; (+) shortness of breath, (+) hypoxia, see cc/HPI   CARDIOVASCULAR: No chest pain or palpitations  GASTROINTESTINAL: No abdominal or epigastric pain. No nausea, vomiting, or hematemesis; No diarrhea or constipation. No melena or hematochezia.  GENITOURINARY: No dysuria, frequency or hematuria  NEUROLOGICAL: No numbness or weakness  SKIN: No itching, rashes  ENDO:  PSYCHE:  MSK:    Physical Exam:   General: WN/WD, On BiPAP / Resp distress   Neurology: A&Ox3, nonfocal, follows commands  Eyes: PERRLA/ EOMI  ENT/Neck: Neck supple, trachea midline, No JVD  Respiratory: CTA B/L, No wheezing, rales, rhonchi  CV: Normal rate regular rhythm, S1S2, no murmurs, rubs or gallops  Abdominal: Soft, NT, ND +BS,   Extremities: No edema, + peripheral pulses  Skin: No Rashes, Hematoma, Ecchymosis  Incisions:   Tubes:    A/p  Acute hypoxemic resp distress 2/2 COVID-19   COPD   Severe Sepsis     NSTEMI   -tele monitoring     ALAN   Hyponatremia     chronic anemia     s/p ORIF for L IT fracture   OA  Chronic back pain     INCOMPLETE NOTE HPI:  83F w/ PMHx COPD, OA, chronic back pain s/p multiple surgeries and recent admission for left intertrochanteric femur fracture s/p ORIF 2023 brought in from [facility] for shortness of breath. EMS reports that she was hypoxic on room air to the 70s. Patient was initially AOx0, after being on NRB in EMS, patient mental status improving with improved oxygenation. Daughter reports the patient was doing okay yesterday and that this was an acute change. Patient currently AOx3, appears short of breath, denying any other complaints at this time.    Vitals: BP 84/53, HR 99, RR 22, SpO2 100% on NRB    Labs: WBC 19.66, Hgb 10.0 (at previous baseline), Na 124, Cr 0.9 (previously 0.6), BNP 9190, Trop 208>192 (previously 19), VBG lactate 2.9>2.4    COVID +ve    EKG - Sinus Tachycardia w/ possible PACs    Imagin. CT head shows no acute intracranial pathology  2. CTA Chest shows:  - no evidence of acute pulmonary embolus.  - Bilateral lower lobe consolidative opacities which can be seen with pneumonia    In the ED:  - s/p Cefepime IV x1  - s/p Levofloxacin IV x1    Admitted to SDU for management of acute hypoxic respiratory failure (31 Dec 2023 18:45)    REVIEW OF SYSTEMS: see cc/HPI   CONSTITUTIONAL: No weakness, fevers or chills  EYES/ENT: No visual changes;  No vertigo or throat pain   NECK: No pain or stiffness  RESPIRATORY: No cough, wheezing, hemoptysis; (+) shortness of breath, (+) hypoxia, see cc/HPI   CARDIOVASCULAR: No chest pain or palpitations  GASTROINTESTINAL: No abdominal or epigastric pain. No nausea, vomiting, or hematemesis; No diarrhea or constipation. No melena or hematochezia.  GENITOURINARY: No dysuria, frequency or hematuria  NEUROLOGICAL: No numbness or weakness  SKIN: No itching, rashes  ENDO:  PSYCHE:  MSK:    Physical Exam:   General: WN/WD, On BiPAP / Resp distress   Neurology: A&Ox3, nonfocal, follows commands  Eyes: PERRLA/ EOMI  ENT/Neck: Neck supple, trachea midline, No JVD  Respiratory: CTA B/L, No wheezing, rales, rhonchi  CV: Normal rate regular rhythm, S1S2, no murmurs, rubs or gallops  Abdominal: Soft, NT, ND +BS,   Extremities: No edema, + peripheral pulses  Skin: No Rashes, Hematoma, Ecchymosis  Incisions:   Tubes:    A/p  Acute hypoxemic resp distress 2/2 COVID-19   COPD   Severe Sepsis     NSTEMI   -tele monitoring     ALAN   Hyponatremia     chronic anemia     s/p ORIF for L IT fracture   OA  Chronic back pain     INCOMPLETE NOTE HPI:  83F w/ PMHx COPD, OA, chronic back pain s/p multiple surgeries and recent admission for left intertrochanteric femur fracture s/p ORIF 2023 brought in from [facility] for shortness of breath. EMS reports that she was hypoxic on room air to the 70s. Patient was initially AOx0, after being on NRB in EMS, patient mental status improving with improved oxygenation. Daughter reports the patient was doing okay yesterday and that this was an acute change. Patient currently AOx3, appears short of breath, denying any other complaints at this time.    Vitals: BP 84/53, HR 99, RR 22, SpO2 100% on NRB    Labs: WBC 19.66, Hgb 10.0 (at previous baseline), Na 124, Cr 0.9 (previously 0.6), BNP 9190, Trop 208>192 (previously 19), VBG lactate 2.9>2.4    COVID +ve    EKG - Sinus Tachycardia w/ possible PACs    Imagin. CT head shows no acute intracranial pathology  2. CTA Chest shows:  - no evidence of acute pulmonary embolus.  - Bilateral lower lobe consolidative opacities which can be seen with pneumonia    In the ED:  - s/p Cefepime IV x1  - s/p Levofloxacin IV x1    Admitted to SDU for management of acute hypoxic respiratory failure (31 Dec 2023 18:45)    REVIEW OF SYSTEMS: see cc/HPI   CONSTITUTIONAL: No weakness, fevers or chills  EYES/ENT: No visual changes;  No vertigo or throat pain   NECK: No pain or stiffness  RESPIRATORY: No cough, wheezing, hemoptysis; (+) shortness of breath, (+) hypoxia, see cc/HPI   CARDIOVASCULAR: No chest pain or palpitations  GASTROINTESTINAL: No abdominal or epigastric pain. No nausea, vomiting, or hematemesis; No diarrhea or constipation. No melena or hematochezia.  GENITOURINARY: No dysuria, frequency or hematuria  NEUROLOGICAL: No numbness or weakness  SKIN: No itching, rashes  ENDO:  PSYCHE:  MSK:    Physical Exam:   General: WN/WD, On BiPAP / Resp distress   Neurology: A&Ox3, nonfocal, follows commands  Eyes: PERRLA/ EOMI  ENT/Neck: Neck supple, trachea midline, No JVD  Respiratory: CTA B/L, No wheezing, rales, rhonchi  CV: Normal rate regular rhythm, S1S2, no murmurs, rubs or gallops  Abdominal: Soft, NT, ND +BS,   Extremities: No edema, + peripheral pulses  Skin: No Rashes, Hematoma, Ecchymosis  Incisions:   Tubes:    A/p  Acute hypoxemic resp distress 2/2 COVID-19   COPD   Severe Sepsis / septic shock - responsive to fluids  -admit to SDU   -BiPAP w/ supplemental O2  -Bronchodilator  -IV Decadron / IV RDV  -ABG in AM   -F/u inflammatory markers  -Pulm / CC consult   -ID eval     NSTEMI   -tele monitoring   -serial trops   -2D echo     ALAN   Hyponatremia   -check urine osm /lytes   -repeat BMP in AM - s/p IVF resuscitation       chronic anemia -stable    s/p ORIF for L IT fracture   OA  Chronic back pain  -PRN pain Rx   -PT/Rehab once stable     DVT prophylaxis      INCOMPLETE NOTE HPI:  83F w/ PMHx COPD, OA, chronic back pain s/p multiple surgeries and recent admission for left intertrochanteric femur fracture s/p ORIF 2023 brought in from [facility] for shortness of breath. EMS reports that she was hypoxic on room air to the 70s. Patient was initially AOx0, after being on NRB in EMS, patient mental status improving with improved oxygenation. Daughter reports the patient was doing okay yesterday and that this was an acute change. Patient currently AOx3, appears short of breath, denying any other complaints at this time.    Vitals: BP 84/53, HR 99, RR 22, SpO2 100% on NRB    Labs: WBC 19.66, Hgb 10.0 (at previous baseline), Na 124, Cr 0.9 (previously 0.6), BNP 9190, Trop 208>192 (previously 19), VBG lactate 2.9>2.4    COVID +ve    EKG - Sinus Tachycardia w/ possible PACs    Imagin. CT head shows no acute intracranial pathology  2. CTA Chest shows:  - no evidence of acute pulmonary embolus.  - Bilateral lower lobe consolidative opacities which can be seen with pneumonia    In the ED:  - s/p Cefepime IV x1  - s/p Levofloxacin IV x1    Admitted to SDU for management of acute hypoxic respiratory failure (31 Dec 2023 18:45)    REVIEW OF SYSTEMS: see cc/HPI   CONSTITUTIONAL: No weakness, fevers or chills  EYES/ENT: No visual changes;  No vertigo or throat pain   NECK: No pain or stiffness  RESPIRATORY: No cough, wheezing, hemoptysis; (+) shortness of breath, (+) hypoxia, see cc/HPI   CARDIOVASCULAR: No chest pain or palpitations  GASTROINTESTINAL: No abdominal or epigastric pain. No nausea, vomiting, or hematemesis; No diarrhea or constipation. No melena or hematochezia.  GENITOURINARY: No dysuria, frequency or hematuria  NEUROLOGICAL: No numbness or weakness  SKIN: No itching, rashes  ENDO:  PSYCHE:  MSK:    Physical Exam:   General: WN/WD, On BiPAP / Resp distress   Neurology: A&Ox3, nonfocal, follows commands  Eyes: PERRLA/ EOMI  ENT/Neck: Neck supple, trachea midline, No JVD  Respiratory: CTA B/L, No wheezing, rales, rhonchi  CV: Normal rate regular rhythm, S1S2, no murmurs, rubs or gallops  Abdominal: Soft, NT, ND +BS,   Extremities: No edema, + peripheral pulses  Skin: No Rashes, Hematoma, Ecchymosis  Incisions:   Tubes:    A/p  Acute hypoxemic resp distress 2/2 COVID-19   COPD   Severe Sepsis / septic shock - responsive to fluids  -admit to SDU   -BiPAP w/ supplemental O2  -Bronchodilator  -IV Decadron / IV RDV  -ABG in AM   -F/u inflammatory markers  -Pulm / CC consult   -ID eval     NSTEMI   -tele monitoring   -serial trops   -2D echo     ALAN   Hyponatremia   -check urine osm /lytes   -repeat BMP in AM - s/p IVF resuscitation       chronic anemia -stable    s/p ORIF for L IT fracture   OA  Chronic back pain  -PRN pain Rx   -PT/Rehab once stable     DVT prophylaxis      INCOMPLETE NOTE HPI:  83F w/ PMHx COPD, OA, chronic back pain s/p multiple surgeries and recent admission for left intertrochanteric femur fracture s/p ORIF 2023 brought in from [facility] for shortness of breath. EMS reports that she was hypoxic on room air to the 70s. Patient was initially AOx0, after being on NRB in EMS, patient mental status improving with improved oxygenation. Daughter reports the patient was doing okay yesterday and that this was an acute change. Patient currently AOx3, appears short of breath, denying any other complaints at this time.    Vitals: BP 84/53, HR 99, RR 22, SpO2 100% on NRB    Labs: WBC 19.66, Hgb 10.0 (at previous baseline), Na 124, Cr 0.9 (previously 0.6), BNP 9190, Trop 208>192 (previously 19), VBG lactate 2.9>2.4    COVID +ve    EKG - Sinus Tachycardia w/ possible PACs    Imagin. CT head shows no acute intracranial pathology  2. CTA Chest shows:  - no evidence of acute pulmonary embolus.  - Bilateral lower lobe consolidative opacities which can be seen with pneumonia    In the ED:  - s/p Cefepime IV x1  - s/p Levofloxacin IV x1    Admitted to SDU for management of acute hypoxic respiratory failure (31 Dec 2023 18:45)    REVIEW OF SYSTEMS: see cc/HPI   CONSTITUTIONAL: No weakness, fevers or chills  EYES/ENT: No visual changes;  No vertigo or throat pain   NECK: No pain or stiffness  RESPIRATORY: No cough, wheezing, hemoptysis; (+) shortness of breath, (+) hypoxia, see cc/HPI   CARDIOVASCULAR: No chest pain or palpitations  GASTROINTESTINAL: No abdominal or epigastric pain. No nausea, vomiting, or hematemesis; No diarrhea or constipation. No melena or hematochezia.  GENITOURINARY: No dysuria, frequency or hematuria  NEUROLOGICAL: No numbness or weakness  SKIN: No itching, rashes  ENDO:  PSYCHE:  MSK:    Physical Exam:   General: WN/WD, On BiPAP / Resp distress   Neurology: A&Ox3, nonfocal, follows commands  Eyes: PERRLA/ EOMI  ENT/Neck: Neck supple, trachea midline, No JVD  Respiratory: CTA B/L, No wheezing, rales, rhonchi  CV: Normal rate regular rhythm, S1S2, no murmurs, rubs or gallops  Abdominal: Soft, NT, ND +BS,   Extremities: No edema, + peripheral pulses  Skin: No Rashes, Hematoma, Ecchymosis  Incisions:   Tubes:    A/p  Acute hypoxemic resp distress 2/2 COVID-19   COPD   Severe Sepsis / septic shock - responsive to fluids  -admit to SDU   -BiPAP w/ supplemental O2  -Bronchodilator  -IV Decadron / IV RDV  -ABG in AM   -F/u inflammatory markers  -Pulm / CC consult   -ID eval     NSTEMI   -tele monitoring   -serial trops   -2D echo     ALAN   Hyponatremia   -check urine osm /lytes   -repeat BMP in AM - s/p IVF resuscitation       chronic anemia -stable    s/p ORIF for L IT fracture   OA  Chronic back pain  -PRN pain Rx   -PT/Rehab once stable     DVT prophylaxis      PATIENT SEEN by ATTENDING 23 ( note revised 24) HPI:  83F w/ PMHx COPD, OA, chronic back pain s/p multiple surgeries and recent admission for left intertrochanteric femur fracture s/p ORIF 2023 brought in from [facility] for shortness of breath. EMS reports that she was hypoxic on room air to the 70s. Patient was initially AOx0, after being on NRB in EMS, patient mental status improving with improved oxygenation. Daughter reports the patient was doing okay yesterday and that this was an acute change. Patient currently AOx3, appears short of breath, denying any other complaints at this time.    Vitals: BP 84/53, HR 99, RR 22, SpO2 100% on NRB    Labs: WBC 19.66, Hgb 10.0 (at previous baseline), Na 124, Cr 0.9 (previously 0.6), BNP 9190, Trop 208>192 (previously 19), VBG lactate 2.9>2.4    COVID +ve    EKG - Sinus Tachycardia w/ possible PACs    Imagin. CT head shows no acute intracranial pathology  2. CTA Chest shows:  - no evidence of acute pulmonary embolus.  - Bilateral lower lobe consolidative opacities which can be seen with pneumonia    In the ED:  - s/p Cefepime IV x1  - s/p Levofloxacin IV x1    Admitted to SDU for management of acute hypoxic respiratory failure (31 Dec 2023 18:45)    REVIEW OF SYSTEMS: see cc/HPI   CONSTITUTIONAL: No weakness, fevers or chills  EYES/ENT: No visual changes;  No vertigo or throat pain   NECK: No pain or stiffness  RESPIRATORY: No cough, wheezing, hemoptysis; (+) shortness of breath, (+) hypoxia, see cc/HPI   CARDIOVASCULAR: No chest pain or palpitations  GASTROINTESTINAL: No abdominal or epigastric pain. No nausea, vomiting, or hematemesis; No diarrhea or constipation. No melena or hematochezia.  GENITOURINARY: No dysuria, frequency or hematuria  NEUROLOGICAL: No numbness or weakness  SKIN: No itching, rashes  ENDO:  PSYCHE:  MSK:    Physical Exam:   General: WN/WD, On BiPAP / Resp distress   Neurology: A&Ox3, nonfocal, follows commands  Eyes: PERRLA/ EOMI  ENT/Neck: Neck supple, trachea midline, No JVD  Respiratory: CTA B/L, No wheezing, rales, rhonchi  CV: Normal rate regular rhythm, S1S2, no murmurs, rubs or gallops  Abdominal: Soft, NT, ND +BS,   Extremities: No edema, + peripheral pulses  Skin: No Rashes, Hematoma, Ecchymosis  Incisions:   Tubes:    A/p  Acute hypoxemic resp distress 2/2 COVID-19   COPD   Severe Sepsis / septic shock - responsive to fluids  -admit to SDU   -BiPAP w/ supplemental O2  -Bronchodilator  -IV Decadron / IV RDV  -ABG in AM   -F/u inflammatory markers  -Pulm / CC consult   -ID eval     NSTEMI   -tele monitoring   -serial trops   -2D echo     ALAN   Hyponatremia   -check urine osm /lytes   -repeat BMP in AM - s/p IVF resuscitation       chronic anemia -stable    s/p ORIF for L IT fracture   OA  Chronic back pain  -PRN pain Rx   -PT/Rehab once stable     DVT prophylaxis      PATIENT SEEN by ATTENDING 23 ( note revised 24)

## 2023-12-31 NOTE — ED ADULT NURSE NOTE - NSFALLFACTORS_ED_ALL_ED
DM - NIDDM  . Reviewed with patient / Will check  HbA1c and fasting blood sugars. Educate home self monitoring and diary keeping(reviewed with patient home blood sugar levels /diary). Educate extensively low calorie diet and weight loss with exercise. Reviewed BS- diary and Rx. Regimen. Riner renal protection with ARB/ACEI.               Educate compliance with diet and Rx. And educate risks and autcomes.                                           Weakness

## 2023-12-31 NOTE — H&P ADULT - NSHPPOADEEPVENOUSTHROMB_GEN_A_CORE
MI (myocardial infarction)  10/2013 - SYED to LAD  Obesity, morbid    PE (pulmonary thromboembolism)  2007 and 2012  TB (pulmonary tuberculosis)  1957 - treated no

## 2023-12-31 NOTE — H&P ADULT - NSHPPHYSICALEXAM_GEN_ALL_CORE
VITALS:   Vital Signs Last 24 Hrs  HR: 99 (31 Dec 2023 13:57) (98 - 99)  BP: 84/53 (31 Dec 2023 13:57) (84/53 - 84/53)  RR: 22 (31 Dec 2023 13:57) (22 - 22)  SpO2: 100% (31 Dec 2023 13:57) (95% - 100%)    Parameters below as of 31 Dec 2023 13:57  Patient On (Oxygen Delivery Method): BiPAP/CPAP    PHYSICAL EXAM:  General: ill appearing, in mild respiratory distress  HEENT: PERRLA, EOMI, moist mucous membranes  Neurology: A&Ox3, nonfocal, MERCER x 4  Respiratory: CTA B/L, normal respiratory effort, no wheezes, crackles, rales  CV: tachycardic, irregular rhythm, no murmurs appreciated  Abdominal: Soft, NT, ND, +BS, no rebound or guarding  Extremities: No edema, +2 peripheral pulses in all 4 extremities  Tubes: [place]

## 2023-12-31 NOTE — ED PROVIDER NOTE - CONSIDERATION OF ADMISSION OBSERVATION
Patient with acute respiratory failure 2/2 pneumonia, sepsis. Patient will require admission for further management. Consideration of Admission/Observation

## 2023-12-31 NOTE — ED PROVIDER NOTE - DIFFERENTIAL DIAGNOSIS
Dyspnea, likely sepsis but will consider ACS vs PE vs dissection vs tamponade vs esophageal rupture vs pneumothorax vs pneumonia vs fluid overload Differential Diagnosis

## 2023-12-31 NOTE — ED ADULT TRIAGE NOTE - CHIEF COMPLAINT QUOTE
pt BIBEMS  from [facility] states she was hypoxic to the 70s, Pt  Aox2,  100% on NRB. pt BIBEMS  from [facility] states she was hypoxic to the 70s, Pt  Aox2,  100% on NRB.

## 2023-12-31 NOTE — H&P ADULT - ASSESSMENT
Patient states medication requires Prior Auth         naltrexone-buPROPion (CONTRAVE) 8-90 mg TbER ER tablet         Patient also cancelled his med ck appt of today 83F w/ PMHx COPD, OA, chronic back pain s/p multiple surgeries and recent admission for left intertrochanteric femur fracture s/p ORIF 12/27/2023 brought in from [facility] for shortness of breath.    #Acute Hypoxic Respiratory Failure 2/2 COVID-19 c/b PNA  #Severe Sepsis on Admission  #COPD - not in exacerbation  - BP 84/53, HR 99, RR 22, SpO2 100% on NRB  - WBC 19.66  - VBG lactate 2.9>2.4  - COVID +ve  - CTA Chest shows: no evidence of acute pulmonary embolus. Bilateral lower lobe consolidative opacities which can be seen with pneumonia  - s/p 2L LR bolus in the ED  - s/p Cefepime and Levofloxacin IV in the ED  - c/w abx  - f/u BCx  - f/u ID consult      Hgb 10.0 (at previous baseline), Na 124, Cr 0.9 (previously 0.6), BNP 9190, Trop 208>192 (previously 19), VBG lactate 2.9>2.4    EKG - Sinus Tachycardia w/ possible PACs    #left intertrochanteric femur fracture s/p ORIF 12/27/2023   #OA  #Chronic Back Pain s/p multiple surgeries  -    83F w/ PMHx COPD, OA, chronic back pain s/p multiple surgeries and recent admission for left intertrochanteric femur fracture s/p ORIF 12/27/2023 brought in from [facility] for shortness of breath.    #Acute Hypoxic Respiratory Failure 2/2 COVID-19 c/b PNA  #Severe Sepsis on Admission  #COPD - not in exacerbation  - BP 84/53, HR 99, RR 22, SpO2 100% on NRB  - WBC 19.66  - VBG lactate 2.9>2.4  - COVID +ve  - CTA Chest shows: no evidence of acute pulmonary embolus. Bilateral lower lobe consolidative opacities which can be seen with pneumonia  - s/p 2L LR bolus in the ED  - s/p Cefepime and Levofloxacin IV in the ED  - c/w abx  - f/u BCx  - f/u ID consult      Hgb 10.0 (at previous baseline), Na 124, Cr 0.9 (previously 0.6), BNP 9190, Trop 208>192 (previously 19), VBG lactate 2.9>2.4    EKG - Sinus Tachycardia w/ possible PACs    #left intertrochanteric femur fracture s/p ORIF 12/27/2023   #OA  #Chronic Back Pain s/p multiple surgeries  -    83F w/ PMHx COPD, OA, chronic back pain s/p multiple surgeries and recent admission for left intertrochanteric femur fracture s/p ORIF 12/27/2023 brought in from [facility] for shortness of breath.    #Acute Hypoxic Respiratory Failure 2/2 COVID-19 c/b PNA  #Severe Sepsis on Admission  #COPD - not in exacerbation  - BP 84/53, HR 99, RR 22, SpO2 100% on NRB  - WBC 19.66  - VBG lactate 2.9>2.4  - COVID +ve  - CTA Chest shows: no evidence of acute pulmonary embolus. Bilateral lower lobe consolidative opacities which can be seen with pneumonia  - s/p 3L LR bolus in the ED - BP stable  - s/p Cefepime and Levofloxacin IV in the ED  - c/w Cefepime  - c/w BiPAP  - if hypotensive, consider starting pressors  - f/u BCx  - f/u procal  - f/u ID consult  - repeat CXR post treatment either prior to d/c or outpatient    #NSTEMI Type II  - EKG showed sinus tachy w/ PACs  - Trop 208>192 (previously 19)  - trend, monitor repeat  - if remains elevated or worsens, consider cardio consult    #ALAN - likely prerenal  #Hyponatremia  - Na 124, Cr 0.9 (previously 0.6)  - suspected 2/2 hypovolemia  - poor PO intake over the last week, dry one exam  - s/p 3L IVF bolus in ED  - monitor Na, Cr    #Chronic Anemia  - Hgb 10.0 (at previous baseline)  - monitor H&H  - f/u o/p    #left intertrochanteric femur fracture s/p ORIF 12/27/2023   #OA  #Chronic Back Pain s/p multiple surgeries  - from rehab  - f/u PT  - f/u o/p    #DVT ppx: Lovenox SubQ  #GI ppx: PPI PO daily  #Diet: Regular - DASH/TLC  #Activity: IAT - PT  #Code Status: DNR/DNI - Living Will on File - Molst filled out to reflect living will, discussed with daughters bedside  #Dispo: from rehab, acute, SDU   83F w/ PMHx COPD, OA, chronic back pain s/p multiple surgeries and recent admission for left intertrochanteric femur fracture s/p ORIF 12/27/2023 brought in from [facility] for shortness of breath.    #Acute Hypoxic Respiratory Failure 2/2 COVID-19 c/b PNA  #Severe Sepsis on Admission  #COPD - not in exacerbation  - BP 84/53, HR 99, RR 22, SpO2 100% on NRB  - WBC 19.66  - VBG lactate 2.9>2.4  - COVID +ve  - CTA Chest shows: no evidence of acute pulmonary embolus. Bilateral lower lobe consolidative opacities which can be seen with pneumonia  - s/p 3L LR bolus in the ED - BP stable  - s/p Cefepime and Levofloxacin IV in the ED  - c/w Cefepime  - c/w BiPAP  - if hypotensive, consider starting pressors  - f/u BCx  - f/u procal  - f/u ID consult  - repeat CXR post treatment either prior to d/c or outpatient    #NSTEMI Type II  - EKG showed sinus tachy w/ PACs  - Trop 208>192 (previously 19)  - trend, monitor repeat  - if remains elevated or worsens, consider cardio consult    #ALAN - likely prerenal  #Hyponatremia  - Na 124, Cr 0.9 (previously 0.6)  - suspected 2/2 hypovolemia  - poor PO intake over the last week, dry one exam  - s/p 3L IVF bolus in ED  - monitor Na, Cr    #Chronic Anemia  - Hgb 10.0 (at previous baseline)  - monitor H&H  - f/u o/p    #left intertrochanteric femur fracture s/p ORIF 12/27/2023   #OA  #Chronic Back Pain s/p multiple surgeries  - from rehab  - f/u PT  - f/u o/p    #DVT ppx: Lovenox SubQ  #GI ppx: PPI PO daily  #Diet: Regular - DASH/TLC  #Activity: IAT - PT  #Code Status: DNR/DNI - Living Will on File - Molst filled out to reflect living will, discussed with daughters bedside  #Dispo: from rehab, acute, SDU   83F w/ PMHx COPD, OA, chronic back pain s/p multiple surgeries and recent admission for left intertrochanteric femur fracture s/p ORIF 12/27/2023 brought in from [facility] for shortness of breath.    #Acute Hypoxic Respiratory Failure 2/2 COVID-19 c/b PNA  #Severe Sepsis on Admission  #COPD - not in exacerbation  - BP 84/53, HR 99, RR 22, SpO2 100% on NRB  - WBC 19.66  - VBG lactate 2.9>2.4  - COVID +ve  - CTA Chest shows: no evidence of acute pulmonary embolus. Bilateral lower lobe consolidative opacities which can be seen with pneumonia  - s/p 3L LR bolus in the ED - repeat BP stable  - s/p Cefepime and Levofloxacin IV in the ED  - c/w Cefepime and Vanc  - c/w BiPAP  - if hypotensive, consider starting pressors  - f/u BCx, MRSA, Legionella, Strep  - f/u procal  - f/u LE duplex  - f/u ID consult  - repeat CXR post treatment either prior to d/c or outpatient    #NSTEMI Type II  - EKG showed sinus tachy w/ PACs  - Trop 208>192 (previously 19)  - trend, monitor repeat  - if remains elevated or worsens, consider cardio consult    #ALAN - likely prerenal  #Hyponatremia  - Na 124, Cr 0.9 (previously 0.6)  - suspected 2/2 hypovolemia  - poor PO intake over the last week, dry one exam  - s/p 3L IVF bolus in ED  - monitor Na, Cr    #Chronic Anemia  - Hgb 10.0 (at previous baseline)  - monitor H&H  - f/u o/p    #left intertrochanteric femur fracture s/p ORIF 12/27/2023   #OA  #Chronic Back Pain s/p multiple surgeries  - from rehab  - f/u PT  - f/u o/p    #DVT ppx: Lovenox SubQ  #GI ppx: PPI PO daily  #Diet: Regular - DASH/TLC  #Activity: IAT - PT  #Code Status: DNR/DNI - Living Will on File - Molst filled out to reflect living will, discussed with daughters bedside  #Dispo: from rehab, acute, SDU   83F w/ PMHx COPD, OA, chronic back pain s/p multiple surgeries and recent admission for left intertrochanteric femur fracture s/p ORIF 12/27/2023 brought in from [facility] for shortness of breath.    #Acute Hypoxic Respiratory Failure 2/2 COVID-19 c/b PNA  #Severe Sepsis on Admission  #COPD - not in exacerbation  - BP 84/53, HR 99, RR 22, SpO2 100% on NRB  - WBC 19.66  - VBG lactate 2.9>2.4  - COVID +ve  - CTA Chest shows: no evidence of acute pulmonary embolus. Bilateral lower lobe consolidative opacities which can be seen with pneumonia  - s/p 3L LR bolus in the ED - repeat BP stable  - s/p Cefepime and Levofloxacin IV in the ED  - c/w Cefepime and Vanc  - c/w BiPAP  - if hypotensive, consider starting pressors  - f/u BCx, MRSA, Legionella, Strep  - f/u procal  - f/u LE duplex  - f/u ID consult  - repeat CXR post treatment either prior to d/c or outpatient    #NSTEMI Type II  - EKG showed sinus tachy w/ PACs  - Trop 208>192 (previously 19)  - trend, monitor repeat  - if remains elevated or worsens, consider cardio consult    #ALAN - likely prerenal  #Hyponatremia  - Na 124, Cr 0.9 (previously 0.6)  - suspected 2/2 hypovolemia  - poor PO intake over the last week, dry one exam  - s/p 3L IVF bolus in ED  - monitor Na, Cr    #Chronic Anemia  - Hgb 10.0 (at previous baseline)  - monitor H&H  - f/u o/p    #left intertrochanteric femur fracture s/p ORIF 12/27/2023   #OA  #Chronic Back Pain s/p multiple surgeries  - from rehab  - f/u PT  - f/u o/p    #DVT ppx: Lovenox SubQ  #GI ppx: PPI PO daily  #Diet: Regular - DASH/TLC  #Activity: IAT - PT  #Code Status: DNR/DNI - Living Will on File - Molst filled out to reflect living will, discussed with daughters bedside  #Dispo: from rehab, acute, SDU   83F w/ PMHx COPD, OA, chronic back pain s/p multiple surgeries and recent admission for left intertrochanteric femur fracture s/p ORIF 12/27/2023 brought in from [facility] for shortness of breath.    #Acute Hypoxic Respiratory Failure 2/2 COVID-19 c/b PNA  #Severe Sepsis on Admission  #COPD - not in exacerbation  - BP 84/53, HR 99, RR 22, SpO2 100% on NRB  - WBC 19.66  - VBG lactate 2.9>2.4  - COVID +ve  - CTA Chest shows: no evidence of acute pulmonary embolus. Bilateral lower lobe consolidative opacities which can be seen with pneumonia  - s/p 3L LR bolus in the ED - repeat BP stable  - s/p Cefepime and Levofloxacin IV in the ED  - c/w Cefepime and Vanc  - starting RDV 200mg 1x dose, pending ID consult for additional 4 doses  - c/w BiPAP  - if hypotensive, consider starting pressors  - f/u BCx, MRSA, Legionella, Strep  - f/u procal  - f/u LE duplex  - f/u ID consult  - repeat CXR post treatment either prior to d/c or outpatient    #NSTEMI Type II  - EKG showed sinus tachy w/ PACs  - Trop 208>192 (previously 19)  - trend, monitor repeat  - if remains elevated or worsens, consider cardio consult    #ALAN - likely prerenal  #Hyponatremia  - Na 124, Cr 0.9 (previously 0.6)  - suspected 2/2 hypovolemia  - poor PO intake over the last week, dry one exam  - s/p 3L IVF bolus in ED  - monitor Na, Cr    #Chronic Anemia  - Hgb 10.0 (at previous baseline)  - monitor H&H  - f/u o/p    #left intertrochanteric femur fracture s/p ORIF 12/27/2023   #OA  #Chronic Back Pain s/p multiple surgeries  - from rehab  - f/u PT  - f/u o/p    #DVT ppx: Lovenox SubQ  #GI ppx: PPI PO daily  #Diet: Regular - DASH/TLC  #Activity: IAT - PT  #Code Status: DNR/DNI - Living Will on File - Molst filled out to reflect living will, discussed with daughters bedside  #Dispo: from rehab, acute, SDU   83F w/ PMHx COPD, OA, chronic back pain s/p multiple surgeries and recent admission for left intertrochanteric femur fracture s/p ORIF 12/27/2023 brought in from [facility] for shortness of breath.    #Acute Hypoxic Respiratory Failure 2/2 COVID-19 c/b PNA  #Severe Sepsis on Admission  #COPD - not in exacerbation  - BP 84/53, HR 99, RR 22, SpO2 100% on NRB  - WBC 19.66  - VBG lactate 2.9>2.4  - COVID +ve  - CTA Chest shows: no evidence of acute pulmonary embolus. Bilateral lower lobe consolidative opacities which can be seen with pneumonia  - s/p 3L LR bolus in the ED - repeat BP stable  - s/p Cefepime and Levofloxacin IV in the ED  - c/w Cefepime and Vanc  - starting RDV 200mg 1x dose, pending ID consult for additional 4 doses  - c/w BiPAP  - if hypotensive, consider starting pressors  - f/u BCx, MRSA, Legionella, Strep  - f/u procal  - f/u LE duplex  - f/u ID consult  - repeat CXR post treatment either prior to d/c or outpatient    #NSTEMI Type II  - EKG showed sinus tachy w/ PACs  - Trop 208>192 (previously 19)  - trend, monitor repeat  - if remains elevated or worsens, consider cardio consult    #ALAN - likely prerenal  #Hyponatremia  - Na 124, Cr 0.9 (previously 0.6)  - suspected 2/2 hypovolemia  - poor PO intake over the last week, dry one exam  - s/p 3L IVF bolus in ED  - monitor Na, Cr    #Chronic Anemia  - Hgb 10.0 (at previous baseline)  - monitor H&H  - f/u o/p    #left intertrochanteric femur fracture s/p ORIF 12/27/2023   #OA  #Chronic Back Pain s/p multiple surgeries  - from rehab  - f/u PT  - f/u o/p    #DVT ppx: Lovenox SubQ  #GI ppx: PPI PO daily  #Diet: Regular - DASH/TLC  #Activity: IAT - PT  #Code Status: DNR/DNI - Living Will on File - Molst filled out to reflect living will, discussed with daughters bedside  #Dispo: from rehab, acute, SDU   83F w/ PMHx COPD, OA, chronic back pain s/p multiple surgeries and recent admission for left intertrochanteric femur fracture s/p ORIF 12/27/2023 brought in from [facility] for shortness of breath.    #Acute Hypoxic Respiratory Failure 2/2 COVID-19 c/b PNA  #Severe Sepsis on Admission  #COPD - not in exacerbation  - BP 84/53, HR 99, RR 22, SpO2 100% on NRB  - WBC 19.66  - VBG lactate 2.9>2.4  - COVID +ve  - CTA Chest shows: no evidence of acute pulmonary embolus. Bilateral lower lobe consolidative opacities which can be seen with pneumonia  - s/p 3L LR bolus in the ED - repeat BP stable  - s/p Cefepime and Levofloxacin IV in the ED  - starting RDV 200mg 1x dose, pending ID consult for additional 4 doses  - starting Dexamethasone 6mg IV for 10 days  - c/w BiPAP  - if hypotensive, consider starting pressors  - f/u BCx, MRSA, Legionella, Strep  - f/u procal, d-dimer  - trend lactate  - f/u LE duplex  - f/u ID consult  - repeat CXR post treatment either prior to d/c or outpatient    #NSTEMI Type II  - EKG showed sinus tachy w/ PACs  - Trop 208>192 (previously 19)  - trend, monitor repeat  - if remains elevated or worsens, consider cardio consult    #ALAN - likely prerenal  #Hyponatremia  - Na 124, Cr 0.9 (previously 0.6)  - suspected 2/2 hypovolemia  - poor PO intake over the last week, dry one exam  - s/p 3L IVF bolus in ED  - monitor Na, Cr    #Chronic Anemia  - Hgb 10.0 (at previous baseline)  - monitor H&H  - f/u o/p    #left intertrochanteric femur fracture s/p ORIF 12/27/2023   #OA  #Chronic Back Pain s/p multiple surgeries  - from rehab  - f/u PT  - f/u o/p    #DVT ppx: Lovenox SubQ  #GI ppx: PPI PO daily  #Diet: Regular - DASH/TLC  #Activity: IAT - PT  #Code Status: DNR/DNI - Living Will on File - Molst filled out to reflect living will, discussed with daughters bedside  #Dispo: from rehab, acute, SDU   83F w/ PMHx COPD, OA, chronic back pain s/p multiple surgeries and recent admission for left intertrochanteric femur fracture s/p ORIF 12/27/2023 brought in from [facility] for shortness of breath.    #Acute Hypoxic Respiratory Failure 2/2 COVID-19 c/b PNA  #Severe Sepsis on Admission  #COPD - not in exacerbation  - BP 84/53, HR 99, RR 22, SpO2 100% on NRB  - WBC 19.66  - VBG lactate 2.9>2.4  - COVID +ve  - CTA Chest shows: no evidence of acute pulmonary embolus. Bilateral lower lobe consolidative opacities which can be seen with pneumonia  - s/p 3L LR bolus in the ED - repeat BP stable  - s/p Cefepime and Levofloxacin IV in the ED  - starting RDV 200mg 1x dose, pending ID consult for additional 4 doses  - starting Dexamethasone 6mg IV for 10 days  - c/w BiPAP  - if hypotensive, consider starting pressors  - f/u BCx, MRSA, Legionella, Strep  - f/u procal, d-dimer  - trend lactate  - f/u LE duplex  - f/u ID consult  - repeat CXR post treatment either prior to d/c or outpatient    #NSTEMI Type II  - EKG showed sinus tachy w/ PACs  - Trop 208>192 (previously 19)  - trend, monitor repeat  - if remains elevated or worsens, consider cardio consult    #ALAN - likely prerenal  #Hyponatremia  - Na 124, Cr 0.9 (previously 0.6)  - suspected 2/2 hypovolemia  - poor PO intake over the last week, dry one exam  - s/p 3L IVF bolus in ED  - monitor Na, Cr    #Chronic Anemia  - Hgb 10.0 (at previous baseline)  - monitor H&H  - f/u o/p    #left intertrochanteric femur fracture s/p ORIF 12/27/2023   #OA  #Chronic Back Pain s/p multiple surgeries  - from rehab  - f/u PT  - f/u o/p    #DVT ppx: Lovenox SubQ  #GI ppx: PPI PO daily  #Diet: Regular - DASH/TLC  #Activity: IAT - PT  #Code Status: DNR/DNI - Living Will on File - Molst filled out to reflect living will, discussed with daughters bedside  #Dispo: from rehab, acute, SDU

## 2023-12-31 NOTE — ED PROVIDER NOTE - CARE PLAN
Principal Discharge DX:	2019 novel coronavirus disease (COVID-19)  Secondary Diagnosis:	SOB (shortness of breath)  Secondary Diagnosis:	Hyponatremia  Secondary Diagnosis:	PNA (pneumonia)  Secondary Diagnosis:	Tachycardia  Secondary Diagnosis:	Sepsis   1

## 2023-12-31 NOTE — ED ADULT NURSE NOTE - OBJECTIVE STATEMENT
Patient presents to ED with complaints of shortness of breath.  EMS reports patient was hypoxic on room air to the 70s and altered however patient mental status improving on NRB.

## 2023-12-31 NOTE — H&P ADULT - NSHPLABSRESULTS_GEN_ALL_CORE
LABS:  cret                        10.0   19.66 )-----------( 297      ( 31 Dec 2023 13:55 )             28.8     12-31    124<L>  |  85<L>  |  29<H>  ----------------------------<  133<H>  4.2   |  29  |  0.9    Ca    8.4      31 Dec 2023 13:55    TPro  6.1  /  Alb  3.5  /  TBili  1.4<H>  /  DBili  x   /  AST  21  /  ALT  11  /  AlkPhos  95  12-31

## 2023-12-31 NOTE — ED ADULT NURSE NOTE - PAIN: PRESENCE, MLM
Report given to RN on 5LM.  Pt will transfer via wheelchair with RN monitored.   non-verbal indicators absent (Rating = 0)

## 2023-12-31 NOTE — PHARMACOTHERAPY INTERVENTION NOTE - COMMENTS
Spoke with MD to confirm whether they mean to order SQ Heparin. MD would like to do one time IV push of Heparin 5000 unit due to suspected PE.

## 2023-12-31 NOTE — ED PROVIDER NOTE - CLINICAL SUMMARY MEDICAL DECISION MAKING FREE TEXT BOX
Patient presented with worsening dyspnea over the past few days, reportedly unresponsive on EMS arrival to NH. On arrival to ED patient on NRB (Reportedly hypoxic to 70s on RA in the field), AAOx3 but limited historian and denies memory of event. Otherwise HD stable. Weaned down to NC with maintenance of O2 saturation. Given high suspicion for sepsis, started on fluid bolus and given IV abx. EKG obtained and showed Afib at 119 bpm but no significant st-t abnormalities. Labs obtained and remarkable for (+) leukocytosis to 19k with a lactate of 2.9 and hyponatremia of 124. Also noted to have troponin of 208 with bnp of 9190. CXR showed (+) b/l congestion but no focal consolidation to suggest pneumonia. COVID (+). Patient remained stable during ED course with supplemental O2. Consulted ICU who agreed with plan to admit to SDU for further management. HD stable at time of admission.

## 2024-01-01 LAB
ALBUMIN SERPL ELPH-MCNC: 2.9 G/DL — LOW (ref 3.5–5.2)
ALBUMIN SERPL ELPH-MCNC: 2.9 G/DL — LOW (ref 3.5–5.2)
ALP SERPL-CCNC: 89 U/L — SIGNIFICANT CHANGE UP (ref 30–115)
ALP SERPL-CCNC: 89 U/L — SIGNIFICANT CHANGE UP (ref 30–115)
ALT FLD-CCNC: 9 U/L — SIGNIFICANT CHANGE UP (ref 0–41)
ALT FLD-CCNC: 9 U/L — SIGNIFICANT CHANGE UP (ref 0–41)
ANION GAP SERPL CALC-SCNC: 12 MMOL/L — SIGNIFICANT CHANGE UP (ref 7–14)
ANION GAP SERPL CALC-SCNC: 12 MMOL/L — SIGNIFICANT CHANGE UP (ref 7–14)
AST SERPL-CCNC: 15 U/L — SIGNIFICANT CHANGE UP (ref 0–41)
AST SERPL-CCNC: 15 U/L — SIGNIFICANT CHANGE UP (ref 0–41)
BILIRUB SERPL-MCNC: 1.4 MG/DL — HIGH (ref 0.2–1.2)
BILIRUB SERPL-MCNC: 1.4 MG/DL — HIGH (ref 0.2–1.2)
BUN SERPL-MCNC: 29 MG/DL — HIGH (ref 10–20)
BUN SERPL-MCNC: 29 MG/DL — HIGH (ref 10–20)
CALCIUM SERPL-MCNC: 7.8 MG/DL — LOW (ref 8.4–10.5)
CALCIUM SERPL-MCNC: 7.8 MG/DL — LOW (ref 8.4–10.5)
CHLORIDE SERPL-SCNC: 89 MMOL/L — LOW (ref 98–110)
CHLORIDE SERPL-SCNC: 89 MMOL/L — LOW (ref 98–110)
CO2 SERPL-SCNC: 26 MMOL/L — SIGNIFICANT CHANGE UP (ref 17–32)
CO2 SERPL-SCNC: 26 MMOL/L — SIGNIFICANT CHANGE UP (ref 17–32)
CREAT SERPL-MCNC: 0.6 MG/DL — LOW (ref 0.7–1.5)
CREAT SERPL-MCNC: 0.6 MG/DL — LOW (ref 0.7–1.5)
D DIMER BLD IA.RAPID-MCNC: 511 NG/ML DDU — HIGH
D DIMER BLD IA.RAPID-MCNC: 511 NG/ML DDU — HIGH
EGFR: 88 ML/MIN/1.73M2 — SIGNIFICANT CHANGE UP
EGFR: 88 ML/MIN/1.73M2 — SIGNIFICANT CHANGE UP
GLUCOSE SERPL-MCNC: 130 MG/DL — HIGH (ref 70–99)
GLUCOSE SERPL-MCNC: 130 MG/DL — HIGH (ref 70–99)
HCT VFR BLD CALC: 24.4 % — LOW (ref 37–47)
HCT VFR BLD CALC: 24.4 % — LOW (ref 37–47)
HGB BLD-MCNC: 8.5 G/DL — LOW (ref 12–16)
HGB BLD-MCNC: 8.5 G/DL — LOW (ref 12–16)
LACTATE SERPL-SCNC: 1.2 MMOL/L — SIGNIFICANT CHANGE UP (ref 0.7–2)
LACTATE SERPL-SCNC: 1.2 MMOL/L — SIGNIFICANT CHANGE UP (ref 0.7–2)
MCHC RBC-ENTMCNC: 30.6 PG — SIGNIFICANT CHANGE UP (ref 27–31)
MCHC RBC-ENTMCNC: 30.6 PG — SIGNIFICANT CHANGE UP (ref 27–31)
MCHC RBC-ENTMCNC: 34.8 G/DL — SIGNIFICANT CHANGE UP (ref 32–37)
MCHC RBC-ENTMCNC: 34.8 G/DL — SIGNIFICANT CHANGE UP (ref 32–37)
MCV RBC AUTO: 87.8 FL — SIGNIFICANT CHANGE UP (ref 81–99)
MCV RBC AUTO: 87.8 FL — SIGNIFICANT CHANGE UP (ref 81–99)
NRBC # BLD: 0 /100 WBCS — SIGNIFICANT CHANGE UP (ref 0–0)
NRBC # BLD: 0 /100 WBCS — SIGNIFICANT CHANGE UP (ref 0–0)
PLATELET # BLD AUTO: 276 K/UL — SIGNIFICANT CHANGE UP (ref 130–400)
PLATELET # BLD AUTO: 276 K/UL — SIGNIFICANT CHANGE UP (ref 130–400)
PMV BLD: 9.7 FL — SIGNIFICANT CHANGE UP (ref 7.4–10.4)
PMV BLD: 9.7 FL — SIGNIFICANT CHANGE UP (ref 7.4–10.4)
POTASSIUM SERPL-MCNC: 3.9 MMOL/L — SIGNIFICANT CHANGE UP (ref 3.5–5)
POTASSIUM SERPL-MCNC: 3.9 MMOL/L — SIGNIFICANT CHANGE UP (ref 3.5–5)
POTASSIUM SERPL-SCNC: 3.9 MMOL/L — SIGNIFICANT CHANGE UP (ref 3.5–5)
POTASSIUM SERPL-SCNC: 3.9 MMOL/L — SIGNIFICANT CHANGE UP (ref 3.5–5)
PROCALCITONIN SERPL-MCNC: 0.95 NG/ML — HIGH (ref 0.02–0.1)
PROCALCITONIN SERPL-MCNC: 0.95 NG/ML — HIGH (ref 0.02–0.1)
PROT SERPL-MCNC: 5 G/DL — LOW (ref 6–8)
PROT SERPL-MCNC: 5 G/DL — LOW (ref 6–8)
RBC # BLD: 2.78 M/UL — LOW (ref 4.2–5.4)
RBC # BLD: 2.78 M/UL — LOW (ref 4.2–5.4)
RBC # FLD: 14.6 % — HIGH (ref 11.5–14.5)
RBC # FLD: 14.6 % — HIGH (ref 11.5–14.5)
SODIUM SERPL-SCNC: 127 MMOL/L — LOW (ref 135–146)
SODIUM SERPL-SCNC: 127 MMOL/L — LOW (ref 135–146)
TROPONIN T, HIGH SENSITIVITY RESULT: 211 NG/L — CRITICAL HIGH (ref 6–13)
TROPONIN T, HIGH SENSITIVITY RESULT: 211 NG/L — CRITICAL HIGH (ref 6–13)
WBC # BLD: 19.34 K/UL — HIGH (ref 4.8–10.8)
WBC # BLD: 19.34 K/UL — HIGH (ref 4.8–10.8)
WBC # FLD AUTO: 19.34 K/UL — HIGH (ref 4.8–10.8)
WBC # FLD AUTO: 19.34 K/UL — HIGH (ref 4.8–10.8)

## 2024-01-01 PROCEDURE — 99291 CRITICAL CARE FIRST HOUR: CPT

## 2024-01-01 PROCEDURE — 94010 BREATHING CAPACITY TEST: CPT | Mod: 26

## 2024-01-01 PROCEDURE — 99233 SBSQ HOSP IP/OBS HIGH 50: CPT

## 2024-01-01 RX ORDER — SODIUM CHLORIDE 9 MG/ML
500 INJECTION, SOLUTION INTRAVENOUS
Refills: 0 | Status: DISCONTINUED | OUTPATIENT
Start: 2024-01-01 | End: 2024-01-02

## 2024-01-01 RX ORDER — PIPERACILLIN AND TAZOBACTAM 4; .5 G/20ML; G/20ML
3.38 INJECTION, POWDER, LYOPHILIZED, FOR SOLUTION INTRAVENOUS ONCE
Refills: 0 | Status: COMPLETED | OUTPATIENT
Start: 2024-01-01 | End: 2024-01-01

## 2024-01-01 RX ORDER — SODIUM CHLORIDE 9 MG/ML
1000 INJECTION, SOLUTION INTRAVENOUS
Refills: 0 | Status: DISCONTINUED | OUTPATIENT
Start: 2024-01-01 | End: 2024-01-01

## 2024-01-01 RX ORDER — VANCOMYCIN HCL 1 G
500 VIAL (EA) INTRAVENOUS EVERY 12 HOURS
Refills: 0 | Status: DISCONTINUED | OUTPATIENT
Start: 2024-01-02 | End: 2024-01-02

## 2024-01-01 RX ORDER — CHLORHEXIDINE GLUCONATE 213 G/1000ML
1 SOLUTION TOPICAL
Refills: 0 | Status: DISCONTINUED | OUTPATIENT
Start: 2024-01-01 | End: 2024-01-10

## 2024-01-01 RX ORDER — VANCOMYCIN HCL 1 G
VIAL (EA) INTRAVENOUS
Refills: 0 | Status: DISCONTINUED | OUTPATIENT
Start: 2024-01-01 | End: 2024-01-02

## 2024-01-01 RX ORDER — VANCOMYCIN HCL 1 G
VIAL (EA) INTRAVENOUS
Refills: 0 | Status: DISCONTINUED | OUTPATIENT
Start: 2024-01-01 | End: 2024-01-01

## 2024-01-01 RX ORDER — VANCOMYCIN HCL 1 G
500 VIAL (EA) INTRAVENOUS ONCE
Refills: 0 | Status: COMPLETED | OUTPATIENT
Start: 2024-01-01 | End: 2024-01-01

## 2024-01-01 RX ORDER — PIPERACILLIN AND TAZOBACTAM 4; .5 G/20ML; G/20ML
3.38 INJECTION, POWDER, LYOPHILIZED, FOR SOLUTION INTRAVENOUS EVERY 8 HOURS
Refills: 0 | Status: COMPLETED | OUTPATIENT
Start: 2024-01-01 | End: 2024-01-08

## 2024-01-01 RX ADMIN — ENOXAPARIN SODIUM 40 MILLIGRAM(S): 100 INJECTION SUBCUTANEOUS at 13:00

## 2024-01-01 RX ADMIN — BUDESONIDE AND FORMOTEROL FUMARATE DIHYDRATE 2 PUFF(S): 160; 4.5 AEROSOL RESPIRATORY (INHALATION) at 22:32

## 2024-01-01 RX ADMIN — BUDESONIDE AND FORMOTEROL FUMARATE DIHYDRATE 2 PUFF(S): 160; 4.5 AEROSOL RESPIRATORY (INHALATION) at 09:07

## 2024-01-01 RX ADMIN — Medication 100 MILLIGRAM(S): at 17:38

## 2024-01-01 RX ADMIN — SODIUM CHLORIDE 100 MILLILITER(S): 9 INJECTION, SOLUTION INTRAVENOUS at 12:59

## 2024-01-01 RX ADMIN — Medication 100 MILLIGRAM(S): at 09:45

## 2024-01-01 RX ADMIN — PIPERACILLIN AND TAZOBACTAM 200 GRAM(S): 4; .5 INJECTION, POWDER, LYOPHILIZED, FOR SOLUTION INTRAVENOUS at 09:45

## 2024-01-01 RX ADMIN — PIPERACILLIN AND TAZOBACTAM 25 GRAM(S): 4; .5 INJECTION, POWDER, LYOPHILIZED, FOR SOLUTION INTRAVENOUS at 22:18

## 2024-01-01 RX ADMIN — Medication 100 MILLIGRAM(S): at 22:18

## 2024-01-01 RX ADMIN — POLYETHYLENE GLYCOL 3350 17 GRAM(S): 17 POWDER, FOR SOLUTION ORAL at 12:59

## 2024-01-01 RX ADMIN — Medication 81 MILLIGRAM(S): at 12:59

## 2024-01-01 NOTE — CONSULT NOTE ADULT - ASSESSMENT
ASSESSMENT  83F w/ PMHx COPD, OA, chronic back pain s/p multiple surgeries and recent admission for left intertrochanteric femur fracture s/p ORIF 12/27/2023 brought in from [facility] for shortness of breath.     IMPRESSION  #Acute Hypoxemic Respiratory failure  #COVID-19, severe -- possible secondary bacterial pneumonia   - CTA Chest 12/31 - negative for PE -- bilateral lower lobe consolidative opacities  - WBC Count: 19.66 K/uL (12.31.23 @ 13:55)    #Femur fracture s/p ORIF 12/27/2023   #Obesity BMI (kg/m2): 21, 21, 21  #Abx allergy: No Known Allergies    RECOMMENDATIONS  This is a preliminary incomplete pended note, all final recommendations to follow after interview and examination of the patient.    Please call or message on [employer] Teams if with any questions.  Spectra [phone] ASSESSMENT  83F w/ PMHx COPD, OA, chronic back pain s/p multiple surgeries and recent admission for left intertrochanteric femur fracture s/p ORIF 12/27/2023 brought in from [facility] for shortness of breath.     IMPRESSION  #Acute Hypoxemic Respiratory failure  #COVID-19, severe -- possible secondary bacterial pneumonia   - CTA Chest 12/31 - negative for PE -- bilateral lower lobe consolidative opacities  - WBC Count: 19.66 K/uL (12.31.23 @ 13:55)    #Femur fracture s/p ORIF 12/27/2023   #Obesity BMI (kg/m2): 21, 21, 21  #Abx allergy: No Known Allergies    RECOMMENDATIONS  This is a preliminary incomplete pended note, all final recommendations to follow after interview and examination of the patient.    Please call or message on Microsoft Teams if with any questions.  Spectra [phone] ASSESSMENT  83F w/ PMHx COPD, OA, chronic back pain s/p multiple surgeries and recent admission for left intertrochanteric femur fracture s/p ORIF 12/27/2023 brought in from [facility] for shortness of breath.     IMPRESSION  #Acute Hypoxemic Respiratory failure  #COVID-19, severe -- possible secondary bacterial pneumonia   - CTA Chest 12/31 - negative for PE -- bilateral lower lobe consolidative opacities  - WBC Count: 19.66 K/uL (12.31.23 @ 13:55)    #Femur fracture s/p ORIF 12/27/2023   #Obesity BMI (kg/m2): 21, 21, 21  #Abx allergy: No Known Allergies    RECOMMENDATIONS  - continue zosyn 3.375 mg q 8 hours   - doxycycline 100 mg BID for 5 days   - dex 6 mg daily for 10 days   - discussed RDV -- daughter feels that clinically appears better -- would like to hold off for now which is ok   - trend WBC   - check MRSA nares      Please call or message on Microsoft Teams if with any questions.  Spectra [phone] ASSESSMENT  83F w/ PMHx COPD, OA, chronic back pain s/p multiple surgeries and recent admission for left intertrochanteric femur fracture s/p ORIF 12/27/2023 brought in from [facility] for shortness of breath.     IMPRESSION  #Acute Hypoxemic Respiratory failure  #COVID-19, severe -- possible secondary bacterial pneumonia   - CTA Chest 12/31 - negative for PE -- bilateral lower lobe consolidative opacities  - WBC Count: 19.66 K/uL (12.31.23 @ 13:55)    #Femur fracture s/p ORIF 12/27/2023   #Obesity BMI (kg/m2): 21, 21, 21  #Abx allergy: No Known Allergies    RECOMMENDATIONS  - continue zosyn 3.375 mg q 8 hours   - doxycycline 100 mg BID for 5 days   - dex 6 mg daily for 10 days   - discussed RDV -- daughter feels that clinically appears better -- would like to hold off for now which is ok   - trend WBC   - check MRSA nares      Please call or message on Microsoft Teams if with any questions.  Spectra [phone]

## 2024-01-01 NOTE — ED ADULT NURSE REASSESSMENT NOTE - NS ED NURSE REASSESS COMMENT FT1
Upon reassessment, Pt is a alert and restless, Keeps pulling at the Bipap. Skin is bruised and ecchymotic. Pt was turned and repositioned. BP elevated but stable. Cardiac monitor continued. Safety precautions maintained.

## 2024-01-01 NOTE — CONSULT NOTE ADULT - SUBJECTIVE AND OBJECTIVE BOX
Patient is a 83y old  Female who presents with a chief complaint of SOB (2024 13:38)      HPI:  83F w/ PMHx COPD, OA, chronic back pain s/p multiple surgeries and recent admission for left intertrochanteric femur fracture s/p ORIF 2023 brought in from [facility] for shortness of breath. EMS reports that she was hypoxic on room air to the 70s. Patient was initially AOx0, after being on NRB in EMS, patient mental status improving with improved oxygenation. Daughter reports the patient was doing okay yesterday and that this was an acute change. Patient currently AOx3, appears short of breath, denying any other complaints at this time.    Vitals: BP 84/53, HR 99, RR 22, SpO2 100% on NRB    Labs: WBC 19.66, Hgb 10.0 (at previous baseline), Na 124, Cr 0.9 (previously 0.6), BNP 9190, Trop 208>192 (previously 19), VBG lactate 2.9>2.4    COVID +ve    EKG - Sinus Tachycardia w/ possible PACs    Imagin. CT head shows no acute intracranial pathology  2. CTA Chest shows:  - no evidence of acute pulmonary embolus.  - Bilateral lower lobe consolidative opacities which can be seen with pneumonia    In the ED:  - s/p Cefepime IV x1  - s/p Levofloxacin IV x1    Admitted to SDU for management of acute hypoxic respiratory failure (31 Dec 2023 18:45)      PAST MEDICAL & SURGICAL HISTORY:  COPD (chronic obstructive pulmonary disease)      H/O cholelithiasis      Smoker      History of surgery  orif right ankle          PREVIOUS DIAGNOSTIC TESTING:      ECHO  FINDINGS:    STRESS  FINDINGS:    CATHETERIZATION  FINDINGS:    MEDICATIONS  (STANDING):  aspirin enteric coated 81 milliGRAM(s) Oral daily  budesonide 160 MICROgram(s)/formoterol 4.5 MICROgram(s) Inhaler 2 Puff(s) Inhalation two times a day  chlorhexidine 2% Cloths 1 Application(s) Topical <User Schedule>  dexAMETHasone  Injectable 6 milliGRAM(s) IV Push daily  doxycycline IVPB 100 milliGRAM(s) IV Intermittent every 12 hours  doxycycline IVPB      enoxaparin Injectable 40 milliGRAM(s) SubCutaneous every 24 hours  lactated ringers. 500 milliLiter(s) (100 mL/Hr) IV Continuous <Continuous>  piperacillin/tazobactam IVPB.. 3.375 Gram(s) IV Intermittent every 8 hours  polyethylene glycol 3350 17 Gram(s) Oral daily  senna 2 Tablet(s) Oral at bedtime  tiotropium 2.5 MICROgram(s) Inhaler 2 Puff(s) Inhalation daily  vancomycin  IVPB 500 milliGRAM(s) IV Intermittent once  vancomycin  IVPB        MEDICATIONS  (PRN):  acetaminophen     Tablet .. 650 milliGRAM(s) Oral every 6 hours PRN Temp greater or equal to 38C (100.4F), Mild Pain (1 - 3)  aluminum hydroxide/magnesium hydroxide/simethicone Suspension 30 milliLiter(s) Oral every 4 hours PRN Dyspepsia  melatonin 3 milliGRAM(s) Oral at bedtime PRN Insomnia  ondansetron Injectable 4 milliGRAM(s) IV Push every 8 hours PRN Nausea and/or Vomiting      FAMILY HISTORY:  Known health problems: none (Father, Mother)        SOCIAL HISTORY:  CIGARETTES:    ALCOHOL:    REVIEW OF SYSTEMS:  CONSTITUTIONAL: No fever, weight loss, or fatigue  NECK: No pain or stiffness  RESPIRATORY: No cough, wheezing, chills or hemoptysis; No shortness of breath  CARDIOVASCULAR: No chest pain, palpitations, dizziness, or leg swelling  GASTROINTESTINAL: No abdominal or epigastric pain. No nausea, vomiting, or hematemesis; No diarrhea or constipation. No melena or hematochezia.  GENITOURINARY: No dysuria, frequency, hematuria, or incontinence  NEUROLOGICAL: No headaches, memory loss, loss of strength, numbness, or tremors  SKIN: No itching, burning, rashes, or lesions   ENDOCRINE: No heat or cold intolerance; No hair loss  MUSCULOSKELETAL: No joint pain or swelling; No muscle, back, or extremity pain  HEME/LYMPH: No easy bruising, or bleeding gums          Vital Signs Last 24 Hrs  T(C): 37.6 (2024 12:15), Max: 37.6 (2024 12:15)  T(F): 99.7 (2024 12:15), Max: 99.7 (2024 12:15)  HR: 111 (2024 13:35) (96 - 178)  BP: 140/72 (2024 12:15) (112/64 - 140/72)  BP(mean): 101 (2024 12:15) (99 - 101)  RR: 22 (2024 13:35) (20 - 22)  SpO2: 95% (2024 13:35) (94% - 100%)    Parameters below as of 2024 13:35  Patient On (Oxygen Delivery Method): nasal cannula, high flow            PHYSICAL EXAM:  GENERAL: NAD, well-groomed, well-developed  HEAD:  Atraumatic, Normocephalic  NECK: Supple, No JVD, Normal thyroid  NERVOUS SYSTEM:  Alert & Oriented X3, Good concentration  CHEST/LUNG: Clear to percussion bilaterally; No rales, rhonchi, wheezing, or rubs  HEART: Regular rate and rhythm; No murmurs, rubs, or gallops  ABDOMEN: Soft, Nontender, Nondistended; Bowel sounds present  EXTREMITIES:  2+ Peripheral Pulses, No clubbing, cyanosis, or edema  SKIN: No rashes or lesions    INTERPRETATION OF TELEMETRY:    ECG:    I&O's Detail      LABS:                        10.0   19.66 )-----------( 297      ( 31 Dec 2023 13:55 )             28.8     12    124<L>  |  85<L>  |  29<H>  ----------------------------<  133<H>  4.2   |  29  |  0.9    Ca    8.4      31 Dec 2023 13:55    TPro  6.1  /  Alb  3.5  /  TBili  1.4<H>  /  DBili  x   /  AST  21  /  ALT  11  /  AlkPhos  95  12          Urinalysis Basic - ( 31 Dec 2023 13:55 )    Color: x / Appearance: x / SG: x / pH: x  Gluc: 133 mg/dL / Ketone: x  / Bili: x / Urobili: x   Blood: x / Protein: x / Nitrite: x   Leuk Esterase: x / RBC: x / WBC x   Sq Epi: x / Non Sq Epi: x / Bacteria: x      I&O's Summary      RADIOLOGY & ADDITIONAL STUDIES:           Patient is a 83y old  Female who presents with a chief complaint of SOB (2024 13:38)      HPI:  83F w/ PMHx COPD, OA, chronic back pain s/p multiple surgeries and recent admission for left intertrochanteric femur fracture s/p ORIF 2023 brought in from [facility] for shortness of breath. EMS reports that she was hypoxic on room air to the 70s. Patient was initially AOx0, after being on NRB in EMS, patient mental status improving with improved oxygenation. Daughter reports the patient was doing okay yesterday and that this was an acute change. Patient currently AOx3, appears short of breath, denying any other complaints at this time.    Vitals: BP 84/53, HR 99, RR 22, SpO2 100% on NRB    Labs: WBC 19.66, Hgb 10.0 (at previous baseline), Na 124, Cr 0.9 (previously 0.6), BNP 9190, Trop 208>192 (previously 19), VBG lactate 2.9>2.4    COVID +ve    EKG - Sinus Tachycardia w/ possible PACs    Imagin. CT head shows no acute intracranial pathology  2. CTA Chest shows:  - no evidence of acute pulmonary embolus.  - Bilateral lower lobe consolidative opacities which can be seen with pneumonia    In the ED:  - s/p Cefepime IV x1  - s/p Levofloxacin IV x1    Admitted to SDU for management of acute hypoxic respiratory failure (31 Dec 2023 18:45)      PAST MEDICAL & SURGICAL HISTORY:  COPD (chronic obstructive pulmonary disease)      H/O cholelithiasis      Smoker      History of surgery  orif right ankle          PREVIOUS DIAGNOSTIC TESTING:      ECHO  FINDINGS:    STRESS  FINDINGS:    CATHETERIZATION  FINDINGS:    MEDICATIONS  (STANDING):  aspirin enteric coated 81 milliGRAM(s) Oral daily  budesonide 160 MICROgram(s)/formoterol 4.5 MICROgram(s) Inhaler 2 Puff(s) Inhalation two times a day  chlorhexidine 2% Cloths 1 Application(s) Topical <User Schedule>  dexAMETHasone  Injectable 6 milliGRAM(s) IV Push daily  doxycycline IVPB 100 milliGRAM(s) IV Intermittent every 12 hours  doxycycline IVPB      enoxaparin Injectable 40 milliGRAM(s) SubCutaneous every 24 hours  lactated ringers. 500 milliLiter(s) (100 mL/Hr) IV Continuous <Continuous>  piperacillin/tazobactam IVPB.. 3.375 Gram(s) IV Intermittent every 8 hours  polyethylene glycol 3350 17 Gram(s) Oral daily  senna 2 Tablet(s) Oral at bedtime  tiotropium 2.5 MICROgram(s) Inhaler 2 Puff(s) Inhalation daily  vancomycin  IVPB 500 milliGRAM(s) IV Intermittent once  vancomycin  IVPB        MEDICATIONS  (PRN):  acetaminophen     Tablet .. 650 milliGRAM(s) Oral every 6 hours PRN Temp greater or equal to 38C (100.4F), Mild Pain (1 - 3)  aluminum hydroxide/magnesium hydroxide/simethicone Suspension 30 milliLiter(s) Oral every 4 hours PRN Dyspepsia  melatonin 3 milliGRAM(s) Oral at bedtime PRN Insomnia  ondansetron Injectable 4 milliGRAM(s) IV Push every 8 hours PRN Nausea and/or Vomiting      FAMILY HISTORY:  Known health problems: none (Father, Mother)        SOCIAL HISTORY:  CIGARETTES:    ALCOHOL:    REVIEW OF SYSTEMS:  CONSTITUTIONAL: No fever, weight loss, or fatigue  NECK: No pain or stiffness  RESPIRATORY: No cough, wheezing, chills or hemoptysis; No shortness of breath  CARDIOVASCULAR: No chest pain, palpitations, dizziness, or leg swelling  GASTROINTESTINAL: No abdominal or epigastric pain. No nausea, vomiting, or hematemesis; No diarrhea or constipation. No melena or hematochezia.  GENITOURINARY: No dysuria, frequency, hematuria, or incontinence  NEUROLOGICAL: No headaches, memory loss, loss of strength, numbness, or tremors  SKIN: No itching, burning, rashes, or lesions   ENDOCRINE: No heat or cold intolerance; No hair loss  MUSCULOSKELETAL: No joint pain or swelling; No muscle, back, or extremity pain  HEME/LYMPH: No easy bruising, or bleeding gums          Vital Signs Last 24 Hrs  T(C): 37.6 (2024 12:15), Max: 37.6 (2024 12:15)  T(F): 99.7 (2024 12:15), Max: 99.7 (2024 12:15)  HR: 111 (2024 13:35) (96 - 178)  BP: 140/72 (2024 12:15) (112/64 - 140/72)  BP(mean): 101 (2024 12:15) (99 - 101)  RR: 22 (2024 13:35) (20 - 22)  SpO2: 95% (2024 13:35) (94% - 100%)    Parameters below as of 2024 13:35  Patient On (Oxygen Delivery Method): nasal cannula, high flow            PHYSICAL EXAM:  GENERAL: NAD, well-groomed, well-developed  HEAD:  Atraumatic, Normocephalic  NECK: Supple, No JVD, Normal thyroid  NERVOUS SYSTEM:  Alert & Oriented X3, Good concentration  CHEST/LUNG: Clear to percussion bilaterally; No rales, rhonchi, wheezing, or rubs  HEART: Regular rate and rhythm; No murmurs, rubs, or gallops  ABDOMEN: Soft, Nontender, Nondistended; Bowel sounds present  EXTREMITIES:  2+ Peripheral Pulses, No clubbing, cyanosis, or edema  SKIN: No rashes or lesions    INTERPRETATION OF TELEMETRY:    ECG:    I&O's Detail      LABS:                        10.0   19.66 )-----------( 297      ( 31 Dec 2023 13:55 )             28.8     12    124<L>  |  85<L>  |  29<H>  ----------------------------<  133<H>  4.2   |  29  |  0.9    Ca    8.4      31 Dec 2023 13:55    TPro  6.1  /  Alb  3.5  /  TBili  1.4<H>  /  DBili  x   /  AST  21  /  ALT  11  /  AlkPhos  95  12          Urinalysis Basic - ( 31 Dec 2023 13:55 )    Color: x / Appearance: x / SG: x / pH: x  Gluc: 133 mg/dL / Ketone: x  / Bili: x / Urobili: x   Blood: x / Protein: x / Nitrite: x   Leuk Esterase: x / RBC: x / WBC x   Sq Epi: x / Non Sq Epi: x / Bacteria: x      I&O's Summary      RADIOLOGY & ADDITIONAL STUDIES:           Patient is a 83y old  Female who presents with a chief complaint of SOB (2024 13:38)      HPI:  83F w/ PMHx COPD, OA, chronic back pain s/p multiple surgeries and recent admission for left intertrochanteric femur fracture s/p ORIF 2023 brought in from [facility] for shortness of breath. EMS reports that she was hypoxic on room air to the 70s. Patient was initially AOx0, after being on NRB in EMS, patient mental status improving with improved oxygenation. Daughter reports the patient was doing okay yesterday and that this was an acute change. Patient currently AOx3, appears short of breath, denying any other complaints at this time.    Vitals: BP 84/53, HR 99, RR 22, SpO2 100% on NRB    Labs: WBC 19.66, Hgb 10.0 (at previous baseline), Na 124, Cr 0.9 (previously 0.6), BNP 9190, Trop 208>192 (previously 19), VBG lactate 2.9>2.4    COVID +ve    EKG - Sinus Tachycardia w/ possible PACs    Imagin. CT head shows no acute intracranial pathology  2. CTA Chest shows:  - no evidence of acute pulmonary embolus.  - Bilateral lower lobe consolidative opacities which can be seen with pneumonia    In the ED:  - s/p Cefepime IV x1  - s/p Levofloxacin IV x1    Admitted to SDU for management of acute hypoxic respiratory failure (31 Dec 2023 18:45)      PAST MEDICAL & SURGICAL HISTORY:  COPD (chronic obstructive pulmonary disease)      H/O cholelithiasis      Smoker      History of surgery  orif right ankle          PREVIOUS DIAGNOSTIC TESTING:      ECHO  FINDINGS:    STRESS  FINDINGS:    CATHETERIZATION  FINDINGS:    MEDICATIONS  (STANDING):  aspirin enteric coated 81 milliGRAM(s) Oral daily  budesonide 160 MICROgram(s)/formoterol 4.5 MICROgram(s) Inhaler 2 Puff(s) Inhalation two times a day  chlorhexidine 2% Cloths 1 Application(s) Topical <User Schedule>  dexAMETHasone  Injectable 6 milliGRAM(s) IV Push daily  doxycycline IVPB 100 milliGRAM(s) IV Intermittent every 12 hours  doxycycline IVPB      enoxaparin Injectable 40 milliGRAM(s) SubCutaneous every 24 hours  lactated ringers. 500 milliLiter(s) (100 mL/Hr) IV Continuous <Continuous>  piperacillin/tazobactam IVPB.. 3.375 Gram(s) IV Intermittent every 8 hours  polyethylene glycol 3350 17 Gram(s) Oral daily  senna 2 Tablet(s) Oral at bedtime  tiotropium 2.5 MICROgram(s) Inhaler 2 Puff(s) Inhalation daily  vancomycin  IVPB 500 milliGRAM(s) IV Intermittent once  vancomycin  IVPB        MEDICATIONS  (PRN):  acetaminophen     Tablet .. 650 milliGRAM(s) Oral every 6 hours PRN Temp greater or equal to 38C (100.4F), Mild Pain (1 - 3)  aluminum hydroxide/magnesium hydroxide/simethicone Suspension 30 milliLiter(s) Oral every 4 hours PRN Dyspepsia  melatonin 3 milliGRAM(s) Oral at bedtime PRN Insomnia  ondansetron Injectable 4 milliGRAM(s) IV Push every 8 hours PRN Nausea and/or Vomiting      FAMILY HISTORY:  Known health problems: none (Father, Mother)        SOCIAL HISTORY:  CIGARETTES: Current smoker     ALCOHOL: Negative     REVIEW OF SYSTEMS:  CONSTITUTIONAL: No fever, weight loss, or fatigue  NECK: No pain or stiffness  RESPIRATORY: + cough. No wheezing, chills or hemoptysis; + shortness of breath  CARDIOVASCULAR: No chest pain, palpitations, dizziness, or leg swelling  GASTROINTESTINAL: No abdominal or epigastric pain. No nausea, vomiting, or hematemesis; No diarrhea or constipation. No melena or hematochezia.  GENITOURINARY: No dysuria, frequency, hematuria, or incontinence  NEUROLOGICAL: No headaches, memory loss, loss of strength, numbness, or tremors  SKIN: No itching, burning, rashes, or lesions   ENDOCRINE: No heat or cold intolerance; No hair loss  MUSCULOSKELETAL: left leg pain   HEME/LYMPH: No easy bruising, or bleeding gums          Vital Signs Last 24 Hrs  T(C): 37.6 (2024 12:15), Max: 37.6 (2024 12:15)  T(F): 99.7 (2024 12:15), Max: 99.7 (2024 12:15)  HR: 111 (2024 13:35) (96 - 178)  BP: 140/72 (2024 12:15) (112/64 - 140/72)  BP(mean): 101 (2024 12:15) (99 - 101)  RR: 22 (2024 13:35) (20 - 22)  SpO2: 95% (2024 13:35) (94% - 100%)    Parameters below as of 2024 13:35  Patient On (Oxygen Delivery Method): nasal cannula, high flow            PHYSICAL EXAM:  GENERAL: NAD, well-groomed, well-developed  HEAD:  Atraumatic, Normocephalic  NECK: Supple, No JVD, Normal thyroid  NERVOUS SYSTEM:  Alert & Oriented X3, Good concentration  CHEST/LUNG: Decrease BS bilaterally. Scattered wheezes bilaterally   HEART: Regular rate and rhythm; No murmurs, rubs, or gallops  ABDOMEN: Soft, Nontender, Nondistended; Bowel sounds present  EXTREMITIES:  2+ Peripheral Pulses, No clubbing, cyanosis, or edema  SKIN: No rashes or lesions    INTERPRETATION OF TELEMETRY:       LABS:                        10.0   19.66 )-----------( 297      ( 31 Dec 2023 13:55 )             28.8     12    124<L>  |  85<L>  |  29<H>  ----------------------------<  133<H>  4.2   |  29  |  0.9    Ca    8.4      31 Dec 2023 13:55    TPro  6.1  /  Alb  3.5  /  TBili  1.4<H>  /  DBili  x   /  AST  21  /  ALT  11  /  AlkPhos  95  12-31          Urinalysis Basic - ( 31 Dec 2023 13:55 )    Color: x / Appearance: x / SG: x / pH: x  Gluc: 133 mg/dL / Ketone: x  / Bili: x / Urobili: x   Blood: x / Protein: x / Nitrite: x   Leuk Esterase: x / RBC: x / WBC x   Sq Epi: x / Non Sq Epi: x / Bacteria: x      I&O's Summary      RADIOLOGY & ADDITIONAL STUDIES:        Continue current as per ID and medical team   Keep negative balance   Trend troponins  DVT/GI prophylaxis   Pulmonary evaluation   Physical therapy/Rehab   will F/U    Patient is a 83y old  Female who presents with a chief complaint of SOB (2024 13:38)      HPI:  83F w/ PMHx COPD, OA, chronic back pain s/p multiple surgeries and recent admission for left intertrochanteric femur fracture s/p ORIF 2023 brought in from [facility] for shortness of breath. EMS reports that she was hypoxic on room air to the 70s. Patient was initially AOx0, after being on NRB in EMS, patient mental status improving with improved oxygenation. Daughter reports the patient was doing okay yesterday and that this was an acute change. Patient currently AOx3, appears short of breath, denying any other complaints at this time.    Vitals: BP 84/53, HR 99, RR 22, SpO2 100% on NRB    Labs: WBC 19.66, Hgb 10.0 (at previous baseline), Na 124, Cr 0.9 (previously 0.6), BNP 9190, Trop 208>192 (previously 19), VBG lactate 2.9>2.4    COVID +ve    EKG - Sinus Tachycardia w/ possible PACs    Imagin. CT head shows no acute intracranial pathology  2. CTA Chest shows:  - no evidence of acute pulmonary embolus.  - Bilateral lower lobe consolidative opacities which can be seen with pneumonia    In the ED:  - s/p Cefepime IV x1  - s/p Levofloxacin IV x1    Admitted to SDU for management of acute hypoxic respiratory failure (31 Dec 2023 18:45)      PAST MEDICAL & SURGICAL HISTORY:  COPD (chronic obstructive pulmonary disease)      H/O cholelithiasis      Smoker      History of surgery  orif right ankle          PREVIOUS DIAGNOSTIC TESTING:      ECHO  FINDINGS:    STRESS  FINDINGS:    CATHETERIZATION  FINDINGS:    MEDICATIONS  (STANDING):  aspirin enteric coated 81 milliGRAM(s) Oral daily  budesonide 160 MICROgram(s)/formoterol 4.5 MICROgram(s) Inhaler 2 Puff(s) Inhalation two times a day  chlorhexidine 2% Cloths 1 Application(s) Topical <User Schedule>  dexAMETHasone  Injectable 6 milliGRAM(s) IV Push daily  doxycycline IVPB 100 milliGRAM(s) IV Intermittent every 12 hours  doxycycline IVPB      enoxaparin Injectable 40 milliGRAM(s) SubCutaneous every 24 hours  lactated ringers. 500 milliLiter(s) (100 mL/Hr) IV Continuous <Continuous>  piperacillin/tazobactam IVPB.. 3.375 Gram(s) IV Intermittent every 8 hours  polyethylene glycol 3350 17 Gram(s) Oral daily  senna 2 Tablet(s) Oral at bedtime  tiotropium 2.5 MICROgram(s) Inhaler 2 Puff(s) Inhalation daily  vancomycin  IVPB 500 milliGRAM(s) IV Intermittent once  vancomycin  IVPB        MEDICATIONS  (PRN):  acetaminophen     Tablet .. 650 milliGRAM(s) Oral every 6 hours PRN Temp greater or equal to 38C (100.4F), Mild Pain (1 - 3)  aluminum hydroxide/magnesium hydroxide/simethicone Suspension 30 milliLiter(s) Oral every 4 hours PRN Dyspepsia  melatonin 3 milliGRAM(s) Oral at bedtime PRN Insomnia  ondansetron Injectable 4 milliGRAM(s) IV Push every 8 hours PRN Nausea and/or Vomiting      FAMILY HISTORY:  Known health problems: none (Father, Mother)        SOCIAL HISTORY:  CIGARETTES: Current smoker     ALCOHOL: Negative     REVIEW OF SYSTEMS:  CONSTITUTIONAL: No fever, weight loss, or fatigue  NECK: No pain or stiffness  RESPIRATORY: + cough. No wheezing, chills or hemoptysis; + shortness of breath  CARDIOVASCULAR: No chest pain, palpitations, dizziness, or leg swelling  GASTROINTESTINAL: No abdominal or epigastric pain. No nausea, vomiting, or hematemesis; No diarrhea or constipation. No melena or hematochezia.  GENITOURINARY: No dysuria, frequency, hematuria, or incontinence  NEUROLOGICAL: No headaches, memory loss, loss of strength, numbness, or tremors  SKIN: No itching, burning, rashes, or lesions   ENDOCRINE: No heat or cold intolerance; No hair loss  MUSCULOSKELETAL: left leg pain   HEME/LYMPH: No easy bruising, or bleeding gums          Vital Signs Last 24 Hrs  T(C): 37.6 (2024 12:15), Max: 37.6 (2024 12:15)  T(F): 99.7 (2024 12:15), Max: 99.7 (2024 12:15)  HR: 111 (2024 13:35) (96 - 178)  BP: 140/72 (2024 12:15) (112/64 - 140/72)  BP(mean): 101 (2024 12:15) (99 - 101)  RR: 22 (2024 13:35) (20 - 22)  SpO2: 95% (2024 13:35) (94% - 100%)    Parameters below as of 2024 13:35  Patient On (Oxygen Delivery Method): nasal cannula, high flow            PHYSICAL EXAM:  GENERAL: NAD, well-groomed, well-developed  HEAD:  Atraumatic, Normocephalic  NECK: Supple, No JVD, Normal thyroid  NERVOUS SYSTEM:  Alert & Oriented X3, Good concentration  CHEST/LUNG: Decrease BS bilaterally. Scattered wheezes bilaterally   HEART: Regular rate and rhythm; No murmurs, rubs, or gallops  ABDOMEN: Soft, Nontender, Nondistended; Bowel sounds present  EXTREMITIES:  2+ Peripheral Pulses, No clubbing, cyanosis, or edema  SKIN: No rashes or lesions    INTERPRETATION OF TELEMETRY:       LABS:                        10.0   19.66 )-----------( 297      ( 31 Dec 2023 13:55 )             28.8     12    124<L>  |  85<L>  |  29<H>  ----------------------------<  133<H>  4.2   |  29  |  0.9    Ca    8.4      31 Dec 2023 13:55    TPro  6.1  /  Alb  3.5  /  TBili  1.4<H>  /  DBili  x   /  AST  21  /  ALT  11  /  AlkPhos  95  12-31          Urinalysis Basic - ( 31 Dec 2023 13:55 )    Color: x / Appearance: x / SG: x / pH: x  Gluc: 133 mg/dL / Ketone: x  / Bili: x / Urobili: x   Blood: x / Protein: x / Nitrite: x   Leuk Esterase: x / RBC: x / WBC x   Sq Epi: x / Non Sq Epi: x / Bacteria: x      I&O's Summary      RADIOLOGY & ADDITIONAL STUDIES:        Continue current as per ID and medical team   Keep negative balance   Trend troponins  DVT/GI prophylaxis   Pulmonary evaluation   Physical therapy/Rehab   will F/U

## 2024-01-01 NOTE — CONSULT NOTE ADULT - SUBJECTIVE AND OBJECTIVE BOX
SHANELL MORENO  83y, Female  Allergy: No Known Allergies      CHIEF COMPLAINT: SOB (2024 08:59)      LOS  1d    HPI:  83F w/ PMHx COPD, OA, chronic back pain s/p multiple surgeries and recent admission for left intertrochanteric femur fracture s/p ORIF 2023 brought in from [facility] for shortness of breath. EMS reports that she was hypoxic on room air to the 70s. Patient was initially AOx0, after being on NRB in EMS, patient mental status improving with improved oxygenation. Daughter reports the patient was doing okay yesterday and that this was an acute change. Patient currently AOx3, appears short of breath, denying any other complaints at this time.    Vitals: BP 84/53, HR 99, RR 22, SpO2 100% on NRB    Labs: WBC 19.66, Hgb 10.0 (at previous baseline), Na 124, Cr 0.9 (previously 0.6), BNP 9190, Trop 208>192 (previously 19), VBG lactate 2.9>2.4    COVID +ve    EKG - Sinus Tachycardia w/ possible PACs    Imagin. CT head shows no acute intracranial pathology  2. CTA Chest shows:  - no evidence of acute pulmonary embolus.  - Bilateral lower lobe consolidative opacities which can be seen with pneumonia    In the ED:  - s/p Cefepime IV x1  - s/p Levofloxacin IV x1    Admitted to SDU for management of acute hypoxic respiratory failure (31 Dec 2023 18:45)      INFECTIOUS DISEASE HISTORY:    PAST MEDICAL & SURGICAL HISTORY:  COPD (chronic obstructive pulmonary disease)      H/O cholelithiasis      Smoker      History of surgery  orif right ankle          FAMILY HISTORY  Known health problems: none (Father, Mother)        SOCIAL HISTORY  Social History:  Lives at home (25 Dec 2023 20:24)        ROS  General: Denies rigors, nightsweats  HEENT: Denies headache, rhinorrhea, sore throat, eye pain  CV: Denies CP, palpitations  PULM: Denies wheezing, hemoptysis  GI: Denies hematemesis, hematochezia, melena  : Denies discharge, hematuria  MSK: Denies arthralgias, myalgias  SKIN: Denies rash, lesions  NEURO: Denies paresthesias, weakness  PSYCH: Denies depression, anxiety    VITALS:  T(F): 99.7, Max: 99.7 (24 @ 12:15)  HR: 106  BP: 140/72  RR: 22Vital Signs Last 24 Hrs  T(C): 37.6 (2024 12:15), Max: 37.6 (2024 12:15)  T(F): 99.7 (2024 12:15), Max: 99.7 (2024 12:15)  HR: 106 (2024 12:15) (96 - 178)  BP: 140/72 (2024 12:15) (84/53 - 140/72)  BP(mean): 101 (2024 12:15) (99 - 101)  RR: 22 (2024 12:15) (20 - 22)  SpO2: 96% (2024 12:15) (94% - 100%)    Parameters below as of 2024 12:15  Patient On (Oxygen Delivery Method): nasal cannula, high flow        PHYSICAL EXAM:  Gen: NAD, resting in bed  HEENT: Normocephalic, atraumatic  Neck: supple, no lymphadenopathy  CV: Regular rate & regular rhythm  Lungs: decreased BS at bases, no fremitus  Abdomen: Soft, BS present  Ext: Warm, well perfused  Neuro: non focal, awake  Skin: no rash, no erythema  Lines: no phlebitis    TESTS & MEASUREMENTS:                        10.0   19.66 )-----------( 297      ( 31 Dec 2023 13:55 )             28.8     12    124<L>  |  85<L>  |  29<H>  ----------------------------<  133<H>  4.2   |  29  |  0.9    Ca    8.4      31 Dec 2023 13:55    TPro  6.1  /  Alb  3.5  /  TBili  1.4<H>  /  DBili  x   /  AST  21  /  ALT  11  /  AlkPhos  95  12-      LIVER FUNCTIONS - ( 31 Dec 2023 13:55 )  Alb: 3.5 g/dL / Pro: 6.1 g/dL / ALK PHOS: 95 U/L / ALT: 11 U/L / AST: 21 U/L / GGT: x           Urinalysis Basic - ( 31 Dec 2023 13:55 )    Color: x / Appearance: x / SG: x / pH: x  Gluc: 133 mg/dL / Ketone: x  / Bili: x / Urobili: x   Blood: x / Protein: x / Nitrite: x   Leuk Esterase: x / RBC: x / WBC x   Sq Epi: x / Non Sq Epi: x / Bacteria: x          Lactate, Blood: 2.2 mmol/L (23 @ 20:00)  Blood Gas Venous - Lactate: 2.4 mmol/L (23 @ 17:27)  Blood Gas Venous - Lactate: 2.9 mmol/L (23 @ 14:34)      INFECTIOUS DISEASES TESTING  COVID-19 PCR: NotDetec (23 @ 16:48)      RADIOLOGY & ADDITIONAL TESTS:  I have personally reviewed the last Chest xray  CXR      CT  CT Angio Chest PE Protocol w/ IV Cont:   ACC: [id number] EXAM:  CT ANGIO CHEST PULM [facility]   ORDERED BY: FELICITY CABRERA     PROCEDURE DATE:  2023          INTERPRETATION:  CLINICAL HISTORY / REASON FOR EXAM: Shortness of breath    TECHNIQUE: Multislice helical sections were obtained from the thoracic   inlet to the lung bases during rapid administration of following   administration of 65 mL Omnipaque 350 intravenous contrast using a CTA   pulmonary embolism protocol. Thin sections were reconstructed through the   pulmonaryvasculature. Coronal, sagittal and 3D/MIP reformatted images   are also submitted.    COMPARISON CT: CT chest from 2023    OTHER STUDIES USED FOR CORRELATION: Chest radiograph from 2023      FINDINGS:    PULMONARY EMBOLUS:No central or segmental pulmonary embolus.    TUBES/LINES: None.    LUNGS, PLEURA, AIRWAYS: Upper lobe predominant, centrilobular   emphysematous changes. Consolidative opacities within the inferior right   middle lobe and bilateral lower lobes. No pneumothorax. Trace left   pleural effusion. Central airways patent.    THORACIC NODES: Enlarged bilateral hilar lymph nodes.    MEDIASTINUM/GREAT VESSELS: No pericardial effusion. Heart size   unremarkable. Multivessel coronary artery calcifications. No aneurysmal   dilation of the thoracic aorta.    VISUALIZED UPPER ABDOMEN: Unremarkable.    BONES/SOFT TISSUES: Status post T10 and T11 kyphoplasty with moderate and   severe compression deformities respectively. Diffuse osteopenia.      IMPRESSION:    No CTA evidence of acute pulmonary embolus.    Bilateral lower lobe consolidative opacities which can be seen with   pneumonia. Recommend radiographic follow-up after treatment.    --- End of Report ---            RAFAEL ARCHER MD; Attending Radiologist  This document has been electronically signed. Dec 31 2023  6:38PM (23 @ 18:26)      CARDIOLOGY TESTING  12 Lead ECG:   Ventricular Rate 122 BPM    Atrial Rate 86 BPM    QRS Duration 122 ms    Q-T Interval 310 ms    QTC Calculation(Bazett) 441 ms    R Axis 125 degrees    T Axis 56 degrees    Diagnosis Line Atrial flutter with variable A-V block  Right bundle branch block  Abnormal ECG    Confirmed by Licha Wong MD ([id number]) on 2024 11:32:55 AM (23 @ 18:36)  12 Lead ECG:   Ventricular Rate 136 BPM    Atrial Rate 144 BPM    QRS Duration 132 ms    Q-T Interval 312 ms    QTC Calculation(Bazett) 469 ms    R Axis 38 degrees    T Axis 53 degrees    Diagnosis Line *** Poor data quality, interpretation may be adversely affected  Undetermined rhythm  Possible Atrial flutter  Indeterminate axis  Right bundle branch block  Abnormal ECG    Confirmed by Licha Wong MD [id number]) on 2024 11:06:47 AM (23 @ 14:08)      MEDICATIONS  aspirin enteric coated 81 Oral daily  budesonide 160 MICROgram(s)/formoterol 4.5 MICROgram(s) Inhaler 2 Inhalation two times a day  chlorhexidine 2% Cloths 1 Topical <User Schedule>  dexAMETHasone  Injectable 6 IV Push daily  doxycycline IVPB     doxycycline IVPB 100 IV Intermittent every 12 hours  enoxaparin Injectable 40 SubCutaneous every 24 hours  lactated ringers. 500 IV Continuous <Continuous>  piperacillin/tazobactam IVPB.. 3.375 IV Intermittent every 8 hours  polyethylene glycol 3350 17 Oral daily  senna 2 Oral at bedtime  tiotropium 2.5 MICROgram(s) Inhaler 2 Inhalation daily  vancomycin  IVPB     vancomycin  IVPB 500 IV Intermittent once      Weight  Weight (kg): 50.3 (24 @ 12:14)    ANTIBIOTICS:  doxycycline IVPB      doxycycline IVPB 100 milliGRAM(s) IV Intermittent every 12 hours  piperacillin/tazobactam IVPB.. 3.375 Gram(s) IV Intermittent every 8 hours  vancomycin  IVPB 500 milliGRAM(s) IV Intermittent once  vancomycin  IVPB          ALLERGIES:  No Known Allergies           SHANELL MORENO  83y, Female  Allergy: No Known Allergies      CHIEF COMPLAINT: SOB (2024 08:59)      LOS  1d    HPI:  83F w/ PMHx COPD, OA, chronic back pain s/p multiple surgeries and recent admission for left intertrochanteric femur fracture s/p ORIF 2023 brought in from [facility] for shortness of breath. EMS reports that she was hypoxic on room air to the 70s. Patient was initially AOx0, after being on NRB in EMS, patient mental status improving with improved oxygenation. Daughter reports the patient was doing okay yesterday and that this was an acute change. Patient currently AOx3, appears short of breath, denying any other complaints at this time.    Vitals: BP 84/53, HR 99, RR 22, SpO2 100% on NRB    Labs: WBC 19.66, Hgb 10.0 (at previous baseline), Na 124, Cr 0.9 (previously 0.6), BNP 9190, Trop 208>192 (previously 19), VBG lactate 2.9>2.4    COVID +ve    EKG - Sinus Tachycardia w/ possible PACs    Imagin. CT head shows no acute intracranial pathology  2. CTA Chest shows:  - no evidence of acute pulmonary embolus.  - Bilateral lower lobe consolidative opacities which can be seen with pneumonia    In the ED:  - s/p Cefepime IV x1  - s/p Levofloxacin IV x1    Admitted to SDU for management of acute hypoxic respiratory failure (31 Dec 2023 18:45)      INFECTIOUS DISEASE HISTORY:    PAST MEDICAL & SURGICAL HISTORY:  COPD (chronic obstructive pulmonary disease)      H/O cholelithiasis      Smoker      History of surgery  orif right ankle          FAMILY HISTORY  Known health problems: none (Father, Mother)        SOCIAL HISTORY  Social History:  Lives at home (25 Dec 2023 20:24)        ROS  General: Denies rigors, nightsweats  HEENT: Denies headache, rhinorrhea, sore throat, eye pain  CV: Denies CP, palpitations  PULM: Denies wheezing, hemoptysis  GI: Denies hematemesis, hematochezia, melena  : Denies discharge, hematuria  MSK: Denies arthralgias, myalgias  SKIN: Denies rash, lesions  NEURO: Denies paresthesias, weakness  PSYCH: Denies depression, anxiety    VITALS:  T(F): 99.7, Max: 99.7 (24 @ 12:15)  HR: 106  BP: 140/72  RR: 22Vital Signs Last 24 Hrs  T(C): 37.6 (2024 12:15), Max: 37.6 (2024 12:15)  T(F): 99.7 (2024 12:15), Max: 99.7 (2024 12:15)  HR: 106 (2024 12:15) (96 - 178)  BP: 140/72 (2024 12:15) (84/53 - 140/72)  BP(mean): 101 (2024 12:15) (99 - 101)  RR: 22 (2024 12:15) (20 - 22)  SpO2: 96% (2024 12:15) (94% - 100%)    Parameters below as of 2024 12:15  Patient On (Oxygen Delivery Method): nasal cannula, high flow        PHYSICAL EXAM:  Gen: NAD, resting in bed  HEENT: Normocephalic, atraumatic  Neck: supple, no lymphadenopathy  CV: Regular rate & regular rhythm  Lungs: decreased BS at bases, no fremitus  Abdomen: Soft, BS present  Ext: Warm, well perfused  Neuro: non focal, awake  Skin: no rash, no erythema  Lines: no phlebitis    TESTS & MEASUREMENTS:                        10.0   19.66 )-----------( 297      ( 31 Dec 2023 13:55 )             28.8     12    124<L>  |  85<L>  |  29<H>  ----------------------------<  133<H>  4.2   |  29  |  0.9    Ca    8.4      31 Dec 2023 13:55    TPro  6.1  /  Alb  3.5  /  TBili  1.4<H>  /  DBili  x   /  AST  21  /  ALT  11  /  AlkPhos  95  12-      LIVER FUNCTIONS - ( 31 Dec 2023 13:55 )  Alb: 3.5 g/dL / Pro: 6.1 g/dL / ALK PHOS: 95 U/L / ALT: 11 U/L / AST: 21 U/L / GGT: x           Urinalysis Basic - ( 31 Dec 2023 13:55 )    Color: x / Appearance: x / SG: x / pH: x  Gluc: 133 mg/dL / Ketone: x  / Bili: x / Urobili: x   Blood: x / Protein: x / Nitrite: x   Leuk Esterase: x / RBC: x / WBC x   Sq Epi: x / Non Sq Epi: x / Bacteria: x          Lactate, Blood: 2.2 mmol/L (23 @ 20:00)  Blood Gas Venous - Lactate: 2.4 mmol/L (23 @ 17:27)  Blood Gas Venous - Lactate: 2.9 mmol/L (23 @ 14:34)      INFECTIOUS DISEASES TESTING  COVID-19 PCR: NotDetec (23 @ 16:48)      RADIOLOGY & ADDITIONAL TESTS:  I have personally reviewed the last Chest xray  CXR      CT  CT Angio Chest PE Protocol w/ IV Cont:   ACC: [id number] EXAM:  CT ANGIO CHEST PULM [facility]   ORDERED BY: FELICITY CABRERA     PROCEDURE DATE:  2023          INTERPRETATION:  CLINICAL HISTORY / REASON FOR EXAM: Shortness of breath    TECHNIQUE: Multislice helical sections were obtained from the thoracic   inlet to the lung bases during rapid administration of following   administration of 65 mL Omnipaque 350 intravenous contrast using a CTA   pulmonary embolism protocol. Thin sections were reconstructed through the   pulmonaryvasculature. Coronal, sagittal and 3D/MIP reformatted images   are also submitted.    COMPARISON CT: CT chest from 2023    OTHER STUDIES USED FOR CORRELATION: Chest radiograph from 2023      FINDINGS:    PULMONARY EMBOLUS:No central or segmental pulmonary embolus.    TUBES/LINES: None.    LUNGS, PLEURA, AIRWAYS: Upper lobe predominant, centrilobular   emphysematous changes. Consolidative opacities within the inferior right   middle lobe and bilateral lower lobes. No pneumothorax. Trace left   pleural effusion. Central airways patent.    THORACIC NODES: Enlarged bilateral hilar lymph nodes.    MEDIASTINUM/GREAT VESSELS: No pericardial effusion. Heart size   unremarkable. Multivessel coronary artery calcifications. No aneurysmal   dilation of the thoracic aorta.    VISUALIZED UPPER ABDOMEN: Unremarkable.    BONES/SOFT TISSUES: Status post T10 and T11 kyphoplasty with moderate and   severe compression deformities respectively. Diffuse osteopenia.      IMPRESSION:    No CTA evidence of acute pulmonary embolus.    Bilateral lower lobe consolidative opacities which can be seen with   pneumonia. Recommend radiographic follow-up after treatment.    --- End of Report ---            RAFAEL ARCHER MD; Attending Radiologist  This document has been electronically signed. Dec 31 2023  6:38PM (23 @ 18:26)      CARDIOLOGY TESTING  12 Lead ECG:   Ventricular Rate 122 BPM    Atrial Rate 86 BPM    QRS Duration 122 ms    Q-T Interval 310 ms    QTC Calculation(Bazett) 441 ms    R Axis 125 degrees    T Axis 56 degrees    Diagnosis Line Atrial flutter with variable A-V block  Right bundle branch block  Abnormal ECG    Confirmed by Licha Wong MD ([id number]) on 2024 11:32:55 AM (23 @ 18:36)  12 Lead ECG:   Ventricular Rate 136 BPM    Atrial Rate 144 BPM    QRS Duration 132 ms    Q-T Interval 312 ms    QTC Calculation(Bazett) 469 ms    R Axis 38 degrees    T Axis 53 degrees    Diagnosis Line *** Poor data quality, interpretation may be adversely affected  Undetermined rhythm  Possible Atrial flutter  Indeterminate axis  Right bundle branch block  Abnormal ECG    Confirmed by Licha Wong MD [id number]) on 2024 11:06:47 AM (23 @ 14:08)      MEDICATIONS  aspirin enteric coated 81 Oral daily  budesonide 160 MICROgram(s)/formoterol 4.5 MICROgram(s) Inhaler 2 Inhalation two times a day  chlorhexidine 2% Cloths 1 Topical <User Schedule>  dexAMETHasone  Injectable 6 IV Push daily  doxycycline IVPB     doxycycline IVPB 100 IV Intermittent every 12 hours  enoxaparin Injectable 40 SubCutaneous every 24 hours  lactated ringers. 500 IV Continuous <Continuous>  piperacillin/tazobactam IVPB.. 3.375 IV Intermittent every 8 hours  polyethylene glycol 3350 17 Oral daily  senna 2 Oral at bedtime  tiotropium 2.5 MICROgram(s) Inhaler 2 Inhalation daily  vancomycin  IVPB     vancomycin  IVPB 500 IV Intermittent once      Weight  Weight (kg): 50.3 (24 @ 12:14)    ANTIBIOTICS:  doxycycline IVPB      doxycycline IVPB 100 milliGRAM(s) IV Intermittent every 12 hours  piperacillin/tazobactam IVPB.. 3.375 Gram(s) IV Intermittent every 8 hours  vancomycin  IVPB 500 milliGRAM(s) IV Intermittent once  vancomycin  IVPB          ALLERGIES:  No Known Allergies           SHANELL MORENO  83y, Female  Allergy: No Known Allergies      CHIEF COMPLAINT: SOB (2024 08:59)      LOS  1d    HPI:  83F w/ PMHx COPD, OA, chronic back pain s/p multiple surgeries and recent admission for left intertrochanteric femur fracture s/p ORIF 2023 brought in from [facility] [place] for shortness of breath. EMS reports that she was hypoxic on room air to the 70s. Patient was initially AOx0, after being on NRB in EMS, patient mental status improving with improved oxygenation. Daughter reports the patient was doing okay yesterday and that this was an acute change. Patient currently AOx3, appears short of breath, denying any other complaints at this time.    Vitals: BP 84/53, HR 99, RR 22, SpO2 100% on NRB    Labs: WBC 19.66, Hgb 10.0 (at previous baseline), Na 124, Cr 0.9 (previously 0.6), BNP 9190, Trop 208>192 (previously 19), VBG lactate 2.9>2.4    COVID +ve    EKG - Sinus Tachycardia w/ possible PACs    Imagin. CT head shows no acute intracranial pathology  2. CTA Chest shows:  - no evidence of acute pulmonary embolus.  - Bilateral lower lobe consolidative opacities which can be seen with pneumonia    In the ED:  - s/p Cefepime IV x1  - s/p Levofloxacin IV x1    Admitted to SDU for management of acute hypoxic respiratory failure (31 Dec 2023 18:45)      INFECTIOUS DISEASE HISTORY:  History as above.  Currently on venti mask     PAST MEDICAL & SURGICAL HISTORY:  COPD (chronic obstructive pulmonary disease)      H/O cholelithiasis      Smoker      History of surgery  orif right ankle          FAMILY HISTORY  Known health problems: none (Father, Mother)        SOCIAL HISTORY  Social History:  Lives at home (25 Dec 2023 20:24)        ROS  General: Denies rigors, nightsweats  HEENT: Denies headache, rhinorrhea, sore throat, eye pain  CV: Denies CP, palpitations  PULM: Denies wheezing, hemoptysis  GI: Denies hematemesis, hematochezia, melena  : Denies discharge, hematuria  MSK: Denies arthralgias, myalgias  SKIN: Denies rash, lesions  NEURO: Denies paresthesias, weakness  PSYCH: Denies depression, anxiety    VITALS:  T(F): 99.7, Max: 99.7 (24 @ 12:15)  HR: 106  BP: 140/72  RR: 22Vital Signs Last 24 Hrs  T(C): 37.6 (2024 12:15), Max: 37.6 (2024 12:15)  T(F): 99.7 (2024 12:15), Max: 99.7 (2024 12:15)  HR: 106 (2024 12:15) (96 - 178)  BP: 140/72 (2024 12:15) (84/53 - 140/72)  BP(mean): 101 (2024 12:15) (99 - 101)  RR: 22 (2024 12:15) (20 - 22)  SpO2: 96% (2024 12:15) (94% - 100%)    Parameters below as of 2024 12:15  Patient On (Oxygen Delivery Method): nasal cannula, high flow        PHYSICAL EXAM:  Gen: NAD, resting in bed  HEENT: Normocephalic, atraumatic  Neck: supple, no lymphadenopathy  CV: Regular rate & regular rhythm  Lungs: decreased BS at bases, no fremitus  Abdomen: Soft, BS present  Ext: Warm, well perfused  Neuro: non focal, awake  Skin: no rash, no erythema  Lines: no phlebitis    TESTS & MEASUREMENTS:                        10.0   19.66 )-----------( 297      ( 31 Dec 2023 13:55 )             28.8         124<L>  |  85<L>  |  29<H>  ----------------------------<  133<H>  4.2   |  29  |  0.9    Ca    8.4      31 Dec 2023 13:55    TPro  6.1  /  Alb  3.5  /  TBili  1.4<H>  /  DBili  x   /  AST  21  /  ALT  11  /  AlkPhos  95  12-      LIVER FUNCTIONS - ( 31 Dec 2023 13:55 )  Alb: 3.5 g/dL / Pro: 6.1 g/dL / ALK PHOS: 95 U/L / ALT: 11 U/L / AST: 21 U/L / GGT: x           Urinalysis Basic - ( 31 Dec 2023 13:55 )    Color: x / Appearance: x / SG: x / pH: x  Gluc: 133 mg/dL / Ketone: x  / Bili: x / Urobili: x   Blood: x / Protein: x / Nitrite: x   Leuk Esterase: x / RBC: x / WBC x   Sq Epi: x / Non Sq Epi: x / Bacteria: x          Lactate, Blood: 2.2 mmol/L (23 @ 20:00)  Blood Gas Venous - Lactate: 2.4 mmol/L (23 @ 17:27)  Blood Gas Venous - Lactate: 2.9 mmol/L (23 @ 14:34)      INFECTIOUS DISEASES TESTING  COVID-19 PCR: NotDetec (23 @ 16:48)      RADIOLOGY & ADDITIONAL TESTS:  I have personally reviewed the last Chest xray  CXR      CT  CT Angio Chest PE Protocol w/ IV Cont:   ACC: [id number] EXAM:  CT ANGIO CHEST PULM [facility]   ORDERED BY: FELICITY CABRERA     PROCEDURE DATE:  2023          INTERPRETATION:  CLINICAL HISTORY / REASON FOR EXAM: Shortness of breath    TECHNIQUE: Multislice helical sections were obtained from the thoracic   inlet to the lung bases during rapid administration of following   administration of 65 mL Omnipaque 350 intravenous contrast using a CTA   pulmonary embolism protocol. Thin sections were reconstructed through the   pulmonaryvasculature. Coronal, sagittal and 3D/MIP reformatted images   are also submitted.    COMPARISON CT: CT chest from 2023    OTHER STUDIES USED FOR CORRELATION: Chest radiograph from 2023      FINDINGS:    PULMONARY EMBOLUS:No central or segmental pulmonary embolus.    TUBES/LINES: None.    LUNGS, PLEURA, AIRWAYS: Upper lobe predominant, centrilobular   emphysematous changes. Consolidative opacities within the inferior right   middle lobe and bilateral lower lobes. No pneumothorax. Trace left   pleural effusion. Central airways patent.    THORACIC NODES: Enlarged bilateral hilar lymph nodes.    MEDIASTINUM/GREAT VESSELS: No pericardial effusion. Heart size   unremarkable. Multivessel coronary artery calcifications. No aneurysmal   dilation of the thoracic aorta.    VISUALIZED UPPER ABDOMEN: Unremarkable.    BONES/SOFT TISSUES: Status post T10 and T11 kyphoplasty with moderate and   severe compression deformities respectively. Diffuse osteopenia.      IMPRESSION:    No CTA evidence of acute pulmonary embolus.    Bilateral lower lobe consolidative opacities which can be seen with   pneumonia. Recommend radiographic follow-up after treatment.    --- End of Report ---            RAFAEL ARCHER MD; Attending Radiologist  This document has been electronically signed. Dec 31 2023  6:38PM (23 @ 18:26)      CARDIOLOGY TESTING  12 Lead ECG:   Ventricular Rate 122 BPM    Atrial Rate 86 BPM    QRS Duration 122 ms    Q-T Interval 310 ms    QTC Calculation(Bazett) 441 ms    R Axis 125 degrees    T Axis 56 degrees    Diagnosis Line Atrial flutter with variable A-V block  Right bundle branch block  Abnormal ECG    Confirmed by Licha Wong MD ([id number]) on 2024 11:32:55 AM (23 @ 18:36)  12 Lead ECG:   Ventricular Rate 136 BPM    Atrial Rate 144 BPM    QRS Duration 132 ms    Q-T Interval 312 ms    QTC Calculation(Bazett) 469 ms    R Axis 38 degrees    T Axis 53 degrees    Diagnosis Line *** Poor data quality, interpretation may be adversely affected  Undetermined rhythm  Possible Atrial flutter  Indeterminate axis  Right bundle branch block  Abnormal ECG    Confirmed by Licha Wong MD ([id number]) on 2024 11:06:47 AM (23 @ 14:08)      MEDICATIONS  aspirin enteric coated 81 Oral daily  budesonide 160 MICROgram(s)/formoterol 4.5 MICROgram(s) Inhaler 2 Inhalation two times a day  chlorhexidine 2% Cloths 1 Topical <User Schedule>  dexAMETHasone  Injectable 6 IV Push daily  doxycycline IVPB     doxycycline IVPB 100 IV Intermittent every 12 hours  enoxaparin Injectable 40 SubCutaneous every 24 hours  lactated ringers. 500 IV Continuous <Continuous>  piperacillin/tazobactam IVPB.. 3.375 IV Intermittent every 8 hours  polyethylene glycol 3350 17 Oral daily  senna 2 Oral at bedtime  tiotropium 2.5 MICROgram(s) Inhaler 2 Inhalation daily  vancomycin  IVPB     vancomycin  IVPB 500 IV Intermittent once      Weight  Weight (kg): 50.3 (24 @ 12:14)    ANTIBIOTICS:  doxycycline IVPB      doxycycline IVPB 100 milliGRAM(s) IV Intermittent every 12 hours  piperacillin/tazobactam IVPB.. 3.375 Gram(s) IV Intermittent every 8 hours  vancomycin  IVPB 500 milliGRAM(s) IV Intermittent once  vancomycin  IVPB          ALLERGIES:  No Known Allergies           SHANELL MORENO  83y, Female  Allergy: No Known Allergies      CHIEF COMPLAINT: SOB (2024 08:59)      LOS  1d    HPI:  83F w/ PMHx COPD, OA, chronic back pain s/p multiple surgeries and recent admission for left intertrochanteric femur fracture s/p ORIF 2023 brought in from [facility] for shortness of breath. EMS reports that she was hypoxic on room air to the 70s. Patient was initially AOx0, after being on NRB in EMS, patient mental status improving with improved oxygenation. Daughter reports the patient was doing okay yesterday and that this was an acute change. Patient currently AOx3, appears short of breath, denying any other complaints at this time.    Vitals: BP 84/53, HR 99, RR 22, SpO2 100% on NRB    Labs: WBC 19.66, Hgb 10.0 (at previous baseline), Na 124, Cr 0.9 (previously 0.6), BNP 9190, Trop 208>192 (previously 19), VBG lactate 2.9>2.4    COVID +ve    EKG - Sinus Tachycardia w/ possible PACs    Imagin. CT head shows no acute intracranial pathology  2. CTA Chest shows:  - no evidence of acute pulmonary embolus.  - Bilateral lower lobe consolidative opacities which can be seen with pneumonia    In the ED:  - s/p Cefepime IV x1  - s/p Levofloxacin IV x1    Admitted to SDU for management of acute hypoxic respiratory failure (31 Dec 2023 18:45)      INFECTIOUS DISEASE HISTORY:  History as above.  Currently on venti mask     PAST MEDICAL & SURGICAL HISTORY:  COPD (chronic obstructive pulmonary disease)      H/O cholelithiasis      Smoker      History of surgery  orif right ankle          FAMILY HISTORY  Known health problems: none (Father, Mother)        SOCIAL HISTORY  Social History:  Lives at home (25 Dec 2023 20:24)        ROS  General: Denies rigors, nightsweats  HEENT: Denies headache, rhinorrhea, sore throat, eye pain  CV: Denies CP, palpitations  PULM: Denies wheezing, hemoptysis  GI: Denies hematemesis, hematochezia, melena  : Denies discharge, hematuria  MSK: Denies arthralgias, myalgias  SKIN: Denies rash, lesions  NEURO: Denies paresthesias, weakness  PSYCH: Denies depression, anxiety    VITALS:  T(F): 99.7, Max: 99.7 (24 @ 12:15)  HR: 106  BP: 140/72  RR: 22Vital Signs Last 24 Hrs  T(C): 37.6 (2024 12:15), Max: 37.6 (2024 12:15)  T(F): 99.7 (2024 12:15), Max: 99.7 (2024 12:15)  HR: 106 (2024 12:15) (96 - 178)  BP: 140/72 (2024 12:15) (84/53 - 140/72)  BP(mean): 101 (2024 12:15) (99 - 101)  RR: 22 (2024 12:15) (20 - 22)  SpO2: 96% (2024 12:15) (94% - 100%)    Parameters below as of 2024 12:15  Patient On (Oxygen Delivery Method): nasal cannula, high flow        PHYSICAL EXAM:  Gen: NAD, resting in bed  HEENT: Normocephalic, atraumatic  Neck: supple, no lymphadenopathy  CV: Regular rate & regular rhythm  Lungs: decreased BS at bases, no fremitus  Abdomen: Soft, BS present  Ext: Warm, well perfused  Neuro: non focal, awake  Skin: no rash, no erythema  Lines: no phlebitis    TESTS & MEASUREMENTS:                        10.0   19.66 )-----------( 297      ( 31 Dec 2023 13:55 )             28.8         124<L>  |  85<L>  |  29<H>  ----------------------------<  133<H>  4.2   |  29  |  0.9    Ca    8.4      31 Dec 2023 13:55    TPro  6.1  /  Alb  3.5  /  TBili  1.4<H>  /  DBili  x   /  AST  21  /  ALT  11  /  AlkPhos  95  12-      LIVER FUNCTIONS - ( 31 Dec 2023 13:55 )  Alb: 3.5 g/dL / Pro: 6.1 g/dL / ALK PHOS: 95 U/L / ALT: 11 U/L / AST: 21 U/L / GGT: x           Urinalysis Basic - ( 31 Dec 2023 13:55 )    Color: x / Appearance: x / SG: x / pH: x  Gluc: 133 mg/dL / Ketone: x  / Bili: x / Urobili: x   Blood: x / Protein: x / Nitrite: x   Leuk Esterase: x / RBC: x / WBC x   Sq Epi: x / Non Sq Epi: x / Bacteria: x          Lactate, Blood: 2.2 mmol/L (23 @ 20:00)  Blood Gas Venous - Lactate: 2.4 mmol/L (23 @ 17:27)  Blood Gas Venous - Lactate: 2.9 mmol/L (23 @ 14:34)      INFECTIOUS DISEASES TESTING  COVID-19 PCR: NotDetec (23 @ 16:48)      RADIOLOGY & ADDITIONAL TESTS:  I have personally reviewed the last Chest xray  CXR      CT  CT Angio Chest PE Protocol w/ IV Cont:   ACC: [id number] EXAM:  CT ANGIO CHEST PULM ART WAWI   ORDERED BY: FELICITY CABRERA     PROCEDURE DATE:  2023          INTERPRETATION:  CLINICAL HISTORY / REASON FOR EXAM: Shortness of breath    TECHNIQUE: Multislice helical sections were obtained from the thoracic   inlet to the lung bases during rapid administration of following   administration of 65 mL Omnipaque 350 intravenous contrast using a CTA   pulmonary embolism protocol. Thin sections were reconstructed through the   pulmonaryvasculature. Coronal, sagittal and 3D/MIP reformatted images   are also submitted.    COMPARISON CT: CT chest from 2023    OTHER STUDIES USED FOR CORRELATION: Chest radiograph from 2023      FINDINGS:    PULMONARY EMBOLUS:No central or segmental pulmonary embolus.    TUBES/LINES: None.    LUNGS, PLEURA, AIRWAYS: Upper lobe predominant, centrilobular   emphysematous changes. Consolidative opacities within the inferior right   middle lobe and bilateral lower lobes. No pneumothorax. Trace left   pleural effusion. Central airways patent.    THORACIC NODES: Enlarged bilateral hilar lymph nodes.    MEDIASTINUM/GREAT VESSELS: No pericardial effusion. Heart size   unremarkable. Multivessel coronary artery calcifications. No aneurysmal   dilation of the thoracic aorta.    VISUALIZED UPPER ABDOMEN: Unremarkable.    BONES/SOFT TISSUES: Status post T10 and T11 kyphoplasty with moderate and   severe compression deformities respectively. Diffuse osteopenia.      IMPRESSION:    No CTA evidence of acute pulmonary embolus.    Bilateral lower lobe consolidative opacities which can be seen with   pneumonia. Recommend radiographic follow-up after treatment.    --- End of Report ---            RAFAEL ARCHER MD; Attending Radiologist  This document has been electronically signed. Dec 31 2023  6:38PM (23 @ 18:26)      CARDIOLOGY TESTING  12 Lead ECG:   Ventricular Rate 122 BPM    Atrial Rate 86 BPM    QRS Duration 122 ms    Q-T Interval 310 ms    QTC Calculation(Bazett) 441 ms    R Axis 125 degrees    T Axis 56 degrees    Diagnosis Line Atrial flutter with variable A-V block  Right bundle branch block  Abnormal ECG    Confirmed by Licha Wong MD ([id number]) on 2024 11:32:55 AM (23 @ 18:36)  12 Lead ECG:   Ventricular Rate 136 BPM    Atrial Rate 144 BPM    QRS Duration 132 ms    Q-T Interval 312 ms    QTC Calculation(Bazett) 469 ms    R Axis 38 degrees    T Axis 53 degrees    Diagnosis Line *** Poor data quality, interpretation may be adversely affected  Undetermined rhythm  Possible Atrial flutter  Indeterminate axis  Right bundle branch block  Abnormal ECG    Confirmed by Licha Wong MD ([id number]) on 2024 11:06:47 AM (23 @ 14:08)      MEDICATIONS  aspirin enteric coated 81 Oral daily  budesonide 160 MICROgram(s)/formoterol 4.5 MICROgram(s) Inhaler 2 Inhalation two times a day  chlorhexidine 2% Cloths 1 Topical <User Schedule>  dexAMETHasone  Injectable 6 IV Push daily  doxycycline IVPB     doxycycline IVPB 100 IV Intermittent every 12 hours  enoxaparin Injectable 40 SubCutaneous every 24 hours  lactated ringers. 500 IV Continuous <Continuous>  piperacillin/tazobactam IVPB.. 3.375 IV Intermittent every 8 hours  polyethylene glycol 3350 17 Oral daily  senna 2 Oral at bedtime  tiotropium 2.5 MICROgram(s) Inhaler 2 Inhalation daily  vancomycin  IVPB     vancomycin  IVPB 500 IV Intermittent once      Weight  Weight (kg): 50.3 (24 @ 12:14)    ANTIBIOTICS:  doxycycline IVPB      doxycycline IVPB 100 milliGRAM(s) IV Intermittent every 12 hours  piperacillin/tazobactam IVPB.. 3.375 Gram(s) IV Intermittent every 8 hours  vancomycin  IVPB 500 milliGRAM(s) IV Intermittent once  vancomycin  IVPB          ALLERGIES:  No Known Allergies

## 2024-01-01 NOTE — CONSULT NOTE ADULT - ASSESSMENT
Impression    Acute on chronic hypoxemic/ hypercapnic resp failure on NIV  Covid pneumonia  Possible aspiration ( cr chest noted)  NTEMI  AMS/ TME  COPD not on Home o2  hyponatremia  Bilateral lung nodules l  Chronic back pain  Left intertrochanteric femur fracture Sp ORIF  Current smoker    PLAN:    CNS: Avoid CNS depressant    HEENT:  Oral care    PULMONARY:  HOB @ 45 degrees, NIV at night and as needed, repeat ABG, solumedrol 40 q 12, Neb q 6, keep SaO2 88 TO 92%    CARDIOVASCULAR: EKG, cardio eval, hep, avoid overload    GI: GI prophylaxis                                          Feeding NPO    RENAL:  F/u  lytes.  Correct as needed. accurate I/O, u Na/ osm    INFECTIOUS DISEASE: Zosyn, trend infl markers    HEMATOLOGICAL:  DVT prophylaxis. LE doppler    ENDOCRINE:  Follow up FS.  Insulin protocol if needed.    SDU  Palliative care eval  very poor prognosis

## 2024-01-01 NOTE — PHARMACOTHERAPY INTERVENTION NOTE - COMMENTS
I spoke with Dr Major Espitia and recommended to adjust the odse from 600 mg to 500 mg as per pk calcu;ations. Will adjust the dose

## 2024-01-01 NOTE — PROGRESS NOTE ADULT - SUBJECTIVE AND OBJECTIVE BOX
[facility] MORENO  83y  [facility]      Patient is a 83y old  Female who presents with a chief complaint of SOB (01 Jan 2024 06:23)      INTERVAL HPI/OVERNIGHT EVENTS:        REVIEW OF SYSTEMS:        FAMILY HISTORY:  Known health problems: none (Father, Mother)      T(C): 37.4 (01-01-24 @ 08:15), Max: 37.4 (01-01-24 @ 08:15)  HR: 96 (01-01-24 @ 08:15) (96 - 178)  BP: 134/83 (01-01-24 @ 08:15) (84/53 - 134/83)  RR: 22 (01-01-24 @ 08:15) (20 - 22)  SpO2: 97% (01-01-24 @ 08:15) (95% - 100%)  Wt(kg): --Vital Signs Last 24 Hrs  T(C): 37.4 (01 Jan 2024 08:15), Max: 37.4 (01 Jan 2024 08:15)  T(F): 99.3 (01 Jan 2024 08:15), Max: 99.3 (01 Jan 2024 08:15)  HR: 96 (01 Jan 2024 08:15) (96 - 178)  BP: 134/83 (01 Jan 2024 08:15) (84/53 - 134/83)  BP(mean): 99 (01 Jan 2024 08:15) (99 - 99)  RR: 22 (01 Jan 2024 08:15) (20 - 22)  SpO2: 97% (01 Jan 2024 08:15) (95% - 100%)    Parameters below as of 01 Jan 2024 08:15  Patient On (Oxygen Delivery Method): BiPAP/CPAP        PHYSICAL EXAM:  GENERAL: NAD, well-groomed, well-developed  HEAD:  Atraumatic, Normocephalic  EYES: EOMI, PERRLA, conjunctiva and sclera clear  ENMT: No tonsillar erythema, exudates, or enlargement; Moist mucous membranes, Good dentition, No lesions  NECK: Supple, No JVD, Normal thyroid  NERVOUS SYSTEM:  Alert & Oriented X3, Good concentration; Motor Strength 5/5 B/L upper and lower extremities; DTRs 2+ intact and symmetric  PULM: Clear to auscultation bilaterally  CARDIAC: Regular rate and rhythm; No murmurs, rubs, or gallops  GI: Soft, Nontender, Nondistended; Bowel sounds present  EXTREMITIES:  2+ Peripheral Pulses, No clubbing, cyanosis, or edema  LYMPH: No lymphadenopathy noted  SKIN: No rashes or lesions    Consultant(s) Notes Reviewed:  [x ] YES  [ ] NO  Care Discussed with Consultants/Other Providers [ x] YES  [ ] NO    LABS:                            10.0   19.66 )-----------( 297      ( 31 Dec 2023 13:55 )             28.8   12-31    124<L>  |  85<L>  |  29<H>  ----------------------------<  133<H>  4.2   |  29  |  0.9    Ca    8.4      31 Dec 2023 13:55    TPro  6.1  /  Alb  3.5  /  TBili  1.4<H>  /  DBili  x   /  AST  21  /  ALT  11  /  AlkPhos  95  12-31            acetaminophen     Tablet .. 650 milliGRAM(s) Oral every 6 hours PRN  aluminum hydroxide/magnesium hydroxide/simethicone Suspension 30 milliLiter(s) Oral every 4 hours PRN  aspirin enteric coated 81 milliGRAM(s) Oral daily  budesonide 160 MICROgram(s)/formoterol 4.5 MICROgram(s) Inhaler 2 Puff(s) Inhalation two times a day  chlorhexidine 2% Cloths 1 Application(s) Topical <User Schedule>  dexAMETHasone  Injectable 6 milliGRAM(s) IV Push daily  enoxaparin Injectable 40 milliGRAM(s) SubCutaneous every 24 hours  melatonin 3 milliGRAM(s) Oral at bedtime PRN  ondansetron Injectable 4 milliGRAM(s) IV Push every 8 hours PRN  polyethylene glycol 3350 17 Gram(s) Oral daily  senna 2 Tablet(s) Oral at bedtime  tiotropium 2.5 MICROgram(s) Inhaler 2 Puff(s) Inhalation daily    83F w/ PMHx COPD, OA, chronic back pain s/p multiple surgeries and recent admission for left intertrochanteric femur fracture s/p ORIF 12/27/2023 brought in from [facility] for shortness of breath.    1. Acute Hypoxic Respiratory Failure and severe sepsis  secondary to COVID 19   *  BP 84/53, HR 99, RR 22, SpO2 100% on NRB/ WBC 19.66  - Admit to SDU                - COVID 19: positive                 - - VBG lactate 2.9>2.4. Repeat until normal     - BNP elevated   - s/p 3L LR bolus in the ED. No continuous IVF due to concerns of congestion   - Cont Zosyn and doxycycline .   - Blood  cx:pending          - MRSA:pending          - Urine leg:pending          - Urine strep:pending   - Procalcitonin:pending   - Cont RMD d:2   - Cont Dexamethasone 6mg d:2   - c/w BiPAP  -  ID consult:pending   - CTA Chest shows: no evidence of acute pulmonary embolus. Bilateral lower lobe consolidative opacities which can be seen with pneumonia    2. NSTEMI Type II  - EKG showed sinus tachy w/ PACs  - Trop 208>192 (previously 19)  - trend, monitor repeat  - if remains elevated or worsens, consider cardio consult    3.ALAN - likely prerenal/ Hyponatremia  - Na 124, Cr 0.9 (previously 0.6)  - poor PO intake over the last week, dry one exam  - s/p 3L IVF bolus in ED    4. Chronic Anemia  - Hgb 10.0 (at previous baseline)  - monitor H&H  - f/u o/p    5. left intertrochanteric femur fracture s/p ORIF 12/27/2023 /OA/ Chronic Back Pain s/p multiple surgeries  - from rehab  - f/u PT  - f/u o/p    #DVT ppx: Lovenox SubQ  #GI ppx: PPI PO daily  #Diet: Regular - DASH/TLC  #Activity: IAT - PT  #Code Status: DNR/DNI - Living Will on File - Molst filled out to reflect living will, discussed with daughters bedside  #Dispo: from rehab, acute, SDU     [facility]ANELL MORENO  83y  [facility]      Patient is a 83y old  Female who presents with a chief complaint of SOB (01 Jan 2024 06:23)      INTERVAL HPI/OVERNIGHT EVENTS:        REVIEW OF SYSTEMS:        FAMILY HISTORY:  Known health problems: none (Father, Mother)      T(C): 37.4 (01-01-24 @ 08:15), Max: 37.4 (01-01-24 @ 08:15)  HR: 96 (01-01-24 @ 08:15) (96 - 178)  BP: 134/83 (01-01-24 @ 08:15) (84/53 - 134/83)  RR: 22 (01-01-24 @ 08:15) (20 - 22)  SpO2: 97% (01-01-24 @ 08:15) (95% - 100%)  Wt(kg): --Vital Signs Last 24 Hrs  T(C): 37.4 (01 Jan 2024 08:15), Max: 37.4 (01 Jan 2024 08:15)  T(F): 99.3 (01 Jan 2024 08:15), Max: 99.3 (01 Jan 2024 08:15)  HR: 96 (01 Jan 2024 08:15) (96 - 178)  BP: 134/83 (01 Jan 2024 08:15) (84/53 - 134/83)  BP(mean): 99 (01 Jan 2024 08:15) (99 - 99)  RR: 22 (01 Jan 2024 08:15) (20 - 22)  SpO2: 97% (01 Jan 2024 08:15) (95% - 100%)    Parameters below as of 01 Jan 2024 08:15  Patient On (Oxygen Delivery Method): BiPAP/CPAP        PHYSICAL EXAM:  GENERAL: NAD, well-groomed, well-developed  HEAD:  Atraumatic, Normocephalic  EYES: EOMI, PERRLA, conjunctiva and sclera clear  ENMT: No tonsillar erythema, exudates, or enlargement; Moist mucous membranes, Good dentition, No lesions  NECK: Supple, No JVD, Normal thyroid  NERVOUS SYSTEM:  Alert & Oriented X3, Good concentration; Motor Strength 5/5 B/L upper and lower extremities; DTRs 2+ intact and symmetric  PULM: Clear to auscultation bilaterally  CARDIAC: Regular rate and rhythm; No murmurs, rubs, or gallops  GI: Soft, Nontender, Nondistended; Bowel sounds present  EXTREMITIES:  2+ Peripheral Pulses, No clubbing, cyanosis, or edema  LYMPH: No lymphadenopathy noted  SKIN: No rashes or lesions    Consultant(s) Notes Reviewed:  [x ] YES  [ ] NO  Care Discussed with Consultants/Other Providers [ x] YES  [ ] NO    LABS:                            10.0   19.66 )-----------( 297      ( 31 Dec 2023 13:55 )             28.8   12-31    124<L>  |  85<L>  |  29<H>  ----------------------------<  133<H>  4.2   |  29  |  0.9    Ca    8.4      31 Dec 2023 13:55    TPro  6.1  /  Alb  3.5  /  TBili  1.4<H>  /  DBili  x   /  AST  21  /  ALT  11  /  AlkPhos  95  12-31            acetaminophen     Tablet .. 650 milliGRAM(s) Oral every 6 hours PRN  aluminum hydroxide/magnesium hydroxide/simethicone Suspension 30 milliLiter(s) Oral every 4 hours PRN  aspirin enteric coated 81 milliGRAM(s) Oral daily  budesonide 160 MICROgram(s)/formoterol 4.5 MICROgram(s) Inhaler 2 Puff(s) Inhalation two times a day  chlorhexidine 2% Cloths 1 Application(s) Topical <User Schedule>  dexAMETHasone  Injectable 6 milliGRAM(s) IV Push daily  enoxaparin Injectable 40 milliGRAM(s) SubCutaneous every 24 hours  melatonin 3 milliGRAM(s) Oral at bedtime PRN  ondansetron Injectable 4 milliGRAM(s) IV Push every 8 hours PRN  polyethylene glycol 3350 17 Gram(s) Oral daily  senna 2 Tablet(s) Oral at bedtime  tiotropium 2.5 MICROgram(s) Inhaler 2 Puff(s) Inhalation daily    83F w/ PMHx COPD, OA, chronic back pain s/p multiple surgeries and recent admission for left intertrochanteric femur fracture s/p ORIF 12/27/2023 brought in from [facility] for shortness of breath.    1. Acute Hypoxic Respiratory Failure and severe sepsis  secondary to COVID 19   *  BP 84/53, HR 99, RR 22, SpO2 100% on NRB/ WBC 19.66  - Admit to SDU                - COVID 19: positive                 - - VBG lactate 2.9>2.4. Repeat until normal     - BNP elevated   - s/p 3L LR bolus in the ED. No continuous IVF due to concerns of congestion   - Cont Zosyn and doxycycline .   - Blood  cx:pending          - MRSA:pending          - Urine leg:pending          - Urine strep:pending   - Procalcitonin:pending   - Cont RMD d:2   - Cont Dexamethasone 6mg d:2   - c/w BiPAP  -  ID consult:pending   - CTA Chest shows: no evidence of acute pulmonary embolus. Bilateral lower lobe consolidative opacities which can be seen with pneumonia    2. NSTEMI Type II  - EKG showed sinus tachy w/ PACs  - Trop 208>192 (previously 19)  - trend, monitor repeat  - if remains elevated or worsens, consider cardio consult    3.ALAN - likely prerenal/ Hyponatremia  - Na 124, Cr 0.9 (previously 0.6)  - poor PO intake over the last week, dry one exam  - s/p 3L IVF bolus in ED    4. Chronic Anemia  - Hgb 10.0 (at previous baseline)  - monitor H&H  - f/u o/p    5. left intertrochanteric femur fracture s/p ORIF 12/27/2023 /OA/ Chronic Back Pain s/p multiple surgeries  - from rehab  - f/u PT  - f/u o/p    #DVT ppx: Lovenox SubQ  #GI ppx: PPI PO daily  #Diet: Regular - DASH/TLC  #Activity: IAT - PT  #Code Status: DNR/DNI - Living Will on File - Molst filled out to reflect living will, discussed with daughters bedside  #Dispo: from rehab, acute, SDU     SHANELL MORENO  83y  [facility]-N ED Hold 032 A      Patient is a 83y old  Female who presents with a chief complaint of SOB (01 Jan 2024 06:23)      INTERVAL HPI/OVERNIGHT EVENTS:    Patient is acutely ill.   Daughter at the bedside who reported that patient was doing well initially then got acutely ill next day after dc   Still in sepsis more awake. more interactive         FAMILY HISTORY:  Known health problems: none (Father, Mother)      T(C): 37.4 (01-01-24 @ 08:15), Max: 37.4 (01-01-24 @ 08:15)  HR: 96 (01-01-24 @ 08:15) (96 - 178)  BP: 134/83 (01-01-24 @ 08:15) (84/53 - 134/83)  RR: 22 (01-01-24 @ 08:15) (20 - 22)  SpO2: 97% (01-01-24 @ 08:15) (95% - 100%)  Wt(kg): --Vital Signs Last 24 Hrs  T(C): 37.4 (01 Jan 2024 08:15), Max: 37.4 (01 Jan 2024 08:15)  T(F): 99.3 (01 Jan 2024 08:15), Max: 99.3 (01 Jan 2024 08:15)  HR: 96 (01 Jan 2024 08:15) (96 - 178)  BP: 134/83 (01 Jan 2024 08:15) (84/53 - 134/83)  BP(mean): 99 (01 Jan 2024 08:15) (99 - 99)  RR: 22 (01 Jan 2024 08:15) (20 - 22)  SpO2: 97% (01 Jan 2024 08:15) (95% - 100%)    Parameters below as of 01 Jan 2024 08:15  Patient On (Oxygen Delivery Method): BiPAP/CPAP        PHYSICAL EXAM:  GENERAL: Acutely ill   NERVOUS SYSTEM:  Alert   PULM: Crackles   CARDIAC: Regular rate and rhythm;   GI: Soft, Nontender, Nondistended; Bowel sounds present  EXTREMITIES:  2+ Peripheral Pulses,     Consultant(s) Notes Reviewed:  [x ] YES  [ ] NO  Care Discussed with Consultants/Other Providers [ x] YES  [ ] NO    LABS:                            10.0   19.66 )-----------( 297      ( 31 Dec 2023 13:55 )             28.8   12-31    124<L>  |  85<L>  |  29<H>  ----------------------------<  133<H>  4.2   |  29  |  0.9    Ca    8.4      31 Dec 2023 13:55    TPro  6.1  /  Alb  3.5  /  TBili  1.4<H>  /  DBili  x   /  AST  21  /  ALT  11  /  AlkPhos  95  12-31            acetaminophen     Tablet .. 650 milliGRAM(s) Oral every 6 hours PRN  aluminum hydroxide/magnesium hydroxide/simethicone Suspension 30 milliLiter(s) Oral every 4 hours PRN  aspirin enteric coated 81 milliGRAM(s) Oral daily  budesonide 160 MICROgram(s)/formoterol 4.5 MICROgram(s) Inhaler 2 Puff(s) Inhalation two times a day  chlorhexidine 2% Cloths 1 Application(s) Topical <User Schedule>  dexAMETHasone  Injectable 6 milliGRAM(s) IV Push daily  enoxaparin Injectable 40 milliGRAM(s) SubCutaneous every 24 hours  melatonin 3 milliGRAM(s) Oral at bedtime PRN  ondansetron Injectable 4 milliGRAM(s) IV Push every 8 hours PRN  polyethylene glycol 3350 17 Gram(s) Oral daily  senna 2 Tablet(s) Oral at bedtime  tiotropium 2.5 MICROgram(s) Inhaler 2 Puff(s) Inhalation daily    83F w/ PMHx COPD, OA, chronic back pain s/p multiple surgeries and recent admission for left intertrochanteric femur fracture s/p ORIF 12/27/2023 brought in from [facility] for shortness of breath.    1. Acute Hypoxic Respiratory Failure and severe sepsis  secondary to COVID 19   *  BP 84/53, HR 99, RR 22, SpO2 100% on NRB/ WBC 19.66  - Admit to SDU                - COVID 19: positive                 - VBG lactate 2.9>2.4. Repeat until normal     - BNP elevated       - CTH:WNL   - s/p 3L LR bolus in the ED. Gentle IVF 100ml.hr for 5 hours   - Cont Zosyn and doxycycline . Add vancomycin   - Blood  cx:pending          - MRSA:pending          - Urine leg:pending          - Urine strep:pending   - Procalcitonin:pending   - Cont RMD d:2   - Cont Dexamethasone 6mg d:2   - c/w BiPAP  -  ID consult:pending   - CTA Chest shows: no evidence of acute pulmonary embolus. Bilateral lower lobe consolidative opacities which can be seen with pneumonia    2. NSTEMI Type II  - EKG showed sinus tachy w/ PACs  - Trop 208>192 (previously 19)  - trend, monitor repeat  - if remains elevated or worsens, consider cardio consult    3.ALAN - likely prerenal associated with acute hyponatremia on top of chronic hyponatremia   - Na 124, Cr 0.9 (previously 0.6)  - poor PO intake over the last week, dry one exam  - s/p 3L IVF bolus in ED    4. Chronic Anemia  - Hgb 10.0 (at previous baseline)  - monitor H&H  - f/u o/p    5. left intertrochanteric femur fracture s/p ORIF 12/27/2023 /OA/ Chronic Back Pain s/p multiple surgeries  - from rehab  - Anticoag daily   - f/u PT    #DVT ppx: Lovenox SubQ  #GI ppx: PPI PO daily  #Diet: Regular - DASH/TLC  #Activity: IAT - PT  #Code Status: DNR/DNI - Living Will on File - Molst filled out to reflect living will, discussed with daughters bedside  #Dispo: from rehab, acute, SDU     SHANELL MORENO  83y  [facility]-N ED Hold 032 A      Patient is a 83y old  Female who presents with a chief complaint of SOB (01 Jan 2024 06:23)      INTERVAL HPI/OVERNIGHT EVENTS:    Patient is acutely ill.   Daughter at the bedside who reported that patient was doing well initially then got acutely ill next day after dc   Still in sepsis more awake. more interactive         FAMILY HISTORY:  Known health problems: none (Father, Mother)      T(C): 37.4 (01-01-24 @ 08:15), Max: 37.4 (01-01-24 @ 08:15)  HR: 96 (01-01-24 @ 08:15) (96 - 178)  BP: 134/83 (01-01-24 @ 08:15) (84/53 - 134/83)  RR: 22 (01-01-24 @ 08:15) (20 - 22)  SpO2: 97% (01-01-24 @ 08:15) (95% - 100%)  Wt(kg): --Vital Signs Last 24 Hrs  T(C): 37.4 (01 Jan 2024 08:15), Max: 37.4 (01 Jan 2024 08:15)  T(F): 99.3 (01 Jan 2024 08:15), Max: 99.3 (01 Jan 2024 08:15)  HR: 96 (01 Jan 2024 08:15) (96 - 178)  BP: 134/83 (01 Jan 2024 08:15) (84/53 - 134/83)  BP(mean): 99 (01 Jan 2024 08:15) (99 - 99)  RR: 22 (01 Jan 2024 08:15) (20 - 22)  SpO2: 97% (01 Jan 2024 08:15) (95% - 100%)    Parameters below as of 01 Jan 2024 08:15  Patient On (Oxygen Delivery Method): BiPAP/CPAP        PHYSICAL EXAM:  GENERAL: Acutely ill   NERVOUS SYSTEM:  Alert   PULM: Crackles   CARDIAC: Regular rate and rhythm;   GI: Soft, Nontender, Nondistended; Bowel sounds present  EXTREMITIES:  2+ Peripheral Pulses,     Consultant(s) Notes Reviewed:  [x ] YES  [ ] NO  Care Discussed with Consultants/Other Providers [ x] YES  [ ] NO    LABS:                            10.0   19.66 )-----------( 297      ( 31 Dec 2023 13:55 )             28.8   12-31    124<L>  |  85<L>  |  29<H>  ----------------------------<  133<H>  4.2   |  29  |  0.9    Ca    8.4      31 Dec 2023 13:55    TPro  6.1  /  Alb  3.5  /  TBili  1.4<H>  /  DBili  x   /  AST  21  /  ALT  11  /  AlkPhos  95  12-31            acetaminophen     Tablet .. 650 milliGRAM(s) Oral every 6 hours PRN  aluminum hydroxide/magnesium hydroxide/simethicone Suspension 30 milliLiter(s) Oral every 4 hours PRN  aspirin enteric coated 81 milliGRAM(s) Oral daily  budesonide 160 MICROgram(s)/formoterol 4.5 MICROgram(s) Inhaler 2 Puff(s) Inhalation two times a day  chlorhexidine 2% Cloths 1 Application(s) Topical <User Schedule>  dexAMETHasone  Injectable 6 milliGRAM(s) IV Push daily  enoxaparin Injectable 40 milliGRAM(s) SubCutaneous every 24 hours  melatonin 3 milliGRAM(s) Oral at bedtime PRN  ondansetron Injectable 4 milliGRAM(s) IV Push every 8 hours PRN  polyethylene glycol 3350 17 Gram(s) Oral daily  senna 2 Tablet(s) Oral at bedtime  tiotropium 2.5 MICROgram(s) Inhaler 2 Puff(s) Inhalation daily    83F w/ PMHx COPD, OA, chronic back pain s/p multiple surgeries and recent admission for left intertrochanteric femur fracture s/p ORIF 12/27/2023 brought in from [facility] for shortness of breath.    1. Acute Hypoxic Respiratory Failure and severe sepsis  secondary to COVID 19   *  BP 84/53, HR 99, RR 22, SpO2 100% on NRB/ WBC 19.66  - Admit to SDU                - COVID 19: positive                 - VBG lactate 2.9>2.4. Repeat until normal     - BNP elevated       - CTH:WNL   - s/p 3L LR bolus in the ED. Gentle IVF 100ml.hr for 5 hours   - Cont Zosyn and doxycycline . Add vancomycin   - Blood  cx:pending          - MRSA:pending          - Urine leg:pending          - Urine strep:pending   - Procalcitonin:pending   - Cont RMD d:2   - Cont Dexamethasone 6mg d:2   - c/w BiPAP  -  ID consult:pending   - CTA Chest shows: no evidence of acute pulmonary embolus. Bilateral lower lobe consolidative opacities which can be seen with pneumonia    2. NSTEMI Type II  - EKG showed sinus tachy w/ PACs  - Trop 208>192 (previously 19)  - trend, monitor repeat  - if remains elevated or worsens, consider cardio consult    3.ALAN - likely prerenal associated with acute hyponatremia on top of chronic hyponatremia   - Na 124, Cr 0.9 (previously 0.6)  - poor PO intake over the last week, dry one exam  - s/p 3L IVF bolus in ED    4. Chronic Anemia  - Hgb 10.0 (at previous baseline)  - monitor H&H  - f/u o/p    5. left intertrochanteric femur fracture s/p ORIF 12/27/2023 /OA/ Chronic Back Pain s/p multiple surgeries  - from rehab  - Anticoag daily   - f/u PT    #DVT ppx: Lovenox SubQ  #GI ppx: PPI PO daily  #Diet: Regular - DASH/TLC  #Activity: IAT - PT  #Code Status: DNR/DNI - Living Will on File - Molst filled out to reflect living will, discussed with daughters bedside  #Dispo: from rehab, acute, SDU

## 2024-01-01 NOTE — CONSULT NOTE ADULT - SUBJECTIVE AND OBJECTIVE BOX
Patient is a 83y old  Female who presents with a chief complaint of SOB (31 Dec 2023 18:45)    83F w/ PMHx COPD/ active smoker, OA, chronic back pain s/p multiple surgeries and recent admission for left intertrochanteric femur fracture s/p ORIF 2023 brought in from [facility] for shortness of breath. EMS reports that she was hypoxic on room air to the 70s. Patient was initially AOx0, after being on NRB in EMS, patient mental status improving with improved oxygenation. Daughter reports the patient was doing okay yesterday and that this was an acute change. Patient currently AOx3, appears short of breath, denying any other complaints at this time.    Vitals: BP 84/53, HR 99, RR 22, SpO2 100% on NRB    Labs: WBC 19.66, Hgb 10.0 (at previous baseline), Na 124, Cr 0.9 (previously 0.6), BNP 9190, Trop 208>192 (previously 19), VBG lactate 2.9>2.4    COVID +ve    EKG - Sinus Tachycardia w/ possible PACs    Imagin. CT head shows no acute intracranial pathology  2. CTA Chest shows:  - no evidence of acute pulmonary embolus.  - Bilateral lower lobe consolidative opacities which can be seen with pneumonia    In the ED:  - s/p Cefepime IV x1  - s/p Levofloxacin IV x1    Admitted to SDU for management of acute hypoxic respiratory failure (31 Dec 2023 18:45), this AM on NIV      PAST MEDICAL & SURGICAL HISTORY:  COPD (chronic obstructive pulmonary disease)      H/O cholelithiasis      Smoker      History of surgery  orif right ankle          SOCIAL HX:   Smoking +    FAMILY HISTORY:  Known health problems: none (Father, Mother)        REVIEW OF SYSTEMS SEE HPI    Allergies    No Known Allergies    Intolerances        acetaminophen     Tablet .. 650 milliGRAM(s) Oral every 6 hours PRN  aluminum hydroxide/magnesium hydroxide/simethicone Suspension 30 milliLiter(s) Oral every 4 hours PRN  aspirin enteric coated 81 milliGRAM(s) Oral daily  budesonide 160 MICROgram(s)/formoterol 4.5 MICROgram(s) Inhaler 2 Puff(s) Inhalation two times a day  chlorhexidine 2% Cloths 1 Application(s) Topical <User Schedule>  dexAMETHasone  Injectable 6 milliGRAM(s) IV Push daily  enoxaparin Injectable 40 milliGRAM(s) SubCutaneous every 24 hours  melatonin 3 milliGRAM(s) Oral at bedtime PRN  ondansetron Injectable 4 milliGRAM(s) IV Push every 8 hours PRN  polyethylene glycol 3350 17 Gram(s) Oral daily  senna 2 Tablet(s) Oral at bedtime  tiotropium 2.5 MICROgram(s) Inhaler 2 Puff(s) Inhalation daily  : Home Meds:      PHYSICAL EXAM    ICU Vital Signs Last 24 Hrs    HR: 135 (2024 00:30) (98 - 144)  BP: 114/68 (2024 00:30) (84/53 - 114/68)  RR: 22 (2024 00:30) (22 - 22)  SpO2: 100% (2024 00:30) (95% - 100%)    O2 Parameters below as of 2024 00:30  Patient On (Oxygen Delivery Method): BiPAP/CPAP            General: Cachectic ill looking  Lungs: Bilateral rhonchi  Cardiovascular: KAREN 2.6  Abdomen: Soft, Positive BS  Extremities: No clubbing  Neurological: Non focal         LABS:                          10.0   19.66 )-----------( 297      ( 31 Dec 2023 13:55 )             28.8                                               12-    124<L>  |  85<L>  |  29<H>  ----------------------------<  133<H>  4.2   |  29  |  0.9    Ca    8.4      31 Dec 2023 13:55    TPro  6.1  /  Alb  3.5  /  TBili  1.4<H>  /  DBili  x   /  AST  21  /  ALT  11  /  AlkPhos  95  12-31                                             Urinalysis Basic - ( 31 Dec 2023 13:55 )    Color: x / Appearance: x / SG: x / pH: x  Gluc: 133 mg/dL / Ketone: x  / Bili: x / Urobili: x   Blood: x / Protein: x / Nitrite: x   Leuk Esterase: x / RBC: x / WBC x   Sq Epi: x / Non Sq Epi: x / Bacteria: x                                                  LIVER FUNCTIONS - ( 31 Dec 2023 13:55 )  Alb: 3.5 g/dL / Pro: 6.1 g/dL / ALK PHOS: 95 U/L / ALT: 11 U/L / AST: 21 U/L / GGT: x                                                                                                                       MEDICATIONS  (STANDING):  aspirin enteric coated 81 milliGRAM(s) Oral daily  budesonide 160 MICROgram(s)/formoterol 4.5 MICROgram(s) Inhaler 2 Puff(s) Inhalation two times a day  chlorhexidine 2% Cloths 1 Application(s) Topical <User Schedule>  dexAMETHasone  Injectable 6 milliGRAM(s) IV Push daily  enoxaparin Injectable 40 milliGRAM(s) SubCutaneous every 24 hours  polyethylene glycol 3350 17 Gram(s) Oral daily  senna 2 Tablet(s) Oral at bedtime  tiotropium 2.5 MICROgram(s) Inhaler 2 Puff(s) Inhalation daily    MEDICATIONS  (PRN):  acetaminophen     Tablet .. 650 milliGRAM(s) Oral every 6 hours PRN Temp greater or equal to 38C (100.4F), Mild Pain (1 - 3)  aluminum hydroxide/magnesium hydroxide/simethicone Suspension 30 milliLiter(s) Oral every 4 hours PRN Dyspepsia  melatonin 3 milliGRAM(s) Oral at bedtime PRN Insomnia  ondansetron Injectable 4 milliGRAM(s) IV Push every 8 hours PRN Nausea and/or Vomiting    CHEST CT noted     Patient is a 83y old  Female who presents with a chief complaint of SOB (31 Dec 2023 18:45)    83F w/ PMHx COPD/ active smoker, OA, chronic back pain s/p multiple surgeries and recent admission for left intertrochanteric femur fracture s/p ORIF 2023 brought in from [facility] for shortness of breath. EMS reports that she was hypoxic on room air to the 70s. Patient was initially AOx0, after being on NRB in EMS, patient mental status improving with improved oxygenation. Daughter reports the patient was doing okay yesterday and that this was an acute change. Patient currently AOx3, appears short of breath, denying any other complaints at this time.    Vitals: BP 84/53, HR 99, RR 22, SpO2 100% on NRB    Labs: WBC 19.66, Hgb 10.0 (at previous baseline), Na 124, Cr 0.9 (previously 0.6), BNP 9190, Trop 208>192 (previously 19), VBG lactate 2.9>2.4    COVID +ve    EKG - Sinus Tachycardia w/ possible PACs    Imagin. CT head shows no acute intracranial pathology  2. CTA Chest shows:  - no evidence of acute pulmonary embolus.  - Bilateral lower lobe consolidative opacities which can be seen with pneumonia    In the ED:  - s/p Cefepime IV x1  - s/p Levofloxacin IV x1    Admitted to SDU for management of acute hypoxic respiratory failure (31 Dec 2023 18:45), this AM on NIV      PAST MEDICAL & SURGICAL HISTORY:  COPD (chronic obstructive pulmonary disease)      H/O cholelithiasis      Smoker      History of surgery  orif right ankle          SOCIAL HX:   Smoking +    FAMILY HISTORY:  Known health problems: none (Father, Mother)        REVIEW OF SYSTEMS SEE HPI    Allergies    No Known Allergies    Intolerances        acetaminophen     Tablet .. 650 milliGRAM(s) Oral every 6 hours PRN  aluminum hydroxide/magnesium hydroxide/simethicone Suspension 30 milliLiter(s) Oral every 4 hours PRN  aspirin enteric coated 81 milliGRAM(s) Oral daily  budesonide 160 MICROgram(s)/formoterol 4.5 MICROgram(s) Inhaler 2 Puff(s) Inhalation two times a day  chlorhexidine 2% Cloths 1 Application(s) Topical <User Schedule>  dexAMETHasone  Injectable 6 milliGRAM(s) IV Push daily  enoxaparin Injectable 40 milliGRAM(s) SubCutaneous every 24 hours  melatonin 3 milliGRAM(s) Oral at bedtime PRN  ondansetron Injectable 4 milliGRAM(s) IV Push every 8 hours PRN  polyethylene glycol 3350 17 Gram(s) Oral daily  senna 2 Tablet(s) Oral at bedtime  tiotropium 2.5 MICROgram(s) Inhaler 2 Puff(s) Inhalation daily  : Home Meds:      PHYSICAL EXAM    ICU Vital Signs Last 24 Hrs    HR: 135 (2024 00:30) (98 - 144)  BP: 114/68 (2024 00:30) (84/53 - 114/68)  RR: 22 (2024 00:30) (22 - 22)  SpO2: 100% (2024 00:30) (95% - 100%)    O2 Parameters below as of 2024 00:30  Patient On (Oxygen Delivery Method): BiPAP/CPAP            General: Cachectic ill looking  Lungs: Bilateral rhonchi  Cardiovascular: KAREN 2.6  Abdomen: Soft, Positive BS  Extremities: No clubbing  Neurological: Non focal         LABS:                          10.0   19.66 )-----------( 297      ( 31 Dec 2023 13:55 )             28.8                                               12-    124<L>  |  85<L>  |  29<H>  ----------------------------<  133<H>  4.2   |  29  |  0.9    Ca    8.4      31 Dec 2023 13:55    TPro  6.1  /  Alb  3.5  /  TBili  1.4<H>  /  DBili  x   /  AST  21  /  ALT  11  /  AlkPhos  95  12-31                                             Urinalysis Basic - ( 31 Dec 2023 13:55 )    Color: x / Appearance: x / SG: x / pH: x  Gluc: 133 mg/dL / Ketone: x  / Bili: x / Urobili: x   Blood: x / Protein: x / Nitrite: x   Leuk Esterase: x / RBC: x / WBC x   Sq Epi: x / Non Sq Epi: x / Bacteria: x                                                  LIVER FUNCTIONS - ( 31 Dec 2023 13:55 )  Alb: 3.5 g/dL / Pro: 6.1 g/dL / ALK PHOS: 95 U/L / ALT: 11 U/L / AST: 21 U/L / GGT: x                                                                                                                       MEDICATIONS  (STANDING):  aspirin enteric coated 81 milliGRAM(s) Oral daily  budesonide 160 MICROgram(s)/formoterol 4.5 MICROgram(s) Inhaler 2 Puff(s) Inhalation two times a day  chlorhexidine 2% Cloths 1 Application(s) Topical <User Schedule>  dexAMETHasone  Injectable 6 milliGRAM(s) IV Push daily  enoxaparin Injectable 40 milliGRAM(s) SubCutaneous every 24 hours  polyethylene glycol 3350 17 Gram(s) Oral daily  senna 2 Tablet(s) Oral at bedtime  tiotropium 2.5 MICROgram(s) Inhaler 2 Puff(s) Inhalation daily    MEDICATIONS  (PRN):  acetaminophen     Tablet .. 650 milliGRAM(s) Oral every 6 hours PRN Temp greater or equal to 38C (100.4F), Mild Pain (1 - 3)  aluminum hydroxide/magnesium hydroxide/simethicone Suspension 30 milliLiter(s) Oral every 4 hours PRN Dyspepsia  melatonin 3 milliGRAM(s) Oral at bedtime PRN Insomnia  ondansetron Injectable 4 milliGRAM(s) IV Push every 8 hours PRN Nausea and/or Vomiting    CHEST CT noted

## 2024-01-01 NOTE — CHART NOTE - NSCHARTNOTEFT_GEN_A_CORE
Consult received and chart reviewed; palliative care will formally evaluate patient Tuesday AM, please call [phone] in the interim for any acute needs. Thank you.   ______________  Zachary Guerrero MD  Palliative Medicine  [facility]   of Geriatric and Palliative Medicine  [phone] Consult received and chart reviewed; palliative care will formally evaluate patient Tuesday AM, please call [phone] in the interim for any acute needs. Thank you.   ______________  Zachary Guerrero MD  Palliative Medicine     of Geriatric and Palliative Medicine  [phone] [FreeTextEntry1] : f/u imaging studies [de-identified] : Twyla presents for follow up.\par \par Imaging studies reviewed:\par \par 1. EDOUARD\par Mild atherosclerotic changes but normal flow and no stenosis. Lipid panel reviewed. ASCVD risk reviewed and LDL is already under 100. Discussed diet and lifestyle modifications.\par \par 2. Abdominal sonogram\par Mild hepatosplenomegaly. +cholelithiasis, renal cyst +/- renal stone.\par \par 3. Neck sono\par +LAD for which biopsy vs follow up is recommended. 1cm thyroid nodule with recommendation for 1 yr follow up. \par \par Imaging was reviewed with patient.

## 2024-01-02 LAB
ANION GAP SERPL CALC-SCNC: 10 MMOL/L — SIGNIFICANT CHANGE UP (ref 7–14)
ANION GAP SERPL CALC-SCNC: 10 MMOL/L — SIGNIFICANT CHANGE UP (ref 7–14)
ANION GAP SERPL CALC-SCNC: 11 MMOL/L — SIGNIFICANT CHANGE UP (ref 7–14)
ANION GAP SERPL CALC-SCNC: 11 MMOL/L — SIGNIFICANT CHANGE UP (ref 7–14)
BUN SERPL-MCNC: 22 MG/DL — HIGH (ref 10–20)
BUN SERPL-MCNC: 22 MG/DL — HIGH (ref 10–20)
BUN SERPL-MCNC: 23 MG/DL — HIGH (ref 10–20)
BUN SERPL-MCNC: 23 MG/DL — HIGH (ref 10–20)
CALCIUM SERPL-MCNC: 7.9 MG/DL — LOW (ref 8.4–10.5)
CALCIUM SERPL-MCNC: 7.9 MG/DL — LOW (ref 8.4–10.5)
CALCIUM SERPL-MCNC: 8.1 MG/DL — LOW (ref 8.4–10.5)
CALCIUM SERPL-MCNC: 8.1 MG/DL — LOW (ref 8.4–10.5)
CHLORIDE SERPL-SCNC: 88 MMOL/L — LOW (ref 98–110)
CHLORIDE SERPL-SCNC: 88 MMOL/L — LOW (ref 98–110)
CHLORIDE SERPL-SCNC: 89 MMOL/L — LOW (ref 98–110)
CHLORIDE SERPL-SCNC: 89 MMOL/L — LOW (ref 98–110)
CO2 SERPL-SCNC: 26 MMOL/L — SIGNIFICANT CHANGE UP (ref 17–32)
CO2 SERPL-SCNC: 26 MMOL/L — SIGNIFICANT CHANGE UP (ref 17–32)
CO2 SERPL-SCNC: 28 MMOL/L — SIGNIFICANT CHANGE UP (ref 17–32)
CO2 SERPL-SCNC: 28 MMOL/L — SIGNIFICANT CHANGE UP (ref 17–32)
CREAT SERPL-MCNC: 0.5 MG/DL — LOW (ref 0.7–1.5)
CREAT SERPL-MCNC: 0.5 MG/DL — LOW (ref 0.7–1.5)
CREAT SERPL-MCNC: 0.6 MG/DL — LOW (ref 0.7–1.5)
CREAT SERPL-MCNC: 0.6 MG/DL — LOW (ref 0.7–1.5)
EGFR: 88 ML/MIN/1.73M2 — SIGNIFICANT CHANGE UP
EGFR: 88 ML/MIN/1.73M2 — SIGNIFICANT CHANGE UP
EGFR: 92 ML/MIN/1.73M2 — SIGNIFICANT CHANGE UP
EGFR: 92 ML/MIN/1.73M2 — SIGNIFICANT CHANGE UP
GLUCOSE SERPL-MCNC: 189 MG/DL — HIGH (ref 70–99)
GLUCOSE SERPL-MCNC: 189 MG/DL — HIGH (ref 70–99)
GLUCOSE SERPL-MCNC: 241 MG/DL — HIGH (ref 70–99)
GLUCOSE SERPL-MCNC: 241 MG/DL — HIGH (ref 70–99)
HCT VFR BLD CALC: 27.3 % — LOW (ref 37–47)
HCT VFR BLD CALC: 27.3 % — LOW (ref 37–47)
HGB BLD-MCNC: 9.2 G/DL — LOW (ref 12–16)
HGB BLD-MCNC: 9.2 G/DL — LOW (ref 12–16)
LACTATE SERPL-SCNC: 1.3 MMOL/L — SIGNIFICANT CHANGE UP (ref 0.7–2)
LACTATE SERPL-SCNC: 1.3 MMOL/L — SIGNIFICANT CHANGE UP (ref 0.7–2)
MCHC RBC-ENTMCNC: 30.5 PG — SIGNIFICANT CHANGE UP (ref 27–31)
MCHC RBC-ENTMCNC: 30.5 PG — SIGNIFICANT CHANGE UP (ref 27–31)
MCHC RBC-ENTMCNC: 33.7 G/DL — SIGNIFICANT CHANGE UP (ref 32–37)
MCHC RBC-ENTMCNC: 33.7 G/DL — SIGNIFICANT CHANGE UP (ref 32–37)
MCV RBC AUTO: 90.4 FL — SIGNIFICANT CHANGE UP (ref 81–99)
MCV RBC AUTO: 90.4 FL — SIGNIFICANT CHANGE UP (ref 81–99)
NRBC # BLD: 0 /100 WBCS — SIGNIFICANT CHANGE UP (ref 0–0)
NRBC # BLD: 0 /100 WBCS — SIGNIFICANT CHANGE UP (ref 0–0)
PLATELET # BLD AUTO: 316 K/UL — SIGNIFICANT CHANGE UP (ref 130–400)
PLATELET # BLD AUTO: 316 K/UL — SIGNIFICANT CHANGE UP (ref 130–400)
PMV BLD: 9.6 FL — SIGNIFICANT CHANGE UP (ref 7.4–10.4)
PMV BLD: 9.6 FL — SIGNIFICANT CHANGE UP (ref 7.4–10.4)
POTASSIUM SERPL-MCNC: 3.7 MMOL/L — SIGNIFICANT CHANGE UP (ref 3.5–5)
POTASSIUM SERPL-MCNC: 3.7 MMOL/L — SIGNIFICANT CHANGE UP (ref 3.5–5)
POTASSIUM SERPL-MCNC: 4.3 MMOL/L — SIGNIFICANT CHANGE UP (ref 3.5–5)
POTASSIUM SERPL-MCNC: 4.3 MMOL/L — SIGNIFICANT CHANGE UP (ref 3.5–5)
POTASSIUM SERPL-SCNC: 3.7 MMOL/L — SIGNIFICANT CHANGE UP (ref 3.5–5)
POTASSIUM SERPL-SCNC: 3.7 MMOL/L — SIGNIFICANT CHANGE UP (ref 3.5–5)
POTASSIUM SERPL-SCNC: 4.3 MMOL/L — SIGNIFICANT CHANGE UP (ref 3.5–5)
POTASSIUM SERPL-SCNC: 4.3 MMOL/L — SIGNIFICANT CHANGE UP (ref 3.5–5)
PROT SERPL-MCNC: 5.9 G/DL — LOW (ref 6–8.3)
PROT SERPL-MCNC: 5.9 G/DL — LOW (ref 6–8.3)
RBC # BLD: 3.02 M/UL — LOW (ref 4.2–5.4)
RBC # BLD: 3.02 M/UL — LOW (ref 4.2–5.4)
RBC # FLD: 14.8 % — HIGH (ref 11.5–14.5)
RBC # FLD: 14.8 % — HIGH (ref 11.5–14.5)
SODIUM SERPL-SCNC: 125 MMOL/L — LOW (ref 135–146)
SODIUM SERPL-SCNC: 125 MMOL/L — LOW (ref 135–146)
SODIUM SERPL-SCNC: 127 MMOL/L — LOW (ref 135–146)
SODIUM SERPL-SCNC: 127 MMOL/L — LOW (ref 135–146)
TROPONIN T, HIGH SENSITIVITY RESULT: 180 NG/L — CRITICAL HIGH (ref 6–13)
TROPONIN T, HIGH SENSITIVITY RESULT: 180 NG/L — CRITICAL HIGH (ref 6–13)
TSH SERPL-MCNC: 2.36 UIU/ML — SIGNIFICANT CHANGE UP (ref 0.27–4.2)
TSH SERPL-MCNC: 2.36 UIU/ML — SIGNIFICANT CHANGE UP (ref 0.27–4.2)
WBC # BLD: 21.9 K/UL — HIGH (ref 4.8–10.8)
WBC # BLD: 21.9 K/UL — HIGH (ref 4.8–10.8)
WBC # FLD AUTO: 21.9 K/UL — HIGH (ref 4.8–10.8)
WBC # FLD AUTO: 21.9 K/UL — HIGH (ref 4.8–10.8)

## 2024-01-02 PROCEDURE — 99223 1ST HOSP IP/OBS HIGH 75: CPT

## 2024-01-02 PROCEDURE — 93970 EXTREMITY STUDY: CPT | Mod: 26

## 2024-01-02 PROCEDURE — 99233 SBSQ HOSP IP/OBS HIGH 50: CPT

## 2024-01-02 PROCEDURE — 99232 SBSQ HOSP IP/OBS MODERATE 35: CPT

## 2024-01-02 RX ORDER — OLANZAPINE 15 MG/1
2.5 TABLET, FILM COATED ORAL ONCE
Refills: 0 | Status: COMPLETED | OUTPATIENT
Start: 2024-01-02 | End: 2024-01-02

## 2024-01-02 RX ADMIN — ENOXAPARIN SODIUM 40 MILLIGRAM(S): 100 INJECTION SUBCUTANEOUS at 14:03

## 2024-01-02 RX ADMIN — Medication 81 MILLIGRAM(S): at 11:47

## 2024-01-02 RX ADMIN — SENNA PLUS 2 TABLET(S): 8.6 TABLET ORAL at 22:18

## 2024-01-02 RX ADMIN — Medication 100 MILLIGRAM(S): at 11:44

## 2024-01-02 RX ADMIN — PIPERACILLIN AND TAZOBACTAM 25 GRAM(S): 4; .5 INJECTION, POWDER, LYOPHILIZED, FOR SOLUTION INTRAVENOUS at 14:04

## 2024-01-02 RX ADMIN — TIOTROPIUM BROMIDE 2 PUFF(S): 18 CAPSULE ORAL; RESPIRATORY (INHALATION) at 11:44

## 2024-01-02 RX ADMIN — Medication 40 MILLIGRAM(S): at 18:00

## 2024-01-02 RX ADMIN — Medication 6 MILLIGRAM(S): at 05:48

## 2024-01-02 RX ADMIN — Medication 100 MILLIGRAM(S): at 05:48

## 2024-01-02 RX ADMIN — Medication 100 MILLIGRAM(S): at 17:59

## 2024-01-02 RX ADMIN — PIPERACILLIN AND TAZOBACTAM 25 GRAM(S): 4; .5 INJECTION, POWDER, LYOPHILIZED, FOR SOLUTION INTRAVENOUS at 22:19

## 2024-01-02 RX ADMIN — PIPERACILLIN AND TAZOBACTAM 25 GRAM(S): 4; .5 INJECTION, POWDER, LYOPHILIZED, FOR SOLUTION INTRAVENOUS at 05:48

## 2024-01-02 RX ADMIN — BUDESONIDE AND FORMOTEROL FUMARATE DIHYDRATE 2 PUFF(S): 160; 4.5 AEROSOL RESPIRATORY (INHALATION) at 11:45

## 2024-01-02 NOTE — CONSULT NOTE ADULT - TIME BILLING
I have personally seen and examined this patient.    I have reviewed all pertinent clinical information and reviewed all relevant imaging and diagnostic studies personally.   I counseled the patient about diagnostic testing and treatment plan. All questions were answered.   I discussed recommendations with the primary team.
Time spent on total encounter assessing patient, examination, chart review, counseling and coordinating care by the attending physician/nurse/care manager as well as GOC discussion.

## 2024-01-02 NOTE — PROGRESS NOTE ADULT - SUBJECTIVE AND OBJECTIVE BOX
She feels better today. + productive cough feels SOB but improved   Daughter and daughter in law by bedside       MEDICATIONS  (STANDING):  aspirin enteric coated 81 milliGRAM(s) Oral daily  budesonide 160 MICROgram(s)/formoterol 4.5 MICROgram(s) Inhaler 2 Puff(s) Inhalation two times a day  chlorhexidine 2% Cloths 1 Application(s) Topical <User Schedule>  doxycycline IVPB 100 milliGRAM(s) IV Intermittent every 12 hours  doxycycline IVPB      enoxaparin Injectable 40 milliGRAM(s) SubCutaneous every 24 hours  methylPREDNISolone sodium succinate Injectable 40 milliGRAM(s) IV Push two times a day  piperacillin/tazobactam IVPB.. 3.375 Gram(s) IV Intermittent every 8 hours  polyethylene glycol 3350 17 Gram(s) Oral daily  senna 2 Tablet(s) Oral at bedtime  tiotropium 2.5 MICROgram(s) Inhaler 2 Puff(s) Inhalation daily    MEDICATIONS  (PRN):  acetaminophen     Tablet .. 650 milliGRAM(s) Oral every 6 hours PRN Temp greater or equal to 38C (100.4F), Mild Pain (1 - 3)  aluminum hydroxide/magnesium hydroxide/simethicone Suspension 30 milliLiter(s) Oral every 4 hours PRN Dyspepsia  melatonin 3 milliGRAM(s) Oral at bedtime PRN Insomnia  ondansetron Injectable 4 milliGRAM(s) IV Push every 8 hours PRN Nausea and/or Vomiting            Vital Signs Last 24 Hrs  T(C): 36.9 (02 Jan 2024 18:51), Max: 37.6 (02 Jan 2024 07:46)  T(F): 98.5 (02 Jan 2024 18:51), Max: 99.7 (02 Jan 2024 07:46)  HR: 80 (02 Jan 2024 18:51) (80 - 99)  BP: 136/63 (02 Jan 2024 18:51) (136/63 - 198/79)  BP(mean): 108 (02 Jan 2024 05:00) (105 - 108)  RR: 17 (02 Jan 2024 18:51) (16 - 22)  SpO2: 98% (02 Jan 2024 18:51) (95% - 98%)    Parameters below as of 02 Jan 2024 18:51  Patient On (Oxygen Delivery Method): nasal cannula, high flow  O2 Flow (L/min): 60  O2 Concentration (%): 80     REVIEW OF SYSTEMS:  CONSTITUTIONAL: No fever, weight loss, or fatigue  CARDIOLOGY: Patient denies chest pain. + shortness of breath (improved)   RESPIRATORY: + shortness of breath. + cough   NEUROLOGICAL: NO weakness, no focal deficits to report.  GI: no BRBPR, no N,V,diarrhea.    PSYCHIATRY: normal mood and affect  HEENT: no nasal discharge, no ecchymosis  SKIN: no ecchymosis, no breakdown  MUSCULOSKELETAL: Full range of motion x4.        PHYSICAL EXAM:  · CONSTITUTIONAL:	Well-developed, well nourished      ·RESPIRATORY:   Decreased BS bilaterally   · CARDIOVASCULAR	regular rate and rhythm  no rub  + KAREN   · EXTREMITIES: No cyanosis, clubbing or edema  · VASCULAR: 	Equal and normal pulses (carotid, femoral, dorsalis pedis)  	    LABS:                        9.2    21.90 )-----------( 316      ( 02 Jan 2024 12:10 )             27.3     01-02    127<L>  |  88<L>  |  23<H>  ----------------------------<  241<H>  4.3   |  28  |  0.5<L>    Ca    8.1<L>      02 Jan 2024 12:10    TPro  5.0<L>  /  Alb  2.9<L>  /  TBili  1.4<H>  /  DBili  x   /  AST  15  /  ALT  9   /  AlkPhos  89  01-01            I&O's Summary    01 Jan 2024 07:01  -  02 Jan 2024 07:00  --------------------------------------------------------  IN: 0 mL / OUT: 250 mL / NET: -250 mL      BNP  RADIOLOGY & ADDITIONAL STUDIES:    IMPRESSION AND PLAN:

## 2024-01-02 NOTE — PHYSICAL THERAPY INITIAL EVALUATION ADULT - SPECIFY REASON(S)
14:02-14:12 Attempted to see pt for tx, however pts weight bearing orders are unclear and does not specify percentage of PWB, reached out to resident to obtain percentage however has not responded.

## 2024-01-02 NOTE — PROGRESS NOTE ADULT - ASSESSMENT
83F w/ PMHx COPD, OA, chronic back pain s/p multiple surgeries and recent admission for left intertrochanteric femur fracture s/p ORIF 12/27/2023 brought in from [facility] for shortness of breath.    1. Acute Hypoxic Respiratory Failure and severe sepsis  secondary to COVID 19   cont Abx  Dexa  Remdesivir on hold per ID  oxygen   CTA Chest shows: no evidence of acute pulmonary embolus. Bilateral lower lobe consolidative opacities which can be seen with pneumonia    2. NSTEMI Type II  - EKG showed sinus tachy w/ PACs  - Trop 208>192 (previously 19)  - trend, monitor repeat  - if remains elevated or worsens, consider cardio consult    3.ALAN /Hyponatremia  Cr improved  Na improving  got Fluids      4. Chronic Anemia  - Hgb 10.0 (at previous baseline)  - monitor H&H  - f/u o/p    5. left intertrochanteric femur fracture s/p ORIF 12/27/2023 /OA/ Chronic Back Pain s/p multiple surgeries  - from rehab  - f/u PT  cont Lovenox    #DVT ppx: Lovenox SubQ  #GI ppx: PPI PO daily  #ACP/GOC  DNR/DNI, living will on file      Dtr says pt is a fighter and doesn't want to give up on her however she understands pt's critical condition. For now she has DNR/DNI.   Palliative care on case   83F w/ PMHx COPD, OA, chronic back pain s/p multiple surgeries and recent admission for left intertrochanteric femur fracture s/p ORIF 12/27/2023 brought in from [facility] for shortness of breath.    1. Acute Hypoxic Respiratory Failure and severe sepsis  secondary to COVID 19   cont Abx  Dexa  Remdesivir on hold per ID  oxygen   CTA Chest shows: no evidence of acute pulmonary embolus. Bilateral lower lobe consolidative opacities which can be seen with pneumonia    2. NSTEMI Type II  - EKG showed sinus tachy w/ PACs  - Trop 208>192 (previously 19)  - trend, monitor repeat  - if remains elevated or worsens, consider cardio consult    3.ALAN /Hyponatremia  Cr improved  Na improving  got Fluids      4. Chronic Anemia  - Hgb 10.0 (at previous baseline)  - monitor H&H  - f/u o/p    5. left intertrochanteric femur fracture s/p ORIF 12/27/2023 /OA/ Chronic Back Pain s/p multiple surgeries  - from rehab  - f/u PT  cont Lovenox    #DVT ppx: Lovenox SubQ  #GI ppx: PPI PO daily  #ACP/GOC  DNR/DNI, living will on file      Dtr says pt is a fighter and doesn't want to give up on her however she understands pt's critical condition. For now she has DNR/DNI.   Palliative care on case

## 2024-01-02 NOTE — CONSULT NOTE ADULT - ASSESSMENT
83F w/ PMHx COPD, OA, chronic back pain s/p multiple surgeries and recent admission for left intertrochanteric femur fracture s/p ORIF 12/27/2023 brought in from [facility] [place] for shortness of breath found to acute hypocix resp failure 2/2 COVID c/b PNA. Palliative care consulted for GO.     #Acute Hypoxic Respiratory Failure 2/2 COVID-19 c/b PNA  #Severe Sepsis   #COPD   - BP 84/53, HR 99, RR 22, SpO2 100% on NRB, WBC 19.66, lactate 2.9>2.4  - management as per primary team  - DNR/DNI, ok with non-invasive ventilatory support     #NSTEMI Type II  - EKG showed sinus tachy w/ PACs  - Trop 208>192 (previously 19)  - management as per primary team    #ALAN - likely prerenal  #Hyponatremia  - Na 124, Cr 0.9 (previously 0.6)  - suspected 2/2 hypovolemia  - poor PO intake over the last week  - s/p 3L IVF bolus in ED  - monitor Na, Cr    #Chronic Anemia  - Hgb 10.0 (at previous baseline)    #L intertrochanteric femur fracture s/p ORIF 12/27/2023   #OA  #Chronic Back Pain s/p multiple surgeries  - from rehab  - pain control    #ACP/GOC  DNR/DNI, living will on file     83F w/ PMHx COPD, OA, chronic back pain s/p multiple surgeries and recent admission for left intertrochanteric femur fracture s/p ORIF 12/27/2023 brought in from [facility] for shortness of breath found to acute hypocix resp failure 2/2 COVID c/b PNA. Palliative care consulted for GO.     #Acute Hypoxic Respiratory Failure 2/2 COVID-19 c/b PNA  #Severe Sepsis   #COPD   - BP 84/53, HR 99, RR 22, SpO2 100% on NRB, WBC 19.66, lactate 2.9>2.4  - management as per primary team  - DNR/DNI, ok with non-invasive ventilatory support     #NSTEMI Type II  - EKG showed sinus tachy w/ PACs  - Trop 208>192 (previously 19)  - management as per primary team    #ALAN - likely prerenal  #Hyponatremia  - Na 124, Cr 0.9 (previously 0.6)  - suspected 2/2 hypovolemia  - poor PO intake over the last week  - s/p 3L IVF bolus in ED  - monitor Na, Cr    #Chronic Anemia  - Hgb 10.0 (at previous baseline)    #L intertrochanteric femur fracture s/p ORIF 12/27/2023   #OA  #Chronic Back Pain s/p multiple surgeries  - from rehab  - pain control    #ACP/GOC  DNR/DNI, living will on file     83F w/ PMHx COPD, OA, chronic back pain s/p multiple surgeries and recent admission for left intertrochanteric femur fracture s/p ORIF 12/27/2023 brought in from [facility] for shortness of breath found to acute hypocix resp failure 2/2 COVID c/b PNA. Palliative care consulted for GOC.     #Acute Hypoxic Respiratory Failure 2/2 COVID-19 c/b PNA  #Severe Sepsis   #COPD   - BP 84/53, HR 99, RR 22, SpO2 100% on NRB, WBC 19.66, lactate 2.9>2.4  - management as per primary team  - DNR/DNI, ok with non-invasive ventilatory support     #NSTEMI Type II  - EKG showed sinus tachy w/ PACs  - Trop 208>192 (previously 19)  - management as per primary team    #ALNA - likely prerenal  #Hyponatremia  - Na 124, Cr 0.9 (previously 0.6)  - suspected 2/2 hypovolemia  - poor PO intake over the last week  - s/p 3L IVF bolus in ED  - monitor Na, Cr    #Chronic Anemia  - Hgb 10.0 (at previous baseline)    #L intertrochanteric femur fracture s/p ORIF 12/27/2023   #OA  #Chronic Back Pain s/p multiple surgeries  - from rehab  - pain control    #ACP/GOC  DNR/DNI, living will on file  Dtr says pt is a fighter and doesn't want to give up on her however she understands pt's critical condition. For now she has DNR/DNI. All questions answered. Team will continue to follow for continuation of GOC discussion and supportive counseling. Psychosocial support provided.    Appreciate consult. Discussed with the primary team.    Rolan Ahuja MD, [facility]     83F w/ PMHx COPD, OA, chronic back pain s/p multiple surgeries and recent admission for left intertrochanteric femur fracture s/p ORIF 12/27/2023 brought in from [facility] for shortness of breath found to acute hypocix resp failure 2/2 COVID c/b PNA. Palliative care consulted for GOC.     #Acute Hypoxic Respiratory Failure 2/2 COVID-19 c/b PNA  #Severe Sepsis   #COPD   - BP 84/53, HR 99, RR 22, SpO2 100% on NRB, WBC 19.66, lactate 2.9>2.4  - management as per primary team  - DNR/DNI, ok with non-invasive ventilatory support     #NSTEMI Type II  - EKG showed sinus tachy w/ PACs  - Trop 208>192 (previously 19)  - management as per primary team    #ALAN - likely prerenal  #Hyponatremia  - Na 124, Cr 0.9 (previously 0.6)  - suspected 2/2 hypovolemia  - poor PO intake over the last week  - s/p 3L IVF bolus in ED  - monitor Na, Cr    #Chronic Anemia  - Hgb 10.0 (at previous baseline)    #L intertrochanteric femur fracture s/p ORIF 12/27/2023   #OA  #Chronic Back Pain s/p multiple surgeries  - from rehab  - pain control    #ACP/GOC  DNR/DNI, living will on file  Dtr says pt is a fighter and doesn't want to give up on her however she understands pt's critical condition. For now she has DNR/DNI. All questions answered. Team will continue to follow for continuation of GOC discussion and supportive counseling. Psychosocial support provided.    Appreciate consult. Discussed with the primary team.    Rolan Ahuja MD, [facility]

## 2024-01-02 NOTE — CONSULT NOTE ADULT - CONVERSATION DETAILS
Daughter shares sudden decline after discharge to [facility] on 12/30 to 12/31 AM. Reviewed patient's medical and social history as well as events leading to patient's hospitalization. Writer discussed patient's current diagnosis (resp failure 2/2 covid and PNA, COPD, h/o tobacco, ), medical condition and management, poor prognosis. Inquired about patient's wishes regarding extent of medical care to be provided including escalation of medical care into the ICU and use of vasopressor support. In addition, the writer inquired about thoughts regarding cardiopulmonary resuscitation, artificial nutrition and hydration including use of feeding tubes and IVF, antibiotics, and further investigative studies such as blood draws and radiology. Dtr showed insight into medical condition. Dtr says pt is a fighter and doesn't want to give up on her however she understands pt's critical condition. For now she has DNR/DNI. All questions answered. Team will continue to follow for continuation of GOC discussion and supportive counseling. Psychosocial support provided. Daughter shares sudden decline after discharge to [facility] on 12/30 to 12/31 AM. Reviewed patient's medical and social history as well as events leading to patient's hospitalization. Writer discussed patient's current diagnosis (resp failure 2/2 covid and PNA, COPD, h/o tobacco, ), medical condition and management, poor prognosis. Inquired about patient's wishes regarding extent of medical care to be provided including escalation of medical care into the ICU and use of vasopressor support. In addition, the writer inquired about thoughts regarding cardiopulmonary resuscitation, artificial nutrition and hydration including use of feeding tubes and IVF, antibiotics, and further investigative studies such as blood draws and radiology. Dtr showed insight into medical condition. Dtr says pt is a fighter and doesn't want to give up on her however she understands pt's critical condition. For now she has DNR/DNI. All questions answered. Team will continue to follow for continuation of GOC discussion and supportive counseling. Psychosocial support provided.

## 2024-01-02 NOTE — PROGRESS NOTE ADULT - SUBJECTIVE AND OBJECTIVE BOX
INTERVAL HPI/OVERNIGHT EVENTS:    83F w/ PMHx COPD, OA, chronic back pain s/p multiple surgeries and recent admission for left intertrochanteric femur fracture s/p ORIF 12/27/2023 brought in from [facility] for shortness of breath. found to have COVID positive      REVIEW OF SYSTEMS:  CONSTITUTIONAL: fatigue  EYES: No eye pain, visual disturbances, or discharge  ENMT:  No difficulty hearing, tinnitus, vertigo; No sinus or throat pain  NECK: No pain or stiffness  BREASTS: No pain, masses, or nipple discharge  RESPIRATORY: shortness of breath  CARDIOVASCULAR: No chest pain, palpitations, dizziness, or leg swelling  GASTROINTESTINAL: No abdominal or epigastric pain.  GENITOURINARY: No dysuria, frequency, hematuria, or incontinence  NEUROLOGICAL: lethargy  SKIN: No itching, burning, rashes, or lesions   LYMPH NODES: No enlarged glands  ENDOCRINE: No heat or cold intolerance; No hair loss  MUSCULOSKELETAL: No joint pain or swelling; No muscle, back, or extremity pain  PSYCHIATRIC: No depression, anxiety, mood swings, or difficulty sleeping  HEME/LYMPH: No easy bruising, or bleeding gums  ALLERY AND IMMUNOLOGIC: No hives or eczema    VITALS:  T(F): 99.7, Max: 99.7 (01-01-24 @ 12:15)  HR: 86  BP: 198/79  RR: 16  SpO2: 96%    PHYSICAL EXAM:  GENERAL: lethargic  HEAD:  Atraumatic, Normocephalic  EYES: EOMI, PERRLA, conjunctiva and sclera clear  ENMT: No tonsillar erythema, exudates, or enlargement; Moist mucous membranes, Good dentition, No lesions  NECK: Supple, No JVD, Normal thyroid  NERVOUS SYSTEM:  lethargic  CHEST/LUNG: crackles at bases  HEART: Regular rate and rhythm; No murmurs, rubs, or gallops  ABDOMEN: Soft, Nontender, Nondistended; Bowel sounds present  EXTREMITIES:  no edema  LYMPH: No lymphadenopathy noted  SKIN: No rashes or lesions      LABS:  Urinalysis Basic - ( 01 Jan 2024 20:42 )    Color: x / Appearance: x / SG: x / pH: x  Gluc: 130 mg/dL / Ketone: x  / Bili: x / Urobili: x   Blood: x / Protein: x / Nitrite: x   Leuk Esterase: x / RBC: x / WBC x   Sq Epi: x / Non Sq Epi: x / Bacteria: x      01-01    127<L>  |  89<L>  |  29<H>  ----------------------------<  130<H>  3.9   |  26  |  0.6<L>    Ca    7.8<L>      01 Jan 2024 20:42    TPro  5.0<L>  /  Alb  2.9<L>  /  TBili  1.4<H>  /  DBili  x   /  AST  15  /  ALT  9   /  AlkPhos  89  01-01                          8.5    19.34 )-----------( 276      ( 01 Jan 2024 20:42 )             24.4       Culture - Blood (collected 31 Dec 2023 13:55)  Source: .Blood Blood-Peripheral  Preliminary Report (02 Jan 2024 02:03):    No growth at 24 hours    Culture - Blood (collected 31 Dec 2023 13:55)  Source: .Blood Blood-Peripheral  Preliminary Report (02 Jan 2024 02:03):    No growth at 24 hours          RADIOLOGY & ADDITIONAL TESTS:    Imaging Personally Reviewed:  [ ] YES  [ ] NO    MEDICATIONS:     MEDICATIONS  (STANDING):  aspirin enteric coated 81 milliGRAM(s) Oral daily  budesonide 160 MICROgram(s)/formoterol 4.5 MICROgram(s) Inhaler 2 Puff(s) Inhalation two times a day  chlorhexidine 2% Cloths 1 Application(s) Topical <User Schedule>  doxycycline IVPB      doxycycline IVPB 100 milliGRAM(s) IV Intermittent every 12 hours  enoxaparin Injectable 40 milliGRAM(s) SubCutaneous every 24 hours  lactated ringers. 500 milliLiter(s) (100 mL/Hr) IV Continuous <Continuous>  methylPREDNISolone sodium succinate Injectable 40 milliGRAM(s) IV Push two times a day  piperacillin/tazobactam IVPB.. 3.375 Gram(s) IV Intermittent every 8 hours  polyethylene glycol 3350 17 Gram(s) Oral daily  senna 2 Tablet(s) Oral at bedtime  tiotropium 2.5 MICROgram(s) Inhaler 2 Puff(s) Inhalation daily    MEDICATIONS  (PRN):  acetaminophen     Tablet .. 650 milliGRAM(s) Oral every 6 hours PRN Temp greater or equal to 38C (100.4F), Mild Pain (1 - 3)  aluminum hydroxide/magnesium hydroxide/simethicone Suspension 30 milliLiter(s) Oral every 4 hours PRN Dyspepsia  melatonin 3 milliGRAM(s) Oral at bedtime PRN Insomnia  ondansetron Injectable 4 milliGRAM(s) IV Push every 8 hours PRN Nausea and/or Vomiting       INTERVAL HPI/OVERNIGHT EVENTS:    83F w/ PMHx COPD, OA, chronic back pain s/p multiple surgeries and recent admission for left intertrochanteric femur fracture s/p ORIF 12/27/2023 brought in from [facility] for shortness of breath. found to have COVID positive      REVIEW OF SYSTEMS:  CONSTITUTIONAL: fatigue  EYES: No eye pain, visual disturbances, or discharge  ENMT:  No difficulty hearing, tinnitus, vertigo; No sinus or throat pain  NECK: No pain or stiffness  BREASTS: No pain, masses, or nipple discharge  RESPIRATORY: shortness of breath  CARDIOVASCULAR: No chest pain, palpitations, dizziness, or leg swelling  GASTROINTESTINAL: No abdominal or epigastric pain.  GENITOURINARY: No dysuria, frequency, hematuria, or incontinence  NEUROLOGICAL: lethargy  SKIN: No itching, burning, rashes, or lesions   LYMPH NODES: No enlarged glands  ENDOCRINE: No heat or cold intolerance; No hair loss  MUSCULOSKELETAL: No joint pain or swelling; No muscle, back, or extremity pain  PSYCHIATRIC: No depression, anxiety, mood swings, or difficulty sleeping  HEME/LYMPH: No easy bruising, or bleeding gums  ALLERY AND IMMUNOLOGIC: No hives or eczema    VITALS:  T(F): 99.7, Max: 99.7 (01-01-24 @ 12:15)  HR: 86  BP: 198/79  RR: 16  SpO2: 96%    PHYSICAL EXAM:  GENERAL: lethargic  HEAD:  Atraumatic, Normocephalic  EYES: EOMI, PERRLA, conjunctiva and sclera clear  ENMT: No tonsillar erythema, exudates, or enlargement; Moist mucous membranes, Good dentition, No lesions  NECK: Supple, No JVD, Normal thyroid  NERVOUS SYSTEM:  lethargic  CHEST/LUNG: crackles at bases  HEART: Regular rate and rhythm; No murmurs, rubs, or gallops  ABDOMEN: Soft, Nontender, Nondistended; Bowel sounds present  EXTREMITIES:  no edema  LYMPH: No lymphadenopathy noted  SKIN: No rashes or lesions      LABS:  Urinalysis Basic - ( 01 Jan 2024 20:42 )    Color: x / Appearance: x / SG: x / pH: x  Gluc: 130 mg/dL / Ketone: x  / Bili: x / Urobili: x   Blood: x / Protein: x / Nitrite: x   Leuk Esterase: x / RBC: x / WBC x   Sq Epi: x / Non Sq Epi: x / Bacteria: x      01-01    127<L>  |  89<L>  |  29<H>  ----------------------------<  130<H>  3.9   |  26  |  0.6<L>    Ca    7.8<L>      01 Jan 2024 20:42    TPro  5.0<L>  /  Alb  2.9<L>  /  TBili  1.4<H>  /  DBili  x   /  AST  15  /  ALT  9   /  AlkPhos  89  01-01                          8.5    19.34 )-----------( 276      ( 01 Jan 2024 20:42 )             24.4       Culture - Blood (collected 31 Dec 2023 13:55)  Source: .Blood Blood-Peripheral  Preliminary Report (02 Jan 2024 02:03):    No growth at 24 hours    Culture - Blood (collected 31 Dec 2023 13:55)  Source: .Blood Blood-Peripheral  Preliminary Report (02 Jan 2024 02:03):    No growth at 24 hours          RADIOLOGY & ADDITIONAL TESTS:    Imaging Personally Reviewed:  [ ] YES  [ ] NO    MEDICATIONS:     MEDICATIONS  (STANDING):  aspirin enteric coated 81 milliGRAM(s) Oral daily  budesonide 160 MICROgram(s)/formoterol 4.5 MICROgram(s) Inhaler 2 Puff(s) Inhalation two times a day  chlorhexidine 2% Cloths 1 Application(s) Topical <User Schedule>  doxycycline IVPB      doxycycline IVPB 100 milliGRAM(s) IV Intermittent every 12 hours  enoxaparin Injectable 40 milliGRAM(s) SubCutaneous every 24 hours  lactated ringers. 500 milliLiter(s) (100 mL/Hr) IV Continuous <Continuous>  methylPREDNISolone sodium succinate Injectable 40 milliGRAM(s) IV Push two times a day  piperacillin/tazobactam IVPB.. 3.375 Gram(s) IV Intermittent every 8 hours  polyethylene glycol 3350 17 Gram(s) Oral daily  senna 2 Tablet(s) Oral at bedtime  tiotropium 2.5 MICROgram(s) Inhaler 2 Puff(s) Inhalation daily    MEDICATIONS  (PRN):  acetaminophen     Tablet .. 650 milliGRAM(s) Oral every 6 hours PRN Temp greater or equal to 38C (100.4F), Mild Pain (1 - 3)  aluminum hydroxide/magnesium hydroxide/simethicone Suspension 30 milliLiter(s) Oral every 4 hours PRN Dyspepsia  melatonin 3 milliGRAM(s) Oral at bedtime PRN Insomnia  ondansetron Injectable 4 milliGRAM(s) IV Push every 8 hours PRN Nausea and/or Vomiting

## 2024-01-02 NOTE — PROGRESS NOTE ADULT - ASSESSMENT
Impression    Acute on chronic hypoxemic/ hypercapnic resp failure on NIV  Covid pneumonia  Possible aspiration ( cr chest noted)  NTEMI  AMS/ TME  COPD not on Home o2  hyponatremia  Bilateral lung nodules l  Chronic back pain  Left intertrochanteric femur fracture Sp ORIF  Current smoker    PLAN:    CNS: Avoid CNS depressant    HEENT:  Oral care    PULMONARY:  HOB @ 45 degrees, NIV at night and as needed, repeat ABG, solumedrol 40 q 12, Neb q 6, keep SaO2 88 TO 92%, hhfnc trial    CARDIOVASCULAR: EKG, cardio eval, hep, avoid overload    GI: GI prophylaxis                                          Feeding feeding when off NIIV, speech and swallow eval    RENAL:  F/u  lytes.  Correct as needed. accurate I/O, u Na/ osm    INFECTIOUS DISEASE: abs per ID    HEMATOLOGICAL:  DVT prophylaxis. LE doppler    ENDOCRINE:  Follow up FS.  Insulin protocol if needed.    SDU  Palliative care eval  very poor prognosis

## 2024-01-02 NOTE — CONSULT NOTE ADULT - SUBJECTIVE AND OBJECTIVE BOX
HPI:  83F w/ PMHx COPD, OA, chronic back pain s/p multiple surgeries and recent admission for left intertrochanteric femur fracture s/p ORIF 2023 brought in from [facility] for shortness of breath. EMS reports that she was hypoxic on room air to the 70s. Patient was initially AOx0, after being on NRB in EMS, patient mental status improving with improved oxygenation. Daughter reports the patient was doing okay yesterday and that this was an acute change. Patient currently AOx3, appears short of breath, denying any other complaints at this time.    Vitals: BP 84/53, HR 99, RR 22, SpO2 100% on NRB    Labs: WBC 19.66, Hgb 10.0 (at previous baseline), Na 124, Cr 0.9 (previously 0.6), BNP 9190, Trop 208>192 (previously 19), VBG lactate 2.9>2.4    COVID +ve    EKG - Sinus Tachycardia w/ possible PACs    Imagin. CT head shows no acute intracranial pathology  2. CTA Chest shows:  - no evidence of acute pulmonary embolus.  - Bilateral lower lobe consolidative opacities which can be seen with pneumonia    In the ED:  - s/p Cefepime IV x1  - s/p Levofloxacin IV x1    Admitted to SDU for management of acute hypoxic respiratory failure (31 Dec 2023 18:45)    PERTINENT PM/SXH:   COPD (chronic obstructive pulmonary disease)    H/O cholelithiasis    Smoker      History of surgery      FAMILY HISTORY:  Known health problems: none (Father, Mother)     difficult to obtain from patient  ITEMS NOT CHECKED ARE NOT PRESENT    SOCIAL HISTORY:   Significant other/partner[ ]  Children[x ]  Latter day/Spirituality:  Substance hx:  [ ]   Tobacco hx:  [ ]   Alcohol hx: [ ]   Living Situation: [ ]Home  [ ]Long term care  [x ]Rehab [ ]Other  Home Services: [ ] HHA [ ] David RN [ ] Hospice  Occupation:  Home Opioid hx:  [ ] Y [ ] N [ ] I-Stop Reference No:    ADVANCE DIRECTIVES:    [ ] Full Code [ ] DNR  MOLST  [x ]  Living Will  [ ]   DECISION MAKER(s):  [ ] Health Care Proxy(s)  [ ] Surrogate(s)  [ ] Guardian           Name(s): Phone Number(s):    BASELINE (I)ADL(s) (prior to admission):  [place]: [ ]Total  [ ] Moderate [ x]Dependent  Palliative Performance Status Version 2:         %    http://npcrc.org/files/news/palliative_performance_scale_[email or web address].pdf    Allergies    No Known Allergies    Intolerances    MEDICATIONS  (STANDING):  aspirin enteric coated 81 milliGRAM(s) Oral daily  budesonide 160 MICROgram(s)/formoterol 4.5 MICROgram(s) Inhaler 2 Puff(s) Inhalation two times a day  chlorhexidine 2% Cloths 1 Application(s) Topical <User Schedule>  dexAMETHasone  Injectable 6 milliGRAM(s) IV Push daily  doxycycline IVPB 100 milliGRAM(s) IV Intermittent every 12 hours  doxycycline IVPB      enoxaparin Injectable 40 milliGRAM(s) SubCutaneous every 24 hours  lactated ringers. 500 milliLiter(s) (100 mL/Hr) IV Continuous <Continuous>  OLANZapine Injectable 2.5 milliGRAM(s) IntraMuscular once  piperacillin/tazobactam IVPB.. 3.375 Gram(s) IV Intermittent every 8 hours  polyethylene glycol 3350 17 Gram(s) Oral daily  senna 2 Tablet(s) Oral at bedtime  tiotropium 2.5 MICROgram(s) Inhaler 2 Puff(s) Inhalation daily    MEDICATIONS  (PRN):  acetaminophen     Tablet .. 650 milliGRAM(s) Oral every 6 hours PRN Temp greater or equal to 38C (100.4F), Mild Pain (1 - 3)  aluminum hydroxide/magnesium hydroxide/simethicone Suspension 30 milliLiter(s) Oral every 4 hours PRN Dyspepsia  melatonin 3 milliGRAM(s) Oral at bedtime PRN Insomnia  ondansetron Injectable 4 milliGRAM(s) IV Push every 8 hours PRN Nausea and/or Vomiting      PRESENT SYMPTOMS: [ ]Unable to obtain due to poor mentation   Source if other than patient:  [ ]Family   [ ]Team     Pain: [ ]yes [ ]no  QOL impact -   Location -                    Aggravating factors -  Quality -  Radiation -  Timing-  Severity (0-10 scale):  Minimal acceptable level (0-10 scale):     CPOT:    https://www.[facility].org/getattachment/[email or web address]/Critical-Care-Pain-Observation-Tool-(CPOT)    PAIN AD Score:   http://geriatrictoolkit.[facility]/cog/painad.pdf (press ctrl +  left click to view)    Dyspnea:                           [ ]None[ ]Mild [ ]Moderate [ ]Severe     Respiratory Distress Observation Scale (RDOS):   A score of 0 to 2 signifies little or no respiratory distress, 3 signifies mild distress, scores 4 to 6 indicate moderate distress, and scores greater than 7 signify severe distress  https://www.[facility].ca/sites/default/files/PDFS/310102-mszycathreo-ykvqjbaz-pzztmeyvfie-hapwo.pdf    Anxiety:                             [ ]None[ ]Mild [ ]Moderate [ ]Severe   Fatigue:                             [ ]None[ ]Mild [ ]Moderate [ ]Severe   Nausea:                             [ ]None[ ]Mild [ ]Moderate [ ]Severe   Loss of appetite:              [ ]None[ ]Mild [ ]Moderate [ ]Severe   Constipation:                    [ ]None[ ]Mild [ ]Moderate [ ]Severe    Other Symptoms:  [ ]All other review of systems negative     Palliative Performance Status Version 2:         %    http://[facility].org/files/news/palliative_performance_scale_ppsv2.pdf  PHYSICAL EXAM:  Vital Signs Last 24 Hrs  T(C): 37.6 (2024 07:46), Max: 37.6 (2024 12:15)  T(F): 99.7 (2024 07:46), Max: 99.7 (2024 12:15)  HR: 97 (2024 07:46) (90 - 111)  BP: 178/84 (2024 07:46) (140/72 - 178/84)  BP(mean): 108 (2024 05:00) (101 - 108)  RR: 20 (2024 07:46) (18 - 22)  SpO2: 95% (2024 07:46) (94% - 98%)    Parameters below as of 2024 07:46  Patient On (Oxygen Delivery Method): nasal cannula, high flow     I&O's Summary    2024 07:01  -  2024 07:00  --------------------------------------------------------  IN: 0 mL / OUT: 250 mL / NET: -250 mL        GENERAL:  [X ] No acute distress [ ]Lethargic  [ ]Unarousable  [ ]Verbal  [ ]Non-Verbal [ ]Cachexia    BEHAVIORAL/PSYCH:  [ ]Alert and Oriented x  [ ] Anxiety [ ] Delirium [ ] Agitation [X ] Calm   EYES: [ ] No scleral icterus [ ] Scleral icterus [ ] Closed  ENMT:  [ ]Dry mouth  [ ]No external oral lesions [ ] No external ear or nose lesions  CARDIOVASCULAR:  [ ]Regular [ ]Irregular [ ]Tachy [ ]Not Tachy  [ ]Norberto [ ] Edema [ ] No edema  PULMONARY:  [ ]Tachypnea  [ ]Audible excessive secretions [X ] No labored breathing [ ] labored breathing  GASTROINTESTINAL: [ ]Soft  [ ]Distended  [ X]Not distended [ ]Non tender [ ]Tender  MUSCULOSKELETAL: [ ]No clubbing [ ] clubbing  [ ] No cyanosis [ ] cyanosis  NEUROLOGIC: [ ]No focal deficits  [ ]Follows commands  [ ]Does not follow commands  [ ]Cognitive impairment  [ ]Dysphagia  [ ]Dysarthria  [ ]Paresis   SKIN: [ ] Jaundiced [X ] Non-jaundiced [ ]Rash [ ]No Rash [ ] Warm [ ] Dry  MISC/LINES: [ ] ET tube [ ] Trach [ ]NGT/OGT [ ]PEG [ ]Cuadra    CRITICAL CARE:  [ ] Shock Present  [ ]Septic [ ]Cardiogenic [ ]Neurologic [ ]Hypovolemic  [ ]  Vasopressors [ ]  Inotropes   [ ]Respiratory failure present [ ]Mechanical ventilation [ ]Non-invasive ventilatory support [ ]High flow  [ ]Acute  [ ]Chronic [ ]Hypoxic  [ ]Hypercarbic [ ]Other  [ ]Other organ failure     LABS: reviewed by me                        8.5    19.34 )-----------( 276      ( 2024 20:42 )             24.4   -    127<L>  |  89<L>  |  29<H>  ----------------------------<  130<H>  3.9   |  26  |  0.6<L>    Ca    7.8<L>      2024 20:42    TPro  5.0<L>  /  Alb  2.9<L>  /  TBili  1.4<H>  /  DBili  x   /  AST  15  /  ALT  9   /  AlkPhos  89        Urinalysis Basic - ( 2024 20:42 )    Color: x / Appearance: x / SG: x / pH: x  Gluc: 130 mg/dL / Ketone: x  / Bili: x / Urobili: x   Blood: x / Protein: x / Nitrite: x   Leuk Esterase: x / RBC: x / WBC x   Sq Epi: x / Non Sq Epi: x / Bacteria: x      RADIOLOGY & ADDITIONAL STUDIES: reviewed by me    PROTEIN CALORIE MALNUTRITION PRESENT: [ ]mild [ ]moderate [ ]severe [ ]underweight [ ]morbid obesity  https://www.andeal.org/vault/2440/web/files/ONC/Table_Clinical%20Characteristics%20to%20Document%20Malnutrition-White%20JV%20et%20al%2020.pdf    Height (cm): 154.9 (23 @ 13:25), 154.9 (23 @ 17:21)  Weight (kg): 50.3 (24 @ 12:14), 50.3 (23 @ 17:21)  BMI (kg/m2): 21 (24 @ 12:14), 21 (23 @ 13:25), 21 (23 @ 17:21)    [ ]PPSV2 < or = to 30% [ ]significant weight loss  [ ]poor nutritional intake  [ ]anasarca      [ ]Artificial Nutrition          Palliative Care Spiritual/Emotional Screening Tool Question  Severity (0-4):                    OR                    [X ] Unable to determine/NA  Score of 2 or greater indicates recommendation of Chaplaincy referral  Chaplaincy Referral: [ ] Yes [ ] Refused [ ] Following     Caregiver [place]:  [ ] Yes [ ] No    OR    [x ] Unable to determine. Will assess at later time if appropriate.  Social Work Referral [ ]  Patient and Family Centered Care Referral [ ]    Anticipatory Grief Present: [ ] Yes [ ] No    OR     [ x] Unable to determine. Will assess at later time if appropriate.  Social Work Referral [ ]  Patient and Family Centered Care Referral [ ]    REFERRALS:   [ ]Chaplaincy  [ ]Hospice  [ ]Child Life  [ ]Social Work  [ ]Case management [ ]Holistic Therapy     Palliative care education provided to patient and/or family    Goals of Care Document:     ______________ HPI:  83F w/ PMHx COPD, OA, chronic back pain s/p multiple surgeries and recent admission for left intertrochanteric femur fracture s/p ORIF 2023 brought in from [facility] for shortness of breath. EMS reports that she was hypoxic on room air to the 70s. Patient was initially AOx0, after being on NRB in EMS, patient mental status improving with improved oxygenation. Daughter reports the patient was doing okay yesterday and that this was an acute change. Patient currently AOx3, appears short of breath, denying any other complaints at this time.    Vitals: BP 84/53, HR 99, RR 22, SpO2 100% on NRB    Labs: WBC 19.66, Hgb 10.0 (at previous baseline), Na 124, Cr 0.9 (previously 0.6), BNP 9190, Trop 208>192 (previously 19), VBG lactate 2.9>2.4    COVID +ve    EKG - Sinus Tachycardia w/ possible PACs    Imagin. CT head shows no acute intracranial pathology  2. CTA Chest shows:  - no evidence of acute pulmonary embolus.  - Bilateral lower lobe consolidative opacities which can be seen with pneumonia    In the ED:  - s/p Cefepime IV x1  - s/p Levofloxacin IV x1    Admitted to SDU for management of acute hypoxic respiratory failure (31 Dec 2023 18:45)    PERTINENT PM/SXH:   COPD (chronic obstructive pulmonary disease)    H/O cholelithiasis    Smoker      History of surgery      FAMILY HISTORY:  Known health problems: none (Father, Mother)     difficult to obtain from patient  ITEMS NOT CHECKED ARE NOT PRESENT    SOCIAL HISTORY:   Significant other/partner[ ]  Children[x ]  Latter day/Spirituality:  Substance hx:  [ ]   Tobacco hx:  [ ]   Alcohol hx: [ ]   Living Situation: [ ]Home  [ ]Long term care  [x ]Rehab [ ]Other  Home Services: [ ] HHA [ ] David RN [ ] Hospice  Occupation:  Home Opioid hx:  [ ] Y [ ] N [ ] I-Stop Reference No:    ADVANCE DIRECTIVES:    [ ] Full Code [ ] DNR  MOLST  [x ]  Living Will  [ ]   DECISION MAKER(s):  [ ] Health Care Proxy(s)  [ ] Surrogate(s)  [ ] Guardian           Name(s): Phone Number(s):    BASELINE (I)ADL(s) (prior to admission):  [place]: [ ]Total  [ ] Moderate [ x]Dependent  Palliative Performance Status Version 2:         %    http://npcrc.org/files/news/palliative_performance_scale_ppsv2.pdf    Allergies    No Known Allergies    Intolerances    MEDICATIONS  (STANDING):  aspirin enteric coated 81 milliGRAM(s) Oral daily  budesonide 160 MICROgram(s)/formoterol 4.5 MICROgram(s) Inhaler 2 Puff(s) Inhalation two times a day  chlorhexidine 2% Cloths 1 Application(s) Topical <User Schedule>  dexAMETHasone  Injectable 6 milliGRAM(s) IV Push daily  doxycycline IVPB 100 milliGRAM(s) IV Intermittent every 12 hours  doxycycline IVPB      enoxaparin Injectable 40 milliGRAM(s) SubCutaneous every 24 hours  lactated ringers. 500 milliLiter(s) (100 mL/Hr) IV Continuous <Continuous>  OLANZapine Injectable 2.5 milliGRAM(s) IntraMuscular once  piperacillin/tazobactam IVPB.. 3.375 Gram(s) IV Intermittent every 8 hours  polyethylene glycol 3350 17 Gram(s) Oral daily  senna 2 Tablet(s) Oral at bedtime  tiotropium 2.5 MICROgram(s) Inhaler 2 Puff(s) Inhalation daily    MEDICATIONS  (PRN):  acetaminophen     Tablet .. 650 milliGRAM(s) Oral every 6 hours PRN Temp greater or equal to 38C (100.4F), Mild Pain (1 - 3)  aluminum hydroxide/magnesium hydroxide/simethicone Suspension 30 milliLiter(s) Oral every 4 hours PRN Dyspepsia  melatonin 3 milliGRAM(s) Oral at bedtime PRN Insomnia  ondansetron Injectable 4 milliGRAM(s) IV Push every 8 hours PRN Nausea and/or Vomiting      PRESENT SYMPTOMS: [ ]Unable to obtain due to poor mentation   Source if other than patient:  [ ]Family   [ ]Team     Pain: [ ]yes [ ]no  QOL impact -   Location -                    Aggravating factors -  Quality -  Radiation -  Timing-  Severity (0-10 scale):  Minimal acceptable level (0-10 scale):     CPOT:    https://www.[facility].org/getattachment/[email or web address]/Critical-Care-Pain-Observation-Tool-(CPOT)    PAIN AD Score:   http://geriatrictoolkit.[facility]/cog/painad.pdf (press ctrl +  left click to view)    Dyspnea:                           [ ]None[ ]Mild [ ]Moderate [ ]Severe     Respiratory Distress Observation Scale (RDOS):   A score of 0 to 2 signifies little or no respiratory distress, 3 signifies mild distress, scores 4 to 6 indicate moderate distress, and scores greater than 7 signify severe distress  https://www.[facility].ca/sites/default/files/PDFS/999677-ewsrwvahxqy-pujbatwl-svmuleiwnvv-nwaad.pdf    Anxiety:                             [ ]None[ ]Mild [ ]Moderate [ ]Severe   Fatigue:                             [ ]None[ ]Mild [ ]Moderate [ ]Severe   Nausea:                             [ ]None[ ]Mild [ ]Moderate [ ]Severe   Loss of appetite:              [ ]None[ ]Mild [ ]Moderate [ ]Severe   Constipation:                    [ ]None[ ]Mild [ ]Moderate [ ]Severe    Other Symptoms:  [ ]All other review of systems negative     Palliative Performance Status Version 2:         %    http://[facility].org/files/news/palliative_performance_scale_ppsv2.pdf  PHYSICAL EXAM:  Vital Signs Last 24 Hrs  T(C): 37.6 (2024 07:46), Max: 37.6 (2024 12:15)  T(F): 99.7 (2024 07:46), Max: 99.7 (2024 12:15)  HR: 97 (2024 07:46) (90 - 111)  BP: 178/84 (2024 07:46) (140/72 - 178/84)  BP(mean): 108 (2024 05:00) (101 - 108)  RR: 20 (2024 07:46) (18 - 22)  SpO2: 95% (2024 07:46) (94% - 98%)    Parameters below as of 2024 07:46  Patient On (Oxygen Delivery Method): nasal cannula, high flow     I&O's Summary    2024 07:01  -  2024 07:00  --------------------------------------------------------  IN: 0 mL / OUT: 250 mL / NET: -250 mL        GENERAL:  [X ] No acute distress [ ]Lethargic  [ ]Unarousable  [ ]Verbal  [ ]Non-Verbal [ ]Cachexia    BEHAVIORAL/PSYCH:  [ ]Alert and Oriented x  [ ] Anxiety [ ] Delirium [ ] Agitation [X ] Calm   EYES: [ ] No scleral icterus [ ] Scleral icterus [ ] Closed  ENMT:  [ ]Dry mouth  [ ]No external oral lesions [ ] No external ear or nose lesions  CARDIOVASCULAR:  [ ]Regular [ ]Irregular [ ]Tachy [ ]Not Tachy  [ ]Norberto [ ] Edema [ ] No edema  PULMONARY:  [ ]Tachypnea  [ ]Audible excessive secretions [X ] No labored breathing [ ] labored breathing  GASTROINTESTINAL: [ ]Soft  [ ]Distended  [ X]Not distended [ ]Non tender [ ]Tender  MUSCULOSKELETAL: [ ]No clubbing [ ] clubbing  [ ] No cyanosis [ ] cyanosis  NEUROLOGIC: [ ]No focal deficits  [ ]Follows commands  [ ]Does not follow commands  [ ]Cognitive impairment  [ ]Dysphagia  [ ]Dysarthria  [ ]Paresis   SKIN: [ ] Jaundiced [X ] Non-jaundiced [ ]Rash [ ]No Rash [ ] Warm [ ] Dry  MISC/LINES: [ ] ET tube [ ] Trach [ ]NGT/OGT [ ]PEG [ ]Cuadra    CRITICAL CARE:  [ ] Shock Present  [ ]Septic [ ]Cardiogenic [ ]Neurologic [ ]Hypovolemic  [ ]  Vasopressors [ ]  Inotropes   [ ]Respiratory failure present [ ]Mechanical ventilation [ ]Non-invasive ventilatory support [ ]High flow  [ ]Acute  [ ]Chronic [ ]Hypoxic  [ ]Hypercarbic [ ]Other  [ ]Other organ failure     LABS: reviewed by me                        8.5    19.34 )-----------( 276      ( 2024 20:42 )             24.4   -    127<L>  |  89<L>  |  29<H>  ----------------------------<  130<H>  3.9   |  26  |  0.6<L>    Ca    7.8<L>      2024 20:42    TPro  5.0<L>  /  Alb  2.9<L>  /  TBili  1.4<H>  /  DBili  x   /  AST  15  /  ALT  9   /  AlkPhos  89        Urinalysis Basic - ( 2024 20:42 )    Color: x / Appearance: x / SG: x / pH: x  Gluc: 130 mg/dL / Ketone: x  / Bili: x / Urobili: x   Blood: x / Protein: x / Nitrite: x   Leuk Esterase: x / RBC: x / WBC x   Sq Epi: x / Non Sq Epi: x / Bacteria: x      RADIOLOGY & ADDITIONAL STUDIES: reviewed by me    PROTEIN CALORIE MALNUTRITION PRESENT: [ ]mild [ ]moderate [ ]severe [ ]underweight [ ]morbid obesity  https://www.andeal.org/vault/2440/web/files/ONC/Table_Clinical%20Characteristics%20to%20Document%20Malnutrition-White%20JV%20et%20al%2020.pdf    Height (cm): 154.9 (23 @ 13:25), 154.9 (23 @ 17:21)  Weight (kg): 50.3 (24 @ 12:14), 50.3 (23 @ 17:21)  BMI (kg/m2): 21 (24 @ 12:14), 21 (23 @ 13:25), 21 (23 @ 17:21)    [ ]PPSV2 < or = to 30% [ ]significant weight loss  [ ]poor nutritional intake  [ ]anasarca      [ ]Artificial Nutrition          Palliative Care Spiritual/Emotional Screening Tool Question  Severity (0-4):                    OR                    [X ] Unable to determine/NA  Score of 2 or greater indicates recommendation of Chaplaincy referral  Chaplaincy Referral: [ ] Yes [ ] Refused [ ] Following     Caregiver [place]:  [ ] Yes [ ] No    OR    [x ] Unable to determine. Will assess at later time if appropriate.  Social Work Referral [ ]  Patient and Family Centered Care Referral [ ]    Anticipatory Grief Present: [ ] Yes [ ] No    OR     [ x] Unable to determine. Will assess at later time if appropriate.  Social Work Referral [ ]  Patient and Family Centered Care Referral [ ]    REFERRALS:   [ ]Chaplaincy  [ ]Hospice  [ ]Child Life  [ ]Social Work  [ ]Case management [ ]Holistic Therapy     Palliative care education provided to patient and/or family    Goals of Care Document:     ______________ HPI:  83 F w/ PMHx COPD, OA, chronic back pain s/p multiple surgeries and recent admission for left intertrochanteric femur fracture s/p ORIF 2023 brought in from [facility] for shortness of breath. EMS reports that she was hypoxic on room air to the 70s. Patient was initially AOx0, after being on NRB in EMS, patient mental status improving with improved oxygenation. Daughter reports the patient was doing okay yesterday and that this was an acute change. Patient currently AOx3, appears short of breath, denying any other complaints at this time.    Vitals: BP 84/53, HR 99, RR 22, SpO2 100% on NRB    Labs: WBC 19.66, Hgb 10.0 (at previous baseline), Na 124, Cr 0.9 (previously 0.6), BNP 9190, Trop 208>192 (previously 19), VBG lactate 2.9>2.4    COVID +ve    EKG - Sinus Tachycardia w/ possible PACs    Imagin. CT head shows no acute intracranial pathology  2. CTA Chest shows:  - no evidence of acute pulmonary embolus.  - Bilateral lower lobe consolidative opacities which can be seen with pneumonia    In the ED:  - s/p Cefepime IV x1  - s/p Levofloxacin IV x1    Admitted to SDU for management of acute hypoxic respiratory failure (31 Dec 2023 18:45)    PERTINENT PM/SXH:   COPD (chronic obstructive pulmonary disease)    H/O cholelithiasis    Smoker      History of surgery      FAMILY HISTORY:  Known health problems: none (Father, Mother)     difficult to obtain from patient  ITEMS NOT CHECKED ARE NOT PRESENT    SOCIAL HISTORY:   Significant other/partner[ ]  Children[x ]  Temple/Spirituality:  Substance hx:  [ ]   Tobacco hx:  [ ]   Alcohol hx: [ ]   Living Situation: [ ]Home  [ ]Long term care  [x ]Rehab [ ]Other  Home Services: [ ] HHA [ ] David RN [ ] Hospice  Occupation:  Home Opioid hx:  [ ] Y [ ] N [ ] I-Stop Reference No:    ADVANCE DIRECTIVES:    [ ] Full Code [ ] DNR  MOLST  [x ]  Living Will  [ ]   DECISION MAKER(s):  [ ] Health Care Proxy(s)  [ ] Surrogate(s)  [ ] Guardian           Name(s): Phone Number(s):    BASELINE (I)ADL(s) (prior to admission):  [place]: [ ]Total  [ ] Moderate [ x]Dependent  Palliative Performance Status Version 2:         %    http://npcrc.org/files/news/palliative_performance_scale_ppsv2.pdf    Allergies    No Known Allergies    Intolerances    MEDICATIONS  (STANDING):  aspirin enteric coated 81 milliGRAM(s) Oral daily  budesonide 160 MICROgram(s)/formoterol 4.5 MICROgram(s) Inhaler 2 Puff(s) Inhalation two times a day  chlorhexidine 2% Cloths 1 Application(s) Topical <User Schedule>  dexAMETHasone  Injectable 6 milliGRAM(s) IV Push daily  doxycycline IVPB 100 milliGRAM(s) IV Intermittent every 12 hours  doxycycline IVPB      enoxaparin Injectable 40 milliGRAM(s) SubCutaneous every 24 hours  lactated ringers. 500 milliLiter(s) (100 mL/Hr) IV Continuous <Continuous>  OLANZapine Injectable 2.5 milliGRAM(s) IntraMuscular once  piperacillin/tazobactam IVPB.. 3.375 Gram(s) IV Intermittent every 8 hours  polyethylene glycol 3350 17 Gram(s) Oral daily  senna 2 Tablet(s) Oral at bedtime  tiotropium 2.5 MICROgram(s) Inhaler 2 Puff(s) Inhalation daily    MEDICATIONS  (PRN):  acetaminophen     Tablet .. 650 milliGRAM(s) Oral every 6 hours PRN Temp greater or equal to 38C (100.4F), Mild Pain (1 - 3)  aluminum hydroxide/magnesium hydroxide/simethicone Suspension 30 milliLiter(s) Oral every 4 hours PRN Dyspepsia  melatonin 3 milliGRAM(s) Oral at bedtime PRN Insomnia  ondansetron Injectable 4 milliGRAM(s) IV Push every 8 hours PRN Nausea and/or Vomiting      PRESENT SYMPTOMS: [ ]Unable to obtain due to poor mentation   Source if other than patient:  [ ]Family   [ ]Team     Pain: [ ]yes [ ]no  QOL impact -   Location -                    Aggravating factors -  Quality -  Radiation -  Timing-  Severity (0-10 scale):  Minimal acceptable level (0-10 scale):     CPOT:    https://www.[facility].org/getattachment/[email or web address]/Critical-Care-Pain-Observation-Tool-(CPOT)    PAIN AD Score:   http://geriatrictoolkit.[facility]/cog/painad.pdf (press ctrl +  left click to view)    Dyspnea:                           [ ]None[ ]Mild [ ]Moderate [ ]Severe     Respiratory Distress Observation Scale (RDOS):   A score of 0 to 2 signifies little or no respiratory distress, 3 signifies mild distress, scores 4 to 6 indicate moderate distress, and scores greater than 7 signify severe distress  https://www.[facility].ca/sites/default/files/PDFS/369390-krbxudepipn-voyexzjk-kjwgcsvcbak-wcpwt.pdf    Anxiety:                             [ ]None[ ]Mild [ ]Moderate [ ]Severe   Fatigue:                             [ ]None[ ]Mild [ ]Moderate [ ]Severe   Nausea:                             [ ]None[ ]Mild [ ]Moderate [ ]Severe   Loss of appetite:              [ ]None[ ]Mild [ ]Moderate [ ]Severe   Constipation:                    [ ]None[ ]Mild [ ]Moderate [ ]Severe    Other Symptoms:  [ ]All other review of systems negative     Palliative Performance Status Version 2:         %    http://[facility].org/files/news/palliative_performance_scale_ppsv2.pdf  PHYSICAL EXAM:  Vital Signs Last 24 Hrs  T(C): 37.6 (2024 07:46), Max: 37.6 (2024 12:15)  T(F): 99.7 (2024 07:46), Max: 99.7 (2024 12:15)  HR: 97 (2024 07:46) (90 - 111)  BP: 178/84 (2024 07:46) (140/72 - 178/84)  BP(mean): 108 (2024 05:00) (101 - 108)  RR: 20 (2024 07:46) (18 - 22)  SpO2: 95% (2024 07:46) (94% - 98%)    Parameters below as of 2024 07:46  Patient On (Oxygen Delivery Method): nasal cannula, high flow     I&O's Summary    2024 07:01  -  2024 07:00  --------------------------------------------------------  IN: 0 mL / OUT: 250 mL / NET: -250 mL        GENERAL:  [X ] No acute distress [ ]Lethargic  [ ]Unarousable  [ ]Verbal  [ ]Non-Verbal [ ]Cachexia    BEHAVIORAL/PSYCH:  [ ]Alert and Oriented x  [ ] Anxiety [ ] Delirium [ ] Agitation [X ] Calm   EYES: [ ] No scleral icterus [ ] Scleral icterus [ ] Closed  ENMT:  [ ]Dry mouth  [ ]No external oral lesions [ ] No external ear or nose lesions  CARDIOVASCULAR:  [ ]Regular [ ]Irregular [ ]Tachy [ ]Not Tachy  [ ]Norberto [ ] Edema [ ] No edema  PULMONARY:  [ ]Tachypnea  [ ]Audible excessive secretions [X ] No labored breathing [ ] labored breathing  GASTROINTESTINAL: [ ]Soft  [ ]Distended  [ X]Not distended [ ]Non tender [ ]Tender  MUSCULOSKELETAL: [ ]No clubbing [ ] clubbing  [ ] No cyanosis [ ] cyanosis  NEUROLOGIC: [ ]No focal deficits  [ ]Follows commands  [ ]Does not follow commands  [ ]Cognitive impairment  [ ]Dysphagia  [ ]Dysarthria  [ ]Paresis   SKIN: [ ] Jaundiced [X ] Non-jaundiced [ ]Rash [ ]No Rash [ ] Warm [ ] Dry  MISC/LINES: [ ] ET tube [ ] Trach [ ]NGT/OGT [ ]PEG [ ]Cuadra    CRITICAL CARE:  [ ] Shock Present  [ ]Septic [ ]Cardiogenic [ ]Neurologic [ ]Hypovolemic  [ ]  Vasopressors [ ]  Inotropes   [ ]Respiratory failure present [ ]Mechanical ventilation [ ]Non-invasive ventilatory support [ ]High flow  [ ]Acute  [ ]Chronic [ ]Hypoxic  [ ]Hypercarbic [ ]Other  [ ]Other organ failure     LABS: reviewed by me                        8.5    19.34 )-----------( 276      ( 2024 20:42 )             24.4   -    127<L>  |  89<L>  |  29<H>  ----------------------------<  130<H>  3.9   |  26  |  0.6<L>    Ca    7.8<L>      2024 20:42    TPro  5.0<L>  /  Alb  2.9<L>  /  TBili  1.4<H>  /  DBili  x   /  AST  15  /  ALT  9   /  AlkPhos  89        Urinalysis Basic - ( 2024 20:42 )    Color: x / Appearance: x / SG: x / pH: x  Gluc: 130 mg/dL / Ketone: x  / Bili: x / Urobili: x   Blood: x / Protein: x / Nitrite: x   Leuk Esterase: x / RBC: x / WBC x   Sq Epi: x / Non Sq Epi: x / Bacteria: x      RADIOLOGY & ADDITIONAL STUDIES: reviewed by me    PROTEIN CALORIE MALNUTRITION PRESENT: [ ]mild [ ]moderate [ ]severe [ ]underweight [ ]morbid obesity  https://www.andeal.org/vault/2440/web/files/ONC/Table_Clinical%20Characteristics%20to%20Document%20Malnutrition-White%20JV%20et%20al%2020.pdf    Height (cm): 154.9 (23 @ 13:25), 154.9 (23 @ 17:21)  Weight (kg): 50.3 (24 @ 12:14), 50.3 (23 @ 17:21)  BMI (kg/m2): 21 (24 @ 12:14), 21 (23 @ 13:25), 21 (23 @ 17:21)    [ ]PPSV2 < or = to 30% [ ]significant weight loss  [ ]poor nutritional intake  [ ]anasarca      [ ]Artificial Nutrition          Palliative Care Spiritual/Emotional Screening Tool Question  Severity (0-4):                    OR                    [X ] Unable to determine/NA  Score of 2 or greater indicates recommendation of Chaplaincy referral  Chaplaincy Referral: [ ] Yes [ ] Refused [ ] Following     Caregiver [place]:  [ ] Yes [ ] No    OR    [x ] Unable to determine. Will assess at later time if appropriate.  Social Work Referral [ ]  Patient and Family Centered Care Referral [ ]    Anticipatory Grief Present: [ ] Yes [ ] No    OR     [ x] Unable to determine. Will assess at later time if appropriate.  Social Work Referral [ ]  Patient and Family Centered Care Referral [ ]    REFERRALS:   [ ]Chaplaincy  [ ]Hospice  [ ]Child Life  [ ]Social Work  [ ]Case management [ ]Holistic Therapy     Palliative care education provided to patient and/or family    Goals of Care Document:     ______________ HPI:  83 F w/ PMHx COPD, OA, chronic back pain s/p multiple surgeries and recent admission for left intertrochanteric femur fracture s/p ORIF 2023 brought in from [facility] for shortness of breath. EMS reports that she was hypoxic on room air to the 70s. Patient was initially AOx0, after being on NRB in EMS, patient mental status improving with improved oxygenation. Daughter reports the patient was doing okay yesterday and that this was an acute change. Patient currently AOx3, appears short of breath, denying any other complaints at this time.    Vitals: BP 84/53, HR 99, RR 22, SpO2 100% on NRB    Labs: WBC 19.66, Hgb 10.0 (at previous baseline), Na 124, Cr 0.9 (previously 0.6), BNP 9190, Trop 208>192 (previously 19), VBG lactate 2.9>2.4    COVID +ve    EKG - Sinus Tachycardia w/ possible PACs    Imagin. CT head shows no acute intracranial pathology  2. CTA Chest shows:  - no evidence of acute pulmonary embolus.  - Bilateral lower lobe consolidative opacities which can be seen with pneumonia    In the ED:  - s/p Cefepime IV x1  - s/p Levofloxacin IV x1    Admitted to SDU for management of acute hypoxic respiratory failure (31 Dec 2023 18:45)    PERTINENT PM/SXH:   COPD (chronic obstructive pulmonary disease)    H/O cholelithiasis    Smoker      History of surgery      FAMILY HISTORY:  Known health problems: none (Father, Mother)     difficult to obtain from patient  ITEMS NOT CHECKED ARE NOT PRESENT    SOCIAL HISTORY:   Significant other/partner[ ]  Children[x ]  Baptism/Spirituality:  Substance hx:  [ ]   Tobacco hx:  [ ]   Alcohol hx: [ ]   Living Situation: [ ]Home  [ ]Long term care  [x ]Rehab [ ]Other  Home Services: [ ] HHA [ ] David RN [ ] Hospice  Occupation:  Home Opioid hx:  [ ] Y [ ] N [ ] I-Stop Reference No:    ADVANCE DIRECTIVES:    [ ] Full Code [ ] DNR  MOLST  [x ]  Living Will  [ ]   DECISION MAKER(s):  [ ] Health Care Proxy(s)  [ ] Surrogate(s)  [ ] Guardian           Name(s): Phone Number(s):    BASELINE (I)ADL(s) (prior to admission):  [place]: [ ]Total  [ ] Moderate [ x]Dependent  Palliative Performance Status Version 2:         %    http://npcrc.org/files/news/palliative_performance_scale_ppsv2.pdf    Allergies    No Known Allergies    Intolerances    MEDICATIONS  (STANDING):  aspirin enteric coated 81 milliGRAM(s) Oral daily  budesonide 160 MICROgram(s)/formoterol 4.5 MICROgram(s) Inhaler 2 Puff(s) Inhalation two times a day  chlorhexidine 2% Cloths 1 Application(s) Topical <User Schedule>  dexAMETHasone  Injectable 6 milliGRAM(s) IV Push daily  doxycycline IVPB 100 milliGRAM(s) IV Intermittent every 12 hours  doxycycline IVPB      enoxaparin Injectable 40 milliGRAM(s) SubCutaneous every 24 hours  lactated ringers. 500 milliLiter(s) (100 mL/Hr) IV Continuous <Continuous>  OLANZapine Injectable 2.5 milliGRAM(s) IntraMuscular once  piperacillin/tazobactam IVPB.. 3.375 Gram(s) IV Intermittent every 8 hours  polyethylene glycol 3350 17 Gram(s) Oral daily  senna 2 Tablet(s) Oral at bedtime  tiotropium 2.5 MICROgram(s) Inhaler 2 Puff(s) Inhalation daily    MEDICATIONS  (PRN):  acetaminophen     Tablet .. 650 milliGRAM(s) Oral every 6 hours PRN Temp greater or equal to 38C (100.4F), Mild Pain (1 - 3)  aluminum hydroxide/magnesium hydroxide/simethicone Suspension 30 milliLiter(s) Oral every 4 hours PRN Dyspepsia  melatonin 3 milliGRAM(s) Oral at bedtime PRN Insomnia  ondansetron Injectable 4 milliGRAM(s) IV Push every 8 hours PRN Nausea and/or Vomiting      PRESENT SYMPTOMS: [ ]Unable to obtain due to poor mentation   Source if other than patient:  [ ]Family   [ ]Team     Pain: [ ]yes [ ]no  QOL impact -   Location -                    Aggravating factors -  Quality -  Radiation -  Timing-  Severity (0-10 scale):  Minimal acceptable level (0-10 scale):     CPOT:    https://www.[facility].org/getattachment/gop90g27-7q5t-[email or web address]-[email or web address]-4004d1657r0s/Critical-Care-Pain-Observation-Tool-(CPOT)    PAIN AD Score:   http://geriatrictoolkit.[facility]/cog/painad.pdf (press ctrl +  left click to view)    Dyspnea:                           [ ]None[ ]Mild [ ]Moderate [ ]Severe     Respiratory Distress Observation Scale (RDOS):   A score of 0 to 2 signifies little or no respiratory distress, 3 signifies mild distress, scores 4 to 6 indicate moderate distress, and scores greater than 7 signify severe distress  https://www.[facility].ca/sites/default/files/PDFS/412317-qndyxyvifob-zcsefory-udaeaqdvksw-xqpkg.pdf    Anxiety:                             [ ]None[ ]Mild [ ]Moderate [ ]Severe   Fatigue:                             [ ]None[ ]Mild [ ]Moderate [ ]Severe   Nausea:                             [ ]None[ ]Mild [ ]Moderate [ ]Severe   Loss of appetite:              [ ]None[ ]Mild [ ]Moderate [ ]Severe   Constipation:                    [ ]None[ ]Mild [ ]Moderate [ ]Severe    Other Symptoms:  [ ]All other review of systems negative     Palliative Performance Status Version 2:         %    http://[facility].org/files/news/palliative_performance_scale_ppsv2.pdf  PHYSICAL EXAM:  Vital Signs Last 24 Hrs  T(C): 37.6 (2024 07:46), Max: 37.6 (2024 12:15)  T(F): 99.7 (2024 07:46), Max: 99.7 (2024 12:15)  HR: 97 (2024 07:46) (90 - 111)  BP: 178/84 (2024 07:46) (140/72 - 178/84)  BP(mean): 108 (2024 05:00) (101 - 108)  RR: 20 (2024 07:46) (18 - 22)  SpO2: 95% (2024 07:46) (94% - 98%)    Parameters below as of 2024 07:46  Patient On (Oxygen Delivery Method): nasal cannula, high flow     I&O's Summary    2024 07:01  -  2024 07:00  --------------------------------------------------------  IN: 0 mL / OUT: 250 mL / NET: -250 mL        GENERAL:  [X ] No acute distress [ ]Lethargic  [ ]Unarousable  [ ]Verbal  [ ]Non-Verbal [ ]Cachexia    BEHAVIORAL/PSYCH:  [ ]Alert and Oriented x  [ ] Anxiety [ ] Delirium [ ] Agitation [X ] Calm   EYES: [ ] No scleral icterus [ ] Scleral icterus [ ] Closed  ENMT:  [ ]Dry mouth  [ ]No external oral lesions [ ] No external ear or nose lesions  CARDIOVASCULAR:  [ ]Regular [ ]Irregular [ ]Tachy [ ]Not Tachy  [ ]Norberto [ ] Edema [ ] No edema  PULMONARY:  [ ]Tachypnea  [ ]Audible excessive secretions [X ] No labored breathing [ ] labored breathing  GASTROINTESTINAL: [ ]Soft  [ ]Distended  [ X]Not distended [ ]Non tender [ ]Tender  MUSCULOSKELETAL: [ ]No clubbing [ ] clubbing  [ ] No cyanosis [ ] cyanosis  NEUROLOGIC: [ ]No focal deficits  [ ]Follows commands  [ ]Does not follow commands  [ ]Cognitive impairment  [ ]Dysphagia  [ ]Dysarthria  [ ]Paresis   SKIN: [ ] Jaundiced [X ] Non-jaundiced [ ]Rash [ ]No Rash [ ] Warm [ ] Dry  MISC/LINES: [ ] ET tube [ ] Trach [ ]NGT/OGT [ ]PEG [ ]Cuadra    CRITICAL CARE:  [ ] Shock Present  [ ]Septic [ ]Cardiogenic [ ]Neurologic [ ]Hypovolemic  [ ]  Vasopressors [ ]  Inotropes   [ ]Respiratory failure present [ ]Mechanical ventilation [ ]Non-invasive ventilatory support [ ]High flow  [ ]Acute  [ ]Chronic [ ]Hypoxic  [ ]Hypercarbic [ ]Other  [ ]Other organ failure     LABS: reviewed by me                        8.5    19.34 )-----------( 276      ( 2024 20:42 )             24.4   -    127<L>  |  89<L>  |  29<H>  ----------------------------<  130<H>  3.9   |  26  |  0.6<L>    Ca    7.8<L>      2024 20:42    TPro  5.0<L>  /  Alb  2.9<L>  /  TBili  1.4<H>  /  DBili  x   /  AST  15  /  ALT  9   /  AlkPhos  89        Urinalysis Basic - ( 2024 20:42 )    Color: x / Appearance: x / SG: x / pH: x  Gluc: 130 mg/dL / Ketone: x  / Bili: x / Urobili: x   Blood: x / Protein: x / Nitrite: x   Leuk Esterase: x / RBC: x / WBC x   Sq Epi: x / Non Sq Epi: x / Bacteria: x      RADIOLOGY & ADDITIONAL STUDIES: reviewed by me    PROTEIN CALORIE MALNUTRITION PRESENT: [ ]mild [ ]moderate [ ]severe [ ]underweight [ ]morbid obesity  https://www.andeal.org/vault/2440/web/files/ONC/Table_Clinical%20Characteristics%20to%20Document%20Malnutrition-White%20JV%20et%20al%2020.pdf    Height (cm): 154.9 (23 @ 13:25), 154.9 (23 @ 17:21)  Weight (kg): 50.3 (24 @ 12:14), 50.3 (23 @ 17:21)  BMI (kg/m2): 21 (24 @ 12:14), 21 (23 @ 13:25), 21 (23 @ 17:21)    [ ]PPSV2 < or = to 30% [ ]significant weight loss  [ ]poor nutritional intake  [ ]anasarca      [ ]Artificial Nutrition          Palliative Care Spiritual/Emotional Screening Tool Question  Severity (0-4):                    OR                    [X ] Unable to determine/NA  Score of 2 or greater indicates recommendation of Chaplaincy referral  Chaplaincy Referral: [ ] Yes [ ] Refused [ ] Following     Caregiver [place]:  [ ] Yes [ ] No    OR    [x ] Unable to determine. Will assess at later time if appropriate.  Social Work Referral [ ]  Patient and Family Centered Care Referral [ ]    Anticipatory Grief Present: [ ] Yes [ ] No    OR     [ x] Unable to determine. Will assess at later time if appropriate.  Social Work Referral [ ]  Patient and Family Centered Care Referral [ ]    REFERRALS:   [ ]Chaplaincy  [ ]Hospice  [ ]Child Life  [ ]Social Work  [ ]Case management [ ]Holistic Therapy     Palliative care education provided to patient and/or family    Goals of Care Document:     ______________

## 2024-01-02 NOTE — PROGRESS NOTE ADULT - SUBJECTIVE AND OBJECTIVE BOX
Over Night Events: events noted, on NIV, ID reviewed, low grade T  PHYSICAL EXAM    ICU Vital Signs Last 24 Hrs  T(C): 37.4 (02 Jan 2024 05:00), Max: 37.6 (01 Jan 2024 12:15)  T(F): 99.3 (02 Jan 2024 05:00), Max: 99.7 (01 Jan 2024 12:15)  HR: 92 (02 Jan 2024 05:00) (92 - 111)  BP: 170/75 (02 Jan 2024 05:00) (134/83 - 177/73)  BP(mean): 108 (02 Jan 2024 05:00) (99 - 108)  RR: 20 (02 Jan 2024 05:00) (20 - 22)  SpO2: 97% (02 Jan 2024 05:00) (94% - 100%)    O2 Parameters below as of 02 Jan 2024 05:00  Patient On (Oxygen Delivery Method): BiPAP/CPAP            General: ILL looking, cachectic  Lungs: Bilateral rhonchi  Cardiovascular: KAREN 2.6  Abdomen: Soft, Positive BS  Extremities: No clubbing   Neurological: Non focal         LABS:                          8.5    19.34 )-----------( 276      ( 01 Jan 2024 20:42 )             24.4                                               01-01    127<L>  |  89<L>  |  29<H>  ----------------------------<  130<H>  3.9   |  26  |  0.6<L>    Ca    7.8<L>      01 Jan 2024 20:42    TPro  5.0<L>  /  Alb  2.9<L>  /  TBili  1.4<H>  /  DBili  x   /  AST  15  /  ALT  9   /  AlkPhos  89  01-01                                             Urinalysis Basic - ( 01 Jan 2024 20:42 )    Color: x / Appearance: x / SG: x / pH: x  Gluc: 130 mg/dL / Ketone: x  / Bili: x / Urobili: x   Blood: x / Protein: x / Nitrite: x   Leuk Esterase: x / RBC: x / WBC x   Sq Epi: x / Non Sq Epi: x / Bacteria: x                                                  LIVER FUNCTIONS - ( 01 Jan 2024 20:42 )  Alb: 2.9 g/dL / Pro: 5.0 g/dL / ALK PHOS: 89 U/L / ALT: 9 U/L / AST: 15 U/L / GGT: x                                                  Culture - Blood (collected 31 Dec 2023 13:55)  Source: .Blood Blood-Peripheral  Preliminary Report (02 Jan 2024 02:03):    No growth at 24 hours    Culture - Blood (collected 31 Dec 2023 13:55)  Source: .Blood Blood-Peripheral  Preliminary Report (02 Jan 2024 02:03):    No growth at 24 hours                                                                                           MEDICATIONS  (STANDING):  aspirin enteric coated 81 milliGRAM(s) Oral daily  budesonide 160 MICROgram(s)/formoterol 4.5 MICROgram(s) Inhaler 2 Puff(s) Inhalation two times a day  chlorhexidine 2% Cloths 1 Application(s) Topical <User Schedule>  dexAMETHasone  Injectable 6 milliGRAM(s) IV Push daily  doxycycline IVPB 100 milliGRAM(s) IV Intermittent every 12 hours  doxycycline IVPB      enoxaparin Injectable 40 milliGRAM(s) SubCutaneous every 24 hours  lactated ringers. 500 milliLiter(s) (100 mL/Hr) IV Continuous <Continuous>  OLANZapine Injectable 2.5 milliGRAM(s) IntraMuscular once  piperacillin/tazobactam IVPB.. 3.375 Gram(s) IV Intermittent every 8 hours  polyethylene glycol 3350 17 Gram(s) Oral daily  senna 2 Tablet(s) Oral at bedtime  tiotropium 2.5 MICROgram(s) Inhaler 2 Puff(s) Inhalation daily  vancomycin  IVPB 500 milliGRAM(s) IV Intermittent every 12 hours  vancomycin  IVPB        MEDICATIONS  (PRN):  acetaminophen     Tablet .. 650 milliGRAM(s) Oral every 6 hours PRN Temp greater or equal to 38C (100.4F), Mild Pain (1 - 3)  aluminum hydroxide/magnesium hydroxide/simethicone Suspension 30 milliLiter(s) Oral every 4 hours PRN Dyspepsia  melatonin 3 milliGRAM(s) Oral at bedtime PRN Insomnia  ondansetron Injectable 4 milliGRAM(s) IV Push every 8 hours PRN Nausea and/or Vomiting

## 2024-01-02 NOTE — PROGRESS NOTE ADULT - ASSESSMENT
83F w/ PMHx COPD, OA, chronic back pain s/p multiple surgeries and recent admission for left intertrochanteric femur fracture s/p ORIF 12/27/2023 brought in from [facility] for shortness of breath.    Acute Hypoxic Respiratory Failure and severe sepsis  secondary to COVID 19/Abnormal CXR   NSTEMI Type II (Positive troponins)   Hyponatremia  Chronic Anemia  S/P left intertrochanteric femur fracture s/p ORIF 12/27/2023 /OA/ Chronic Back Pain s/p multiple surgeries        Continue current care as per medical team  DVT/GI prophylaxis   Pulmonary and ID evaluation and follow up   Physical therapy/Rehab   Trend troponins   Will F/U    83F w/ PMHx COPD, OA, chronic back pain s/p multiple surgeries and recent admission for left intertrochanteric femur fracture s/p ORIF 12/27/2023 brought in from [facility] for shortness of breath.    Acute Hypoxic Respiratory Failure and severe sepsis  secondary to COVID 19/Abnormal CXR   NSTEMI Type II (Positive troponins)   Hyponatremia  Chronic Anemia  S/P left intertrochanteric femur fracture s/p ORIF 12/27/2023 /OA/ Chronic Back Pain s/p multiple surgeries        Continue current care as per medical team  DVT/GI prophylaxis   Pulmonary and ID evaluation and follow up   Physical therapy/Rehab   Trend troponins   Will F/U

## 2024-01-03 DIAGNOSIS — Z51.5 ENCOUNTER FOR PALLIATIVE CARE: ICD-10-CM

## 2024-01-03 DIAGNOSIS — Z87.39 PERSONAL HISTORY OF OTHER DISEASES OF THE MUSCULOSKELETAL SYSTEM AND CONNECTIVE TISSUE: ICD-10-CM

## 2024-01-03 DIAGNOSIS — R74.8 ABNORMAL LEVELS OF OTHER SERUM ENZYMES: ICD-10-CM

## 2024-01-03 DIAGNOSIS — Z79.899 OTHER LONG TERM (CURRENT) DRUG THERAPY: ICD-10-CM

## 2024-01-03 DIAGNOSIS — M19.90 UNSPECIFIED OSTEOARTHRITIS, UNSPECIFIED SITE: ICD-10-CM

## 2024-01-03 DIAGNOSIS — J96.01 ACUTE RESPIRATORY FAILURE WITH HYPOXIA: ICD-10-CM

## 2024-01-03 DIAGNOSIS — S72.002A FRACTURE OF UNSPECIFIED PART OF NECK OF LEFT FEMUR, INITIAL ENCOUNTER FOR CLOSED FRACTURE: ICD-10-CM

## 2024-01-03 LAB
ANION GAP SERPL CALC-SCNC: 10 MMOL/L — SIGNIFICANT CHANGE UP (ref 7–14)
ANION GAP SERPL CALC-SCNC: 10 MMOL/L — SIGNIFICANT CHANGE UP (ref 7–14)
BUN SERPL-MCNC: 20 MG/DL — SIGNIFICANT CHANGE UP (ref 10–20)
BUN SERPL-MCNC: 20 MG/DL — SIGNIFICANT CHANGE UP (ref 10–20)
CALCIUM SERPL-MCNC: 8.2 MG/DL — LOW (ref 8.4–10.5)
CALCIUM SERPL-MCNC: 8.2 MG/DL — LOW (ref 8.4–10.5)
CHLORIDE SERPL-SCNC: 90 MMOL/L — LOW (ref 98–110)
CHLORIDE SERPL-SCNC: 90 MMOL/L — LOW (ref 98–110)
CO2 SERPL-SCNC: 30 MMOL/L — SIGNIFICANT CHANGE UP (ref 17–32)
CO2 SERPL-SCNC: 30 MMOL/L — SIGNIFICANT CHANGE UP (ref 17–32)
CREAT SERPL-MCNC: 0.5 MG/DL — LOW (ref 0.7–1.5)
CREAT SERPL-MCNC: 0.5 MG/DL — LOW (ref 0.7–1.5)
EGFR: 92 ML/MIN/1.73M2 — SIGNIFICANT CHANGE UP
EGFR: 92 ML/MIN/1.73M2 — SIGNIFICANT CHANGE UP
GLUCOSE SERPL-MCNC: 142 MG/DL — HIGH (ref 70–99)
GLUCOSE SERPL-MCNC: 142 MG/DL — HIGH (ref 70–99)
HCT VFR BLD CALC: 26.3 % — LOW (ref 37–47)
HCT VFR BLD CALC: 26.3 % — LOW (ref 37–47)
HGB BLD-MCNC: 8.8 G/DL — LOW (ref 12–16)
HGB BLD-MCNC: 8.8 G/DL — LOW (ref 12–16)
MCHC RBC-ENTMCNC: 30 PG — SIGNIFICANT CHANGE UP (ref 27–31)
MCHC RBC-ENTMCNC: 30 PG — SIGNIFICANT CHANGE UP (ref 27–31)
MCHC RBC-ENTMCNC: 33.5 G/DL — SIGNIFICANT CHANGE UP (ref 32–37)
MCHC RBC-ENTMCNC: 33.5 G/DL — SIGNIFICANT CHANGE UP (ref 32–37)
MCV RBC AUTO: 89.8 FL — SIGNIFICANT CHANGE UP (ref 81–99)
MCV RBC AUTO: 89.8 FL — SIGNIFICANT CHANGE UP (ref 81–99)
MRSA PCR RESULT.: POSITIVE
MRSA PCR RESULT.: POSITIVE
NRBC # BLD: 0 /100 WBCS — SIGNIFICANT CHANGE UP (ref 0–0)
NRBC # BLD: 0 /100 WBCS — SIGNIFICANT CHANGE UP (ref 0–0)
OSMOLALITY UR: 716 MOS/KG — SIGNIFICANT CHANGE UP (ref 50–1200)
OSMOLALITY UR: 716 MOS/KG — SIGNIFICANT CHANGE UP (ref 50–1200)
PLATELET # BLD AUTO: 312 K/UL — SIGNIFICANT CHANGE UP (ref 130–400)
PLATELET # BLD AUTO: 312 K/UL — SIGNIFICANT CHANGE UP (ref 130–400)
PMV BLD: 9.7 FL — SIGNIFICANT CHANGE UP (ref 7.4–10.4)
PMV BLD: 9.7 FL — SIGNIFICANT CHANGE UP (ref 7.4–10.4)
POTASSIUM SERPL-MCNC: 4.2 MMOL/L — SIGNIFICANT CHANGE UP (ref 3.5–5)
POTASSIUM SERPL-MCNC: 4.2 MMOL/L — SIGNIFICANT CHANGE UP (ref 3.5–5)
POTASSIUM SERPL-SCNC: 4.2 MMOL/L — SIGNIFICANT CHANGE UP (ref 3.5–5)
POTASSIUM SERPL-SCNC: 4.2 MMOL/L — SIGNIFICANT CHANGE UP (ref 3.5–5)
RBC # BLD: 2.93 M/UL — LOW (ref 4.2–5.4)
RBC # BLD: 2.93 M/UL — LOW (ref 4.2–5.4)
RBC # FLD: 14.6 % — HIGH (ref 11.5–14.5)
RBC # FLD: 14.6 % — HIGH (ref 11.5–14.5)
SODIUM SERPL-SCNC: 130 MMOL/L — LOW (ref 135–146)
SODIUM SERPL-SCNC: 130 MMOL/L — LOW (ref 135–146)
SODIUM UR-SCNC: <20 MMOL/L — SIGNIFICANT CHANGE UP
SODIUM UR-SCNC: <20 MMOL/L — SIGNIFICANT CHANGE UP
WBC # BLD: 14.89 K/UL — HIGH (ref 4.8–10.8)
WBC # BLD: 14.89 K/UL — HIGH (ref 4.8–10.8)
WBC # FLD AUTO: 14.89 K/UL — HIGH (ref 4.8–10.8)
WBC # FLD AUTO: 14.89 K/UL — HIGH (ref 4.8–10.8)

## 2024-01-03 PROCEDURE — 99291 CRITICAL CARE FIRST HOUR: CPT

## 2024-01-03 PROCEDURE — 71045 X-RAY EXAM CHEST 1 VIEW: CPT | Mod: 26

## 2024-01-03 PROCEDURE — 99233 SBSQ HOSP IP/OBS HIGH 50: CPT

## 2024-01-03 PROCEDURE — 99232 SBSQ HOSP IP/OBS MODERATE 35: CPT

## 2024-01-03 RX ORDER — IPRATROPIUM/ALBUTEROL SULFATE 18-103MCG
3 AEROSOL WITH ADAPTER (GRAM) INHALATION EVERY 6 HOURS
Refills: 0 | Status: DISCONTINUED | OUTPATIENT
Start: 2024-01-03 | End: 2024-01-10

## 2024-01-03 RX ORDER — HYDRALAZINE HCL 50 MG
25 TABLET ORAL ONCE
Refills: 0 | Status: COMPLETED | OUTPATIENT
Start: 2024-01-03 | End: 2024-01-03

## 2024-01-03 RX ADMIN — Medication 40 MILLIGRAM(S): at 06:22

## 2024-01-03 RX ADMIN — TIOTROPIUM BROMIDE 2 PUFF(S): 18 CAPSULE ORAL; RESPIRATORY (INHALATION) at 08:50

## 2024-01-03 RX ADMIN — BUDESONIDE AND FORMOTEROL FUMARATE DIHYDRATE 2 PUFF(S): 160; 4.5 AEROSOL RESPIRATORY (INHALATION) at 08:50

## 2024-01-03 RX ADMIN — Medication 81 MILLIGRAM(S): at 11:18

## 2024-01-03 RX ADMIN — PIPERACILLIN AND TAZOBACTAM 25 GRAM(S): 4; .5 INJECTION, POWDER, LYOPHILIZED, FOR SOLUTION INTRAVENOUS at 06:23

## 2024-01-03 RX ADMIN — Medication 3 MILLILITER(S): at 14:28

## 2024-01-03 RX ADMIN — Medication 40 MILLIGRAM(S): at 17:32

## 2024-01-03 RX ADMIN — PIPERACILLIN AND TAZOBACTAM 25 GRAM(S): 4; .5 INJECTION, POWDER, LYOPHILIZED, FOR SOLUTION INTRAVENOUS at 21:21

## 2024-01-03 RX ADMIN — Medication 100 MILLIGRAM(S): at 17:31

## 2024-01-03 RX ADMIN — BUDESONIDE AND FORMOTEROL FUMARATE DIHYDRATE 2 PUFF(S): 160; 4.5 AEROSOL RESPIRATORY (INHALATION) at 21:26

## 2024-01-03 RX ADMIN — Medication 100 MILLIGRAM(S): at 06:23

## 2024-01-03 RX ADMIN — PIPERACILLIN AND TAZOBACTAM 25 GRAM(S): 4; .5 INJECTION, POWDER, LYOPHILIZED, FOR SOLUTION INTRAVENOUS at 14:30

## 2024-01-03 RX ADMIN — BUDESONIDE AND FORMOTEROL FUMARATE DIHYDRATE 2 PUFF(S): 160; 4.5 AEROSOL RESPIRATORY (INHALATION) at 01:11

## 2024-01-03 RX ADMIN — ENOXAPARIN SODIUM 40 MILLIGRAM(S): 100 INJECTION SUBCUTANEOUS at 14:30

## 2024-01-03 RX ADMIN — POLYETHYLENE GLYCOL 3350 17 GRAM(S): 17 POWDER, FOR SOLUTION ORAL at 11:18

## 2024-01-03 RX ADMIN — Medication 25 MILLIGRAM(S): at 07:01

## 2024-01-03 RX ADMIN — Medication 3 MILLILITER(S): at 21:22

## 2024-01-03 NOTE — PHYSICAL THERAPY INITIAL EVALUATION ADULT - PERTINENT HX OF CURRENT PROBLEM, REHAB EVAL
3F w/ PMHx COPD, OA, chronic back pain s/p multiple surgeries and recent admission for left intertrochanteric femur fracture s/p ORIF 12/27/2023 brought in from [facility] for shortness of breath. EMS reports that she was hypoxic on room air to the 70s. Patient was initially AOx0, after being on NRB in EMS, patient mental status improving with improved oxygenation. Daughter reports the patient was doing okay yesterday and that this was an acute change. Patient currently AOx3, appears short of breath, denying any other complaints at this time. 3F w/ PMHx COPD, OA, chronic back pain s/p multiple surgeries and recent admission for left intertrochanteric femur fracture s/p ORIF 12/27/2023 brought in from [facility] for shortness of breath. EMS reports that she was hypoxic on room air to the 70s. Patient was initially AOx0, after being on NRB in EMS, patient mental status improving with improved oxygenation. Daughter reports the patient was doing okay yesterday and that this was an acute change. Patient currently AOx3, appears short of breath, denying any other complaints at this time.

## 2024-01-03 NOTE — PATIENT PROFILE ADULT - NSPROPTRIGHTREPPHONE_GEN_A_NUR
Pura is a 49 year old who is being evaluated via a billable video visit.      How would you like to obtain your AVS? MyChart  If the video visit is dropped, the invitation should be resent by: Text to cell phone: [phone]  Will anyone else be joining your video visit? No    Video Start Time: 8:55 AM        Subjective   Pura is a 49 year old who presents for the following health issues    HPI     Hyperlipidemia Follow-Up      Are you regularly taking any medication or supplement to lower your cholesterol?   Yes- .    Are you having muscle aches or other side effects that you think could be caused by your cholesterol lowering medication?  No    Hypertension Follow-up      Do you check your blood pressure regularly outside of the clinic? No     Are you following a low salt diet? Yes    Are your blood pressures ever more than 140 on the top number (systolic) OR more   than 90 on the bottom number (diastolic), for example 140/90? No      How many servings of fruits and vegetables do you eat daily?  0-1    On average, how many sweetened beverages do you drink each day (Examples: soda, juice, sweet tea, etc.  Do NOT count diet or artificially sweetened beverages)?   1    How many days per week do you exercise enough to make your heart beat faster? 0    How many minutes a day do you exercise enough to make your heart beat faster?     How many days per week do you miss taking your medication? 0    Recheck of her previous dvt. No concerns.  Recheck of pancreatitis. No abd pain. No chronic diarrhea.  Recheck of dm . No polyuria/dipsia.   No chest pain/sob/palpitations/dizziness/ha's  Recheck of her chf. No orthopnea/pnd/edema  No ascites.       Review of Systems   Constitutional, HEENT, cardiovascular, pulmonary, GI, , musculoskeletal, neuro, skin, endocrine and psych systems are negative, except as otherwise noted.      Objective           Vitals:  No vitals were obtained today due to virtual visit.    Physical Exam    GENERAL: Healthy, alert and no distress  EYES: Eyes grossly normal to inspection.  No discharge or erythema, or obvious scleral/conjunctival abnormalities.  RESP: No audible wheeze, cough, or visible cyanosis.  No visible retractions or increased work of breathing.    SKIN: Visible skin clear. No significant rash, abnormal pigmentation or lesions.  NEURO: Cranial nerves grossly intact.  Mentation and speech appropriate for age.  PSYCH: Mentation appears normal, affect normal/bright, judgement and insight intact, normal speech and appearance well-groomed.    Guadalupe was seen today for hypertension and lipids.    Diagnoses and all orders for this visit:    Hyperlipidemia with target LDL less than 100  -     Lipid panel reflex to direct LDL Fasting; Future  -     atorvastatin (LIPITOR) 10 MG tablet; Take 1 tablet (10 mg) by mouth daily    Chronic combined systolic and diastolic congestive heart failure (H)    Acute deep vein thrombosis (DVT) of other vein of right upper extremity (H)    Other chronic pancreatitis (H)  -     Lipase; Future    Essential hypertension  -     Comprehensive metabolic panel (BMP + Alb, Alk Phos, ALT, AST, Total. Bili, TP); Future  -     CBC with platelets and differential; Future  -     amLODIPine (NORVASC) 5 MG tablet; Take 1 tablet (5 mg) by mouth daily  -     spironolactone (ALDACTONE) 25 MG tablet; Take 1 tablet (25 mg) by mouth daily    Type 2 diabetes mellitus with stage 3a chronic kidney disease, without long-term current use of insulin (H)  -     Hemoglobin A1c; Future    Proteinuria, unspecified type  -     losartan (COZAAR) 100 MG tablet; Take 1 tablet (100 mg) by mouth daily      work on lifestyle modification  Recheck in the spring.    Video-Visit Details    Type of service:  Video Visit    Video End Time:9:09 AM    Originating Location (pt. Location): Home    Distant Location (provider location):  [facility]     Platform used for Video Visit: Timeet   [id number]

## 2024-01-03 NOTE — PROGRESS NOTE ADULT - SUBJECTIVE AND OBJECTIVE BOX
HPI:    83F w/ PMHx COPD, OA, chronic back pain s/p multiple surgeries and recent admission for left intertrochanteric femur fracture s/p ORIF 12/27/2023 brought in from [facility] for shortness of breath. EMS reports that she was hypoxic on room air to the 70s. Patient was initially AOx0, after being on NRB in EMS, patient mental status improving with improved oxygenation. Daughter reports the patient was doing okay yesterday and that this was an acute change. Patient currently AOx3, appears short of breath, denying any other complaints at this time.COVID +ve. Admitted to SDU for management of acute hypoxic respiratory failure. Palliative consulted for GO.     Interval history:     1/3: patient on high flow NC. she endorses no pain or dyspnea but is thirsty and says the bed is uncomfortable. she is able to tell me the situation of what happened to her over the course of her past 2 hospitalizations.      ADVANCE DIRECTIVES:     [ ] Full Code [ X] DNR/DNI  MOLST  [ ]  Living Will  [ ]   DECISION MAKER(s): Patient 8  [ X] Health Care Proxy(s)  [ ] Surrogate(s)  [ ] Guardian           Name(s): Phone Number(s): Promise Green (dtr) is primary HCP and Mary Juarez is secondary HCP       BASELINE (I)ADL(s) (prior to admission):    [place]: [ ]Total  [X ] Moderate [ ]Dependent - from STR, was living by herself independently prior to hip fx  Palliative Performance Status Version 2:         %    http://npcrc.org/files/news/palliative_performance_scale_ppsv2.pdf    Allergies    No Known Allergies    Intolerances    MEDICATIONS  (STANDING):  albuterol/ipratropium for Nebulization 3 milliLiter(s) Nebulizer every 6 hours  aspirin enteric coated 81 milliGRAM(s) Oral daily  budesonide 160 MICROgram(s)/formoterol 4.5 MICROgram(s) Inhaler 2 Puff(s) Inhalation two times a day  chlorhexidine 2% Cloths 1 Application(s) Topical <User Schedule>  doxycycline IVPB 100 milliGRAM(s) IV Intermittent every 12 hours  doxycycline IVPB      enoxaparin Injectable 40 milliGRAM(s) SubCutaneous every 24 hours  methylPREDNISolone sodium succinate Injectable 40 milliGRAM(s) IV Push two times a day  piperacillin/tazobactam IVPB.. 3.375 Gram(s) IV Intermittent every 8 hours  polyethylene glycol 3350 17 Gram(s) Oral daily  senna 2 Tablet(s) Oral at bedtime    MEDICATIONS  (PRN):  acetaminophen     Tablet .. 650 milliGRAM(s) Oral every 6 hours PRN Temp greater or equal to 38C (100.4F), Mild Pain (1 - 3)  aluminum hydroxide/magnesium hydroxide/simethicone Suspension 30 milliLiter(s) Oral every 4 hours PRN Dyspepsia  melatonin 3 milliGRAM(s) Oral at bedtime PRN Insomnia  ondansetron Injectable 4 milliGRAM(s) IV Push every 8 hours PRN Nausea and/or Vomiting    PRESENT SYMPTOMS:   Pain: [ ]yes [X ]no  QOL impact -   Location -                    Aggravating factors -  Quality -  Radiation -  Timing-  Severity (0-10 scale):  Minimal acceptable level (0-10 scale):     CPOT:    https://www.[facility].org/getattachment/[email or web address]/Critical-Care-Pain-Observation-Tool-(CPOT)    PAIN AD Score:   http://geriatrictoolkit.[place].[facility]/cog/painad.pdf (press ctrl +  left click to view)    Dyspnea:                           [ ]None[ X ]Mild [ ]Moderate [ ]Severe     Respiratory Distress Observation Scale (RDOS):   A score of 0 to 2 signifies little or no respiratory distress, 3 signifies mild distress, scores 4 to 6 indicate moderate distress, and scores greater than 7 signify severe distress  https://www.[facility].ca/sites/default/files/PDFS/081087-idwdninugfg-jowdnjey-hypttjehunf-iwkum.pdf    Anxiety:                             [ ]None[ ]Mild [ ]Moderate [ ]Severe   Fatigue:                             [ ]None[ ]Mild [ ]Moderate [ ]Severe   Nausea:                             [ ]None[ ]Mild [ ]Moderate [ ]Severe   Loss of appetite:              [ ]None[ ]Mild [ ]Moderate [ ]Severe   Constipation:                    [ ]None[ ]Mild [ ]Moderate [ ]Severe    Other Symptoms:  [X ]All other review of systems negative     Palliative Performance Status Version 2:        30 %    http://npcrc.org/files/news/palliative_performance_scale_ppsv2.pdf    PHYSICAL EXAM:  Vital Signs Last 24 Hrs  T(C): 36.4 (03 Jan 2024 07:48), Max: 36.9 (02 Jan 2024 18:51)  T(F): 97.5 (03 Jan 2024 07:48), Max: 98.5 (02 Jan 2024 18:51)  HR: 73 (03 Jan 2024 07:48) (73 - 82)  BP: 134/63 (03 Jan 2024 07:48) (134/63 - 192/88)  BP(mean): 90 (03 Jan 2024 07:48) (90 - 90)  RR: 16 (03 Jan 2024 07:48) (16 - 18)  SpO2: 93% (03 Jan 2024 07:48) (93% - 100%)    GENERAL:  [X ] No acute distress [ ]Lethargic  [ ]Unarousable  [X ]Verbal  [ ]Non-Verbal [ ]Cachexia    BEHAVIORAL/PSYCH:  [X ]Alert and Oriented x 3-4  [ ] Anxiety [ ] Delirium [ ] Agitation [X ] Calm   EYES: [X ] No scleral icterus [ ] Scleral icterus [ ] Closed  ENMT:  [ ]Dry mouth  [X ]No external oral lesions [X ] No external ear or nose lesions  CARDIOVASCULAR:  [ ]Regular [ ]Irregular [ ]Tachy [ ]Not Tachy  [ ]Norberto [ ] Edema [ X] No edema  PULMONARY:  [ ]Tachypnea  [ ]Audible excessive secretions [ X] No labored breathing [ ] labored breathing  GASTROINTESTINAL: [X ]Soft  [ ]Distended  [ ]Not distended [ ]Non tender [ ]Tender  MUSCULOSKELETAL: [X ]No clubbing [ ] clubbing  [ ] No cyanosis [ ] cyanosis  NEUROLOGIC: [ X]No focal deficits  [ X]Follows commands  [ ]Does not follow commands  [ ]Cognitive impairment  [ ]Dysphagia  [ ]Dysarthria  [ ]Paresis   SKIN: [ ] Jaundiced [X] Non-jaundiced [ ]Rash [ ]No Rash [ ] Warm [ ] Dry  MISC/LINES: [ ] ET tube [ ] Trach [ ]NGT/OGT [ ]PEG [ ]Cuadra    LABS: reviewed by me                        8.8    14.89 )-----------( 312      ( 03 Jan 2024 05:54 )             26.3   01-03    130<L>  |  90<L>  |  20  ----------------------------<  142<H>  4.2   |  30  |  0.5<L>    Ca    8.2<L>      03 Jan 2024 05:54    TPro  5.0<L>  /  Alb  2.9<L>  /  TBili  1.4<H>  /  DBili  x   /  AST  15  /  ALT  9   /  AlkPhos  89  01-01      Urinalysis Basic - ( 03 Jan 2024 05:54 )    Color: x / Appearance: x / SG: x / pH: x  Gluc: 142 mg/dL / Ketone: x  / Bili: x / Urobili: x   Blood: x / Protein: x / Nitrite: x   Leuk Esterase: x / RBC: x / WBC x   Sq Epi: x / Non Sq Epi: x / Bacteria: x      RADIOLOGY & ADDITIONAL STUDIES: reviewed by me    < from: Xray Chest 1 View-PORTABLE IMMEDIATE (Xray Chest 1 View-PORTABLE IMMEDIATE .) (01.03.24 @ 09:47) >  Findings:    Support devices: Telemetry leads are seen    Cardiac/mediastinum/hilum: Aorta is atherosclerotic.    Lung parenchyma/Pleura: Hyperinflation with basilar opacities    Skeleton/soft tissues: Unremarkable.    Impression:    COPD with basilar opacifications, worsened.        --- End of Report ---    < end of copied text >      EKG: reviewed by me    < from: 12 Lead ECG (12.31.23 @ 18:36) >  Ventricular Rate 122 BPM    Atrial Rate 86 BPM    QRS Duration 122 ms    Q-T Interval 310 ms    QTC Calculation(Bazett) 441 ms    R Axis 125 degrees    T Axis 56 degrees    Diagnosis Line Atrial flutter with variable A-V block  Right bundle branch block  Abnormal ECG    Confirmed by Licha Wong MD ([id number]) on 1/1/2024 11:32:55 AM    < end of copied text >      Patient discussed with primary medical team MD  Palliative care education provided to patient and/or family   HPI:    83F w/ PMHx COPD, OA, chronic back pain s/p multiple surgeries and recent admission for left intertrochanteric femur fracture s/p ORIF 12/27/2023 brought in from [facility] for shortness of breath. EMS reports that she was hypoxic on room air to the 70s. Patient was initially AOx0, after being on NRB in EMS, patient mental status improving with improved oxygenation. Daughter reports the patient was doing okay yesterday and that this was an acute change. Patient currently AOx3, appears short of breath, denying any other complaints at this time.COVID +ve. Admitted to SDU for management of acute hypoxic respiratory failure. Palliative consulted for GO.     Interval history:     1/3: patient on high flow NC. she endorses no pain or dyspnea but is thirsty and says the bed is uncomfortable. she is able to tell me the situation of what happened to her over the course of her past 2 hospitalizations.      ADVANCE DIRECTIVES:     [ ] Full Code [ X] DNR/DNI  MOLST  [ ]  Living Will  [ ]   DECISION MAKER(s): Patient 8  [ X] Health Care Proxy(s)  [ ] Surrogate(s)  [ ] Guardian           Name(s): Phone Number(s): Promise Green (dtr) is primary HCP and Mary Juarez is secondary HCP       BASELINE (I)ADL(s) (prior to admission):    [place]: [ ]Total  [X ] Moderate [ ]Dependent - from STR, was living by herself independently prior to hip fx  Palliative Performance Status Version 2:         %    http://npcrc.org/files/news/palliative_performance_scale_ppsv2.pdf    Allergies    No Known Allergies    Intolerances    MEDICATIONS  (STANDING):  albuterol/ipratropium for Nebulization 3 milliLiter(s) Nebulizer every 6 hours  aspirin enteric coated 81 milliGRAM(s) Oral daily  budesonide 160 MICROgram(s)/formoterol 4.5 MICROgram(s) Inhaler 2 Puff(s) Inhalation two times a day  chlorhexidine 2% Cloths 1 Application(s) Topical <User Schedule>  doxycycline IVPB 100 milliGRAM(s) IV Intermittent every 12 hours  doxycycline IVPB      enoxaparin Injectable 40 milliGRAM(s) SubCutaneous every 24 hours  methylPREDNISolone sodium succinate Injectable 40 milliGRAM(s) IV Push two times a day  piperacillin/tazobactam IVPB.. 3.375 Gram(s) IV Intermittent every 8 hours  polyethylene glycol 3350 17 Gram(s) Oral daily  senna 2 Tablet(s) Oral at bedtime    MEDICATIONS  (PRN):  acetaminophen     Tablet .. 650 milliGRAM(s) Oral every 6 hours PRN Temp greater or equal to 38C (100.4F), Mild Pain (1 - 3)  aluminum hydroxide/magnesium hydroxide/simethicone Suspension 30 milliLiter(s) Oral every 4 hours PRN Dyspepsia  melatonin 3 milliGRAM(s) Oral at bedtime PRN Insomnia  ondansetron Injectable 4 milliGRAM(s) IV Push every 8 hours PRN Nausea and/or Vomiting    PRESENT SYMPTOMS:   Pain: [ ]yes [X ]no  QOL impact -   Location -                    Aggravating factors -  Quality -  Radiation -  Timing-  Severity (0-10 scale):  Minimal acceptable level (0-10 scale):     CPOT:    https://www.[facility].org/getattachment/[email or web address]/Critical-Care-Pain-Observation-Tool-(CPOT)    PAIN AD Score:   http://geriatrictoolkit.[place].[facility]/cog/painad.pdf (press ctrl +  left click to view)    Dyspnea:                           [ ]None[ X ]Mild [ ]Moderate [ ]Severe     Respiratory Distress Observation Scale (RDOS):   A score of 0 to 2 signifies little or no respiratory distress, 3 signifies mild distress, scores 4 to 6 indicate moderate distress, and scores greater than 7 signify severe distress  https://[email or web address]/sites/default/files/PDFS/315876-kjzhqjpizof-ieocvvqw-cvuecyovrpq-fjidh.pdf    Anxiety:                             [ ]None[ ]Mild [ ]Moderate [ ]Severe   Fatigue:                             [ ]None[ ]Mild [ ]Moderate [ ]Severe   Nausea:                             [ ]None[ ]Mild [ ]Moderate [ ]Severe   Loss of appetite:              [ ]None[ ]Mild [ ]Moderate [ ]Severe   Constipation:                    [ ]None[ ]Mild [ ]Moderate [ ]Severe    Other Symptoms:  [X ]All other review of systems negative     Palliative Performance Status Version 2:        30 %    http://npcrc.org/files/news/palliative_performance_scale_ppsv2.pdf    PHYSICAL EXAM:  Vital Signs Last 24 Hrs  T(C): 36.4 (03 Jan 2024 07:48), Max: 36.9 (02 Jan 2024 18:51)  T(F): 97.5 (03 Jan 2024 07:48), Max: 98.5 (02 Jan 2024 18:51)  HR: 73 (03 Jan 2024 07:48) (73 - 82)  BP: 134/63 (03 Jan 2024 07:48) (134/63 - 192/88)  BP(mean): 90 (03 Jan 2024 07:48) (90 - 90)  RR: 16 (03 Jan 2024 07:48) (16 - 18)  SpO2: 93% (03 Jan 2024 07:48) (93% - 100%)    GENERAL:  [X ] No acute distress [ ]Lethargic  [ ]Unarousable  [X ]Verbal  [ ]Non-Verbal [ ]Cachexia    BEHAVIORAL/PSYCH:  [X ]Alert and Oriented x 3-4  [ ] Anxiety [ ] Delirium [ ] Agitation [X ] Calm   EYES: [X ] No scleral icterus [ ] Scleral icterus [ ] Closed  ENMT:  [ ]Dry mouth  [X ]No external oral lesions [X ] No external ear or nose lesions  CARDIOVASCULAR:  [ ]Regular [ ]Irregular [ ]Tachy [ ]Not Tachy  [ ]Norberto [ ] Edema [ X] No edema  PULMONARY:  [ ]Tachypnea  [ ]Audible excessive secretions [ X] No labored breathing [ ] labored breathing  GASTROINTESTINAL: [X ]Soft  [ ]Distended  [ ]Not distended [ ]Non tender [ ]Tender  MUSCULOSKELETAL: [X ]No clubbing [ ] clubbing  [ ] No cyanosis [ ] cyanosis  NEUROLOGIC: [ X]No focal deficits  [ X]Follows commands  [ ]Does not follow commands  [ ]Cognitive impairment  [ ]Dysphagia  [ ]Dysarthria  [ ]Paresis   SKIN: [ ] Jaundiced [X] Non-jaundiced [ ]Rash [ ]No Rash [ ] Warm [ ] Dry  MISC/LINES: [ ] ET tube [ ] Trach [ ]NGT/OGT [ ]PEG [ ]Cuadra    LABS: reviewed by me                        8.8    14.89 )-----------( 312      ( 03 Jan 2024 05:54 )             26.3   01-03    130<L>  |  90<L>  |  20  ----------------------------<  142<H>  4.2   |  30  |  0.5<L>    Ca    8.2<L>      03 Jan 2024 05:54    TPro  5.0<L>  /  Alb  2.9<L>  /  TBili  1.4<H>  /  DBili  x   /  AST  15  /  ALT  9   /  AlkPhos  89  01-01      Urinalysis Basic - ( 03 Jan 2024 05:54 )    Color: x / Appearance: x / SG: x / pH: x  Gluc: 142 mg/dL / Ketone: x  / Bili: x / Urobili: x   Blood: x / Protein: x / Nitrite: x   Leuk Esterase: x / RBC: x / WBC x   Sq Epi: x / Non Sq Epi: x / Bacteria: x      RADIOLOGY & ADDITIONAL STUDIES: reviewed by me    < from: Xray Chest 1 View-PORTABLE IMMEDIATE (Xray Chest 1 View-PORTABLE IMMEDIATE .) (01.03.24 @ 09:47) >  Findings:    Support devices: Telemetry leads are seen    Cardiac/mediastinum/hilum: Aorta is atherosclerotic.    Lung parenchyma/Pleura: Hyperinflation with basilar opacities    Skeleton/soft tissues: Unremarkable.    Impression:    COPD with basilar opacifications, worsened.        --- End of Report ---    < end of copied text >      EKG: reviewed by me    < from: 12 Lead ECG (12.31.23 @ 18:36) >  Ventricular Rate 122 BPM    Atrial Rate 86 BPM    QRS Duration 122 ms    Q-T Interval 310 ms    QTC Calculation(Bazett) 441 ms    R Axis 125 degrees    T Axis 56 degrees    Diagnosis Line Atrial flutter with variable A-V block  Right bundle branch block  Abnormal ECG    Confirmed by Licha Wong MD ([id number]) on 1/1/2024 11:32:55 AM    < end of copied text >      Patient discussed with primary medical team MD  Palliative care education provided to patient and/or family

## 2024-01-03 NOTE — PROGRESS NOTE ADULT - ASSESSMENT
83F w/ PMHx COPD, OA, chronic back pain s/p multiple surgeries and recent admission for left intertrochanteric femur fracture s/p ORIF 12/27/2023 brought in from [facility] for shortness of breath. COVID +ve. Admitted to SDU for management of acute hypoxic respiratory failure. She is currently on high flow NX and IV antibiotics.  Palliative consulted for GOC.     Patient comfortable on exam. No family at bedside.   Goals for now are DNR/DNI with ongoing medical management.  Will follow.     Education about palliative care provided to patient/family.  See Recs below.    Please call [phone] with questions or concerns 24/7.   We will continue to follow.    83F w/ PMHx COPD, OA, chronic back pain s/p multiple surgeries and recent admission for left intertrochanteric femur fracture s/p ORIF 12/27/2023 brought in from [facility] for shortness of breath. COVID +ve. Admitted to SDU for management of acute hypoxic respiratory failure. She is currently on high flow NX and IV antibiotics.  Palliative consulted for GOC.     Patient comfortable on exam. No family at bedside.   Goals for now are DNR/DNI with ongoing medical management.  Will follow.     Education about palliative care provided to patient/family.  See Recs below.    Please call [phone] with questions or concerns 24/7.   We will continue to follow.

## 2024-01-03 NOTE — PROGRESS NOTE ADULT - ASSESSMENT
Impression    Acute on chronic hypoxemic/ hypercapnic resp failure on hhfnc  Covid pneumonia  Possible aspiration ( cr chest noted)  NTEMI  AMS/ TME  COPD not on Home o2  hyponatremia  Bilateral lung nodules l  Chronic back pain  Left intertrochanteric femur fracture Sp ORIF  Current smoker    PLAN:    CNS: Avoid CNS depressant    HEENT:  Oral care    PULMONARY:  HOB @ 45 degrees, NIV at night and as needed, repeat ABG, solumedrol 40 q 12, Neb q 6, keep SaO2 88 TO 92%, HHFNC    CARDIOVASCULAR: EKG, cardio eval, hep, avoid overload, lasix daily    GI: GI prophylaxis                                          Feeding feeding when speech and swallow eval    RENAL:  F/u  lytes.  Correct as needed. accurate I/O    INFECTIOUS DISEASE: abx per ID    HEMATOLOGICAL:  DVT prophylaxis.    ENDOCRINE:  Follow up FS.  Insulin protocol if needed.    SDU  Palliative care eval  very poor prognosis

## 2024-01-03 NOTE — PATIENT PROFILE ADULT - FUNCTIONAL ASSESSMENT - BASIC MOBILITY 6.
2-calculated by average/Not able to assess (calculate score using [facility] averaging method) 2-calculated by average/Not able to assess (calculate score using [facility] averaging method)

## 2024-01-03 NOTE — PROGRESS NOTE ADULT - SUBJECTIVE AND OBJECTIVE BOX
INTERVAL HPI/OVERNIGHT EVENTS:    SUBJECTIVE: Patient seen and examined at bedside.     no cp,  abd pain, fever  +sob, cough, no orthopnea, pnd    OBJECTIVE:    VITAL SIGNS:  Vital Signs Last 24 Hrs  T(C): 36.4 (03 Jan 2024 07:48), Max: 36.9 (02 Jan 2024 18:51)  T(F): 97.5 (03 Jan 2024 07:48), Max: 98.5 (02 Jan 2024 18:51)  HR: 73 (03 Jan 2024 07:48) (73 - 82)  BP: 134/63 (03 Jan 2024 07:48) (134/63 - 192/88)  BP(mean): 90 (03 Jan 2024 07:48) (90 - 90)  RR: 16 (03 Jan 2024 07:48) (16 - 18)  SpO2: 93% (03 Jan 2024 07:48) (93% - 100%)    Parameters below as of 03 Jan 2024 07:48  Patient On (Oxygen Delivery Method): nasal cannula, high flow          PHYSICAL EXAM:    General: NAD  HEENT: NC/AT; PERRL, clear conjunctiva  Neck: supple  Respiratory: base crackles  Cardiovascular: +S1/S2; RRR  Abdomen: soft, NT/ND; +BS x4  Extremities: WWP, 2+ peripheral pulses b/l; no LE edema  Skin: normal color and turgor; no rash  Neurological:    MEDICATIONS:  MEDICATIONS  (STANDING):  aspirin enteric coated 81 milliGRAM(s) Oral daily  budesonide 160 MICROgram(s)/formoterol 4.5 MICROgram(s) Inhaler 2 Puff(s) Inhalation two times a day  chlorhexidine 2% Cloths 1 Application(s) Topical <User Schedule>  doxycycline IVPB 100 milliGRAM(s) IV Intermittent every 12 hours  doxycycline IVPB      enoxaparin Injectable 40 milliGRAM(s) SubCutaneous every 24 hours  methylPREDNISolone sodium succinate Injectable 40 milliGRAM(s) IV Push two times a day  piperacillin/tazobactam IVPB.. 3.375 Gram(s) IV Intermittent every 8 hours  polyethylene glycol 3350 17 Gram(s) Oral daily  senna 2 Tablet(s) Oral at bedtime  tiotropium 2.5 MICROgram(s) Inhaler 2 Puff(s) Inhalation daily    MEDICATIONS  (PRN):  acetaminophen     Tablet .. 650 milliGRAM(s) Oral every 6 hours PRN Temp greater or equal to 38C (100.4F), Mild Pain (1 - 3)  aluminum hydroxide/magnesium hydroxide/simethicone Suspension 30 milliLiter(s) Oral every 4 hours PRN Dyspepsia  melatonin 3 milliGRAM(s) Oral at bedtime PRN Insomnia  ondansetron Injectable 4 milliGRAM(s) IV Push every 8 hours PRN Nausea and/or Vomiting      ALLERGIES:  Allergies    No Known Allergies    Intolerances        LABS:                        8.8    14.89 )-----------( 312      ( 03 Jan 2024 05:54 )             26.3     Hemoglobin: 8.8 g/dL (01-03 @ 05:54)  Hemoglobin: 9.2 g/dL (01-02 @ 12:10)  Hemoglobin: 8.5 g/dL (01-01 @ 20:42)  Hemoglobin: 10.0 g/dL (12-31 @ 13:55)  Hemoglobin: 10.2 g/dL (12-29 @ 23:48)    CBC Full  -  ( 03 Jan 2024 05:54 )  WBC Count : 14.89 K/uL  RBC Count : 2.93 M/uL  Hemoglobin : 8.8 g/dL  Hematocrit : 26.3 %  Platelet Count - Automated : 312 K/uL  Mean Cell Volume : 89.8 fL  Mean Cell Hemoglobin : 30.0 pg  Mean Cell Hemoglobin Concentration : 33.5 g/dL  Auto Neutrophil # : x  Auto Lymphocyte # : x  Auto Monocyte # : x  Auto Eosinophil # : x  Auto Basophil # : x  Auto Neutrophil % : x  Auto Lymphocyte % : x  Auto Monocyte % : x  Auto Eosinophil % : x  Auto Basophil % : x    01-03    130<L>  |  90<L>  |  20  ----------------------------<  142<H>  4.2   |  30  |  0.5<L>    Ca    8.2<L>      03 Jan 2024 05:54    TPro  5.0<L>  /  Alb  2.9<L>  /  TBili  1.4<H>  /  DBili  x   /  AST  15  /  ALT  9   /  AlkPhos  89  01-01    Creatinine Trend: 0.5<--, 0.6<--, 0.5<--, 0.6<--, 0.9<--, 0.6<--  LIVER FUNCTIONS - ( 01 Jan 2024 20:42 )  Alb: 2.9 g/dL / Pro: 5.0 g/dL / ALK PHOS: 89 U/L / ALT: 9 U/L / AST: 15 U/L / GGT: x               hs Troponin:                180 <<== 01-02-24 @ 09:20                211 <<== 01-01-24 @ 20:42            Urinalysis Basic - ( 03 Jan 2024 05:54 )    Color: x / Appearance: x / SG: x / pH: x  Gluc: 142 mg/dL / Ketone: x  / Bili: x / Urobili: x   Blood: x / Protein: x / Nitrite: x   Leuk Esterase: x / RBC: x / WBC x   Sq Epi: x / Non Sq Epi: x / Bacteria: x      CSF:                      EKG:   MICROBIOLOGY:    Culture - Blood (collected 31 Dec 2023 13:55)  Source: .Blood Blood-Peripheral  Preliminary Report (03 Jan 2024 02:02):    No growth at 48 Hours    Culture - Blood (collected 31 Dec 2023 13:55)  Source: .Blood Blood-Peripheral  Preliminary Report (03 Jan 2024 02:02):    No growth at 48 Hours      IMAGING:      Labs, imaging, EKG personally reviewed    RADIOLOGY & ADDITIONAL TESTS: Reviewed.

## 2024-01-03 NOTE — PROGRESS NOTE ADULT - NSPROGADDITIONALINFOA_GEN_ALL_CORE
#Progress Note Handoff:  Pending (specify):  Consults_________, Tests________, Test Results_______, Other______hypoxia___  Family discussion: d/w pt at bedside  Disposition: Home___/SNF___/Other________/Unknown at this time____x____

## 2024-01-03 NOTE — PATIENT PROFILE ADULT - STATED REASON FOR ADMISSION
pt BIBEMS  from [facility] states she was hypoxic to the 70s, Pt  Aox2,  100% on NRB.  altered mental status   pt BIBEMS  from [facility] states she was hypoxic to the 70s, Pt  Aox2,  100% on NRB.  altered mental status

## 2024-01-03 NOTE — PATIENT PROFILE ADULT - FALL HARM RISK - HARM RISK INTERVENTIONS
Assistance with ambulation/Assistance OOB with selected safe patient handling equipment/Communicate Risk of Fall with Harm to all staff/Discuss with provider need for PT consult/Monitor gait and stability/Provide patient with walking aids - walker, cane, crutches/Reinforce activity limits and safety measures with patient and family/Tailored Fall Risk Interventions/Visual Cue: Yellow wristband and red socks/Bed in lowest position, wheels locked, appropriate side rails in place/Call bell, personal items and telephone in reach/Instruct patient to call for assistance before getting out of bed or chair/Non-slip footwear when patient is out of bed/[place] to call system/Physically safe environment - no spills, clutter or unnecessary equipment/Purposeful Proactive Rounding/Room/bathroom lighting operational, light cord in reach Assistance with ambulation/Assistance OOB with selected safe patient handling equipment/Communicate Risk of Fall with Harm to all staff/Discuss with provider need for PT consult/Monitor gait and stability/Provide patient with walking aids - walker, cane, crutches/Reinforce activity limits and safety measures with patient and family/Tailored Fall Risk Interventions/Visual Cue: Yellow wristband and red socks/Bed in lowest position, wheels locked, appropriate side rails in place/Call bell, personal items and telephone in reach/Instruct patient to call for assistance before getting out of bed or chair/Non-slip footwear when patient is out of bed/[place] to call system/Physically safe environment - no spills, clutter or unnecessary equipment/Purposeful Proactive Rounding/Room/bathroom lighting operational, light cord in reach

## 2024-01-03 NOTE — PHYSICAL THERAPY INITIAL EVALUATION ADULT - ADDITIONAL COMMENTS
Pt admitted from [facility] for STR due to recent Left hip ORIF, will be returning to rehab facility, daughter at bedside in agreement. Pt was able to stand with RW prior to admission however was requiring assistance with dressing and showering and transfers since hip fracture. Pt admitted from [facility] for STR due to recent Left hip ORIF, will be returning to rehab facility, daughter at bedside in agreement. Pt was able to stand with RW prior to admission however was requiring assistance with dressing and showering and transfers since hip fracture.

## 2024-01-03 NOTE — PHYSICAL THERAPY INITIAL EVALUATION ADULT - GENERAL OBSERVATIONS, REHAB EVAL
13:15-14:00 Pt encountered semi harris in bed in NAD with +call bell, +bed rails, +bed alarm, +HFNC 50L, +arriola, +VSS, daughter at bedside, agreeable to therapy. During supine to sit BP was 173/80, JUAN Mckeon agreed to hold further, pt c/o slight headache.

## 2024-01-03 NOTE — PROGRESS NOTE ADULT - SUBJECTIVE AND OBJECTIVE BOX
Over Night Events: events noted, on HHFNC 60%, cough, afebrile    PHYSICAL EXAM    ICU Vital Signs Last 24 Hrs  T(C): 36.4 (03 Jan 2024 00:00), Max: 37.6 (02 Jan 2024 07:46)  T(F): 97.5 (03 Jan 2024 00:00), Max: 99.7 (02 Jan 2024 07:46)  HR: 76 (03 Jan 2024 02:25) (76 - 97)  BP: 191/87 (03 Jan 2024 02:25) (136/63 - 198/79)  RR: 18 (03 Jan 2024 00:00) (16 - 20)  SpO2: 98% (03 Jan 2024 02:25) (95% - 100%)    O2 Parameters below as of 03 Jan 2024 02:25  Patient On (Oxygen Delivery Method): nasal cannula, high flow  O2 Flow (L/min): 60  O2 Concentration (%): 60        General: ill looking  cachectic  Lungs: Bilateral rhonchi  Cardiovascular: Regular   Abdomen: Soft, Positive BS  Extremities: No clubbing   Skin: Warm  Neurological: Non focal       01-01-24 @ 07:01  -  01-02-24 @ 07:00  --------------------------------------------------------  IN:  Total IN: 0 mL    OUT:    Voided (mL): 250 mL  Total OUT: 250 mL    Total NET: -250 mL          LABS:                          9.2    21.90 )-----------( 316      ( 02 Jan 2024 12:10 )             27.3                                               01-02    125<L>  |  89<L>  |  22<H>  ----------------------------<  189<H>  3.7   |  26  |  0.6<L>    Ca    7.9<L>      02 Jan 2024 22:04    TPro  5.0<L>  /  Alb  2.9<L>  /  TBili  1.4<H>  /  DBili  x   /  AST  15  /  ALT  9   /  AlkPhos  89  01-01                                             Urinalysis Basic - ( 02 Jan 2024 22:04 )    Color: x / Appearance: x / SG: x / pH: x  Gluc: 189 mg/dL / Ketone: x  / Bili: x / Urobili: x   Blood: x / Protein: x / Nitrite: x   Leuk Esterase: x / RBC: x / WBC x   Sq Epi: x / Non Sq Epi: x / Bacteria: x                                                  LIVER FUNCTIONS - ( 01 Jan 2024 20:42 )  Alb: 2.9 g/dL / Pro: 5.0 g/dL / ALK PHOS: 89 U/L / ALT: 9 U/L / AST: 15 U/L / GGT: x                                                  Culture - Blood (collected 31 Dec 2023 13:55)  Source: .Blood Blood-Peripheral  Preliminary Report (03 Jan 2024 02:02):    No growth at 48 Hours    Culture - Blood (collected 31 Dec 2023 13:55)  Source: .Blood Blood-Peripheral  Preliminary Report (03 Jan 2024 02:02):    No growth at 48 Hours                                                                                           MEDICATIONS  (STANDING):  aspirin enteric coated 81 milliGRAM(s) Oral daily  budesonide 160 MICROgram(s)/formoterol 4.5 MICROgram(s) Inhaler 2 Puff(s) Inhalation two times a day  chlorhexidine 2% Cloths 1 Application(s) Topical <User Schedule>  doxycycline IVPB 100 milliGRAM(s) IV Intermittent every 12 hours  doxycycline IVPB      enoxaparin Injectable 40 milliGRAM(s) SubCutaneous every 24 hours  methylPREDNISolone sodium succinate Injectable 40 milliGRAM(s) IV Push two times a day  piperacillin/tazobactam IVPB.. 3.375 Gram(s) IV Intermittent every 8 hours  polyethylene glycol 3350 17 Gram(s) Oral daily  senna 2 Tablet(s) Oral at bedtime  tiotropium 2.5 MICROgram(s) Inhaler 2 Puff(s) Inhalation daily    MEDICATIONS  (PRN):  acetaminophen     Tablet .. 650 milliGRAM(s) Oral every 6 hours PRN Temp greater or equal to 38C (100.4F), Mild Pain (1 - 3)  aluminum hydroxide/magnesium hydroxide/simethicone Suspension 30 milliLiter(s) Oral every 4 hours PRN Dyspepsia  melatonin 3 milliGRAM(s) Oral at bedtime PRN Insomnia  ondansetron Injectable 4 milliGRAM(s) IV Push every 8 hours PRN Nausea and/or Vomiting

## 2024-01-03 NOTE — PROGRESS NOTE ADULT - ASSESSMENT
83F PMHx COPD, osteoarthritis, L hip fx s/p ORIF 12/2023, chronic back pain s/p repair here with acute hypoxic resp failure, requiring hfnc.

## 2024-01-04 DIAGNOSIS — I21.A1 MYOCARDIAL INFARCTION TYPE 2: ICD-10-CM

## 2024-01-04 DIAGNOSIS — G89.29 OTHER CHRONIC PAIN: ICD-10-CM

## 2024-01-04 DIAGNOSIS — E22.2 SYNDROME OF INAPPROPRIATE SECRETION OF ANTIDIURETIC HORMONE: ICD-10-CM

## 2024-01-04 DIAGNOSIS — D62 ACUTE POSTHEMORRHAGIC ANEMIA: ICD-10-CM
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
## 2024-01-04 DIAGNOSIS — D72.829 ELEVATED WHITE BLOOD CELL COUNT, UNSPECIFIED: ICD-10-CM

## 2024-01-04 DIAGNOSIS — M54.9 DORSALGIA, UNSPECIFIED: ICD-10-CM

## 2024-01-04 DIAGNOSIS — J44.9 CHRONIC OBSTRUCTIVE PULMONARY DISEASE, UNSPECIFIED: ICD-10-CM

## 2024-01-04 DIAGNOSIS — S72.142A DISPLACED INTERTROCHANTERIC FRACTURE OF LEFT FEMUR, INITIAL ENCOUNTER FOR CLOSED FRACTURE: ICD-10-CM

## 2024-01-04 DIAGNOSIS — J43.2 CENTRILOBULAR EMPHYSEMA: ICD-10-CM

## 2024-01-04 DIAGNOSIS — F17.210 NICOTINE DEPENDENCE, CIGARETTES, UNCOMPLICATED: ICD-10-CM

## 2024-01-04 DIAGNOSIS — I25.10 ATHEROSCLEROTIC HEART DISEASE OF NATIVE CORONARY ARTERY WITHOUT ANGINA PECTORIS: ICD-10-CM
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
## 2024-01-04 DIAGNOSIS — E87.5 HYPERKALEMIA: ICD-10-CM

## 2024-01-04 LAB
ALBUMIN SERPL ELPH-MCNC: 3 G/DL — LOW (ref 3.5–5.2)
ALBUMIN SERPL ELPH-MCNC: 3 G/DL — LOW (ref 3.5–5.2)
ALP SERPL-CCNC: 97 U/L — SIGNIFICANT CHANGE UP (ref 30–115)
ALP SERPL-CCNC: 97 U/L — SIGNIFICANT CHANGE UP (ref 30–115)
ALT FLD-CCNC: 22 U/L — SIGNIFICANT CHANGE UP (ref 0–41)
ALT FLD-CCNC: 22 U/L — SIGNIFICANT CHANGE UP (ref 0–41)
ANION GAP SERPL CALC-SCNC: 10 MMOL/L — SIGNIFICANT CHANGE UP (ref 7–14)
ANION GAP SERPL CALC-SCNC: 10 MMOL/L — SIGNIFICANT CHANGE UP (ref 7–14)
AST SERPL-CCNC: 21 U/L — SIGNIFICANT CHANGE UP (ref 0–41)
AST SERPL-CCNC: 21 U/L — SIGNIFICANT CHANGE UP (ref 0–41)
BASOPHILS # BLD AUTO: 0.02 K/UL — SIGNIFICANT CHANGE UP (ref 0–0.2)
BASOPHILS # BLD AUTO: 0.02 K/UL — SIGNIFICANT CHANGE UP (ref 0–0.2)
BASOPHILS NFR BLD AUTO: 0.1 % — SIGNIFICANT CHANGE UP (ref 0–1)
BASOPHILS NFR BLD AUTO: 0.1 % — SIGNIFICANT CHANGE UP (ref 0–1)
BILIRUB SERPL-MCNC: 1 MG/DL — SIGNIFICANT CHANGE UP (ref 0.2–1.2)
BILIRUB SERPL-MCNC: 1 MG/DL — SIGNIFICANT CHANGE UP (ref 0.2–1.2)
BUN SERPL-MCNC: 19 MG/DL — SIGNIFICANT CHANGE UP (ref 10–20)
BUN SERPL-MCNC: 19 MG/DL — SIGNIFICANT CHANGE UP (ref 10–20)
CALCIUM SERPL-MCNC: 8.3 MG/DL — LOW (ref 8.4–10.5)
CALCIUM SERPL-MCNC: 8.3 MG/DL — LOW (ref 8.4–10.5)
CHLORIDE SERPL-SCNC: 88 MMOL/L — LOW (ref 98–110)
CHLORIDE SERPL-SCNC: 88 MMOL/L — LOW (ref 98–110)
CO2 SERPL-SCNC: 29 MMOL/L — SIGNIFICANT CHANGE UP (ref 17–32)
CO2 SERPL-SCNC: 29 MMOL/L — SIGNIFICANT CHANGE UP (ref 17–32)
CREAT SERPL-MCNC: 0.5 MG/DL — LOW (ref 0.7–1.5)
CREAT SERPL-MCNC: 0.5 MG/DL — LOW (ref 0.7–1.5)
EGFR: 92 ML/MIN/1.73M2 — SIGNIFICANT CHANGE UP
EGFR: 92 ML/MIN/1.73M2 — SIGNIFICANT CHANGE UP
EOSINOPHIL # BLD AUTO: 0 K/UL — SIGNIFICANT CHANGE UP (ref 0–0.7)
EOSINOPHIL # BLD AUTO: 0 K/UL — SIGNIFICANT CHANGE UP (ref 0–0.7)
EOSINOPHIL NFR BLD AUTO: 0 % — SIGNIFICANT CHANGE UP (ref 0–8)
EOSINOPHIL NFR BLD AUTO: 0 % — SIGNIFICANT CHANGE UP (ref 0–8)
GAS PNL BLDA: SIGNIFICANT CHANGE UP
GAS PNL BLDA: SIGNIFICANT CHANGE UP
GLUCOSE SERPL-MCNC: 139 MG/DL — HIGH (ref 70–99)
GLUCOSE SERPL-MCNC: 139 MG/DL — HIGH (ref 70–99)
HCT VFR BLD CALC: 27.1 % — LOW (ref 37–47)
HCT VFR BLD CALC: 27.1 % — LOW (ref 37–47)
HGB BLD-MCNC: 9.2 G/DL — LOW (ref 12–16)
HGB BLD-MCNC: 9.2 G/DL — LOW (ref 12–16)
IMM GRANULOCYTES NFR BLD AUTO: 0.9 % — HIGH (ref 0.1–0.3)
IMM GRANULOCYTES NFR BLD AUTO: 0.9 % — HIGH (ref 0.1–0.3)
LYMPHOCYTES # BLD AUTO: 0.51 K/UL — LOW (ref 1.2–3.4)
LYMPHOCYTES # BLD AUTO: 0.51 K/UL — LOW (ref 1.2–3.4)
LYMPHOCYTES # BLD AUTO: 3.7 % — LOW (ref 20.5–51.1)
LYMPHOCYTES # BLD AUTO: 3.7 % — LOW (ref 20.5–51.1)
MAGNESIUM SERPL-MCNC: 1.7 MG/DL — LOW (ref 1.8–2.4)
MAGNESIUM SERPL-MCNC: 1.7 MG/DL — LOW (ref 1.8–2.4)
MCHC RBC-ENTMCNC: 30 PG — SIGNIFICANT CHANGE UP (ref 27–31)
MCHC RBC-ENTMCNC: 30 PG — SIGNIFICANT CHANGE UP (ref 27–31)
MCHC RBC-ENTMCNC: 33.9 G/DL — SIGNIFICANT CHANGE UP (ref 32–37)
MCHC RBC-ENTMCNC: 33.9 G/DL — SIGNIFICANT CHANGE UP (ref 32–37)
MCV RBC AUTO: 88.3 FL — SIGNIFICANT CHANGE UP (ref 81–99)
MCV RBC AUTO: 88.3 FL — SIGNIFICANT CHANGE UP (ref 81–99)
MONOCYTES # BLD AUTO: 0.55 K/UL — SIGNIFICANT CHANGE UP (ref 0.1–0.6)
MONOCYTES # BLD AUTO: 0.55 K/UL — SIGNIFICANT CHANGE UP (ref 0.1–0.6)
MONOCYTES NFR BLD AUTO: 4 % — SIGNIFICANT CHANGE UP (ref 1.7–9.3)
MONOCYTES NFR BLD AUTO: 4 % — SIGNIFICANT CHANGE UP (ref 1.7–9.3)
NEUTROPHILS # BLD AUTO: 12.53 K/UL — HIGH (ref 1.4–6.5)
NEUTROPHILS # BLD AUTO: 12.53 K/UL — HIGH (ref 1.4–6.5)
NEUTROPHILS NFR BLD AUTO: 91.3 % — HIGH (ref 42.2–75.2)
NEUTROPHILS NFR BLD AUTO: 91.3 % — HIGH (ref 42.2–75.2)
NRBC # BLD: 0 /100 WBCS — SIGNIFICANT CHANGE UP (ref 0–0)
NRBC # BLD: 0 /100 WBCS — SIGNIFICANT CHANGE UP (ref 0–0)
PLATELET # BLD AUTO: 386 K/UL — SIGNIFICANT CHANGE UP (ref 130–400)
PLATELET # BLD AUTO: 386 K/UL — SIGNIFICANT CHANGE UP (ref 130–400)
PMV BLD: 9.6 FL — SIGNIFICANT CHANGE UP (ref 7.4–10.4)
PMV BLD: 9.6 FL — SIGNIFICANT CHANGE UP (ref 7.4–10.4)
POTASSIUM SERPL-MCNC: 3.8 MMOL/L — SIGNIFICANT CHANGE UP (ref 3.5–5)
POTASSIUM SERPL-MCNC: 3.8 MMOL/L — SIGNIFICANT CHANGE UP (ref 3.5–5)
POTASSIUM SERPL-SCNC: 3.8 MMOL/L — SIGNIFICANT CHANGE UP (ref 3.5–5)
POTASSIUM SERPL-SCNC: 3.8 MMOL/L — SIGNIFICANT CHANGE UP (ref 3.5–5)
PROT SERPL-MCNC: 5.2 G/DL — LOW (ref 6–8)
PROT SERPL-MCNC: 5.2 G/DL — LOW (ref 6–8)
RBC # BLD: 3.07 M/UL — LOW (ref 4.2–5.4)
RBC # BLD: 3.07 M/UL — LOW (ref 4.2–5.4)
RBC # FLD: 14.4 % — SIGNIFICANT CHANGE UP (ref 11.5–14.5)
RBC # FLD: 14.4 % — SIGNIFICANT CHANGE UP (ref 11.5–14.5)
SODIUM SERPL-SCNC: 127 MMOL/L — LOW (ref 135–146)
SODIUM SERPL-SCNC: 127 MMOL/L — LOW (ref 135–146)
WBC # BLD: 13.74 K/UL — HIGH (ref 4.8–10.8)
WBC # BLD: 13.74 K/UL — HIGH (ref 4.8–10.8)
WBC # FLD AUTO: 13.74 K/UL — HIGH (ref 4.8–10.8)
WBC # FLD AUTO: 13.74 K/UL — HIGH (ref 4.8–10.8)

## 2024-01-04 PROCEDURE — 99233 SBSQ HOSP IP/OBS HIGH 50: CPT

## 2024-01-04 PROCEDURE — 99232 SBSQ HOSP IP/OBS MODERATE 35: CPT

## 2024-01-04 PROCEDURE — 99497 ADVNCD CARE PLAN 30 MIN: CPT

## 2024-01-04 RX ORDER — MUPIROCIN 20 MG/G
1 OINTMENT TOPICAL
Refills: 0 | Status: DISCONTINUED | OUTPATIENT
Start: 2024-01-04 | End: 2024-01-10

## 2024-01-04 RX ORDER — SODIUM CHLORIDE 9 MG/ML
1000 INJECTION INTRAMUSCULAR; INTRAVENOUS; SUBCUTANEOUS
Refills: 0 | Status: DISCONTINUED | OUTPATIENT
Start: 2024-01-04 | End: 2024-01-05

## 2024-01-04 RX ORDER — MAGNESIUM SULFATE 500 MG/ML
2 VIAL (ML) INJECTION ONCE
Refills: 0 | Status: COMPLETED | OUTPATIENT
Start: 2024-01-04 | End: 2024-01-04

## 2024-01-04 RX ORDER — FUROSEMIDE 40 MG
20 TABLET ORAL DAILY
Refills: 0 | Status: DISCONTINUED | OUTPATIENT
Start: 2024-01-04 | End: 2024-01-04

## 2024-01-04 RX ORDER — MUPIROCIN 20 MG/G
1 OINTMENT TOPICAL EVERY 12 HOURS
Refills: 0 | Status: DISCONTINUED | OUTPATIENT
Start: 2024-01-04 | End: 2024-01-04

## 2024-01-04 RX ADMIN — Medication 40 MILLIGRAM(S): at 11:15

## 2024-01-04 RX ADMIN — ENOXAPARIN SODIUM 40 MILLIGRAM(S): 100 INJECTION SUBCUTANEOUS at 12:14

## 2024-01-04 RX ADMIN — Medication 25 GRAM(S): at 22:17

## 2024-01-04 RX ADMIN — Medication 100 MILLIGRAM(S): at 22:17

## 2024-01-04 RX ADMIN — PIPERACILLIN AND TAZOBACTAM 25 GRAM(S): 4; .5 INJECTION, POWDER, LYOPHILIZED, FOR SOLUTION INTRAVENOUS at 12:13

## 2024-01-04 RX ADMIN — Medication 40 MILLIGRAM(S): at 17:15

## 2024-01-04 RX ADMIN — Medication 81 MILLIGRAM(S): at 11:15

## 2024-01-04 RX ADMIN — PIPERACILLIN AND TAZOBACTAM 25 GRAM(S): 4; .5 INJECTION, POWDER, LYOPHILIZED, FOR SOLUTION INTRAVENOUS at 22:30

## 2024-01-04 RX ADMIN — Medication 100 MILLIGRAM(S): at 11:15

## 2024-01-04 RX ADMIN — Medication 3 MILLILITER(S): at 13:23

## 2024-01-04 RX ADMIN — CHLORHEXIDINE GLUCONATE 1 APPLICATION(S): 213 SOLUTION TOPICAL at 06:41

## 2024-01-04 RX ADMIN — POLYETHYLENE GLYCOL 3350 17 GRAM(S): 17 POWDER, FOR SOLUTION ORAL at 11:15

## 2024-01-04 NOTE — PROGRESS NOTE ADULT - SUBJECTIVE AND OBJECTIVE BOX
SHANELL MORENO  83y, Female  Allergy: No Known Allergies      LOS  4d    CHIEF COMPLAINT: SOB (04 Jan 2024 14:39)      INTERVAL EVENTS/HPI  - No acute events overnight  - T(F): , Max: 98.3 (01-03-24 @ 16:20)  - Denies any worsening symptoms  - on HFNC - on 40L/50 FIO2   - WBC Count: 13.74 (01-04-24 @ 06:20)  WBC Count: 14.89 (01-03-24 @ 05:54)     - Creatinine: 0.5 (01-04-24 @ 06:20)  Creatinine: 0.5 (01-03-24 @ 05:54)       ROS  General: Denies rigors, nightsweats  HEENT: Denies headache, rhinorrhea, sore throat, eye pain  CV: Denies CP, palpitations  PULM: Denies wheezing, hemoptysis  GI: Denies hematemesis, hematochezia, melena  : Denies discharge, hematuria  MSK: Denies arthralgias, myalgias  SKIN: Denies rash, lesions  NEURO: Denies paresthesias, weakness  PSYCH: Denies depression, anxiety    VITALS:  T(F): 97.8, Max: 98.3 (01-03-24 @ 16:20)  HR: 82  BP: 142/79  RR: 18Vital Signs Last 24 Hrs  T(C): 36.6 (04 Jan 2024 08:00), Max: 36.8 (03 Jan 2024 16:20)  T(F): 97.8 (04 Jan 2024 08:00), Max: 98.3 (03 Jan 2024 16:20)  HR: 82 (04 Jan 2024 08:00) (80 - 92)  BP: 142/79 (04 Jan 2024 08:00) (110/52 - 149/65)  BP(mean): 105 (04 Jan 2024 08:00) (89 - 105)  RR: 18 (04 Jan 2024 08:00) (17 - 19)  SpO2: 94% (04 Jan 2024 08:00) (86% - 100%)    Parameters below as of 04 Jan 2024 08:00  Patient On (Oxygen Delivery Method): nasal cannula, high flow        PHYSICAL EXAM:  Gen: NAD, resting in bed  HEENT: Normocephalic, atraumatic  Neck: supple, no lymphadenopathy  CV: Regular rate & regular rhythm  Lungs: decreased BS at bases, no fremitus  Abdomen: Soft, BS present  Ext: Warm, well perfused  Neuro: non focal, awake  Skin: no rash, no erythema  Lines: no phlebitis    FH: Non-contributory  Social Hx: Non-contributory    TESTS & MEASUREMENTS:                        9.2    13.74 )-----------( 386      ( 04 Jan 2024 06:20 )             27.1     01-04    127<L>  |  88<L>  |  19  ----------------------------<  139<H>  3.8   |  29  |  0.5<L>    Ca    8.3<L>      04 Jan 2024 06:20  Mg     1.7     01-04    TPro  5.2<L>  /  Alb  3.0<L>  /  TBili  1.0  /  DBili  x   /  AST  21  /  ALT  22  /  AlkPhos  97  01-04      LIVER FUNCTIONS - ( 04 Jan 2024 06:20 )  Alb: 3.0 g/dL / Pro: 5.2 g/dL / ALK PHOS: 97 U/L / ALT: 22 U/L / AST: 21 U/L / GGT: x           Urinalysis Basic - ( 04 Jan 2024 06:20 )    Color: x / Appearance: x / SG: x / pH: x  Gluc: 139 mg/dL / Ketone: x  / Bili: x / Urobili: x   Blood: x / Protein: x / Nitrite: x   Leuk Esterase: x / RBC: x / WBC x   Sq Epi: x / Non Sq Epi: x / Bacteria: x        Culture - Blood (collected 12-31-23 @ 13:55)  Source: .Blood Blood-Peripheral  Preliminary Report (01-04-24 @ 02:02):    No growth at 72 Hours    Culture - Blood (collected 12-31-23 @ 13:55)  Source: .Blood Blood-Peripheral  Preliminary Report (01-04-24 @ 02:02):    No growth at 72 Hours        Lactate, Blood: 1.3 mmol/L (01-02-24 @ 12:10)  Lactate, Blood: 1.2 mmol/L (01-01-24 @ 20:42)  Lactate, Blood: 2.2 mmol/L (12-31-23 @ 20:00)  Blood Gas Venous - Lactate: 2.4 mmol/L (12-31-23 @ 17:27)  Blood Gas Venous - Lactate: 2.9 mmol/L (12-31-23 @ 14:34)      INFECTIOUS DISEASES TESTING  MRSA PCR Result.: Positive (01-02-24 @ 23:30)  Procalcitonin, Serum: 0.95 (12-31-23 @ 20:00)  COVID-19 PCR: NotDetec (12-29-23 @ 16:48)      INFLAMMATORY MARKERS      RADIOLOGY & ADDITIONAL TESTS:  I have personally reviewed the last available Chest xray  CXR      CT      CARDIOLOGY TESTING  12 Lead ECG:   Ventricular Rate 122 BPM    Atrial Rate 86 BPM    QRS Duration 122 ms    Q-T Interval 310 ms    QTC Calculation(Bazett) 441 ms    R Axis 125 degrees    T Axis 56 degrees    Diagnosis Line Atrial flutter with variable A-V block  Right bundle branch block  Abnormal ECG    Confirmed by Licha Wong MD ([id number]) on 1/1/2024 11:32:55 AM (12-31-23 @ 18:36)  12 Lead ECG:   Ventricular Rate 136 BPM    Atrial Rate 144 BPM    QRS Duration 132 ms    Q-T Interval 312 ms    QTC Calculation(Bazett) 469 ms    R Axis 38 degrees    T Axis 53 degrees    Diagnosis Line *** Poor data quality, interpretation may be adversely affected  Undetermined rhythm  Possible Atrial flutter  Indeterminate axis  Right bundle branch block  Abnormal ECG    Confirmed by Licha Wong MD ([id number]) on 1/1/2024 11:06:47 AM (12-31-23 @ 14:08)      MEDICATIONS  albuterol/ipratropium for Nebulization 3 Nebulizer every 6 hours  aspirin enteric coated 81 Oral daily  budesonide 160 MICROgram(s)/formoterol 4.5 MICROgram(s) Inhaler 2 Inhalation two times a day  chlorhexidine 2% Cloths 1 Topical <User Schedule>  doxycycline IVPB 100 IV Intermittent every 12 hours  doxycycline IVPB     enoxaparin Injectable 40 SubCutaneous every 24 hours  furosemide   Injectable 20 IV Push daily  magnesium sulfate  IVPB 2 IV Intermittent once  methylPREDNISolone sodium succinate Injectable 40 IV Push two times a day  mupirocin 2% Ointment 1 Topical two times a day  piperacillin/tazobactam IVPB.. 3.375 IV Intermittent every 8 hours  polyethylene glycol 3350 17 Oral daily  senna 2 Oral at bedtime      WEIGHT  Weight (kg): 50.3 (01-01-24 @ 12:14)  Creatinine: 0.5 mg/dL (01-04-24 @ 06:20)      ANTIBIOTICS:  doxycycline IVPB      doxycycline IVPB 100 milliGRAM(s) IV Intermittent every 12 hours  piperacillin/tazobactam IVPB.. 3.375 Gram(s) IV Intermittent every 8 hours      All available historical records have been reviewed

## 2024-01-04 NOTE — PROGRESS NOTE ADULT - CONVERSATION DETAILS
Spoke with patient and daughter at bedside. Discussed code status and confirmed DNR/DNI. For now they want all other medical management with hopes of getting the patient back to STR and then back home. They understand she cannot leave until she is weaned from the high flow and is medically stabilized.

## 2024-01-04 NOTE — PROGRESS NOTE ADULT - SUBJECTIVE AND OBJECTIVE BOX
Over Night Events: events noted, on HHFNC 50%, declined NIV, afebrile    PHYSICAL EXAM    ICU Vital Signs Last 24 Hrs  T(C): 36.8 (04 Jan 2024 04:00), Max: 36.8 (03 Jan 2024 16:20)  T(F): 98.3 (04 Jan 2024 04:00), Max: 98.3 (03 Jan 2024 16:20)  HR: 85 (04 Jan 2024 04:00) (73 - 92)  BP: 145/80 (04 Jan 2024 04:00) (110/52 - 149/65)  BP(mean): 89 (04 Jan 2024 00:00) (89 - 90)  RR: 18 (04 Jan 2024 04:00) (16 - 19)  SpO2: 86% (04 Jan 2024 04:00) (86% - 100%)    O2 Parameters below as of 04 Jan 2024 03:06  Patient On (Oxygen Delivery Method): nasal cannula, high flow  O2 Flow (L/min): 40  O2 Concentration (%): 50        General: ill looking  Lungs: Bilateral rhonchi  Cardiovascular: Regular   Abdomen: Soft, Positive BS  Extremities: No clubbing   Neurological: Non focal         LABS:                          8.8    14.89 )-----------( 312      ( 03 Jan 2024 05:54 )             26.3                                               01-03    130<L>  |  90<L>  |  20  ----------------------------<  142<H>  4.2   |  30  |  0.5<L>    Ca    8.2<L>      03 Jan 2024 05:54                                               Urinalysis Basic - ( 03 Jan 2024 05:54 )    Color: x / Appearance: x / SG: x / pH: x  Gluc: 142 mg/dL / Ketone: x  / Bili: x / Urobili: x   Blood: x / Protein: x / Nitrite: x   Leuk Esterase: x / RBC: x / WBC x   Sq Epi: x / Non Sq Epi: x / Bacteria: x                                                                                                                                                                                MEDICATIONS  (STANDING):  albuterol/ipratropium for Nebulization 3 milliLiter(s) Nebulizer every 6 hours  aspirin enteric coated 81 milliGRAM(s) Oral daily  budesonide 160 MICROgram(s)/formoterol 4.5 MICROgram(s) Inhaler 2 Puff(s) Inhalation two times a day  chlorhexidine 2% Cloths 1 Application(s) Topical <User Schedule>  doxycycline IVPB      doxycycline IVPB 100 milliGRAM(s) IV Intermittent every 12 hours  enoxaparin Injectable 40 milliGRAM(s) SubCutaneous every 24 hours  methylPREDNISolone sodium succinate Injectable 40 milliGRAM(s) IV Push two times a day  piperacillin/tazobactam IVPB.. 3.375 Gram(s) IV Intermittent every 8 hours  polyethylene glycol 3350 17 Gram(s) Oral daily  senna 2 Tablet(s) Oral at bedtime    MEDICATIONS  (PRN):  acetaminophen     Tablet .. 650 milliGRAM(s) Oral every 6 hours PRN Temp greater or equal to 38C (100.4F), Mild Pain (1 - 3)  aluminum hydroxide/magnesium hydroxide/simethicone Suspension 30 milliLiter(s) Oral every 4 hours PRN Dyspepsia  melatonin 3 milliGRAM(s) Oral at bedtime PRN Insomnia  ondansetron Injectable 4 milliGRAM(s) IV Push every 8 hours PRN Nausea and/or Vomiting

## 2024-01-04 NOTE — PROGRESS NOTE ADULT - ASSESSMENT
ASSESSMENT  83F w/ PMHx COPD, OA, chronic back pain s/p multiple surgeries and recent admission for left intertrochanteric femur fracture s/p ORIF 12/27/2023 brought in from [facility] for shortness of breath.     IMPRESSION  #Acute Hypoxemic Respiratory failure  #COVID-19, severe -- possible secondary bacterial pneumonia   - CTA Chest 12/31 - negative for PE -- bilateral lower lobe consolidative opacities  - WBC Count: 19.66 K/uL (12.31.23 @ 13:55)    #Femur fracture s/p ORIF 12/27/2023   #Obesity BMI (kg/m2): 21, 21, 21  #Abx allergy: No Known Allergies    RECOMMENDATIONS  - check CRP and Procalcitonin   - continue zosyn 3.375 mg q 8 hours   - doxycycline 100 mg BID for 5 days (last day today)   - on Solumedrol 40 q 12 hours   - wean o2 as tolerated    Please call or message on Microsoft Teams if with any questions.  Spectra [id number] ASSESSMENT  83F w/ PMHx COPD, OA, chronic back pain s/p multiple surgeries and recent admission for left intertrochanteric femur fracture s/p ORIF 12/27/2023 brought in from [facility] for shortness of breath.     IMPRESSION  #Acute Hypoxemic Respiratory failure  #COVID-19, severe -- possible secondary bacterial pneumonia   - CTA Chest 12/31 - negative for PE -- bilateral lower lobe consolidative opacities  - WBC Count: 19.66 K/uL (12.31.23 @ 13:55)    #Femur fracture s/p ORIF 12/27/2023   #Obesity BMI (kg/m2): 21, 21, 21  #Abx allergy: No Known Allergies    RECOMMENDATIONS  - check CRP and Procalcitonin   - continue zosyn 3.375 mg q 8 hours   - doxycycline 100 mg BID for 5 days (last day today)   - on Solumedrol 40 q 12 hours   - wean o2 as tolerated    Please call or message on Microsoft Teams if with any questions.  Spectra [phone]

## 2024-01-04 NOTE — PROGRESS NOTE ADULT - ASSESSMENT
Impression    Acute on chronic hypoxemic/ hypercapnic resp failure on hhfnc  Covid pneumonia  Possible aspiration ( cr chest noted)  fluid overload  NTEMI  AMS/ TME  COPD not on Home o2  hyponatremia  Bilateral lung nodules l  Chronic back pain  Left intertrochanteric femur fracture Sp ORIF  Current smoker    PLAN:    CNS: Avoid CNS depressant    HEENT:  Oral care    PULMONARY:  HOB @ 45 degrees, NIV at night and as needed, repeat ABG, solumedrol 40 q 12, Neb q 6, keep SaO2 88 TO 92%, HHFNC    CARDIOVASCULAR: avoid overload, lasix 20 IV daily    GI: GI prophylaxis                                          Feeding feeding per speech    RENAL:  F/u  lytes.  Correct as needed. accurate I/O    INFECTIOUS DISEASE: abx per ID    HEMATOLOGICAL:  DVT prophylaxis.    ENDOCRINE:  Follow up FS.  Insulin protocol if needed.    SDU  Palliative care fup  very poor prognosis

## 2024-01-04 NOTE — PROGRESS NOTE ADULT - SUBJECTIVE AND OBJECTIVE BOX
24H events:    Patient is a 83y old Female who presents with a chief complaint of SOB (04 Jan 2024 13:52)    Primary diagnosis of 2019 novel coronavirus disease (COVID-19)    Today is hospital day 4d. This morning patient was seen and examined at bedside, resting comfortably in bed.    No acute or major events overnight.    PAST MEDICAL & SURGICAL HISTORY  COPD (chronic obstructive pulmonary disease)    H/O cholelithiasis    Smoker    History of surgery  orif right ankle      SOCIAL HISTORY:  Social History:      ALLERGIES:  No Known Allergies    MEDICATIONS:  STANDING MEDICATIONS  albuterol/ipratropium for Nebulization 3 milliLiter(s) Nebulizer every 6 hours  aspirin enteric coated 81 milliGRAM(s) Oral daily  budesonide 160 MICROgram(s)/formoterol 4.5 MICROgram(s) Inhaler 2 Puff(s) Inhalation two times a day  chlorhexidine 2% Cloths 1 Application(s) Topical <User Schedule>  doxycycline IVPB      doxycycline IVPB 100 milliGRAM(s) IV Intermittent every 12 hours  enoxaparin Injectable 40 milliGRAM(s) SubCutaneous every 24 hours  furosemide   Injectable 20 milliGRAM(s) IV Push daily  magnesium sulfate  IVPB 2 Gram(s) IV Intermittent once  methylPREDNISolone sodium succinate Injectable 40 milliGRAM(s) IV Push two times a day  mupirocin 2% Ointment 1 Application(s) Topical two times a day  piperacillin/tazobactam IVPB.. 3.375 Gram(s) IV Intermittent every 8 hours  polyethylene glycol 3350 17 Gram(s) Oral daily  senna 2 Tablet(s) Oral at bedtime    PRN MEDICATIONS  acetaminophen     Tablet .. 650 milliGRAM(s) Oral every 6 hours PRN  aluminum hydroxide/magnesium hydroxide/simethicone Suspension 30 milliLiter(s) Oral every 4 hours PRN  melatonin 3 milliGRAM(s) Oral at bedtime PRN  ondansetron Injectable 4 milliGRAM(s) IV Push every 8 hours PRN    VITALS:   T(F): 97.8  HR: 82  BP: 142/79  RR: 18  SpO2: 94%    PHYSICAL EXAM:  GENERAL: patient in minimal distress secondary to anxiety  (  ) NAD, lying in bed comfortably     (  ) obtunded     (  ) lethargic     (  ) somnolent    HEAD:   ( X ) Atraumatic     (  ) hematoma     (  ) laceration (specify location:       )     NECK:  ( X ) Supple     (  ) neck stiffness     (  ) nuchal rigidity     (  )  no JVD     (  ) JVD present ( -- cm)    HEART:  Rate -->     ( X ) normal rate     (  ) bradycardic     (  ) tachycardic  Rhythm -->     ( X ) regular     (  ) regularly irregular     (  ) irregularly irregular  Murmurs -->     (  ) normal s1s2     (  ) systolic murmur     (  ) diastolic murmur     (  ) continuous murmur      (  ) S3 present     (  ) S4 present    LUNGS:   ( X )Unlabored respirations     (  ) tachypnea  ( X ) B/L air entry     (  ) decreased breath sounds in:  (location     )    (  ) no adventitious sound     (  ) crackles     (  ) wheezing      (  ) rhonchi      (specify location:       )  (  ) chest wall tenderness (specify location:       )    ABDOMEN:   ( X ) Soft     (  ) tense   |   (  ) nondistended     (  ) distended   |   (  ) +BS     (  ) hypoactive bowel sounds     (  ) hyperactive bowel sounds  ( X ) nontender     (  ) RUQ tenderness     (  ) RLQ tenderness     (  ) LLQ tenderness     (  ) epigastric tenderness     (  ) diffuse tenderness  (  ) Splenomegaly      (  ) Hepatomegaly      (  ) Jaundice     (  ) ecchymosis     EXTREMITIES: decreased ROM LLE 2/2 hip fracture s/p ORIF  (  ) Normal     (  ) Rash     (  ) ecchymosis     (  ) varicose veins      (  ) pitting edema     (  ) non-pitting edema   (  ) ulceration     (  ) gangrene:     (location:     )    NERVOUS SYSTEM:    ( X ) A&Ox3     (  ) confused     (  ) lethargic  CN II-XII:     (  ) Intact     (  ) deficits found     (Specify:     )   Upper extremities:     (  ) no sensorimotor deficits     (  ) weakness     (  ) loss of proprioception/vibration     (  ) loss of touch/temperature (specify:    )  Lower extremities:     (  ) no sensorimotor deficits     (  ) weakness     (  ) loss of proprioception/vibration     (  ) loss of touch/temperature (specify:    )    SKIN:   ( X ) No rashes or lesions     (  ) maculopapular rash     (  ) pustules     (  ) vesicles     (  ) ulcer     (  ) ecchymosis     (specify location:     )      LABS:                        9.2    13.74 )-----------( 386      ( 04 Jan 2024 06:20 )             27.1     01-04    127<L>  |  88<L>  |  19  ----------------------------<  139<H>  3.8   |  29  |  0.5<L>    Ca    8.3<L>      04 Jan 2024 06:20  Mg     1.7     01-04    TPro  5.2<L>  /  Alb  3.0<L>  /  TBili  1.0  /  DBili  x   /  AST  21  /  ALT  22  /  AlkPhos  97  01-04      Urinalysis Basic - ( 04 Jan 2024 06:20 )    Color: x / Appearance: x / SG: x / pH: x  Gluc: 139 mg/dL / Ketone: x  / Bili: x / Urobili: x   Blood: x / Protein: x / Nitrite: x   Leuk Esterase: x / RBC: x / WBC x   Sq Epi: x / Non Sq Epi: x / Bacteria: x      ABG - ( 04 Jan 2024 09:14 )  pH, Arterial: 7.50  pH, Blood: x     /  pCO2: 39    /  pO2: 81    / HCO3: 30    / Base Excess: 6.7   /  SaO2: 98.6

## 2024-01-04 NOTE — PROGRESS NOTE ADULT - SUBJECTIVE AND OBJECTIVE BOX
83F w/ PMHx COPD, OA, chronic back pain s/p multiple surgeries and recent admission for left intertrochanteric femur fracture s/p ORIF 12/27/2023 brought in from [facility] for shortness of breath, was diagnosed with COVID with suspected underlying hospital acquired PNA, pt was in acute respiratory distress, on high flow oxygen now.   Today pt feels much better, denies SOB at rest, c/o feeling cold, has pain in left leg on movement, overall feels better.     PAST MEDICAL & SURGICAL HISTORY  COPD (chronic obstructive pulmonary disease)    H/O cholelithiasis    Smoker    History of surgery  orif right ankle      SOCIAL HISTORY:  Social History:      ALLERGIES:  No Known Allergies      VITALS:   T(C): 36.3 (04 Jan 2024 16:05), Max: 36.8 (03 Jan 2024 23:16)  T(F): 97.4 (04 Jan 2024 16:05), Max: 98.3 (04 Jan 2024 00:00)  HR: 75 (04 Jan 2024 16:05) (75 - 92)  BP: 142/70 (04 Jan 2024 16:05) (110/52 - 146/75)  BP(mean): 105 (04 Jan 2024 08:00) (89 - 105)  RR: 18 (04 Jan 2024 16:05) (18 - 19)  SpO2: 100% (04 Jan 2024 16:05) (86% - 100%)    Parameters below as of 04 Jan 2024 16:05  Patient On (Oxygen Delivery Method): nasal cannula, high flow        PHYSICAL EXAM:  GENERAL:  NAD, frail elderly female  NECK: supple, no JVD  CV: S1, S2, RRR  Lungs: decreased BS at bases, no wheezing  Abdomen/GI: soft, scaphoid, NT,ND, BS present  Extremities: surgical site looks clean no ecchymoses dry dressing ( left hip) , no edema on LE  Neuro: AAOx3, clear speech, pt is following commands, moving all extremities         LABS:                                   9.2    13.74 )-----------( 386      ( 04 Jan 2024 06:20 )             27.1   01-04    127<L>  |  88<L>  |  19  ----------------------------<  139<H>  3.8   |  29  |  0.5<L>    Ca    8.3<L>      04 Jan 2024 06:20  Mg     1.7     01-04    TPro  5.2<L>  /  Alb  3.0<L>  /  TBili  1.0  /  DBili  x   /  AST  21  /  ALT  22  /  AlkPhos  97  01-04      Urinalysis Basic - ( 04 Jan 2024 06:20 )    Color: x / Appearance: x / SG: x / pH: x  Gluc: 139 mg/dL / Ketone: x  / Bili: x / Urobili: x   Blood: x / Protein: x / Nitrite: x   Leuk Esterase: x / RBC: x / WBC x   Sq Epi: x / Non Sq Epi: x / Bacteria: x  ABG - ( 04 Jan 2024 09:14 )  pH, Arterial: 7.50  pH, Blood: x     /  pCO2: 39    /  pO2: 81    / HCO3: 30    / Base Excess: 6.7   /  SaO2: 98.6      RADIOLOGY:  < from: Xray Chest 1 View-PORTABLE IMMEDIATE (Xray Chest 1 View-PORTABLE IMMEDIATE .) (01.03.24 @ 09:47) >    Impression:    COPD with basilar opacifications, worsened.    < end of copied text >  < from: [facility] Duplex Lower Ext Vein Scan, Bilat (01.02.24 @ 11:03) >    IMPRESSION:  No evidence of deep venous thrombosis in either lower extremity.    < end of copied text >  < from: CT Angio Chest PE Protocol w/ IV Cont (12.31.23 @ 18:26) >  IMPRESSION:    No CTA evidence of acute pulmonary embolus.    Bilateral lower lobe consolidative opacities which can be seen with   pneumonia. Recommend radiographic follow-up after treatment.    MEDICATIONS  (STANDING):  albuterol/ipratropium for Nebulization 3 milliLiter(s) Nebulizer every 6 hours  aspirin enteric coated 81 milliGRAM(s) Oral daily  budesonide 160 MICROgram(s)/formoterol 4.5 MICROgram(s) Inhaler 2 Puff(s) Inhalation two times a day  chlorhexidine 2% Cloths 1 Application(s) Topical <User Schedule>  doxycycline IVPB      doxycycline IVPB 100 milliGRAM(s) IV Intermittent every 12 hours  enoxaparin Injectable 40 milliGRAM(s) SubCutaneous every 24 hours  magnesium sulfate  IVPB 2 Gram(s) IV Intermittent once  methylPREDNISolone sodium succinate Injectable 40 milliGRAM(s) IV Push two times a day  mupirocin 2% Ointment 1 Application(s) Topical two times a day  piperacillin/tazobactam IVPB.. 3.375 Gram(s) IV Intermittent every 8 hours  polyethylene glycol 3350 17 Gram(s) Oral daily  senna 2 Tablet(s) Oral at bedtime  sodium chloride 0.9%. 1000 milliLiter(s) (100 mL/Hr) IV Continuous <Continuous>    MEDICATIONS  (PRN):  acetaminophen     Tablet .. 650 milliGRAM(s) Oral every 6 hours PRN Temp greater or equal to 38C (100.4F), Mild Pain (1 - 3)  aluminum hydroxide/magnesium hydroxide/simethicone Suspension 30 milliLiter(s) Oral every 4 hours PRN Dyspepsia  melatonin 3 milliGRAM(s) Oral at bedtime PRN Insomnia  ondansetron Injectable 4 milliGRAM(s) IV Push every 8 hours PRN Nausea and/or Vomiting           83F w/ PMHx COPD, OA, chronic back pain s/p multiple surgeries and recent admission for left intertrochanteric femur fracture s/p ORIF 12/27/2023 brought in from [facility] for shortness of breath, was diagnosed with COVID with suspected underlying hospital acquired PNA, pt was in acute respiratory distress, on high flow oxygen now.   Today pt feels much better, denies SOB at rest, c/o feeling cold, has pain in left leg on movement, overall feels better.     PAST MEDICAL & SURGICAL HISTORY  COPD (chronic obstructive pulmonary disease)    H/O cholelithiasis    Smoker    History of surgery  orif right ankle      SOCIAL HISTORY:  Social History:      ALLERGIES:  No Known Allergies      VITALS:   T(C): 36.3 (04 Jan 2024 16:05), Max: 36.8 (03 Jan 2024 23:16)  T(F): 97.4 (04 Jan 2024 16:05), Max: 98.3 (04 Jan 2024 00:00)  HR: 75 (04 Jan 2024 16:05) (75 - 92)  BP: 142/70 (04 Jan 2024 16:05) (110/52 - 146/75)  BP(mean): 105 (04 Jan 2024 08:00) (89 - 105)  RR: 18 (04 Jan 2024 16:05) (18 - 19)  SpO2: 100% (04 Jan 2024 16:05) (86% - 100%)    Parameters below as of 04 Jan 2024 16:05  Patient On (Oxygen Delivery Method): nasal cannula, high flow        PHYSICAL EXAM:  GENERAL:  NAD, frail elderly female  NECK: supple, no JVD  CV: S1, S2, RRR  Lungs: decreased BS at bases, no wheezing  Abdomen/GI: soft, scaphoid, NT,ND, BS present  Extremities: surgical site looks clean no ecchymoses dry dressing ( left hip) , no edema on LE  Neuro: AAOx3, clear speech, pt is following commands, moving all extremities         LABS:                                   9.2    13.74 )-----------( 386      ( 04 Jan 2024 06:20 )             27.1   01-04    127<L>  |  88<L>  |  19  ----------------------------<  139<H>  3.8   |  29  |  0.5<L>    Ca    8.3<L>      04 Jan 2024 06:20  Mg     1.7     01-04    TPro  5.2<L>  /  Alb  3.0<L>  /  TBili  1.0  /  DBili  x   /  AST  21  /  ALT  22  /  AlkPhos  97  01-04      Urinalysis Basic - ( 04 Jan 2024 06:20 )    Color: x / Appearance: x / SG: x / pH: x  Gluc: 139 mg/dL / Ketone: x  / Bili: x / Urobili: x   Blood: x / Protein: x / Nitrite: x   Leuk Esterase: x / RBC: x / WBC x   Sq Epi: x / Non Sq Epi: x / Bacteria: x  ABG - ( 04 Jan 2024 09:14 )  pH, Arterial: 7.50  pH, Blood: x     /  pCO2: 39    /  pO2: 81    / HCO3: 30    / Base Excess: 6.7   /  SaO2: 98.6      RADIOLOGY:  < from: Xray Chest 1 View-PORTABLE IMMEDIATE (Xray Chest 1 View-PORTABLE IMMEDIATE .) (01.03.24 @ 09:47) >    Impression:    COPD with basilar opacifications, worsened.    < end of copied text >  < from: [facility] Duplex Lower Ext Vein Scan, Bilat (01.02.24 @ 11:03) >    IMPRESSION:  No evidence of deep venous thrombosis in either lower extremity.    < end of copied text >  < from: CT Angio Chest PE Protocol w/ IV Cont (12.31.23 @ 18:26) >  IMPRESSION:    No CTA evidence of acute pulmonary embolus.    Bilateral lower lobe consolidative opacities which can be seen with   pneumonia. Recommend radiographic follow-up after treatment.    MEDICATIONS  (STANDING):  albuterol/ipratropium for Nebulization 3 milliLiter(s) Nebulizer every 6 hours  aspirin enteric coated 81 milliGRAM(s) Oral daily  budesonide 160 MICROgram(s)/formoterol 4.5 MICROgram(s) Inhaler 2 Puff(s) Inhalation two times a day  chlorhexidine 2% Cloths 1 Application(s) Topical <User Schedule>  doxycycline IVPB      doxycycline IVPB 100 milliGRAM(s) IV Intermittent every 12 hours  enoxaparin Injectable 40 milliGRAM(s) SubCutaneous every 24 hours  magnesium sulfate  IVPB 2 Gram(s) IV Intermittent once  methylPREDNISolone sodium succinate Injectable 40 milliGRAM(s) IV Push two times a day  mupirocin 2% Ointment 1 Application(s) Topical two times a day  piperacillin/tazobactam IVPB.. 3.375 Gram(s) IV Intermittent every 8 hours  polyethylene glycol 3350 17 Gram(s) Oral daily  senna 2 Tablet(s) Oral at bedtime  sodium chloride 0.9%. 1000 milliLiter(s) (100 mL/Hr) IV Continuous <Continuous>    MEDICATIONS  (PRN):  acetaminophen     Tablet .. 650 milliGRAM(s) Oral every 6 hours PRN Temp greater or equal to 38C (100.4F), Mild Pain (1 - 3)  aluminum hydroxide/magnesium hydroxide/simethicone Suspension 30 milliLiter(s) Oral every 4 hours PRN Dyspepsia  melatonin 3 milliGRAM(s) Oral at bedtime PRN Insomnia  ondansetron Injectable 4 milliGRAM(s) IV Push every 8 hours PRN Nausea and/or Vomiting

## 2024-01-04 NOTE — PROGRESS NOTE ADULT - ASSESSMENT
83F w/ PMHx COPD, OA, chronic back pain s/p multiple surgeries and recent admission for left intertrochanteric femur fracture s/p ORIF 12/27/2023 brought in from [facility] for shortness of breath.    #Acute Hypoxic Respiratory Failure and severe sepsis  secondary to COVID 19   - cont Abx; Doxy + Zosyn  - Solumedrol 40 mg BID-   - Remdesivir on hold as per patient's daughter's request  - duoneb q6, symbicort  - c/w HFNC at 40%, 40 LPM  - MRSA nares positive, --> ordered bactroban  - CTA Chest shows: no evidence of acute pulmonary embolus. Bilateral lower lobe consolidative opacities which can be seen with pneumonia  - 1/3 CXR- COPD with basilar opacifications, worsened.  - lasix 20 mg IV qd    #NSTEMI Type II  - EKG showed sinus tachy w/ PACs  - Trop 208>192 (previously 19)  - trend, monitor repeat  - if remains elevated or worsens, consider cardio consult    #ALAN -- improved    #Hyponatremia  - Na 124    #Chronic Anemia  - Hgb 10.0 (at previous baseline)  - monitor H&H  - f/u o/p    #left intertrochanteric femur fracture s/p ORIF 12/27/2023 /OA/ Chronic Back Pain s/p multiple surgeries  - from rehab  - PT recommended eventual d/c to rehab  - cont Lovenox    #DVT ppx: Lovenox  #GI ppx: none  #Diet: DASH/TLC   #Activity: Weight bearing 50% LLE, WBAT RLE  #Code status: DNR/DNI (Trial NIV)  #Disposition: SDU    Pending: weaning off of HFNC   83F w/ PMHx COPD, OA, chronic back pain s/p multiple surgeries and recent admission for left intertrochanteric femur fracture s/p ORIF 12/27/2023 brought in from [facility] for shortness of breath.    #Acute Hypoxic Respiratory Failure and severe sepsis  secondary to COVID 19   - cont Abx; Doxy + Zosyn  - Solumedrol 40 mg BID-   - Remdesivir on hold as per patient's daughter's request  - duoneb q6, symbicort  - c/w HFNC at 40%, 40 LPM  - MRSA nares positive, --> ordered bactroban  - CTA Chest shows: no evidence of acute pulmonary embolus. Bilateral lower lobe consolidative opacities which can be seen with pneumonia  - 1/3 CXR- COPD with basilar opacifications, worsened.  - lasix 20 mg IV qd    #NSTEMI Type II  - EKG showed sinus tachy w/ PACs  - Trop 208>192 (previously 19)  - trend, monitor repeat  - if remains elevated or worsens, consider cardio consult    #ALAN -- improved    #Hyponatremia  - Na 124    #Chronic Anemia  - Hgb 10.0 (at previous baseline)  - monitor H&H  - f/u o/p    #left intertrochanteric femur fracture s/p ORIF 12/27/2023 /OA/ Chronic Back Pain s/p multiple surgeries  - from rehab  - PT recommended eventual d/c to rehab  - cont Lovenox    #DVT ppx: Lovenox  #GI ppx: none  #Diet: DASH/TLC   #Activity: Weight bearing 50% LLE, WBAT RLE  #Code status: DNR/DNI (Trial NIV)  #Disposition: SDU    Pending: weaning off of HFNC

## 2024-01-04 NOTE — PROGRESS NOTE ADULT - ASSESSMENT
83F w/ PMHx COPD, OA, chronic back pain s/p multiple surgeries and recent admission for left intertrochanteric femur fracture s/p ORIF 12/27/2023 brought in from [facility] for shortness of breath.    A/P  #Acute Hypoxic Respiratory Failure/ sepsis POA  secondary to COVID 19 and suspected hospital acquired PNA / h/o COPD   - pt is clinically improving, comfortable on high flow oxygen today  - will try to taper off high flow oxygen in 24 hours   - c/w pulmonary toilet, chest PT Q 4 hours  - aspiration precautions   - cont Abx; Doxy + Zosyn  - Solumedrol 40 mg BID  - Remdesivir on hold as per patient's daughter's request  - duoneb q6, symbicort  - pt was consulted by pulmonary, recommendations noted  - c/w isolation for COVID 19       #NSTEMI Type II  - EKG showed sinus tachy w/ PACs  - Trop 208>192 (previously 19)  -  telemetry monitoring  - on ASA, start Lopressor 12.5 mg Q 12 hours  - check lipid profile, will start statin if indicated   - check 2Decho and BNP    # Hypovolemic hyponatremia  - pt is hypovolemic on PE  - hold IV Lasix ( no clear indications for diuretics )  - start IV hydration for 24 hours   - f/u repeat Na level in AM     #ALAN   - IV hydration for 24 hours   - monitor BUN/cr and urine output   - stable for now     #Chronic Anemia  - monitor H/H, keep Hb above 8.0  - no active bleeding noted     #left intertrochanteric femur fracture s/p ORIF 12/27/2023 /OA/ Chronic Back Pain s/p multiple surgeries  - may change Tylenol to standing  - will get pt OOB to chair in 24 hours and consider PT eval if off high flow oxygen     #DVT ppx: Lovenox  #GI ppx: none  #Diet: DASH/TLC   #Code status: DNR/DNI (Trial NIV)  #Disposition: SDU       83F w/ PMHx COPD, OA, chronic back pain s/p multiple surgeries and recent admission for left intertrochanteric femur fracture s/p ORIF 12/27/2023 brought in from [facility] for shortness of breath.    A/P  #Acute Hypoxic Respiratory Failure/ sepsis POA  secondary to COVID 19 and suspected hospital acquired PNA / h/o COPD   - pt is clinically improving, comfortable on high flow oxygen today  - will try to taper off high flow oxygen in 24 hours   - c/w pulmonary toilet, chest PT Q 4 hours  - aspiration precautions   - cont Abx; Doxy + Zosyn  - Solumedrol 40 mg BID  - Remdesivir on hold as per patient's daughter's request  - duoneb q6, symbicort  - pt was consulted by pulmonary, recommendations noted  - c/w isolation for COVID 19       #NSTEMI Type II  - EKG showed sinus tachy w/ PACs  - Trop 208>192 (previously 19)  -  telemetry monitoring  - on ASA, start Lopressor 12.5 mg Q 12 hours  - check lipid profile, will start statin if indicated   - check 2Decho and BNP    # Hypovolemic hyponatremia  - pt is hypovolemic on PE  - hold IV Lasix ( no clear indications for diuretics )  - start IV hydration for 24 hours   - f/u repeat Na level in AM     #ALAN   - IV hydration for 24 hours   - monitor BUN/cr and urine output   - stable for now     #Chronic Anemia  - monitor H/H, keep Hb above 8.0  - no active bleeding noted     #left intertrochanteric femur fracture s/p ORIF 12/27/2023 /OA/ Chronic Back Pain s/p multiple surgeries  - may change Tylenol to standing  - will get pt OOB to chair in 24 hours and consider PT eval if off high flow oxygen     #DVT ppx: Lovenox  #GI ppx: none  #Diet: DASH/TLC   #Code status: DNR/DNI (Trial NIV)  #Disposition: SDU

## 2024-01-04 NOTE — PROGRESS NOTE ADULT - SUBJECTIVE AND OBJECTIVE BOX
HPI:    83F w/ PMHx COPD, OA, chronic back pain s/p multiple surgeries and recent admission for left intertrochanteric femur fracture s/p ORIF 12/27/2023 brought in from [facility] for shortness of breath. EMS reports that she was hypoxic on room air to the 70s. Patient was initially AOx0, after being on NRB in EMS, patient mental status improving with improved oxygenation. Daughter reports the patient was doing okay yesterday and that this was an acute change. Patient currently AOx3, appears short of breath, denying any other complaints at this time.COVID +ve. Admitted to SDU for management of acute hypoxic respiratory failure. Palliative consulted for [facility].     Interval history:     1/3: patient on high flow NC. she endorses no pain or dyspnea but is thirsty and says the bed is uncomfortable. she is able to tell me the situation of what happened to her over the course of her past 2 hospitalizations.   1/4: patient on high flow NC. Daughter Promise is at bedside. They both spoke with Dr. Gentile today. They are both hopeful she will get back to the STR and then back home but understand she may not return to her baseline. Patient denies pain or discomfort.      ADVANCE DIRECTIVES:     [ ] Full Code [ X] DNR/DNI  MOLST  [ ]  Living Will  [ ]   DECISION MAKER(s): Patient 8  [ X] Health Care Proxy(s)  [ ] Surrogate(s)  [ ] Guardian           Name(s): Phone Number(s): Promise Green (dtr) is primary HCP and Mary Juarez is secondary HCP       BASELINE (I)ADL(s) (prior to admission):    [place]: [ ]Total  [X ] Moderate [ ]Dependent - from STR, was living by herself independently prior to hip fx  Palliative Performance Status Version 2:         %    http://npcrc.org/files/news/palliative_performance_scale_ppsv2.pdf    Allergies    No Known Allergies    Intolerances    MEDICATIONS  (STANDING):  albuterol/ipratropium for Nebulization 3 milliLiter(s) Nebulizer every 6 hours  aspirin enteric coated 81 milliGRAM(s) Oral daily  budesonide 160 MICROgram(s)/formoterol 4.5 MICROgram(s) Inhaler 2 Puff(s) Inhalation two times a day  chlorhexidine 2% Cloths 1 Application(s) Topical <User Schedule>  doxycycline IVPB 100 milliGRAM(s) IV Intermittent every 12 hours  doxycycline IVPB      enoxaparin Injectable 40 milliGRAM(s) SubCutaneous every 24 hours  methylPREDNISolone sodium succinate Injectable 40 milliGRAM(s) IV Push two times a day  piperacillin/tazobactam IVPB.. 3.375 Gram(s) IV Intermittent every 8 hours  polyethylene glycol 3350 17 Gram(s) Oral daily  senna 2 Tablet(s) Oral at bedtime    MEDICATIONS  (PRN):  acetaminophen     Tablet .. 650 milliGRAM(s) Oral every 6 hours PRN Temp greater or equal to 38C (100.4F), Mild Pain (1 - 3)  aluminum hydroxide/magnesium hydroxide/simethicone Suspension 30 milliLiter(s) Oral every 4 hours PRN Dyspepsia  melatonin 3 milliGRAM(s) Oral at bedtime PRN Insomnia  ondansetron Injectable 4 milliGRAM(s) IV Push every 8 hours PRN Nausea and/or Vomiting    PRESENT SYMPTOMS:   Pain: [ ]yes [X ]no  QOL impact -   Location -                    Aggravating factors -  Quality -  Radiation -  Timing-  Severity (0-10 scale):  Minimal acceptable level (0-10 scale):     CPOT:    https://www.[facility].org/getattachment/[email or web address]/Critical-Care-Pain-Observation-Tool-(CPOT)    PAIN AD Score:   http://geriatrictoolkit.[place].[facility]/cog/painad.pdf (press ctrl +  left click to view)    Dyspnea:                           [ ]None[ X ]Mild [ ]Moderate [ ]Severe     Respiratory Distress Observation Scale (RDOS):   A score of 0 to 2 signifies little or no respiratory distress, 3 signifies mild distress, scores 4 to 6 indicate moderate distress, and scores greater than 7 signify severe distress  https://www.[facility].ca/sites/default/files/PDFS/186992-xxwttezbzbd-poyenfpy-cqkmxzstohm-lmhtj.pdf    Anxiety:                             [ ]None[ ]Mild [ ]Moderate [ ]Severe   Fatigue:                             [ ]None[ ]Mild [ ]Moderate [ ]Severe   Nausea:                             [ ]None[ ]Mild [ ]Moderate [ ]Severe   Loss of appetite:              [ ]None[ ]Mild [ ]Moderate [ ]Severe   Constipation:                    [ ]None[ ]Mild [ ]Moderate [ ]Severe    Other Symptoms:  [X ]All other review of systems negative     Palliative Performance Status Version 2:        30 %    http://[facility] hospitalrc.org/files/news/palliative_performance_scale_ppsv2.pdf    PHYSICAL EXAM:  ICU Vital Signs Last 24 Hrs  T(C): 36.6 (04 Jan 2024 08:00), Max: 36.8 (03 Jan 2024 16:20)  T(F): 97.8 (04 Jan 2024 08:00), Max: 98.3 (03 Jan 2024 16:20)  HR: 82 (04 Jan 2024 08:00) (80 - 92)  BP: 142/79 (04 Jan 2024 08:00) (110/52 - 149/65)  BP(mean): 105 (04 Jan 2024 08:00) (89 - 105)  RR: 18 (04 Jan 2024 08:00) (17 - 19)  SpO2: 94% (04 Jan 2024 08:00) (86% - 100%)  O2 Parameters below as of 04 Jan 2024 08:00  Patient On (Oxygen Delivery Method): nasal cannula, high flow    GENERAL:  [X ] No acute distress [ ]Lethargic  [ ]Unarousable  [X ]Verbal  [ ]Non-Verbal [ ]Cachexia    BEHAVIORAL/PSYCH:  [X ]Alert and Oriented x 3-4  [ ] Anxiety [ ] Delirium [ ] Agitation [X ] Calm   EYES: [X ] No scleral icterus [ ] Scleral icterus [ ] Closed  ENMT:  [ ]Dry mouth  [X ]No external oral lesions [X ] No external ear or nose lesions  CARDIOVASCULAR:  [ ]Regular [ ]Irregular [ ]Tachy [ ]Not Tachy  [ ]Norberto [ ] Edema [ X] No edema  PULMONARY:  [ ]Tachypnea  [ ]Audible excessive secretions [ X] No labored breathing [ ] labored breathing  GASTROINTESTINAL: [X ]Soft  [ ]Distended  [ ]Not distended [ ]Non tender [ ]Tender  MUSCULOSKELETAL: [X ]No clubbing [ ] clubbing  [ ] No cyanosis [ ] cyanosis  NEUROLOGIC: [ X]No focal deficits  [ X]Follows commands  [ ]Does not follow commands  [ ]Cognitive impairment  [ ]Dysphagia  [ ]Dysarthria  [ ]Paresis   SKIN: [ ] Jaundiced [X] Non-jaundiced [ ]Rash [ ]No Rash [ ] Warm [ ] Dry  MISC/LINES: [ ] ET tube [ ] Trach [ ]NGT/OGT [ ]PEG [ ]Cuadra    LABS: reviewed by me      01-04    127<L>  |  88<L>  |  19  ----------------------------<  139<H>  3.8   |  29  |  0.5<L>    Ca    8.3<L>      04 Jan 2024 06:20  Mg     1.7     01-04    TPro  5.2<L>  /  Alb  3.0<L>  /  TBili  1.0  /  DBili  x   /  AST  21  /  ALT  22  /  AlkPhos  97  01-04                            9.2    13.74 )-----------( 386      ( 04 Jan 2024 06:20 )             27.1           RADIOLOGY & ADDITIONAL STUDIES: reviewed by me    < from: Xray Chest 1 View-PORTABLE IMMEDIATE (Xray Chest 1 View-PORTABLE IMMEDIATE .) (01.03.24 @ 09:47) >  Findings:    Support devices: Telemetry leads are seen    Cardiac/mediastinum/hilum: Aorta is atherosclerotic.    Lung parenchyma/Pleura: Hyperinflation with basilar opacities    Skeleton/soft tissues: Unremarkable.    Impression:    COPD with basilar opacifications, worsened.        --- End of Report ---    < end of copied text >      EKG: reviewed by me    < from: 12 Lead ECG (12.31.23 @ 18:36) >  Ventricular Rate 122 BPM    Atrial Rate 86 BPM    QRS Duration 122 ms    Q-T Interval 310 ms    QTC Calculation(Bazett) 441 ms    R Axis 125 degrees    T Axis 56 degrees    Diagnosis Line Atrial flutter with variable A-V block  Right bundle branch block  Abnormal ECG    Confirmed by Licha Wong MD ([id number]) on 1/1/2024 11:32:55 AM    < end of copied text >      Patient discussed with primary medical team MD  Palliative care education provided to patient and/or family   HPI:    83F w/ PMHx COPD, OA, chronic back pain s/p multiple surgeries and recent admission for left intertrochanteric femur fracture s/p ORIF 12/27/2023 brought in from [facility] for shortness of breath. EMS reports that she was hypoxic on room air to the 70s. Patient was initially AOx0, after being on NRB in EMS, patient mental status improving with improved oxygenation. Daughter reports the patient was doing okay yesterday and that this was an acute change. Patient currently AOx3, appears short of breath, denying any other complaints at this time.COVID +ve. Admitted to SDU for management of acute hypoxic respiratory failure. Palliative consulted for [facility].     Interval history:     1/3: patient on high flow NC. she endorses no pain or dyspnea but is thirsty and says the bed is uncomfortable. she is able to tell me the situation of what happened to her over the course of her past 2 hospitalizations.   1/4: patient on high flow NC. Daughter Promise is at bedside. They both spoke with Dr. Gentile today. They are both hopeful she will get back to the STR and then back home but understand she may not return to her baseline. Patient denies pain or discomfort.      ADVANCE DIRECTIVES:     [ ] Full Code [ X] DNR/DNI  MOLST  [ ]  Living Will  [ ]   DECISION MAKER(s): Patient 8  [ X] Health Care Proxy(s)  [ ] Surrogate(s)  [ ] Guardian           Name(s): Phone Number(s): Promise Green (dtr) is primary HCP and Mary Juarez is secondary HCP       BASELINE (I)ADL(s) (prior to admission):    [place]: [ ]Total  [X ] Moderate [ ]Dependent - from STR, was living by herself independently prior to hip fx  Palliative Performance Status Version 2:         %    http://npcrc.org/files/news/palliative_performance_scale_ppsv2.pdf    Allergies    No Known Allergies    Intolerances    MEDICATIONS  (STANDING):  albuterol/ipratropium for Nebulization 3 milliLiter(s) Nebulizer every 6 hours  aspirin enteric coated 81 milliGRAM(s) Oral daily  budesonide 160 MICROgram(s)/formoterol 4.5 MICROgram(s) Inhaler 2 Puff(s) Inhalation two times a day  chlorhexidine 2% Cloths 1 Application(s) Topical <User Schedule>  doxycycline IVPB 100 milliGRAM(s) IV Intermittent every 12 hours  doxycycline IVPB      enoxaparin Injectable 40 milliGRAM(s) SubCutaneous every 24 hours  methylPREDNISolone sodium succinate Injectable 40 milliGRAM(s) IV Push two times a day  piperacillin/tazobactam IVPB.. 3.375 Gram(s) IV Intermittent every 8 hours  polyethylene glycol 3350 17 Gram(s) Oral daily  senna 2 Tablet(s) Oral at bedtime    MEDICATIONS  (PRN):  acetaminophen     Tablet .. 650 milliGRAM(s) Oral every 6 hours PRN Temp greater or equal to 38C (100.4F), Mild Pain (1 - 3)  aluminum hydroxide/magnesium hydroxide/simethicone Suspension 30 milliLiter(s) Oral every 4 hours PRN Dyspepsia  melatonin 3 milliGRAM(s) Oral at bedtime PRN Insomnia  ondansetron Injectable 4 milliGRAM(s) IV Push every 8 hours PRN Nausea and/or Vomiting    PRESENT SYMPTOMS:   Pain: [ ]yes [X ]no  QOL impact -   Location -                    Aggravating factors -  Quality -  Radiation -  Timing-  Severity (0-10 scale):  Minimal acceptable level (0-10 scale):     CPOT:    https://www.[facility].org/getattachment/[email or web address]/Critical-Care-Pain-Observation-Tool-(CPOT)    PAIN AD Score:   http://geriatrictoolkit.[place].[facility]/cog/painad.pdf (press ctrl +  left click to view)    Dyspnea:                           [ ]None[ X ]Mild [ ]Moderate [ ]Severe     Respiratory Distress Observation Scale (RDOS):   A score of 0 to 2 signifies little or no respiratory distress, 3 signifies mild distress, scores 4 to 6 indicate moderate distress, and scores greater than 7 signify severe distress  https://www.[facility].ca/sites/default/files/PDFS/900829-ayrnvzjgycr-ydbrdmku-yolyenubtfp-yzpdc.pdf    Anxiety:                             [ ]None[ ]Mild [ ]Moderate [ ]Severe   Fatigue:                             [ ]None[ ]Mild [ ]Moderate [ ]Severe   Nausea:                             [ ]None[ ]Mild [ ]Moderate [ ]Severe   Loss of appetite:              [ ]None[ ]Mild [ ]Moderate [ ]Severe   Constipation:                    [ ]None[ ]Mild [ ]Moderate [ ]Severe    Other Symptoms:  [X ]All other review of systems negative     Palliative Performance Status Version 2:        30 %    http://[facility].org/files/news/palliative_performance_scale_ppsv2.pdf    PHYSICAL EXAM:  ICU Vital Signs Last 24 Hrs  T(C): 36.6 (04 Jan 2024 08:00), Max: 36.8 (03 Jan 2024 16:20)  T(F): 97.8 (04 Jan 2024 08:00), Max: 98.3 (03 Jan 2024 16:20)  HR: 82 (04 Jan 2024 08:00) (80 - 92)  BP: 142/79 (04 Jan 2024 08:00) (110/52 - 149/65)  BP(mean): 105 (04 Jan 2024 08:00) (89 - 105)  RR: 18 (04 Jan 2024 08:00) (17 - 19)  SpO2: 94% (04 Jan 2024 08:00) (86% - 100%)  O2 Parameters below as of 04 Jan 2024 08:00  Patient On (Oxygen Delivery Method): nasal cannula, high flow    GENERAL:  [X ] No acute distress [ ]Lethargic  [ ]Unarousable  [X ]Verbal  [ ]Non-Verbal [ ]Cachexia    BEHAVIORAL/PSYCH:  [X ]Alert and Oriented x 3-4  [ ] Anxiety [ ] Delirium [ ] Agitation [X ] Calm   EYES: [X ] No scleral icterus [ ] Scleral icterus [ ] Closed  ENMT:  [ ]Dry mouth  [X ]No external oral lesions [X ] No external ear or nose lesions  CARDIOVASCULAR:  [ ]Regular [ ]Irregular [ ]Tachy [ ]Not Tachy  [ ]Norberto [ ] Edema [ X] No edema  PULMONARY:  [ ]Tachypnea  [ ]Audible excessive secretions [ X] No labored breathing [ ] labored breathing  GASTROINTESTINAL: [X ]Soft  [ ]Distended  [ ]Not distended [ ]Non tender [ ]Tender  MUSCULOSKELETAL: [X ]No clubbing [ ] clubbing  [ ] No cyanosis [ ] cyanosis  NEUROLOGIC: [ X]No focal deficits  [ X]Follows commands  [ ]Does not follow commands  [ ]Cognitive impairment  [ ]Dysphagia  [ ]Dysarthria  [ ]Paresis   SKIN: [ ] Jaundiced [X] Non-jaundiced [ ]Rash [ ]No Rash [ ] Warm [ ] Dry  MISC/LINES: [ ] ET tube [ ] Trach [ ]NGT/OGT [ ]PEG [ ]Cuadra    LABS: reviewed by me      01-04    127<L>  |  88<L>  |  19  ----------------------------<  139<H>  3.8   |  29  |  0.5<L>    Ca    8.3<L>      04 Jan 2024 06:20  Mg     1.7     01-04    TPro  5.2<L>  /  Alb  3.0<L>  /  TBili  1.0  /  DBili  x   /  AST  21  /  ALT  22  /  AlkPhos  97  01-04                            9.2    13.74 )-----------( 386      ( 04 Jan 2024 06:20 )             27.1           RADIOLOGY & ADDITIONAL STUDIES: reviewed by me    < from: Xray Chest 1 View-PORTABLE IMMEDIATE (Xray Chest 1 View-PORTABLE IMMEDIATE .) (01.03.24 @ 09:47) >  Findings:    Support devices: Telemetry leads are seen    Cardiac/mediastinum/hilum: Aorta is atherosclerotic.    Lung parenchyma/Pleura: Hyperinflation with basilar opacities    Skeleton/soft tissues: Unremarkable.    Impression:    COPD with basilar opacifications, worsened.        --- End of Report ---    < end of copied text >      EKG: reviewed by me    < from: 12 Lead ECG (12.31.23 @ 18:36) >  Ventricular Rate 122 BPM    Atrial Rate 86 BPM    QRS Duration 122 ms    Q-T Interval 310 ms    QTC Calculation(Bazett) 441 ms    R Axis 125 degrees    T Axis 56 degrees    Diagnosis Line Atrial flutter with variable A-V block  Right bundle branch block  Abnormal ECG    Confirmed by Licha Wong MD ([id number]) on 1/1/2024 11:32:55 AM    < end of copied text >      Patient discussed with primary medical team MD  Palliative care education provided to patient and/or family

## 2024-01-04 NOTE — PROGRESS NOTE ADULT - ASSESSMENT
83F w/ PMHx COPD, OA, chronic back pain s/p multiple surgeries and recent admission for left intertrochanteric femur fracture s/p ORIF 12/27/2023 brought in from [facility] for shortness of breath. COVID +ve. Admitted to SDU for management of acute hypoxic respiratory failure. She is currently on high flow NX and IV antibiotics.  Palliative consulted for GOC.     Patient comfortable on exam.   Goals for now are DNR/DNI with ongoing medical management.  Will follow.     Education about palliative care provided to patient/family.  See Recs below.    Please call [phone] with questions or concerns 24/7.   We will continue to follow.    83F w/ PMHx COPD, OA, chronic back pain s/p multiple surgeries and recent admission for left intertrochanteric femur fracture s/p ORIF 12/27/2023 brought in from [facility] for shortness of breath. COVID +ve. Admitted to SDU for management of acute hypoxic respiratory failure. She is currently on high flow NX and IV antibiotics.  Palliative consulted for GOC.     Patient comfortable on exam.   Goals for now are DNR/DNI with ongoing medical management.  Will follow.     Education about palliative care provided to patient/family.  See Recs below.    Please call [phone] with questions or concerns 24/7.   We will continue to follow.

## 2024-01-05 LAB
% ALBUMIN: 51.5 % — SIGNIFICANT CHANGE UP
% ALBUMIN: 51.5 % — SIGNIFICANT CHANGE UP
% ALPHA 1: 8.6 % — SIGNIFICANT CHANGE UP
% ALPHA 1: 8.6 % — SIGNIFICANT CHANGE UP
% ALPHA 2: 12.5 % — SIGNIFICANT CHANGE UP
% ALPHA 2: 12.5 % — SIGNIFICANT CHANGE UP
% BETA: 13.2 % — SIGNIFICANT CHANGE UP
% BETA: 13.2 % — SIGNIFICANT CHANGE UP
% GAMMA: 14.2 % — SIGNIFICANT CHANGE UP
% GAMMA: 14.2 % — SIGNIFICANT CHANGE UP
% M SPIKE: SIGNIFICANT CHANGE UP
% M SPIKE: SIGNIFICANT CHANGE UP
ALBUMIN SERPL ELPH-MCNC: 2.6 G/DL — LOW (ref 3.5–5.2)
ALBUMIN SERPL ELPH-MCNC: 2.6 G/DL — LOW (ref 3.5–5.2)
ALBUMIN SERPL ELPH-MCNC: 3 G/DL — LOW (ref 3.6–5.5)
ALBUMIN SERPL ELPH-MCNC: 3 G/DL — LOW (ref 3.6–5.5)
ALBUMIN/GLOB SERPL ELPH: 1 RATIO — SIGNIFICANT CHANGE UP
ALBUMIN/GLOB SERPL ELPH: 1 RATIO — SIGNIFICANT CHANGE UP
ALP SERPL-CCNC: 80 U/L — SIGNIFICANT CHANGE UP (ref 30–115)
ALP SERPL-CCNC: 80 U/L — SIGNIFICANT CHANGE UP (ref 30–115)
ALPHA1 GLOB SERPL ELPH-MCNC: 0.5 G/DL — HIGH (ref 0.1–0.4)
ALPHA1 GLOB SERPL ELPH-MCNC: 0.5 G/DL — HIGH (ref 0.1–0.4)
ALPHA2 GLOB SERPL ELPH-MCNC: 0.7 G/DL — SIGNIFICANT CHANGE UP (ref 0.5–1)
ALPHA2 GLOB SERPL ELPH-MCNC: 0.7 G/DL — SIGNIFICANT CHANGE UP (ref 0.5–1)
ALT FLD-CCNC: 23 U/L — SIGNIFICANT CHANGE UP (ref 0–41)
ALT FLD-CCNC: 23 U/L — SIGNIFICANT CHANGE UP (ref 0–41)
ANION GAP SERPL CALC-SCNC: 12 MMOL/L — SIGNIFICANT CHANGE UP (ref 7–14)
ANION GAP SERPL CALC-SCNC: 12 MMOL/L — SIGNIFICANT CHANGE UP (ref 7–14)
AST SERPL-CCNC: 25 U/L — SIGNIFICANT CHANGE UP (ref 0–41)
AST SERPL-CCNC: 25 U/L — SIGNIFICANT CHANGE UP (ref 0–41)
B-GLOBULIN SERPL ELPH-MCNC: 0.8 G/DL — SIGNIFICANT CHANGE UP (ref 0.5–1)
B-GLOBULIN SERPL ELPH-MCNC: 0.8 G/DL — SIGNIFICANT CHANGE UP (ref 0.5–1)
BASOPHILS # BLD AUTO: 0.02 K/UL — SIGNIFICANT CHANGE UP (ref 0–0.2)
BASOPHILS # BLD AUTO: 0.02 K/UL — SIGNIFICANT CHANGE UP (ref 0–0.2)
BASOPHILS NFR BLD AUTO: 0.1 % — SIGNIFICANT CHANGE UP (ref 0–1)
BASOPHILS NFR BLD AUTO: 0.1 % — SIGNIFICANT CHANGE UP (ref 0–1)
BILIRUB SERPL-MCNC: 1 MG/DL — SIGNIFICANT CHANGE UP (ref 0.2–1.2)
BILIRUB SERPL-MCNC: 1 MG/DL — SIGNIFICANT CHANGE UP (ref 0.2–1.2)
BUN SERPL-MCNC: 16 MG/DL — SIGNIFICANT CHANGE UP (ref 10–20)
BUN SERPL-MCNC: 16 MG/DL — SIGNIFICANT CHANGE UP (ref 10–20)
CALCIUM SERPL-MCNC: 7.7 MG/DL — LOW (ref 8.4–10.5)
CALCIUM SERPL-MCNC: 7.7 MG/DL — LOW (ref 8.4–10.5)
CHLORIDE SERPL-SCNC: 87 MMOL/L — LOW (ref 98–110)
CHLORIDE SERPL-SCNC: 87 MMOL/L — LOW (ref 98–110)
CO2 SERPL-SCNC: 28 MMOL/L — SIGNIFICANT CHANGE UP (ref 17–32)
CO2 SERPL-SCNC: 28 MMOL/L — SIGNIFICANT CHANGE UP (ref 17–32)
CREAT SERPL-MCNC: <0.5 MG/DL — LOW (ref 0.7–1.5)
CREAT SERPL-MCNC: <0.5 MG/DL — LOW (ref 0.7–1.5)
CRP SERPL-MCNC: 46.9 MG/L — HIGH
CRP SERPL-MCNC: 46.9 MG/L — HIGH
EGFR: 97 ML/MIN/1.73M2 — SIGNIFICANT CHANGE UP
EGFR: 97 ML/MIN/1.73M2 — SIGNIFICANT CHANGE UP
EOSINOPHIL # BLD AUTO: 0 K/UL — SIGNIFICANT CHANGE UP (ref 0–0.7)
EOSINOPHIL # BLD AUTO: 0 K/UL — SIGNIFICANT CHANGE UP (ref 0–0.7)
EOSINOPHIL NFR BLD AUTO: 0 % — SIGNIFICANT CHANGE UP (ref 0–8)
EOSINOPHIL NFR BLD AUTO: 0 % — SIGNIFICANT CHANGE UP (ref 0–8)
GAMMA GLOBULIN: 0.8 G/DL — SIGNIFICANT CHANGE UP (ref 0.6–1.6)
GAMMA GLOBULIN: 0.8 G/DL — SIGNIFICANT CHANGE UP (ref 0.6–1.6)
GLUCOSE SERPL-MCNC: 108 MG/DL — HIGH (ref 70–99)
GLUCOSE SERPL-MCNC: 108 MG/DL — HIGH (ref 70–99)
HCT VFR BLD CALC: 25.9 % — LOW (ref 37–47)
HCT VFR BLD CALC: 25.9 % — LOW (ref 37–47)
HGB BLD-MCNC: 8.9 G/DL — LOW (ref 12–16)
HGB BLD-MCNC: 8.9 G/DL — LOW (ref 12–16)
IMM GRANULOCYTES NFR BLD AUTO: 1.1 % — HIGH (ref 0.1–0.3)
IMM GRANULOCYTES NFR BLD AUTO: 1.1 % — HIGH (ref 0.1–0.3)
INTERPRETATION SERPL IFE-IMP: SIGNIFICANT CHANGE UP
INTERPRETATION SERPL IFE-IMP: SIGNIFICANT CHANGE UP
LYMPHOCYTES # BLD AUTO: 0.77 K/UL — LOW (ref 1.2–3.4)
LYMPHOCYTES # BLD AUTO: 0.77 K/UL — LOW (ref 1.2–3.4)
LYMPHOCYTES # BLD AUTO: 5.5 % — LOW (ref 20.5–51.1)
LYMPHOCYTES # BLD AUTO: 5.5 % — LOW (ref 20.5–51.1)
M-SPIKE: SIGNIFICANT CHANGE UP (ref 0–0)
M-SPIKE: SIGNIFICANT CHANGE UP (ref 0–0)
MAGNESIUM SERPL-MCNC: 2 MG/DL — SIGNIFICANT CHANGE UP (ref 1.8–2.4)
MAGNESIUM SERPL-MCNC: 2 MG/DL — SIGNIFICANT CHANGE UP (ref 1.8–2.4)
MCHC RBC-ENTMCNC: 30 PG — SIGNIFICANT CHANGE UP (ref 27–31)
MCHC RBC-ENTMCNC: 30 PG — SIGNIFICANT CHANGE UP (ref 27–31)
MCHC RBC-ENTMCNC: 34.4 G/DL — SIGNIFICANT CHANGE UP (ref 32–37)
MCHC RBC-ENTMCNC: 34.4 G/DL — SIGNIFICANT CHANGE UP (ref 32–37)
MCV RBC AUTO: 87.2 FL — SIGNIFICANT CHANGE UP (ref 81–99)
MCV RBC AUTO: 87.2 FL — SIGNIFICANT CHANGE UP (ref 81–99)
MONOCYTES # BLD AUTO: 0.92 K/UL — HIGH (ref 0.1–0.6)
MONOCYTES # BLD AUTO: 0.92 K/UL — HIGH (ref 0.1–0.6)
MONOCYTES NFR BLD AUTO: 6.6 % — SIGNIFICANT CHANGE UP (ref 1.7–9.3)
MONOCYTES NFR BLD AUTO: 6.6 % — SIGNIFICANT CHANGE UP (ref 1.7–9.3)
NEUTROPHILS # BLD AUTO: 12.05 K/UL — HIGH (ref 1.4–6.5)
NEUTROPHILS # BLD AUTO: 12.05 K/UL — HIGH (ref 1.4–6.5)
NEUTROPHILS NFR BLD AUTO: 86.7 % — HIGH (ref 42.2–75.2)
NEUTROPHILS NFR BLD AUTO: 86.7 % — HIGH (ref 42.2–75.2)
NRBC # BLD: 0 /100 WBCS — SIGNIFICANT CHANGE UP (ref 0–0)
NRBC # BLD: 0 /100 WBCS — SIGNIFICANT CHANGE UP (ref 0–0)
PLATELET # BLD AUTO: 361 K/UL — SIGNIFICANT CHANGE UP (ref 130–400)
PLATELET # BLD AUTO: 361 K/UL — SIGNIFICANT CHANGE UP (ref 130–400)
PMV BLD: 9.8 FL — SIGNIFICANT CHANGE UP (ref 7.4–10.4)
PMV BLD: 9.8 FL — SIGNIFICANT CHANGE UP (ref 7.4–10.4)
POTASSIUM SERPL-MCNC: 4.1 MMOL/L — SIGNIFICANT CHANGE UP (ref 3.5–5)
POTASSIUM SERPL-MCNC: 4.1 MMOL/L — SIGNIFICANT CHANGE UP (ref 3.5–5)
POTASSIUM SERPL-SCNC: 4.1 MMOL/L — SIGNIFICANT CHANGE UP (ref 3.5–5)
POTASSIUM SERPL-SCNC: 4.1 MMOL/L — SIGNIFICANT CHANGE UP (ref 3.5–5)
PROT PATTERN SERPL ELPH-IMP: SIGNIFICANT CHANGE UP
PROT PATTERN SERPL ELPH-IMP: SIGNIFICANT CHANGE UP
PROT SERPL-MCNC: 4.8 G/DL — LOW (ref 6–8)
PROT SERPL-MCNC: 4.8 G/DL — LOW (ref 6–8)
PROT SERPL-MCNC: 5.9 G/DL — LOW (ref 6–8.3)
PROT SERPL-MCNC: 5.9 G/DL — LOW (ref 6–8.3)
RBC # BLD: 2.97 M/UL — LOW (ref 4.2–5.4)
RBC # BLD: 2.97 M/UL — LOW (ref 4.2–5.4)
RBC # FLD: 14.1 % — SIGNIFICANT CHANGE UP (ref 11.5–14.5)
RBC # FLD: 14.1 % — SIGNIFICANT CHANGE UP (ref 11.5–14.5)
SODIUM SERPL-SCNC: 127 MMOL/L — LOW (ref 135–146)
SODIUM SERPL-SCNC: 127 MMOL/L — LOW (ref 135–146)
WBC # BLD: 13.91 K/UL — HIGH (ref 4.8–10.8)
WBC # BLD: 13.91 K/UL — HIGH (ref 4.8–10.8)
WBC # FLD AUTO: 13.91 K/UL — HIGH (ref 4.8–10.8)
WBC # FLD AUTO: 13.91 K/UL — HIGH (ref 4.8–10.8)

## 2024-01-05 PROCEDURE — 99232 SBSQ HOSP IP/OBS MODERATE 35: CPT

## 2024-01-05 PROCEDURE — 99233 SBSQ HOSP IP/OBS HIGH 50: CPT

## 2024-01-05 PROCEDURE — 71045 X-RAY EXAM CHEST 1 VIEW: CPT | Mod: 26

## 2024-01-05 RX ORDER — AMLODIPINE BESYLATE 2.5 MG/1
5 TABLET ORAL DAILY
Refills: 0 | Status: DISCONTINUED | OUTPATIENT
Start: 2024-01-05 | End: 2024-01-10

## 2024-01-05 RX ORDER — FUROSEMIDE 40 MG
20 TABLET ORAL DAILY
Refills: 0 | Status: DISCONTINUED | OUTPATIENT
Start: 2024-01-05 | End: 2024-01-05

## 2024-01-05 RX ORDER — ACETAMINOPHEN 500 MG
650 TABLET ORAL EVERY 6 HOURS
Refills: 0 | Status: DISCONTINUED | OUTPATIENT
Start: 2024-01-05 | End: 2024-01-10

## 2024-01-05 RX ADMIN — PIPERACILLIN AND TAZOBACTAM 25 GRAM(S): 4; .5 INJECTION, POWDER, LYOPHILIZED, FOR SOLUTION INTRAVENOUS at 06:38

## 2024-01-05 RX ADMIN — Medication 650 MILLIGRAM(S): at 12:37

## 2024-01-05 RX ADMIN — Medication 100 MILLIGRAM(S): at 10:20

## 2024-01-05 RX ADMIN — POLYETHYLENE GLYCOL 3350 17 GRAM(S): 17 POWDER, FOR SOLUTION ORAL at 11:16

## 2024-01-05 RX ADMIN — ONDANSETRON 4 MILLIGRAM(S): 8 TABLET, FILM COATED ORAL at 13:35

## 2024-01-05 RX ADMIN — PIPERACILLIN AND TAZOBACTAM 25 GRAM(S): 4; .5 INJECTION, POWDER, LYOPHILIZED, FOR SOLUTION INTRAVENOUS at 22:51

## 2024-01-05 RX ADMIN — MUPIROCIN 1 APPLICATION(S): 20 OINTMENT TOPICAL at 06:35

## 2024-01-05 RX ADMIN — AMLODIPINE BESYLATE 5 MILLIGRAM(S): 2.5 TABLET ORAL at 17:20

## 2024-01-05 RX ADMIN — Medication 650 MILLIGRAM(S): at 17:55

## 2024-01-05 RX ADMIN — Medication 100 MILLIGRAM(S): at 22:51

## 2024-01-05 RX ADMIN — ENOXAPARIN SODIUM 40 MILLIGRAM(S): 100 INJECTION SUBCUTANEOUS at 12:38

## 2024-01-05 RX ADMIN — CHLORHEXIDINE GLUCONATE 1 APPLICATION(S): 213 SOLUTION TOPICAL at 06:36

## 2024-01-05 RX ADMIN — Medication 81 MILLIGRAM(S): at 11:16

## 2024-01-05 RX ADMIN — MUPIROCIN 1 APPLICATION(S): 20 OINTMENT TOPICAL at 17:55

## 2024-01-05 RX ADMIN — Medication 20 MILLIGRAM(S): at 10:21

## 2024-01-05 RX ADMIN — PIPERACILLIN AND TAZOBACTAM 25 GRAM(S): 4; .5 INJECTION, POWDER, LYOPHILIZED, FOR SOLUTION INTRAVENOUS at 13:10

## 2024-01-05 RX ADMIN — Medication 40 MILLIGRAM(S): at 17:54

## 2024-01-05 NOTE — PROGRESS NOTE ADULT - ASSESSMENT
83F w/ PMHx COPD, OA, chronic back pain s/p multiple surgeries and recent admission for left intertrochanteric femur fracture s/p ORIF 12/27/2023 brought in from [facility] for shortness of breath.    A/P  #Acute Hypoxic Respiratory Failure/ sepsis POA  secondary to COVID 19 and suspected hospital acquired PNA / h/o COPD   - pt is clinically improving, tapered off  high flow oxygen, comfortable on NC now   - c/w pulmonary toilet, chest PT Q 4 hours  - aspiration precautions   - cont Abx; Doxy + Zosyn  - Solumedrol 40 mg BID  - Remdesivir on hold as per patient's daughter's request  - duoneb q6, symbicort  - pt was consulted by pulmonary, recommendations noted  - c/w isolation for COVID 19       #NSTEMI Type II  - EKG showed sinus tachy w/ PACs  - Trop 208>192 (previously 19)  -  telemetry monitoring  - on ASA, start Lopressor 12.5 mg Q 12 hours  - check lipid profile, will start statin if indicated   - check 2Decho and BNP  - pt was consulted by cardiology, pt needs OP event monitoring     # Hypovolemic hyponatremia  - pt is hypovolemic on PE  - hold IV Lasix ( no clear indications for diuretics )  - monitor Na level     #ALAN   - maintain fluid balance   - monitor BUN/cr and urine output   - stable for now     #Chronic Anemia  - monitor H/H, keep Hb above 8.0  - no active bleeding noted     #left intertrochanteric femur fracture s/p ORIF 12/27/2023 /OA/ Chronic Back Pain s/p multiple surgeries  - may change Tylenol to standing  - will get pt OOB to chair in 24 hours and consider PT eval if off high flow oxygen     #DVT ppx: Lovenox  #GI ppx: none  #Diet: DASH/TLC   #Code status: DNR/DNI (Trial NIV)  #Disposition: SDU      Pending (specify):  supportive care, monitor Na level, take pt OOB to chair, PT/rehab eval      83F w/ PMHx COPD, OA, chronic back pain s/p multiple surgeries and recent admission for left intertrochanteric femur fracture s/p ORIF 12/27/2023 brought in from [facility] for shortness of breath.    A/P  #Acute Hypoxic Respiratory Failure/ sepsis POA  secondary to COVID 19 and suspected hospital acquired PNA / h/o COPD   - pt is clinically improving, tapered off  high flow oxygen, comfortable on NC now   - c/w pulmonary toilet, chest PT Q 4 hours  - aspiration precautions   - cont Abx; Doxy + Zosyn  - Solumedrol 40 mg BID  - Remdesivir on hold as per patient's daughter's request  - duoneb q6, symbicort  - pt was consulted by pulmonary, recommendations noted  - c/w isolation for COVID 19       #NSTEMI Type II  - EKG showed sinus tachy w/ PACs  - Trop 208>192 (previously 19)  -  telemetry monitoring  - on ASA, start Lopressor 12.5 mg Q 12 hours  - check lipid profile, will start statin if indicated   - check 2Decho and BNP  - pt was consulted by cardiology, pt needs OP event monitoring     # Hypovolemic hyponatremia  - pt is hypovolemic on PE  - hold IV Lasix ( no clear indications for diuretics )  - monitor Na level     #ALAN   - maintain fluid balance   - monitor BUN/cr and urine output   - stable for now     #Chronic Anemia  - monitor H/H, keep Hb above 8.0  - no active bleeding noted     #left intertrochanteric femur fracture s/p ORIF 12/27/2023 /OA/ Chronic Back Pain s/p multiple surgeries  - may change Tylenol to standing  - will get pt OOB to chair in 24 hours and consider PT eval if off high flow oxygen     #DVT ppx: Lovenox  #GI ppx: none  #Diet: DASH/TLC   #Code status: DNR/DNI (Trial NIV)  #Disposition: SDU      Pending (specify):  supportive care, monitor Na level, take pt OOB to chair, PT/rehab eval

## 2024-01-05 NOTE — PROGRESS NOTE ADULT - SUBJECTIVE AND OBJECTIVE BOX
24H events:    Patient is a 83y old Female who presents with a chief complaint of SOB (05 Jan 2024 08:33)    Primary diagnosis of 2019 novel coronavirus disease (COVID-19)    Today is hospital day 5d. This morning patient was seen and examined at bedside, resting comfortably in bed.    No acute or major events overnight.      PAST MEDICAL & SURGICAL HISTORY  COPD (chronic obstructive pulmonary disease)    H/O cholelithiasis    Smoker    History of surgery  orif right ankle      SOCIAL HISTORY:  Social History:      ALLERGIES:  No Known Allergies    MEDICATIONS:  STANDING MEDICATIONS  albuterol/ipratropium for Nebulization 3 milliLiter(s) Nebulizer every 6 hours  aspirin enteric coated 81 milliGRAM(s) Oral daily  budesonide 160 MICROgram(s)/formoterol 4.5 MICROgram(s) Inhaler 2 Puff(s) Inhalation two times a day  chlorhexidine 2% Cloths 1 Application(s) Topical <User Schedule>  doxycycline IVPB 100 milliGRAM(s) IV Intermittent every 12 hours  doxycycline IVPB      enoxaparin Injectable 40 milliGRAM(s) SubCutaneous every 24 hours  furosemide   Injectable 20 milliGRAM(s) IV Push daily  methylPREDNISolone sodium succinate Injectable 40 milliGRAM(s) IV Push two times a day  mupirocin 2% Ointment 1 Application(s) Topical two times a day  piperacillin/tazobactam IVPB.. 3.375 Gram(s) IV Intermittent every 8 hours  polyethylene glycol 3350 17 Gram(s) Oral daily  senna 2 Tablet(s) Oral at bedtime  sodium chloride 0.9%. 1000 milliLiter(s) IV Continuous <Continuous>    PRN MEDICATIONS  acetaminophen     Tablet .. 650 milliGRAM(s) Oral every 6 hours PRN  aluminum hydroxide/magnesium hydroxide/simethicone Suspension 30 milliLiter(s) Oral every 4 hours PRN  melatonin 3 milliGRAM(s) Oral at bedtime PRN  ondansetron Injectable 4 milliGRAM(s) IV Push every 8 hours PRN    VITALS:   T(F): 97.2  HR: 85  BP: 178/98  RR: 20  SpO2: 100%    PHYSICAL EXAM:  GENERAL:   (X) NAD, lying in bed comfortably     (  ) obtunded     (  ) lethargic     (  ) somnolent    HEAD:   ( X ) Atraumatic     (  ) hematoma     (  ) laceration (specify location:       )     NECK:  ( X ) Supple     (  ) neck stiffness     (  ) nuchal rigidity     (  )  no JVD     (  ) JVD present ( -- cm)    HEART:  Rate -->     ( X ) normal rate     (  ) bradycardic     (  ) tachycardic  Rhythm -->     ( X ) regular     (  ) regularly irregular     (  ) irregularly irregular  Murmurs -->     (  ) normal s1s2     (  ) systolic murmur     (  ) diastolic murmur     (  ) continuous murmur      (  ) S3 present     (  ) S4 present    LUNGS:   ( X )Unlabored respirations     (  ) tachypnea  ( X ) B/L air entry     (  ) decreased breath sounds in:  (location     )    (  ) no adventitious sound     (  ) crackles     (  ) wheezing      (  ) rhonchi      (specify location:       )  (  ) chest wall tenderness (specify location:       )    ABDOMEN:   ( X ) Soft     (  ) tense   |   (  ) nondistended     (  ) distended   |   (  ) +BS     (  ) hypoactive bowel sounds     (  ) hyperactive bowel sounds  ( X ) nontender     (  ) RUQ tenderness     (  ) RLQ tenderness     (  ) LLQ tenderness     (  ) epigastric tenderness     (  ) diffuse tenderness  (  ) Splenomegaly      (  ) Hepatomegaly      (  ) Jaundice     (  ) ecchymosis     EXTREMITIES: decreased ROM LLE 2/2 hip fracture s/p ORIF  (  ) Normal     (  ) Rash     (  ) ecchymosis     (  ) varicose veins      (  ) pitting edema     (  ) non-pitting edema   (  ) ulceration     (  ) gangrene:     (location:     )    NERVOUS SYSTEM:    ( X ) A&Ox3     (  ) confused     (  ) lethargic  CN II-XII:     (  ) Intact     (  ) deficits found     (Specify:     )   Upper extremities:     (  ) no sensorimotor deficits     (  ) weakness     (  ) loss of proprioception/vibration     (  ) loss of touch/temperature (specify:    )  Lower extremities:     (  ) no sensorimotor deficits     (  ) weakness     (  ) loss of proprioception/vibration     (  ) loss of touch/temperature (specify:    )    SKIN:   ( X ) No rashes or lesions     (  ) maculopapular rash     (  ) pustules     (  ) vesicles     (  ) ulcer     (  ) ecchymosis     (specify location:     )  LABS:                        8.9    13.91 )-----------( 361      ( 05 Jan 2024 05:05 )             25.9     01-05    127<L>  |  87<L>  |  16  ----------------------------<  108<H>  4.1   |  28  |  <0.5<L>    Ca    7.7<L>      05 Jan 2024 05:05  Mg     2.0     01-05    TPro  4.8<L>  /  Alb  2.6<L>  /  TBili  1.0  /  DBili  x   /  AST  25  /  ALT  23  /  AlkPhos  80  01-05      Urinalysis Basic - ( 05 Jan 2024 05:05 )    Color: x / Appearance: x / SG: x / pH: x  Gluc: 108 mg/dL / Ketone: x  / Bili: x / Urobili: x   Blood: x / Protein: x / Nitrite: x   Leuk Esterase: x / RBC: x / WBC x   Sq Epi: x / Non Sq Epi: x / Bacteria: x      ABG - ( 04 Jan 2024 09:14 )  pH, Arterial: 7.50  pH, Blood: x     /  pCO2: 39    /  pO2: 81    / HCO3: 30    / Base Excess: 6.7   /  SaO2: 98.6

## 2024-01-05 NOTE — PROGRESS NOTE ADULT - ASSESSMENT
83F w/ PMHx COPD, OA, chronic back pain s/p multiple surgeries and recent admission for left intertrochanteric femur fracture s/p ORIF 12/27/2023 brought in from [facility] for shortness of breath. COVID +ve. Admitted to SDU for management of acute hypoxic respiratory failure. She is currently on high flow NX and IV antibiotics.  Palliative consulted for GOC.     Patient comfortable on exam.   Goals for now are DNR/DNI with ongoing medical management. Patient wants to get back to STR to work on her strength and mobility.   Will follow.     Education about palliative care provided to patient/family.  See Recs below.    Please call [phone] with questions or concerns 24/7.   We will continue to follow.    83F w/ PMHx COPD, OA, chronic back pain s/p multiple surgeries and recent admission for left intertrochanteric femur fracture s/p ORIF 12/27/2023 brought in from [facility] for shortness of breath. COVID +ve. Admitted to SDU for management of acute hypoxic respiratory failure. She is currently on high flow NX and IV antibiotics.  Palliative consulted for GOC.     Patient comfortable on exam.   Goals for now are DNR/DNI with ongoing medical management. Patient wants to get back to STR to work on her strength and mobility.   Will follow.     Education about palliative care provided to patient/family.  See Recs below.    Please call [phone] with questions or concerns 24/7.   We will continue to follow.

## 2024-01-05 NOTE — PROGRESS NOTE ADULT - ASSESSMENT
83F w/ PMHx COPD, OA, chronic back pain s/p multiple surgeries and recent admission for left intertrochanteric femur fracture s/p ORIF 12/27/2023 brought in from [facility] for shortness of breath and hypoxia     Acute Hypoxic Respiratory Failure/ sepsis POA  secondary to COVID 19 and suspected hospital acquired PNA / h/o COPD   NSTEMI Type II  Hyponatremia  ALAN (resolved)   Chronic Anemia  left intertrochanteric femur fracture s/p ORIF 12/27/2023 /OA/ Chronic Back Pain s/p multiple surgeries   Cardiac arrhythmia         Continue current care   Physical therapy   DVT/GI prophylaxis   Pulmonary and ID follow up   Correct Hyponatremia   Event recorder as out patient   Possible need for Event recorder versus ILR as out patient. Need for AC was discussed in detail for the patient. She would like to discuss it once feels better as out patient    83F w/ PMHx COPD, OA, chronic back pain s/p multiple surgeries and recent admission for left intertrochanteric femur fracture s/p ORIF 12/27/2023 brought in from [facility] for shortness of breath and hypoxia     Acute Hypoxic Respiratory Failure/ sepsis POA  secondary to COVID 19 and suspected hospital acquired PNA / h/o COPD   NSTEMI Type II  Hyponatremia  ALAN (resolved)   Chronic Anemia  left intertrochanteric femur fracture s/p ORIF 12/27/2023 /OA/ Chronic Back Pain s/p multiple surgeries   Cardiac arrhythmia         Continue current care   Physical therapy   DVT/GI prophylaxis   Pulmonary and ID follow up   Correct Hyponatremia   Event recorder as out patient   Possible need for Event recorder versus ILR as out patient. Need for AC was discussed in detail for the patient. She would like to discuss it once feels better as out patient

## 2024-01-05 NOTE — PROGRESS NOTE ADULT - ASSESSMENT
Impression    Acute on chronic hypoxemic/ hypercapnic resp failure on HHFNC  Covid pneumonia  Possible aspiration ( cr chest noted)  copd exacerbation  fluid overload  NTEMI  AMS/ TME  COPD not on Home o2  hyponatremia  Bilateral lung nodules   Chronic back pain  Left intertrochanteric femur fracture Sp ORIF  Current smoker    PLAN:    CNS: Avoid CNS depressant    HEENT:  Oral care    PULMONARY:  HOB @ 45 degrees, NIV at night  ( declined) solumedrol 40 q 24, Neb q 6, keep SaO2 88 TO 92%, low flow NC trial, repeat CXR    CARDIOVASCULAR: avoid overload, lasix daily    GI: GI prophylaxis                                          Feeding feeding per speech    RENAL:  F/u  lytes.  Correct as needed. accurate I/O    INFECTIOUS DISEASE: abx per ID    HEMATOLOGICAL:  DVT prophylaxis.    ENDOCRINE:  Follow up FS.  Insulin protocol if needed.    SDU  Palliative care fup  very poor prognosis

## 2024-01-05 NOTE — PROGRESS NOTE ADULT - SUBJECTIVE AND OBJECTIVE BOX
HPI:    83F w/ PMHx COPD, OA, chronic back pain s/p multiple surgeries and recent admission for left intertrochanteric femur fracture s/p ORIF 12/27/2023 brought in from [facility] for shortness of breath. EMS reports that she was hypoxic on room air to the 70s. Patient was initially AOx0, after being on NRB in EMS, patient mental status improving with improved oxygenation. Daughter reports the patient was doing okay yesterday and that this was an acute change. Patient currently AOx3, appears short of breath, denying any other complaints at this time.COVID +ve. Admitted to SDU for management of acute hypoxic respiratory failure. Palliative consulted for [facility].     Interval history:     1/3: patient on high flow NC. she endorses no pain or dyspnea but is thirsty and says the bed is uncomfortable. she is able to tell me the situation of what happened to her over the course of her past 2 hospitalizations.   1/4: patient on high flow NC. Daughter Promise is at bedside. They both spoke with Dr. Gentile today. They are both hopeful she will get back to the STR and then back home but understand she may not return to her baseline. Patient denies pain or discomfort.   1/5: patient on regular nasal cannula during exam. denies dyspnea or pain. she is preparing to eat lunch. No questions. Daughter not at bedside. She is hopeful to get out of hospital ASAP.      ADVANCE DIRECTIVES:     [ ] Full Code [ X] DNR/DNI  MOLST  [ ]  Living Will  [ ]   DECISION MAKER(s): Patient 8  [ X] Health Care Proxy(s)  [ ] Surrogate(s)  [ ] Guardian           Name(s): Phone Number(s): Promise Green (dtr) is primary HCP and Mary Nuñezboni is secondary HCP       BASELINE (I)ADL(s) (prior to admission):    [place]: [ ]Total  [X ] Moderate [ ]Dependent - from STR, was living by herself independently prior to hip fx  Palliative Performance Status Version 2:         %    http://npcrc.org/files/news/palliative_performance_scale_ppsv2.pdf    Allergies    No Known Allergies    Intolerances    MEDICATIONS  (STANDING):  albuterol/ipratropium for Nebulization 3 milliLiter(s) Nebulizer every 6 hours  aspirin enteric coated 81 milliGRAM(s) Oral daily  budesonide 160 MICROgram(s)/formoterol 4.5 MICROgram(s) Inhaler 2 Puff(s) Inhalation two times a day  chlorhexidine 2% Cloths 1 Application(s) Topical <User Schedule>  doxycycline IVPB 100 milliGRAM(s) IV Intermittent every 12 hours  doxycycline IVPB      enoxaparin Injectable 40 milliGRAM(s) SubCutaneous every 24 hours  methylPREDNISolone sodium succinate Injectable 40 milliGRAM(s) IV Push two times a day  piperacillin/tazobactam IVPB.. 3.375 Gram(s) IV Intermittent every 8 hours  polyethylene glycol 3350 17 Gram(s) Oral daily  senna 2 Tablet(s) Oral at bedtime    MEDICATIONS  (PRN):  acetaminophen     Tablet .. 650 milliGRAM(s) Oral every 6 hours PRN Temp greater or equal to 38C (100.4F), Mild Pain (1 - 3)  aluminum hydroxide/magnesium hydroxide/simethicone Suspension 30 milliLiter(s) Oral every 4 hours PRN Dyspepsia  melatonin 3 milliGRAM(s) Oral at bedtime PRN Insomnia  ondansetron Injectable 4 milliGRAM(s) IV Push every 8 hours PRN Nausea and/or Vomiting    PRESENT SYMPTOMS:   Pain: [ ]yes [X ]no  QOL impact -   Location -                    Aggravating factors -  Quality -  Radiation -  Timing-  Severity (0-10 scale):  Minimal acceptable level (0-10 scale):     CPOT:    https://www.[facility].org/getattachment/[email or web address]/Critical-Care-Pain-Observation-Tool-(CPOT)    PAIN AD Score:   http://geriatrictoolkit.[place].[facility]/cog/painad.pdf (press ctrl +  left click to view)    Dyspnea:                           [X ]None[  ]Mild [ ]Moderate [ ]Severe     Respiratory Distress Observation Scale (RDOS):   A score of 0 to 2 signifies little or no respiratory distress, 3 signifies mild distress, scores 4 to 6 indicate moderate distress, and scores greater than 7 signify severe distress  [email or web address]/sites/default/[email or web address]-ztyjjmxplia-ouznahvh-vdazvlvckgh-hftgg.pdf    Anxiety:                             [ ]None[ ]Mild [ ]Moderate [ ]Severe   Fatigue:                             [ ]None[ ]Mild [ ]Moderate [ ]Severe   Nausea:                             [ ]None[ ]Mild [ ]Moderate [ ]Severe   Loss of appetite:              [ ]None[ ]Mild [ ]Moderate [ ]Severe   Constipation:                    [ ]None[ ]Mild [ ]Moderate [ ]Severe    Other Symptoms:  [X ]All other review of systems negative     Palliative Performance Status Version 2:        30 %    http://[facility].org/files/news/palliative_performance_scale_ppsv2.pdf    PHYSICAL EXAM:  ICU Vital Signs Last 24 Hrs  T(C): 36.3 (05 Jan 2024 11:26), Max: 36.5 (05 Jan 2024 00:15)  T(F): 97.4 (05 Jan 2024 11:26), Max: 97.7 (05 Jan 2024 00:15)  HR: 83 (05 Jan 2024 11:26) (72 - 85)  BP: 172/83 (05 Jan 2024 11:26) (142/70 - 186/78)  BP(mean): 118 (05 Jan 2024 11:26) (104 - 129)  RR: 20 (05 Jan 2024 11:26) (18 - 20)  SpO2: 96% (05 Jan 2024 11:26) (96% - 100%)  O2 Parameters below as of 05 Jan 2024 11:26  Patient On (Oxygen Delivery Method): nasal cannula, high flow    GENERAL:  [X ] No acute distress [ ]Lethargic  [ ]Unarousable  [X ]Verbal  [ ]Non-Verbal [ ]Cachexia    BEHAVIORAL/PSYCH:  [X ]Alert and Oriented x 3-4  [ ] Anxiety [ ] Delirium [ ] Agitation [X ] Calm   EYES: [X ] No scleral icterus [ ] Scleral icterus [ ] Closed  ENMT:  [ ]Dry mouth  [X ]No external oral lesions [X ] No external ear or nose lesions  CARDIOVASCULAR:  [ ]Regular [ ]Irregular [ ]Tachy [ ]Not Tachy  [ ]Norberto [ ] Edema [ X] No edema  PULMONARY:  [ ]Tachypnea  [ ]Audible excessive secretions [ X] No labored breathing [ ] labored breathing  GASTROINTESTINAL: [X ]Soft  [ ]Distended  [ ]Not distended [ ]Non tender [ ]Tender  MUSCULOSKELETAL: [X ]No clubbing [ ] clubbing  [ ] No cyanosis [ ] cyanosis  NEUROLOGIC: [ X]No focal deficits  [ X]Follows commands  [ ]Does not follow commands  [ ]Cognitive impairment  [ ]Dysphagia  [ ]Dysarthria  [ ]Paresis   SKIN: [ ] Jaundiced [X] Non-jaundiced [ ]Rash [ ]No Rash [ ] Warm [ ] Dry  MISC/LINES: [ ] ET tube [ ] Trach [ ]NGT/OGT [ ]PEG [ ]Cuadra    LABS: reviewed by me    01-05    127<L>  |  87<L>  |  16  ----------------------------<  108<H>  4.1   |  28  |  <0.5<L>    Ca    7.7<L>      05 Jan 2024 05:05  Mg     2.0     01-05    TPro  4.8<L>  /  Alb  2.6<L>  /  TBili  1.0  /  DBili  x   /  AST  25  /  ALT  23  /  AlkPhos  80  01-05                            8.9    13.91 )-----------( 361      ( 05 Jan 2024 05:05 )             25.9     RADIOLOGY & ADDITIONAL STUDIES: reviewed by me    < from: Xray Chest 1 View-PORTABLE IMMEDIATE (Xray Chest 1 View-PORTABLE IMMEDIATE .) (01.03.24 @ 09:47) >  Findings:    Support devices: Telemetry leads are seen    Cardiac/mediastinum/hilum: Aorta is atherosclerotic.    Lung parenchyma/Pleura: Hyperinflation with basilar opacities    Skeleton/soft tissues: Unremarkable.    Impression:    COPD with basilar opacifications, worsened.        --- End of Report ---    < end of copied text >      EKG: reviewed by me    < from: 12 Lead ECG (12.31.23 @ 18:36) >  Ventricular Rate 122 BPM    Atrial Rate 86 BPM    QRS Duration 122 ms    Q-T Interval 310 ms    QTC Calculation(Bazett) 441 ms    R Axis 125 degrees    T Axis 56 degrees    Diagnosis Line Atrial flutter with variable A-V block  Right bundle branch block  Abnormal ECG    Confirmed by Licha Wong MD ([id number]) on 1/1/2024 11:32:55 AM    < end of copied text >      Patient discussed with primary medical team MD  Palliative care education provided to patient and/or family   HPI:    83F w/ PMHx COPD, OA, chronic back pain s/p multiple surgeries and recent admission for left intertrochanteric femur fracture s/p ORIF 12/27/2023 brought in from [facility] for shortness of breath. EMS reports that she was hypoxic on room air to the 70s. Patient was initially AOx0, after being on NRB in EMS, patient mental status improving with improved oxygenation. Daughter reports the patient was doing okay yesterday and that this was an acute change. Patient currently AOx3, appears short of breath, denying any other complaints at this time.COVID +ve. Admitted to SDU for management of acute hypoxic respiratory failure. Palliative consulted for [facility].     Interval history:     1/3: patient on high flow NC. she endorses no pain or dyspnea but is thirsty and says the bed is uncomfortable. she is able to tell me the situation of what happened to her over the course of her past 2 hospitalizations.   1/4: patient on high flow NC. Daughter Promise is at bedside. They both spoke with Dr. Gentile today. They are both hopeful she will get back to the STR and then back home but understand she may not return to her baseline. Patient denies pain or discomfort.   1/5: patient on regular nasal cannula during exam. denies dyspnea or pain. she is preparing to eat lunch. No questions. Daughter not at bedside. She is hopeful to get out of hospital ASAP.      ADVANCE DIRECTIVES:     [ ] Full Code [ X] DNR/DNI  MOLST  [ ]  Living Will  [ ]   DECISION MAKER(s): Patient 8  [ X] Health Care Proxy(s)  [ ] Surrogate(s)  [ ] Guardian           Name(s): Phone Number(s): Promise Green (dtr) is primary HCP and Mary Nuñezboni is secondary HCP       BASELINE (I)ADL(s) (prior to admission):    [place]: [ ]Total  [X ] Moderate [ ]Dependent - from STR, was living by herself independently prior to hip fx  Palliative Performance Status Version 2:         %    http://npcrc.org/files/news/palliative_performance_scale_ppsv2.pdf    Allergies    No Known Allergies    Intolerances    MEDICATIONS  (STANDING):  albuterol/ipratropium for Nebulization 3 milliLiter(s) Nebulizer every 6 hours  aspirin enteric coated 81 milliGRAM(s) Oral daily  budesonide 160 MICROgram(s)/formoterol 4.5 MICROgram(s) Inhaler 2 Puff(s) Inhalation two times a day  chlorhexidine 2% Cloths 1 Application(s) Topical <User Schedule>  doxycycline IVPB 100 milliGRAM(s) IV Intermittent every 12 hours  doxycycline IVPB      enoxaparin Injectable 40 milliGRAM(s) SubCutaneous every 24 hours  methylPREDNISolone sodium succinate Injectable 40 milliGRAM(s) IV Push two times a day  piperacillin/tazobactam IVPB.. 3.375 Gram(s) IV Intermittent every 8 hours  polyethylene glycol 3350 17 Gram(s) Oral daily  senna 2 Tablet(s) Oral at bedtime    MEDICATIONS  (PRN):  acetaminophen     Tablet .. 650 milliGRAM(s) Oral every 6 hours PRN Temp greater or equal to 38C (100.4F), Mild Pain (1 - 3)  aluminum hydroxide/magnesium hydroxide/simethicone Suspension 30 milliLiter(s) Oral every 4 hours PRN Dyspepsia  melatonin 3 milliGRAM(s) Oral at bedtime PRN Insomnia  ondansetron Injectable 4 milliGRAM(s) IV Push every 8 hours PRN Nausea and/or Vomiting    PRESENT SYMPTOMS:   Pain: [ ]yes [X ]no  QOL impact -   Location -                    Aggravating factors -  Quality -  Radiation -  Timing-  Severity (0-10 scale):  Minimal acceptable level (0-10 scale):     CPOT:    https://www.[facility].org/getattachment/[email or web address]/Critical-Care-Pain-Observation-Tool-(CPOT)    PAIN AD Score:   http://geriatrictoolkit.[place].[facility]/cog/painad.pdf (press ctrl +  left click to view)    Dyspnea:                           [X ]None[  ]Mild [ ]Moderate [ ]Severe     Respiratory Distress Observation Scale (RDOS):   A score of 0 to 2 signifies little or no respiratory distress, 3 signifies mild distress, scores 4 to 6 indicate moderate distress, and scores greater than 7 signify severe distress  [email or web address]://[email or web address]/sites/default/[email or web address]-onoaspgqmta-[email or web address]-[email or web address].pdf    Anxiety:                             [ ]None[ ]Mild [ ]Moderate [ ]Severe   Fatigue:                             [ ]None[ ]Mild [ ]Moderate [ ]Severe   Nausea:                             [ ]None[ ]Mild [ ]Moderate [ ]Severe   Loss of appetite:              [ ]None[ ]Mild [ ]Moderate [ ]Severe   Constipation:                    [ ]None[ ]Mild [ ]Moderate [ ]Severe    Other Symptoms:  [X ]All other review of systems negative     Palliative Performance Status Version 2:        30 %    http://[facility].org/files/news/palliative_performance_scale_ppsv2.pdf    PHYSICAL EXAM:  ICU Vital Signs Last 24 Hrs  T(C): 36.3 (05 Jan 2024 11:26), Max: 36.5 (05 Jan 2024 00:15)  T(F): 97.4 (05 Jan 2024 11:26), Max: 97.7 (05 Jan 2024 00:15)  HR: 83 (05 Jan 2024 11:26) (72 - 85)  BP: 172/83 (05 Jan 2024 11:26) (142/70 - 186/78)  BP(mean): 118 (05 Jan 2024 11:26) (104 - 129)  RR: 20 (05 Jan 2024 11:26) (18 - 20)  SpO2: 96% (05 Jan 2024 11:26) (96% - 100%)  O2 Parameters below as of 05 Jan 2024 11:26  Patient On (Oxygen Delivery Method): nasal cannula, high flow    GENERAL:  [X ] No acute distress [ ]Lethargic  [ ]Unarousable  [X ]Verbal  [ ]Non-Verbal [ ]Cachexia    BEHAVIORAL/PSYCH:  [X ]Alert and Oriented x 3-4  [ ] Anxiety [ ] Delirium [ ] Agitation [X ] Calm   EYES: [X ] No scleral icterus [ ] Scleral icterus [ ] Closed  ENMT:  [ ]Dry mouth  [X ]No external oral lesions [X ] No external ear or nose lesions  CARDIOVASCULAR:  [ ]Regular [ ]Irregular [ ]Tachy [ ]Not Tachy  [ ]Norberto [ ] Edema [ X] No edema  PULMONARY:  [ ]Tachypnea  [ ]Audible excessive secretions [ X] No labored breathing [ ] labored breathing  GASTROINTESTINAL: [X ]Soft  [ ]Distended  [ ]Not distended [ ]Non tender [ ]Tender  MUSCULOSKELETAL: [X ]No clubbing [ ] clubbing  [ ] No cyanosis [ ] cyanosis  NEUROLOGIC: [ X]No focal deficits  [ X]Follows commands  [ ]Does not follow commands  [ ]Cognitive impairment  [ ]Dysphagia  [ ]Dysarthria  [ ]Paresis   SKIN: [ ] Jaundiced [X] Non-jaundiced [ ]Rash [ ]No Rash [ ] Warm [ ] Dry  MISC/LINES: [ ] ET tube [ ] Trach [ ]NGT/OGT [ ]PEG [ ]Cuadra    LABS: reviewed by me    01-05    127<L>  |  87<L>  |  16  ----------------------------<  108<H>  4.1   |  28  |  <0.5<L>    Ca    7.7<L>      05 Jan 2024 05:05  Mg     2.0     01-05    TPro  4.8<L>  /  Alb  2.6<L>  /  TBili  1.0  /  DBili  x   /  AST  25  /  ALT  23  /  AlkPhos  80  01-05                            8.9    13.91 )-----------( 361      ( 05 Jan 2024 05:05 )             25.9     RADIOLOGY & ADDITIONAL STUDIES: reviewed by me    < from: Xray Chest 1 View-PORTABLE IMMEDIATE (Xray Chest 1 View-PORTABLE IMMEDIATE .) (01.03.24 @ 09:47) >  Findings:    Support devices: Telemetry leads are seen    Cardiac/mediastinum/hilum: Aorta is atherosclerotic.    Lung parenchyma/Pleura: Hyperinflation with basilar opacities    Skeleton/soft tissues: Unremarkable.    Impression:    COPD with basilar opacifications, worsened.        --- End of Report ---    < end of copied text >      EKG: reviewed by me    < from: 12 Lead ECG (12.31.23 @ 18:36) >  Ventricular Rate 122 BPM    Atrial Rate 86 BPM    QRS Duration 122 ms    Q-T Interval 310 ms    QTC Calculation(Bazett) 441 ms    R Axis 125 degrees    T Axis 56 degrees    Diagnosis Line Atrial flutter with variable A-V block  Right bundle branch block  Abnormal ECG    Confirmed by Licha Wong MD ([id number]) on 1/1/2024 11:32:55 AM    < end of copied text >      Patient discussed with primary medical team MD  Palliative care education provided to patient and/or family

## 2024-01-05 NOTE — PROGRESS NOTE ADULT - ASSESSMENT
**THIS NOTE IS IMCOMPLETE**    83F w/ PMHx COPD, OA, chronic back pain s/p multiple surgeries and recent admission for left intertrochanteric femur fracture s/p ORIF 12/27/2023 brought in from [facility] for shortness of breath.    #Acute Hypoxic Respiratory Failure and severe sepsis secondary to COVID 19   #Superimposed bacterial pneumonia  - cont Abx; Doxy + Zosyn  - Solumedrol 40 mg BID  - Remdesivir on hold as per patient's daughter's request  - duoneb q6, symbicort  - c/w HFNC at 40%, 40 LPM  - MRSA nares positive, --> ordered bactroban  - CTA Chest shows: no evidence of acute pulmonary embolus. Bilateral lower lobe consolidative opacities which can be seen with pneumonia  - 1/3 CXR- COPD with basilar opacifications, worsened.  - holding Lasix  - 1/4 ID f/u-  CRP 46.9, f/u Procalcitonin   - c/w zosyn 3.375 mg q 8 hours; doxycycline 100 mg BID for 5 days (started 1/1, last day today 1/5)     #NSTEMI Type II  - EKG showed sinus tachy w/ PACs  - Trop 208>192 (previously 19)  - trend, monitor repeat  - if remains elevated or worsens, consider cardio consult  - obtaining lipid profile, may need to start statin    #ALAN -- improved    #Hyponatremia  - Na 124 --> 127    #Chronic Anemia  - Hgb 10.0 (at previous baseline)  - monitor H&H  - f/u o/p    #left intertrochanteric femur fracture s/p ORIF 12/27/2023 /OA/ Chronic Back Pain s/p multiple surgeries  - from rehab  - cont Lovenox  - PT recommended eventual d/c to rehab  - continue to f/u PT progress    #DVT ppx: Lovenox  #GI ppx: none  #Diet: DASH/TLC   #Activity: IAT, Weight bearing 50% LLE, WBAT RLE  #Code status: DNR/DNI (Trial NIV)  #Disposition: SDU    Pending: weaning off of HFNC   **THIS NOTE IS IMCOMPLETE**    83F w/ PMHx COPD, OA, chronic back pain s/p multiple surgeries and recent admission for left intertrochanteric femur fracture s/p ORIF 12/27/2023 brought in from [facility] for shortness of breath.    #Acute Hypoxic Respiratory Failure and severe sepsis secondary to COVID 19   #Superimposed bacterial pneumonia  - cont Abx; Doxy + Zosyn  - Solumedrol 40 mg BID  - Remdesivir on hold as per patient's daughter's request  - duoneb q6, symbicort  - c/w HFNC at 40%, 40 LPM  - MRSA nares positive, --> ordered bactroban  - CTA Chest shows: no evidence of acute pulmonary embolus. Bilateral lower lobe consolidative opacities which can be seen with pneumonia  - 1/3 CXR- COPD with basilar opacifications, worsened.  - holding Lasix  - 1/4 ID f/u-  CRP 46.9, f/u Procalcitonin   - c/w zosyn 3.375 mg q 8 hours; doxycycline 100 mg BID for 5 days (started 1/1, last day today 1/5)     #NSTEMI Type II  - EKG showed sinus tachy w/ PACs  - Trop 208>192 (previously 19)  - trend, monitor repeat  - if remains elevated or worsens, consider cardio consult  - obtaining lipid profile, may need to start statin    #ALAN -- improved    #Hyponatremia  - Na 124 --> 127    #Chronic Anemia  - Hgb 10.0 (at previous baseline)  - monitor H&H  - f/u o/p    #left intertrochanteric femur fracture s/p ORIF 12/27/2023 /OA/ Chronic Back Pain s/p multiple surgeries  - from rehab  - cont Lovenox  - PT recommended eventual d/c to rehab  - continue to f/u PT progress    #DVT ppx: Lovenox  #GI ppx: none  #Diet: DASH/TLC   #Activity: IAT, Weight bearing 50% LLE, WBAT RLE  #Code status: DNR/DNI (Trial NIV)  #Disposition: SDU    Pending: weaning off of HFNC   83F w/ PMHx COPD, OA, chronic back pain s/p multiple surgeries and recent admission for left intertrochanteric femur fracture s/p ORIF 12/27/2023 brought in from [facility] for shortness of breath.    #Acute Hypoxic Respiratory Failure and severe sepsis secondary to COVID 19   #Superimposed bacterial pneumonia  - cont Abx; Doxy + Zosyn  - Solumedrol 40 mg BID  - Remdesivir on hold as per patient's daughter's request  - duoneb q6, symbicort  - MRSA nares positive, --> ordered bactroban  - CTA Chest shows: no evidence of acute pulmonary embolus. Bilateral lower lobe consolidative opacities which can be seen with pneumonia  - 1/4 ID f/u-  CRP 46.9, f/u Procalcitonin   - c/w zosyn 3.375 mg q 8 hours; doxycycline 100 mg BID for 5 days (started 1/1, last day today 1/5)   - c/w Lasix 20 mg IV qd  - 1/5- patient noted to be saturating well when HFNC was not in her nose; transition to NC today  - 1/5 AM CXR appears improved on my read    #NSTEMI Type II  - EKG showed sinus tachy w/ PACs  - Trop 208>192 (previously 19)  - trend, monitor repeat  - if remains elevated or worsens, consider cardio consult  - obtaining lipid profile, may need to start statin    #ALAN -- improved    #Hyponatremia  - Na 127, appears chronic    #Chronic Anemia  - Hgb 8.9 (b/l ~10)  - monitor H&H  - f/u o/p    #left intertrochanteric femur fracture s/p ORIF 12/27/2023 /OA/ Chronic Back Pain s/p multiple surgeries  - from rehab  - cont Lovenox  - for pain control added standing Tylenol q6h, if not improving, will add Oxycodone 5 PRN  - PT recommended eventual d/c to rehab  - continue to f/u PT progress    #DVT ppx: Lovenox  #GI ppx: none  #Diet: DASH/TLC   #Activity: IAT, Weight bearing 50% LLE, WBAT RLE  #Code status: DNR/DNI (Trial NIV)  #Disposition: SDU    Pending: weaning off of HFNC   83F w/ PMHx COPD, OA, chronic back pain s/p multiple surgeries and recent admission for left intertrochanteric femur fracture s/p ORIF 12/27/2023 brought in from [facility] for shortness of breath.    #Acute Hypoxic Respiratory Failure and severe sepsis secondary to COVID 19   #Superimposed bacterial pneumonia  - cont Abx; Doxy + Zosyn  - Solumedrol 40 mg BID  - Remdesivir on hold as per patient's daughter's request  - duoneb q6, symbicort  - MRSA nares positive, --> ordered bactroban  - CTA Chest shows: no evidence of acute pulmonary embolus. Bilateral lower lobe consolidative opacities which can be seen with pneumonia  - 1/4 ID f/u-  CRP 46.9, f/u Procalcitonin   - c/w zosyn 3.375 mg q 8 hours; doxycycline 100 mg BID for 5 days (started 1/1, last day today 1/5)   - c/w Lasix 20 mg IV qd  - 1/5- patient noted to be saturating well when HFNC was not in her nose; transition to NC today  - 1/5 AM CXR appears improved on my read    #NSTEMI Type II  - EKG showed sinus tachy w/ PACs  - Trop 208>192 (previously 19)  - trend, monitor repeat  - if remains elevated or worsens, consider cardio consult  - obtaining lipid profile, may need to start statin    #ALAN -- improved    #Hyponatremia  - Na 127, appears chronic    #Chronic Anemia  - Hgb 8.9 (b/l ~10)  - monitor H&H  - f/u o/p    #left intertrochanteric femur fracture s/p ORIF 12/27/2023 /OA/ Chronic Back Pain s/p multiple surgeries  - from rehab  - cont Lovenox  - for pain control added standing Tylenol q6h, if not improving, will add Oxycodone 5 PRN  - PT recommended eventual d/c to rehab  - continue to f/u PT progress    #DVT ppx: Lovenox  #GI ppx: none  #Diet: DASH/TLC   #Activity: IAT, Weight bearing 50% LLE, WBAT RLE  #Code status: DNR/DNI (Trial NIV)  #Disposition: SDU    Pending: weaning off of HFNC   83F w/ PMHx COPD, OA, chronic back pain s/p multiple surgeries and recent admission for left intertrochanteric femur fracture s/p ORIF 12/27/2023 brought in from [facility] for shortness of breath.    #Acute Hypoxic Respiratory Failure and severe sepsis secondary to COVID 19   #Superimposed bacterial pneumonia  - cont Abx; Doxy + Zosyn  - Solumedrol 40 mg BID  - Remdesivir on hold as per patient's daughter's request  - duoneb q6, symbicort  - MRSA nares positive, --> ordered bactroban  - CTA Chest shows: no evidence of acute pulmonary embolus. Bilateral lower lobe consolidative opacities which can be seen with pneumonia  - 1/4 ID f/u-  CRP 46.9, f/u Procalcitonin   - c/w zosyn 3.375 mg q 8 hours; doxycycline 100 mg BID for 5 days (started 1/1, last day today 1/5)   - c/w Lasix 20 mg IV qd  - 1/5- patient noted to be saturating well when HFNC was not in her nose; transition to NC today  - 1/5 AM CXR appears improved on my read    #NSTEMI Type II  - EKG showed sinus tachy w/ PACs  - Trop 208>192 (previously 19)  - trend, monitor repeat  - if remains elevated or worsens, consider cardio consult  - obtaining lipid profile, may need to start statin    #HTN  - d/c IVF  - 1/5 started amlodipine 5 mg qd    #ALAN -- improved    #Hyponatremia  - Na 127, appears chronic    #Chronic Anemia  - Hgb 8.9 (b/l ~10)  - monitor H&H  - f/u o/p    #left intertrochanteric femur fracture s/p ORIF 12/27/2023 /OA/ Chronic Back Pain s/p multiple surgeries  - from rehab  - cont Lovenox  - for pain control added standing Tylenol q6h, if not improving, will add Oxycodone 5 PRN  - PT recommended eventual d/c to rehab  - continue to f/u PT progress    #DVT ppx: Lovenox  #GI ppx: none  #Diet: DASH/TLC   #Activity: IAT, Weight bearing 50% LLE, WBAT RLE  #Code status: DNR/DNI (Trial NIV)  #Disposition: SDU    Pending: weaning off of HFNC   83F w/ PMHx COPD, OA, chronic back pain s/p multiple surgeries and recent admission for left intertrochanteric femur fracture s/p ORIF 12/27/2023 brought in from [facility] for shortness of breath.    #Acute Hypoxic Respiratory Failure and severe sepsis secondary to COVID 19   #Superimposed bacterial pneumonia  - cont Abx; Doxy + Zosyn  - Solumedrol 40 mg BID  - Remdesivir on hold as per patient's daughter's request  - duoneb q6, symbicort  - MRSA nares positive, --> ordered bactroban  - CTA Chest shows: no evidence of acute pulmonary embolus. Bilateral lower lobe consolidative opacities which can be seen with pneumonia  - 1/4 ID f/u-  CRP 46.9, f/u Procalcitonin   - c/w zosyn 3.375 mg q 8 hours; doxycycline 100 mg BID for 5 days (started 1/1, last day today 1/5)   - c/w Lasix 20 mg IV qd  - 1/5- patient noted to be saturating well when HFNC was not in her nose; transition to NC today  - 1/5 AM CXR appears improved on my read    #NSTEMI Type II  - EKG showed sinus tachy w/ PACs  - Trop 208>192 (previously 19)  - trend, monitor repeat  - if remains elevated or worsens, consider cardio consult  - obtaining lipid profile, may need to start statin    #HTN  - d/c IVF  - 1/5 started amlodipine 5 mg qd    #ALAN -- improved    #Hyponatremia  - Na 127, appears chronic    #Chronic Anemia  - Hgb 8.9 (b/l ~10)  - monitor H&H  - f/u o/p    #left intertrochanteric femur fracture s/p ORIF 12/27/2023 /OA/ Chronic Back Pain s/p multiple surgeries  - from rehab  - cont Lovenox  - for pain control added standing Tylenol q6h, if not improving, will add Oxycodone 5 PRN  - PT recommended eventual d/c to rehab  - continue to f/u PT progress    #DVT ppx: Lovenox  #GI ppx: none  #Diet: DASH/TLC   #Activity: IAT, Weight bearing 50% LLE, WBAT RLE  #Code status: DNR/DNI (Trial NIV)  #Disposition: SDU    Pending: weaning off of HFNC

## 2024-01-05 NOTE — PROGRESS NOTE ADULT - SUBJECTIVE AND OBJECTIVE BOX
Over Night Events: events noted, on HHFNC 50%, Afebrile    PHYSICAL EXAM    ICU Vital Signs Last 24 Hrs  T(C): 36.2 (05 Jan 2024 04:00), Max: 36.6 (04 Jan 2024 08:00)  T(F): 97.2 (05 Jan 2024 04:00), Max: 97.8 (04 Jan 2024 08:00)  HR: 76 (05 Jan 2024 04:00) (72 - 82)  BP: 159/79 (05 Jan 2024 04:00) (142/70 - 186/78)  BP(mean): 111 (05 Jan 2024 04:00) (104 - 111  RR: 18 (04 Jan 2024 21:00) (18 - 18)  SpO2: 100% (05 Jan 2024 04:00) (94% - 100%)    O2 Parameters below as of 05 Jan 2024 04:00  Patient On (Oxygen Delivery Method): nasal cannula, high flow            General: chronically ill looking  Lungs: Bilateral rhonchi  Cardiovascular: KAREN 2.6  Abdomen: Soft, Positive BS  Extremities: No clubbing   Skin: Warm  Neurological: Non focal       01-04-24 @ 07:01  -  01-05-24 @ 07:00  --------------------------------------------------------  IN:  Total IN: 0 mL    OUT:    Indwelling Catheter - Urethral (mL): 1550 mL  Total OUT: 1550 mL    Total NET: -1550 mL          LABS:                          8.9    13.91 )-----------( 361      ( 05 Jan 2024 05:05 )             25.9                                               01-05    127<L>  |  87<L>  |  16  ----------------------------<  108<H>  4.1   |  28  |  <0.5<L>    Ca    7.7<L>      05 Jan 2024 05:05  Mg     2.0     01-05    TPro  4.8<L>  /  Alb  2.6<L>  /  TBili  1.0  /  DBili  x   /  AST  25  /  ALT  23  /  AlkPhos  80  01-05                                             Urinalysis Basic - ( 05 Jan 2024 05:05 )    Color: x / Appearance: x / SG: x / pH: x  Gluc: 108 mg/dL / Ketone: x  / Bili: x / Urobili: x   Blood: x / Protein: x / Nitrite: x   Leuk Esterase: x / RBC: x / WBC x   Sq Epi: x / Non Sq Epi: x / Bacteria: x                                                  LIVER FUNCTIONS - ( 05 Jan 2024 05:05 )  Alb: 2.6 g/dL / Pro: 4.8 g/dL / ALK PHOS: 80 U/L / ALT: 23 U/L / AST: 25 U/L / GGT: x                                                                                                                                   ABG - ( 04 Jan 2024 09:14 )  pH, Arterial: 7.50  pH, Blood: x     /  pCO2: 39    /  pO2: 81    / HCO3: 30    / Base Excess: 6.7   /  SaO2: 98.6                MEDICATIONS  (STANDING):  albuterol/ipratropium for Nebulization 3 milliLiter(s) Nebulizer every 6 hours  aspirin enteric coated 81 milliGRAM(s) Oral daily  budesonide 160 MICROgram(s)/formoterol 4.5 MICROgram(s) Inhaler 2 Puff(s) Inhalation two times a day  chlorhexidine 2% Cloths 1 Application(s) Topical <User Schedule>  doxycycline IVPB 100 milliGRAM(s) IV Intermittent every 12 hours  doxycycline IVPB      enoxaparin Injectable 40 milliGRAM(s) SubCutaneous every 24 hours  methylPREDNISolone sodium succinate Injectable 40 milliGRAM(s) IV Push two times a day  mupirocin 2% Ointment 1 Application(s) Topical two times a day  piperacillin/tazobactam IVPB.. 3.375 Gram(s) IV Intermittent every 8 hours  polyethylene glycol 3350 17 Gram(s) Oral daily  senna 2 Tablet(s) Oral at bedtime  sodium chloride 0.9%. 1000 milliLiter(s) (100 mL/Hr) IV Continuous <Continuous>    MEDICATIONS  (PRN):  acetaminophen     Tablet .. 650 milliGRAM(s) Oral every 6 hours PRN Temp greater or equal to 38C (100.4F), Mild Pain (1 - 3)  aluminum hydroxide/magnesium hydroxide/simethicone Suspension 30 milliLiter(s) Oral every 4 hours PRN Dyspepsia  melatonin 3 milliGRAM(s) Oral at bedtime PRN Insomnia  ondansetron Injectable 4 milliGRAM(s) IV Push every 8 hours PRN Nausea and/or Vomiting

## 2024-01-05 NOTE — PROGRESS NOTE ADULT - SUBJECTIVE AND OBJECTIVE BOX
83F w/ PMHx COPD, OA, chronic back pain s/p multiple surgeries and recent admission for left intertrochanteric femur fracture s/p ORIF 12/27/2023 brought in from [facility] for shortness of breath, was diagnosed with COVID with suspected underlying hospital acquired PNA, pt was in acute respiratory distress, on high flow oxygen now.   Today pt feels better, transitioned to NC, comfortable, weak, not in pain at rest.     PAST MEDICAL & SURGICAL HISTORY  COPD (chronic obstructive pulmonary disease)    H/O cholelithiasis    Smoker    History of surgery  orif right ankle      SOCIAL HISTORY:  Social History:      ALLERGIES:  No Known Allergies      VITALS:   T(C): 36.3 (05 Jan 2024 11:26), Max: 36.5 (05 Jan 2024 00:15)  T(F): 97.4 (05 Jan 2024 11:26), Max: 97.7 (05 Jan 2024 00:15)  HR: 83 (05 Jan 2024 11:26) (72 - 85)  BP: 172/83 (05 Jan 2024 11:26) (159/79 - 186/78)  BP(mean): 118 (05 Jan 2024 11:26) (104 - 129)  RR: 20 (05 Jan 2024 11:26) (18 - 20)  SpO2: 96% (05 Jan 2024 11:26) (96% - 100%)    Parameters below as of 05 Jan 2024 11:26  Patient On (Oxygen Delivery Method): nasal cannula, high flow        PHYSICAL EXAM:  GENERAL:  NAD, frail elderly female  NECK: supple, no JVD  CV: S1, S2, RRR  Lungs: decreased BS at bases, no wheezing  Abdomen/GI: soft, scaphoid, NT,ND, BS present  Extremities: surgical site looks clean no ecchymoses dry dressing ( left hip) , no edema on LE  Neuro: AAOx3, clear speech, pt is following commands, moving all extremities         LABS:                                              8.9    13.91 )-----------( 361      ( 05 Jan 2024 05:05 )             25.9   01-05    127<L>  |  87<L>  |  16  ----------------------------<  108<H>  4.1   |  28  |  <0.5<L>    Ca    7.7<L>      05 Jan 2024 05:05  Mg     2.0     01-05    TPro  4.8<L>  /  Alb  2.6<L>  /  TBili  1.0  /  DBili  x   /  AST  25  /  ALT  23  /  AlkPhos  80  01-05        Urinalysis Basic - ( 04 Jan 2024 06:20 )    Color: x / Appearance: x / SG: x / pH: x  Gluc: 139 mg/dL / Ketone: x  / Bili: x / Urobili: x   Blood: x / Protein: x / Nitrite: x   Leuk Esterase: x / RBC: x / WBC x   Sq Epi: x / Non Sq Epi: x / Bacteria: x  ABG - ( 04 Jan 2024 09:14 )  pH, Arterial: 7.50  pH, Blood: x     /  pCO2: 39    /  pO2: 81    / HCO3: 30    / Base Excess: 6.7   /  SaO2: 98.6      RADIOLOGY:  < from: Xray Chest 1 View-PORTABLE IMMEDIATE (Xray Chest 1 View-PORTABLE IMMEDIATE .) (01.03.24 @ 09:47) >    Impression:    COPD with basilar opacifications, worsened.    < end of copied text >  < from: [facility] Duplex Lower Ext Vein Scan, Bilat (01.02.24 @ 11:03) >    IMPRESSION:  No evidence of deep venous thrombosis in either lower extremity.    < end of copied text >  < from: CT Angio Chest PE Protocol w/ IV Cont (12.31.23 @ 18:26) >  IMPRESSION:    No CTA evidence of acute pulmonary embolus.    Bilateral lower lobe consolidative opacities which can be seen with   pneumonia. Recommend radiographic follow-up after treatment.    MEDICATIONS  (STANDING):  acetaminophen     Tablet .. 650 milliGRAM(s) Oral every 6 hours  albuterol/ipratropium for Nebulization 3 milliLiter(s) Nebulizer every 6 hours  amLODIPine   Tablet 5 milliGRAM(s) Oral daily  aspirin enteric coated 81 milliGRAM(s) Oral daily  budesonide 160 MICROgram(s)/formoterol 4.5 MICROgram(s) Inhaler 2 Puff(s) Inhalation two times a day  chlorhexidine 2% Cloths 1 Application(s) Topical <User Schedule>  doxycycline IVPB      doxycycline IVPB 100 milliGRAM(s) IV Intermittent every 12 hours  enoxaparin Injectable 40 milliGRAM(s) SubCutaneous every 24 hours  methylPREDNISolone sodium succinate Injectable 40 milliGRAM(s) IV Push two times a day  mupirocin 2% Ointment 1 Application(s) Topical two times a day  piperacillin/tazobactam IVPB.. 3.375 Gram(s) IV Intermittent every 8 hours  polyethylene glycol 3350 17 Gram(s) Oral daily  senna 2 Tablet(s) Oral at bedtime    MEDICATIONS  (PRN):  aluminum hydroxide/magnesium hydroxide/simethicone Suspension 30 milliLiter(s) Oral every 4 hours PRN Dyspepsia  melatonin 3 milliGRAM(s) Oral at bedtime PRN Insomnia  ondansetron Injectable 4 milliGRAM(s) IV Push every 8 hours PRN Nausea and/or Vomiting             83F w/ PMHx COPD, OA, chronic back pain s/p multiple surgeries and recent admission for left intertrochanteric femur fracture s/p ORIF 12/27/2023 brought in from [facility] for shortness of breath, was diagnosed with COVID with suspected underlying hospital acquired PNA, pt was in acute respiratory distress, on high flow oxygen now.   Today pt feels better, transitioned to NC, comfortable, weak, not in pain at rest.     PAST MEDICAL & SURGICAL HISTORY  COPD (chronic obstructive pulmonary disease)    H/O cholelithiasis    Smoker    History of surgery  orif right ankle      SOCIAL HISTORY:  Social History:      ALLERGIES:  No Known Allergies      VITALS:   T(C): 36.3 (05 Jan 2024 11:26), Max: 36.5 (05 Jan 2024 00:15)  T(F): 97.4 (05 Jan 2024 11:26), Max: 97.7 (05 Jan 2024 00:15)  HR: 83 (05 Jan 2024 11:26) (72 - 85)  BP: 172/83 (05 Jan 2024 11:26) (159/79 - 186/78)  BP(mean): 118 (05 Jan 2024 11:26) (104 - 129)  RR: 20 (05 Jan 2024 11:26) (18 - 20)  SpO2: 96% (05 Jan 2024 11:26) (96% - 100%)    Parameters below as of 05 Jan 2024 11:26  Patient On (Oxygen Delivery Method): nasal cannula, high flow        PHYSICAL EXAM:  GENERAL:  NAD, frail elderly female  NECK: supple, no JVD  CV: S1, S2, RRR  Lungs: decreased BS at bases, no wheezing  Abdomen/GI: soft, scaphoid, NT,ND, BS present  Extremities: surgical site looks clean no ecchymoses dry dressing ( left hip) , no edema on LE  Neuro: AAOx3, clear speech, pt is following commands, moving all extremities         LABS:                                              8.9    13.91 )-----------( 361      ( 05 Jan 2024 05:05 )             25.9   01-05    127<L>  |  87<L>  |  16  ----------------------------<  108<H>  4.1   |  28  |  <0.5<L>    Ca    7.7<L>      05 Jan 2024 05:05  Mg     2.0     01-05    TPro  4.8<L>  /  Alb  2.6<L>  /  TBili  1.0  /  DBili  x   /  AST  25  /  ALT  23  /  AlkPhos  80  01-05        Urinalysis Basic - ( 04 Jan 2024 06:20 )    Color: x / Appearance: x / SG: x / pH: x  Gluc: 139 mg/dL / Ketone: x  / Bili: x / Urobili: x   Blood: x / Protein: x / Nitrite: x   Leuk Esterase: x / RBC: x / WBC x   Sq Epi: x / Non Sq Epi: x / Bacteria: x  ABG - ( 04 Jan 2024 09:14 )  pH, Arterial: 7.50  pH, Blood: x     /  pCO2: 39    /  pO2: 81    / HCO3: 30    / Base Excess: 6.7   /  SaO2: 98.6      RADIOLOGY:  < from: Xray Chest 1 View-PORTABLE IMMEDIATE (Xray Chest 1 View-PORTABLE IMMEDIATE .) (01.03.24 @ 09:47) >    Impression:    COPD with basilar opacifications, worsened.    < end of copied text >  < from: [facility] Duplex Lower Ext Vein Scan, Bilat (01.02.24 @ 11:03) >    IMPRESSION:  No evidence of deep venous thrombosis in either lower extremity.    < end of copied text >  < from: CT Angio Chest PE Protocol w/ IV Cont (12.31.23 @ 18:26) >  IMPRESSION:    No CTA evidence of acute pulmonary embolus.    Bilateral lower lobe consolidative opacities which can be seen with   pneumonia. Recommend radiographic follow-up after treatment.    MEDICATIONS  (STANDING):  acetaminophen     Tablet .. 650 milliGRAM(s) Oral every 6 hours  albuterol/ipratropium for Nebulization 3 milliLiter(s) Nebulizer every 6 hours  amLODIPine   Tablet 5 milliGRAM(s) Oral daily  aspirin enteric coated 81 milliGRAM(s) Oral daily  budesonide 160 MICROgram(s)/formoterol 4.5 MICROgram(s) Inhaler 2 Puff(s) Inhalation two times a day  chlorhexidine 2% Cloths 1 Application(s) Topical <User Schedule>  doxycycline IVPB      doxycycline IVPB 100 milliGRAM(s) IV Intermittent every 12 hours  enoxaparin Injectable 40 milliGRAM(s) SubCutaneous every 24 hours  methylPREDNISolone sodium succinate Injectable 40 milliGRAM(s) IV Push two times a day  mupirocin 2% Ointment 1 Application(s) Topical two times a day  piperacillin/tazobactam IVPB.. 3.375 Gram(s) IV Intermittent every 8 hours  polyethylene glycol 3350 17 Gram(s) Oral daily  senna 2 Tablet(s) Oral at bedtime    MEDICATIONS  (PRN):  aluminum hydroxide/magnesium hydroxide/simethicone Suspension 30 milliLiter(s) Oral every 4 hours PRN Dyspepsia  melatonin 3 milliGRAM(s) Oral at bedtime PRN Insomnia  ondansetron Injectable 4 milliGRAM(s) IV Push every 8 hours PRN Nausea and/or Vomiting

## 2024-01-05 NOTE — PROGRESS NOTE ADULT - SUBJECTIVE AND OBJECTIVE BOX
SUBJ:      MEDICATIONS  (STANDING):  albuterol/ipratropium for Nebulization 3 milliLiter(s) Nebulizer every 6 hours  aspirin enteric coated 81 milliGRAM(s) Oral daily  budesonide 160 MICROgram(s)/formoterol 4.5 MICROgram(s) Inhaler 2 Puff(s) Inhalation two times a day  chlorhexidine 2% Cloths 1 Application(s) Topical <User Schedule>  doxycycline IVPB      doxycycline IVPB 100 milliGRAM(s) IV Intermittent every 12 hours  enoxaparin Injectable 40 milliGRAM(s) SubCutaneous every 24 hours  methylPREDNISolone sodium succinate Injectable 40 milliGRAM(s) IV Push two times a day  mupirocin 2% Ointment 1 Application(s) Topical two times a day  piperacillin/tazobactam IVPB.. 3.375 Gram(s) IV Intermittent every 8 hours  polyethylene glycol 3350 17 Gram(s) Oral daily  senna 2 Tablet(s) Oral at bedtime  sodium chloride 0.9%. 1000 milliLiter(s) (100 mL/Hr) IV Continuous <Continuous>    MEDICATIONS  (PRN):  acetaminophen     Tablet .. 650 milliGRAM(s) Oral every 6 hours PRN Temp greater or equal to 38C (100.4F), Mild Pain (1 - 3)  aluminum hydroxide/magnesium hydroxide/simethicone Suspension 30 milliLiter(s) Oral every 4 hours PRN Dyspepsia  melatonin 3 milliGRAM(s) Oral at bedtime PRN Insomnia  ondansetron Injectable 4 milliGRAM(s) IV Push every 8 hours PRN Nausea and/or Vomiting            Vital Signs Last 24 Hrs  T(C): 36.2 (2024 07:50), Max: 36.5 (2024 00:15)  T(F): 97.2 (2024 07:50), Max: 97.7 (2024 00:15)  HR: 85 (2024 07:50) (72 - 85)  BP: 178/98 (2024 07:50) (142/70 - 186/78)  BP(mean): 129 (2024 07:50) (104 - 129)  RR: 20 (2024 07:50) (18 - 20)  SpO2: 100% (2024 07:50) (100% - 100%)    Parameters below as of 2024 07:50  Patient On (Oxygen Delivery Method): nasal cannula, high flow         REVIEW OF SYSTEMS:  CONSTITUTIONAL: No fever, weight loss, or fatigue  CARDIOLOGY: PAtient denies chest pain, shortness of breath or syncopal episodes.   RESPIRATORY: denies shortness of breath, wheezeing.   NEUROLOGICAL: NO weakness, no focal deficits to report.  GI: no BRBPR, no N,V,diarrhea.    PSYCHIATRY: normal mood and affect  HEENT: no nasal discharge, no ecchymosis  SKIN: no ecchymosis, no breakdown  MUSCULOSKELETAL: Full range of motion x4.        PHYSICAL EXAM:  · CONSTITUTIONAL:	Well-developed, well nourished     ·RESPIRATORY:   airway patent; breath sounds equal but decrease bilaterally; good air movement; respirations non-labored   · CARDIOVASCULAR	regular rate and rhythm  no rub  + SE murmur   · EXTREMITIES: No cyanosis, clubbing or edema  · VASCULAR: absent PT bilaterally   	  TELEMETRY: Sinus rhythm/Frequent PACs/PVCs     ECG: Sinus (EC2023: Sinus tachycardia with frequent PACs versus Atrial Flutter)       LABS:                        8.9    13.91 )-----------( 361      ( 2024 05:05 )             25.9     01-05    127<L>  |  87<L>  |  16  ----------------------------<  108<H>  4.1   |  28  |  <0.5<L>    Ca    7.7<L>      2024 05:05  Mg     2.0         TPro  4.8<L>  /  Alb  2.6<L>  /  TBili  1.0  /  DBili  x   /  AST  25  /  ALT  23  /  AlkPhos  80  -            I&O's Summary    2024 07:01  -  2024 07:00  --------------------------------------------------------  IN: 0 mL / OUT: 1550 mL / NET: -1550 mL      BNP  RADIOLOGY & ADDITIONAL STUDIES:    IMPRESSION AND PLAN:

## 2024-01-05 NOTE — PROGRESS NOTE ADULT - PROBLEM SELECTOR PLAN 1
suspected copd exacerbation, with CAP, covid  cta chest with bl opacities/ consolidations  bcx ntd  mrsa positive  zosyn, doxy  solumedrol 40 iv bid  requiring hfnc at 60lpm, 60%  duoneb q6, symbicort
2/2 COVID, COPD not on home O2  continue to wean high flow as tolerated  recommendations as per pulmonology  continue with IV antibiotics

## 2024-01-06 LAB
ALBUMIN SERPL ELPH-MCNC: 2.7 G/DL — LOW (ref 3.5–5.2)
ALBUMIN SERPL ELPH-MCNC: 2.7 G/DL — LOW (ref 3.5–5.2)
ALP SERPL-CCNC: 83 U/L — SIGNIFICANT CHANGE UP (ref 30–115)
ALP SERPL-CCNC: 83 U/L — SIGNIFICANT CHANGE UP (ref 30–115)
ALT FLD-CCNC: 21 U/L — SIGNIFICANT CHANGE UP (ref 0–41)
ALT FLD-CCNC: 21 U/L — SIGNIFICANT CHANGE UP (ref 0–41)
ANION GAP SERPL CALC-SCNC: 9 MMOL/L — SIGNIFICANT CHANGE UP (ref 7–14)
ANION GAP SERPL CALC-SCNC: 9 MMOL/L — SIGNIFICANT CHANGE UP (ref 7–14)
AST SERPL-CCNC: 14 U/L — SIGNIFICANT CHANGE UP (ref 0–41)
AST SERPL-CCNC: 14 U/L — SIGNIFICANT CHANGE UP (ref 0–41)
BASOPHILS # BLD AUTO: 0.02 K/UL — SIGNIFICANT CHANGE UP (ref 0–0.2)
BASOPHILS # BLD AUTO: 0.02 K/UL — SIGNIFICANT CHANGE UP (ref 0–0.2)
BASOPHILS NFR BLD AUTO: 0.1 % — SIGNIFICANT CHANGE UP (ref 0–1)
BASOPHILS NFR BLD AUTO: 0.1 % — SIGNIFICANT CHANGE UP (ref 0–1)
BILIRUB SERPL-MCNC: 0.9 MG/DL — SIGNIFICANT CHANGE UP (ref 0.2–1.2)
BILIRUB SERPL-MCNC: 0.9 MG/DL — SIGNIFICANT CHANGE UP (ref 0.2–1.2)
BUN SERPL-MCNC: 23 MG/DL — HIGH (ref 10–20)
BUN SERPL-MCNC: 23 MG/DL — HIGH (ref 10–20)
CALCIUM SERPL-MCNC: 7.9 MG/DL — LOW (ref 8.4–10.4)
CALCIUM SERPL-MCNC: 7.9 MG/DL — LOW (ref 8.4–10.4)
CHLORIDE SERPL-SCNC: 87 MMOL/L — LOW (ref 98–110)
CHLORIDE SERPL-SCNC: 87 MMOL/L — LOW (ref 98–110)
CHOLEST SERPL-MCNC: 121 MG/DL — SIGNIFICANT CHANGE UP
CHOLEST SERPL-MCNC: 121 MG/DL — SIGNIFICANT CHANGE UP
CO2 SERPL-SCNC: 30 MMOL/L — SIGNIFICANT CHANGE UP (ref 17–32)
CO2 SERPL-SCNC: 30 MMOL/L — SIGNIFICANT CHANGE UP (ref 17–32)
CREAT SERPL-MCNC: 0.5 MG/DL — LOW (ref 0.7–1.5)
CREAT SERPL-MCNC: 0.5 MG/DL — LOW (ref 0.7–1.5)
CULTURE RESULTS: SIGNIFICANT CHANGE UP
EGFR: 92 ML/MIN/1.73M2 — SIGNIFICANT CHANGE UP
EGFR: 92 ML/MIN/1.73M2 — SIGNIFICANT CHANGE UP
EOSINOPHIL # BLD AUTO: 0.01 K/UL — SIGNIFICANT CHANGE UP (ref 0–0.7)
EOSINOPHIL # BLD AUTO: 0.01 K/UL — SIGNIFICANT CHANGE UP (ref 0–0.7)
EOSINOPHIL NFR BLD AUTO: 0.1 % — SIGNIFICANT CHANGE UP (ref 0–8)
EOSINOPHIL NFR BLD AUTO: 0.1 % — SIGNIFICANT CHANGE UP (ref 0–8)
GLUCOSE SERPL-MCNC: 80 MG/DL — SIGNIFICANT CHANGE UP (ref 70–99)
GLUCOSE SERPL-MCNC: 80 MG/DL — SIGNIFICANT CHANGE UP (ref 70–99)
HCT VFR BLD CALC: 26.3 % — LOW (ref 37–47)
HCT VFR BLD CALC: 26.3 % — LOW (ref 37–47)
HDLC SERPL-MCNC: 46 MG/DL — LOW
HDLC SERPL-MCNC: 46 MG/DL — LOW
HGB BLD-MCNC: 8.8 G/DL — LOW (ref 12–16)
HGB BLD-MCNC: 8.8 G/DL — LOW (ref 12–16)
IMM GRANULOCYTES NFR BLD AUTO: 1.1 % — HIGH (ref 0.1–0.3)
IMM GRANULOCYTES NFR BLD AUTO: 1.1 % — HIGH (ref 0.1–0.3)
LACTATE SERPL-SCNC: 2.5 MMOL/L — HIGH (ref 0.7–2)
LACTATE SERPL-SCNC: 2.5 MMOL/L — HIGH (ref 0.7–2)
LIPID PNL WITH DIRECT LDL SERPL: 57 MG/DL — SIGNIFICANT CHANGE UP
LIPID PNL WITH DIRECT LDL SERPL: 57 MG/DL — SIGNIFICANT CHANGE UP
LYMPHOCYTES # BLD AUTO: 1.43 K/UL — SIGNIFICANT CHANGE UP (ref 1.2–3.4)
LYMPHOCYTES # BLD AUTO: 1.43 K/UL — SIGNIFICANT CHANGE UP (ref 1.2–3.4)
LYMPHOCYTES # BLD AUTO: 7.9 % — LOW (ref 20.5–51.1)
LYMPHOCYTES # BLD AUTO: 7.9 % — LOW (ref 20.5–51.1)
MAGNESIUM SERPL-MCNC: 1.7 MG/DL — LOW (ref 1.8–2.4)
MAGNESIUM SERPL-MCNC: 1.7 MG/DL — LOW (ref 1.8–2.4)
MCHC RBC-ENTMCNC: 29.8 PG — SIGNIFICANT CHANGE UP (ref 27–31)
MCHC RBC-ENTMCNC: 29.8 PG — SIGNIFICANT CHANGE UP (ref 27–31)
MCHC RBC-ENTMCNC: 33.5 G/DL — SIGNIFICANT CHANGE UP (ref 32–37)
MCHC RBC-ENTMCNC: 33.5 G/DL — SIGNIFICANT CHANGE UP (ref 32–37)
MCV RBC AUTO: 89.2 FL — SIGNIFICANT CHANGE UP (ref 81–99)
MCV RBC AUTO: 89.2 FL — SIGNIFICANT CHANGE UP (ref 81–99)
MONOCYTES # BLD AUTO: 1.87 K/UL — HIGH (ref 0.1–0.6)
MONOCYTES # BLD AUTO: 1.87 K/UL — HIGH (ref 0.1–0.6)
MONOCYTES NFR BLD AUTO: 10.4 % — HIGH (ref 1.7–9.3)
MONOCYTES NFR BLD AUTO: 10.4 % — HIGH (ref 1.7–9.3)
NEUTROPHILS # BLD AUTO: 14.48 K/UL — HIGH (ref 1.4–6.5)
NEUTROPHILS # BLD AUTO: 14.48 K/UL — HIGH (ref 1.4–6.5)
NEUTROPHILS NFR BLD AUTO: 80.4 % — HIGH (ref 42.2–75.2)
NEUTROPHILS NFR BLD AUTO: 80.4 % — HIGH (ref 42.2–75.2)
NON HDL CHOLESTEROL: 75 MG/DL — SIGNIFICANT CHANGE UP
NON HDL CHOLESTEROL: 75 MG/DL — SIGNIFICANT CHANGE UP
NRBC # BLD: 0 /100 WBCS — SIGNIFICANT CHANGE UP (ref 0–0)
NRBC # BLD: 0 /100 WBCS — SIGNIFICANT CHANGE UP (ref 0–0)
PLATELET # BLD AUTO: 368 K/UL — SIGNIFICANT CHANGE UP (ref 130–400)
PLATELET # BLD AUTO: 368 K/UL — SIGNIFICANT CHANGE UP (ref 130–400)
PMV BLD: 9.2 FL — SIGNIFICANT CHANGE UP (ref 7.4–10.4)
PMV BLD: 9.2 FL — SIGNIFICANT CHANGE UP (ref 7.4–10.4)
POTASSIUM SERPL-MCNC: 3.8 MMOL/L — SIGNIFICANT CHANGE UP (ref 3.5–5)
POTASSIUM SERPL-MCNC: 3.8 MMOL/L — SIGNIFICANT CHANGE UP (ref 3.5–5)
POTASSIUM SERPL-SCNC: 3.8 MMOL/L — SIGNIFICANT CHANGE UP (ref 3.5–5)
POTASSIUM SERPL-SCNC: 3.8 MMOL/L — SIGNIFICANT CHANGE UP (ref 3.5–5)
PROT SERPL-MCNC: 4.8 G/DL — LOW (ref 6–8)
PROT SERPL-MCNC: 4.8 G/DL — LOW (ref 6–8)
RBC # BLD: 2.95 M/UL — LOW (ref 4.2–5.4)
RBC # BLD: 2.95 M/UL — LOW (ref 4.2–5.4)
RBC # FLD: 14 % — SIGNIFICANT CHANGE UP (ref 11.5–14.5)
RBC # FLD: 14 % — SIGNIFICANT CHANGE UP (ref 11.5–14.5)
SODIUM SERPL-SCNC: 126 MMOL/L — LOW (ref 135–146)
SODIUM SERPL-SCNC: 126 MMOL/L — LOW (ref 135–146)
SPECIMEN SOURCE: SIGNIFICANT CHANGE UP
TRIGL SERPL-MCNC: 89 MG/DL — SIGNIFICANT CHANGE UP
TRIGL SERPL-MCNC: 89 MG/DL — SIGNIFICANT CHANGE UP
WBC # BLD: 18.01 K/UL — HIGH (ref 4.8–10.8)
WBC # BLD: 18.01 K/UL — HIGH (ref 4.8–10.8)
WBC # FLD AUTO: 18.01 K/UL — HIGH (ref 4.8–10.8)
WBC # FLD AUTO: 18.01 K/UL — HIGH (ref 4.8–10.8)

## 2024-01-06 PROCEDURE — 99233 SBSQ HOSP IP/OBS HIGH 50: CPT

## 2024-01-06 PROCEDURE — 93306 TTE W/DOPPLER COMPLETE: CPT | Mod: 26

## 2024-01-06 RX ORDER — SODIUM CHLORIDE 9 MG/ML
1000 INJECTION INTRAMUSCULAR; INTRAVENOUS; SUBCUTANEOUS
Refills: 0 | Status: DISCONTINUED | OUTPATIENT
Start: 2024-01-06 | End: 2024-01-08

## 2024-01-06 RX ORDER — MAGNESIUM SULFATE 500 MG/ML
2 VIAL (ML) INJECTION ONCE
Refills: 0 | Status: COMPLETED | OUTPATIENT
Start: 2024-01-06 | End: 2024-01-06

## 2024-01-06 RX ORDER — METOPROLOL TARTRATE 50 MG
12.5 TABLET ORAL EVERY 12 HOURS
Refills: 0 | Status: DISCONTINUED | OUTPATIENT
Start: 2024-01-06 | End: 2024-01-10

## 2024-01-06 RX ADMIN — Medication 12.5 MILLIGRAM(S): at 18:16

## 2024-01-06 RX ADMIN — PIPERACILLIN AND TAZOBACTAM 25 GRAM(S): 4; .5 INJECTION, POWDER, LYOPHILIZED, FOR SOLUTION INTRAVENOUS at 05:50

## 2024-01-06 RX ADMIN — Medication 81 MILLIGRAM(S): at 11:37

## 2024-01-06 RX ADMIN — Medication 40 MILLIGRAM(S): at 18:17

## 2024-01-06 RX ADMIN — Medication 100 MILLIGRAM(S): at 21:45

## 2024-01-06 RX ADMIN — AMLODIPINE BESYLATE 5 MILLIGRAM(S): 2.5 TABLET ORAL at 05:49

## 2024-01-06 RX ADMIN — BUDESONIDE AND FORMOTEROL FUMARATE DIHYDRATE 2 PUFF(S): 160; 4.5 AEROSOL RESPIRATORY (INHALATION) at 21:45

## 2024-01-06 RX ADMIN — Medication 650 MILLIGRAM(S): at 05:49

## 2024-01-06 RX ADMIN — Medication 650 MILLIGRAM(S): at 11:37

## 2024-01-06 RX ADMIN — ENOXAPARIN SODIUM 40 MILLIGRAM(S): 100 INJECTION SUBCUTANEOUS at 14:16

## 2024-01-06 RX ADMIN — SODIUM CHLORIDE 75 MILLILITER(S): 9 INJECTION INTRAMUSCULAR; INTRAVENOUS; SUBCUTANEOUS at 18:16

## 2024-01-06 RX ADMIN — PIPERACILLIN AND TAZOBACTAM 25 GRAM(S): 4; .5 INJECTION, POWDER, LYOPHILIZED, FOR SOLUTION INTRAVENOUS at 14:16

## 2024-01-06 RX ADMIN — Medication 650 MILLIGRAM(S): at 07:22

## 2024-01-06 RX ADMIN — MUPIROCIN 1 APPLICATION(S): 20 OINTMENT TOPICAL at 05:49

## 2024-01-06 RX ADMIN — Medication 100 MILLIGRAM(S): at 11:36

## 2024-01-06 RX ADMIN — MUPIROCIN 1 APPLICATION(S): 20 OINTMENT TOPICAL at 18:17

## 2024-01-06 RX ADMIN — CHLORHEXIDINE GLUCONATE 1 APPLICATION(S): 213 SOLUTION TOPICAL at 06:21

## 2024-01-06 RX ADMIN — Medication 40 MILLIGRAM(S): at 05:49

## 2024-01-06 RX ADMIN — Medication 25 GRAM(S): at 11:37

## 2024-01-06 RX ADMIN — PIPERACILLIN AND TAZOBACTAM 25 GRAM(S): 4; .5 INJECTION, POWDER, LYOPHILIZED, FOR SOLUTION INTRAVENOUS at 23:36

## 2024-01-06 RX ADMIN — Medication 650 MILLIGRAM(S): at 12:37

## 2024-01-06 NOTE — PROCEDURE NOTE - NSSITEPREP_SKIN_A_CORE
Called PCP office requesting pre-op clearance note and signed EKG. Doctor stated he will fax clearance and EKG once he receives Labs.    chlorhexidine/povidone-iodine ( under 2 weeks of age or 1500 grams)

## 2024-01-06 NOTE — PROGRESS NOTE ADULT - ASSESSMENT
83F w/ PMHx COPD, OA, chronic back pain s/p multiple surgeries and recent admission for left intertrochanteric femur fracture s/p ORIF 12/27/2023 brought in from [facility] for shortness of breath.    A/P  #Acute Hypoxic Respiratory Failure/ sepsis POA  secondary to COVID 19 and suspected hospital acquired PNA / h/o COPD   - pt is clinically improving, comfortable on NC now   - c/w pulmonary toilet, chest PT Q 4 hours  - aspiration precautions   - cont Abx; Doxy + Zosyn  - taper Solumedrol 40 mg to Q24 hours    - Remdesivir on hold as per patient's daughter's request  - duoneb q6, symbicort  - pulmonary is following   - c/w isolation for COVID 19     # worsening leukocytosis  - multifactorial, will taper steroids  - c/w broad spectrum Abx  - trend WBC   - monitor fever curve     #NSTEMI Type II  - EKG showed sinus tachy w/ PACs  - Trop 208>192 (previously 19)  -  telemetry monitoring  - on ASA, start Lopressor 12.5 mg Q 12 hours  - check lipid profile, will start statin if indicated   - check 2Decho and BNP  - pt was consulted by cardiology, pt needs OP event monitoring     # Hypovolemic hyponatremia  - pt is hypovolemic on PE  - continue to hold diuretics  ( no clear indications for diuretics )  - monitor Na level   - start IV hydration for 24 hours     #ALAN   - resolved  - IV hydration for 24 hours   - maintain fluid balance   - monitor BUN/cr and urine output       #Chronic Anemia  - monitor H/H, keep Hb above 8.0  - no active bleeding noted     #left intertrochanteric femur fracture s/p ORIF 12/27/2023 /OA/ Chronic Back Pain s/p multiple surgeries  - may change Tylenol to standing  - OOB to chair, start  PT     #DVT ppx: Lovenox  #GI ppx: none  #Diet: DASH/TLC   #Code status: DNR/DNI (Trial NIV)  #Disposition: SDU      Pending (specify): start IV hydration for 24 hours, f/u Na level in AM, start BB, taper steroids, trend WBC,  supportive care,, take pt OOB to chair, PT/rehab eval      83F w/ PMHx COPD, OA, chronic back pain s/p multiple surgeries and recent admission for left intertrochanteric femur fracture s/p ORIF 12/27/2023 brought in from [facility] for shortness of breath.    A/P  #Acute Hypoxic Respiratory Failure/ sepsis POA  secondary to COVID 19 and suspected hospital acquired PNA / h/o COPD   - pt is clinically improving, comfortable on NC now   - c/w pulmonary toilet, chest PT Q 4 hours  - aspiration precautions   - cont Abx; Doxy + Zosyn  - taper Solumedrol 40 mg to Q24 hours    - Remdesivir on hold as per patient's daughter's request  - duoneb q6, symbicort  - pulmonary is following   - c/w isolation for COVID 19     # worsening leukocytosis  - multifactorial, will taper steroids  - c/w broad spectrum Abx  - trend WBC   - monitor fever curve     #NSTEMI Type II  - EKG showed sinus tachy w/ PACs  - Trop 208>192 (previously 19)  -  telemetry monitoring  - on ASA, start Lopressor 12.5 mg Q 12 hours  - check lipid profile, will start statin if indicated   - check 2Decho and BNP  - pt was consulted by cardiology, pt needs OP event monitoring     # Hypovolemic hyponatremia  - pt is hypovolemic on PE  - continue to hold diuretics  ( no clear indications for diuretics )  - monitor Na level   - start IV hydration for 24 hours     #ALAN   - resolved  - IV hydration for 24 hours   - maintain fluid balance   - monitor BUN/cr and urine output       #Chronic Anemia  - monitor H/H, keep Hb above 8.0  - no active bleeding noted     #left intertrochanteric femur fracture s/p ORIF 12/27/2023 /OA/ Chronic Back Pain s/p multiple surgeries  - may change Tylenol to standing  - OOB to chair, start  PT     #DVT ppx: Lovenox  #GI ppx: none  #Diet: DASH/TLC   #Code status: DNR/DNI (Trial NIV)  #Disposition: SDU      Pending (specify): start IV hydration for 24 hours, f/u Na level in AM, start BB, taper steroids, trend WBC,  supportive care,, take pt OOB to chair, PT/rehab eval

## 2024-01-06 NOTE — PROGRESS NOTE ADULT - ASSESSMENT
Impression    Acute on chronic hypoxemic/ hypercapnic resp failure on HHFNC  Covid pneumonia  Possible aspiration ( cr chest noted)  copd exacerbation  fluid overload  NTEMI  AMS/ TME  COPD not on Home o2  hyponatremia  Bilateral lung nodules   Chronic back pain  Left intertrochanteric femur fracture Sp ORIF  Current smoker    PLAN:    CNS: Avoid CNS depressant    HEENT:  Oral care    PULMONARY:  HOB @ 45 degrees, NIV at night  ( declined) solumedrol 40 q 24, Neb q 6, keep SaO2 88 TO 92%, low flow NC trial, repeat CXR    CARDIOVASCULAR: avoid overload, lasix daily    GI: GI prophylaxis                                          Feeding feeding per speech    RENAL:  F/u  lytes.  Correct as needed. accurate I/O    INFECTIOUS DISEASE: abx per ID    HEMATOLOGICAL:  DVT prophylaxis.    ENDOCRINE:  Follow up FS.  Insulin protocol if needed.    SDU  Palliative care fup  very poor prognosis Impression    Acute on chronic hypoxemic/ hypercapnic resp failure on HHFNC  Covid pneumonia  Possible aspiration (CT chest noted)  COPD exacerbation  Fluid overload  NTEMI  AMS/ TME  COPD not on Home o2  hyponatremia  Bilateral lung nodules   Chronic back pain  Left intertrochanteric femur fracture Sp ORIF  Current smoker    PLAN:    CNS: Avoid CNS depressant    HEENT:  Oral care    PULMONARY:  HOB @ 45 degrees, NIV at night ( declined). Solumedrol 40 q 24,  Neb q 6, keep SaO2 88 TO 92%,  Weaned off High Flow.  On low-flow NC, repeat CXR noted.    CARDIOVASCULAR:  Avoid overload.  Monitor her off diuretic.    GI: GI prophylaxis.   Feeding per speech    RENAL:  F/u  lytes.  Correct as needed. accurate I/O    INFECTIOUS DISEASE: abx per ID    HEMATOLOGICAL:  DVT prophylaxis.    ENDOCRINE:  Follow up FS.  Insulin protocol if needed.    DGTF  Palliative care  very poor prognosis

## 2024-01-06 NOTE — PROGRESS NOTE ADULT - SUBJECTIVE AND OBJECTIVE BOX
Patient is a 83y old  Female who presents with a chief complaint of SOB (05 Jan 2024 16:28)        Over Night Events:        ROS:     All ROS are negative except HPI         PHYSICAL EXAM    ICU Vital Signs Last 24 Hrs  T(C): 37.2 (06 Jan 2024 04:00), Max: 37.2 (06 Jan 2024 04:00)  T(F): 99 (06 Jan 2024 04:00), Max: 99 (06 Jan 2024 04:00)  HR: 75 (06 Jan 2024 04:00) (75 - 101)  BP: 178/77 (06 Jan 2024 04:00) (141/79 - 178/98)  BP(mean): 111 (06 Jan 2024 04:00) (111 - 129)  ABP: --  ABP(mean): --  RR: 20 (06 Jan 2024 04:00) (20 - 20)  SpO2: 100% (06 Jan 2024 04:00) (93% - 100%)    O2 Parameters below as of 06 Jan 2024 04:00  Patient On (Oxygen Delivery Method): nasal cannula  O2 Flow (L/min): 5          CONSTITUTIONAL:  NAD    ENT:   Airway patent,   Mouth with normal mucosa.   No thrush    EYES:   Pupils equal,   Round and reactive to light.    CARDIAC:   Normal rate,   Regular rhythm.    No edema    RESPIRATORY:   No wheezing  Bilateral BS  Normal chest expansion  Not tachypneic,  No use of accessory muscles    GASTROINTESTINAL:  Abdomen soft,   Non-tender,   No guarding,   + BS    MUSCULOSKELETAL:   Range of motion is not limited,  No clubbing, cyanosis    NEUROLOGICAL:   Alert and oriented   No motor  deficits.    SKIN:   Skin normal color for race,   Warm and dry and intact.   No evidence of rash.        01-05-24 @ 07:01  -  01-06-24 @ 07:00  --------------------------------------------------------  IN:    IV PiggyBack: 100 mL    Oral Fluid: 810 mL    sodium chloride 0.9%: 800 mL  Total IN: 1710 mL    OUT:    Indwelling Catheter - Urethral (mL): 1750 mL  Total OUT: 1750 mL    Total NET: -40 mL          LABS:                            8.8    18.01 )-----------( 368      ( 06 Jan 2024 04:39 )             26.3                                               01-06    126<L>  |  87<L>  |  23<H>  ----------------------------<  80  3.8   |  30  |  0.5<L>    Ca    7.9<L>      06 Jan 2024 04:39  Mg     1.7     01-06    TPro  4.8<L>  /  Alb  2.7<L>  /  TBili  0.9  /  DBili  x   /  AST  14  /  ALT  21  /  AlkPhos  83  01-06                                             Urinalysis Basic - ( 06 Jan 2024 04:39 )    Color: x / Appearance: x / SG: x / pH: x  Gluc: 80 mg/dL / Ketone: x  / Bili: x / Urobili: x   Blood: x / Protein: x / Nitrite: x   Leuk Esterase: x / RBC: x / WBC x   Sq Epi: x / Non Sq Epi: x / Bacteria: x                                                  LIVER FUNCTIONS - ( 06 Jan 2024 04:39 )  Alb: 2.7 g/dL / Pro: 4.8 g/dL / ALK PHOS: 83 U/L / ALT: 21 U/L / AST: 14 U/L / GGT: x                                                                                                                                   ABG - ( 04 Jan 2024 09:14 )  pH, Arterial: 7.50  pH, Blood: x     /  pCO2: 39    /  pO2: 81    / HCO3: 30    / Base Excess: 6.7   /  SaO2: 98.6                MEDICATIONS  (STANDING):  acetaminophen     Tablet .. 650 milliGRAM(s) Oral every 6 hours  albuterol/ipratropium for Nebulization 3 milliLiter(s) Nebulizer every 6 hours  amLODIPine   Tablet 5 milliGRAM(s) Oral daily  aspirin enteric coated 81 milliGRAM(s) Oral daily  budesonide 160 MICROgram(s)/formoterol 4.5 MICROgram(s) Inhaler 2 Puff(s) Inhalation two times a day  chlorhexidine 2% Cloths 1 Application(s) Topical <User Schedule>  doxycycline IVPB      doxycycline IVPB 100 milliGRAM(s) IV Intermittent every 12 hours  enoxaparin Injectable 40 milliGRAM(s) SubCutaneous every 24 hours  methylPREDNISolone sodium succinate Injectable 40 milliGRAM(s) IV Push two times a day  mupirocin 2% Ointment 1 Application(s) Topical two times a day  piperacillin/tazobactam IVPB.. 3.375 Gram(s) IV Intermittent every 8 hours  polyethylene glycol 3350 17 Gram(s) Oral daily  senna 2 Tablet(s) Oral at bedtime    MEDICATIONS  (PRN):  aluminum hydroxide/magnesium hydroxide/simethicone Suspension 30 milliLiter(s) Oral every 4 hours PRN Dyspepsia  melatonin 3 milliGRAM(s) Oral at bedtime PRN Insomnia  ondansetron Injectable 4 milliGRAM(s) IV Push every 8 hours PRN Nausea and/or Vomiting      New X-rays reviewed:                                                                                  ECHO    CXR interpreted by me:       Patient is a 83y old  Female who presents with a chief complaint of SOB (05 Jan 2024 16:28)        Over Night Events:  Patient on NC 4L.  Off pressors.        ROS:     All ROS are negative except HPI         PHYSICAL EXAM    ICU Vital Signs Last 24 Hrs  T(C): 37.2 (06 Jan 2024 04:00), Max: 37.2 (06 Jan 2024 04:00)  T(F): 99 (06 Jan 2024 04:00), Max: 99 (06 Jan 2024 04:00)  HR: 75 (06 Jan 2024 04:00) (75 - 101)  BP: 178/77 (06 Jan 2024 04:00) (141/79 - 178/98)  BP(mean): 111 (06 Jan 2024 04:00) (111 - 129)  ABP: --  ABP(mean): --  RR: 20 (06 Jan 2024 04:00) (20 - 20)  SpO2: 100% (06 Jan 2024 04:00) (93% - 100%)    O2 Parameters below as of 06 Jan 2024 04:00  Patient On (Oxygen Delivery Method): nasal cannula  O2 Flow (L/min): 5          CONSTITUTIONAL:  NAD    ENT:   Airway patent    EYES:   Pupils equal    CARDIAC:   Normal rate,   Regular rhythm.    No edema    RESPIRATORY:   No wheezing  Bilateral BS    GASTROINTESTINAL:  Abdomen soft,   Non-tender,   No guarding,     MUSCULOSKELETAL:   Range of motion is not limited,  No clubbing, cyanosis    NEUROLOGICAL:   Alert and oriented   No motor  deficits.    SKIN:   Skin normal color for race,   Warm and dry and intact.   No evidence of rash.        01-05-24 @ 07:01  -  01-06-24 @ 07:00  --------------------------------------------------------  IN:    IV PiggyBack: 100 mL    Oral Fluid: 810 mL    sodium chloride 0.9%: 800 mL  Total IN: 1710 mL    OUT:    Indwelling Catheter - Urethral (mL): 1750 mL  Total OUT: 1750 mL    Total NET: -40 mL          LABS:                            8.8    18.01 )-----------( 368      ( 06 Jan 2024 04:39 )             26.3                                               01-06    126<L>  |  87<L>  |  23<H>  ----------------------------<  80  3.8   |  30  |  0.5<L>    Ca    7.9<L>      06 Jan 2024 04:39  Mg     1.7     01-06    TPro  4.8<L>  /  Alb  2.7<L>  /  TBili  0.9  /  DBili  x   /  AST  14  /  ALT  21  /  AlkPhos  83  01-06                                             Urinalysis Basic - ( 06 Jan 2024 04:39 )    Color: x / Appearance: x / SG: x / pH: x  Gluc: 80 mg/dL / Ketone: x  / Bili: x / Urobili: x   Blood: x / Protein: x / Nitrite: x   Leuk Esterase: x / RBC: x / WBC x   Sq Epi: x / Non Sq Epi: x / Bacteria: x                                                  LIVER FUNCTIONS - ( 06 Jan 2024 04:39 )  Alb: 2.7 g/dL / Pro: 4.8 g/dL / ALK PHOS: 83 U/L / ALT: 21 U/L / AST: 14 U/L / GGT: x                                                                                                                                   ABG - ( 04 Jan 2024 09:14 )  pH, Arterial: 7.50  pH, Blood: x     /  pCO2: 39    /  pO2: 81    / HCO3: 30    / Base Excess: 6.7   /  SaO2: 98.6                MEDICATIONS  (STANDING):  acetaminophen     Tablet .. 650 milliGRAM(s) Oral every 6 hours  albuterol/ipratropium for Nebulization 3 milliLiter(s) Nebulizer every 6 hours  amLODIPine   Tablet 5 milliGRAM(s) Oral daily  aspirin enteric coated 81 milliGRAM(s) Oral daily  budesonide 160 MICROgram(s)/formoterol 4.5 MICROgram(s) Inhaler 2 Puff(s) Inhalation two times a day  chlorhexidine 2% Cloths 1 Application(s) Topical <User Schedule>  doxycycline IVPB      doxycycline IVPB 100 milliGRAM(s) IV Intermittent every 12 hours  enoxaparin Injectable 40 milliGRAM(s) SubCutaneous every 24 hours  methylPREDNISolone sodium succinate Injectable 40 milliGRAM(s) IV Push two times a day  mupirocin 2% Ointment 1 Application(s) Topical two times a day  piperacillin/tazobactam IVPB.. 3.375 Gram(s) IV Intermittent every 8 hours  polyethylene glycol 3350 17 Gram(s) Oral daily  senna 2 Tablet(s) Oral at bedtime    MEDICATIONS  (PRN):  aluminum hydroxide/magnesium hydroxide/simethicone Suspension 30 milliLiter(s) Oral every 4 hours PRN Dyspepsia  melatonin 3 milliGRAM(s) Oral at bedtime PRN Insomnia  ondansetron Injectable 4 milliGRAM(s) IV Push every 8 hours PRN Nausea and/or Vomiting      New X-rays reviewed:                                                                                  ECHO    CXR interpreted by me:

## 2024-01-06 NOTE — PROGRESS NOTE ADULT - SUBJECTIVE AND OBJECTIVE BOX
83F w/ PMHx COPD, OA, chronic back pain s/p multiple surgeries and recent admission for left intertrochanteric femur fracture s/p ORIF 12/27/2023 brought in from [facility] for shortness of breath, was diagnosed with COVID with suspected underlying hospital acquired PNA, pt was in acute respiratory distress, on high flow oxygen now.   Today pt is comfortable, speaking full sentences, denies SOB, pain, has no specific complaints,  asking for PT     PAST MEDICAL & SURGICAL HISTORY  COPD (chronic obstructive pulmonary disease)    H/O cholelithiasis    Smoker    History of surgery  orif right ankle      SOCIAL HISTORY:  Social History:      ALLERGIES:  No Known Allergies      VITALS:   T(C): 37.1 (06 Jan 2024 11:43), Max: 37.2 (06 Jan 2024 04:00)  T(F): 98.7 (06 Jan 2024 11:43), Max: 99 (06 Jan 2024 04:00)  HR: 78 (06 Jan 2024 11:43) (75 - 101)  BP: 128/62 (06 Jan 2024 11:43) (128/62 - 178/77)  BP(mean): 111 (06 Jan 2024 04:00) (111 - 111)  RR: 20 (06 Jan 2024 11:43) (20 - 20)  SpO2: 100% (06 Jan 2024 11:43) (93% - 100%)    Parameters below as of 06 Jan 2024 04:00  Patient On (Oxygen Delivery Method): nasal cannula  O2 Flow (L/min): 5      PHYSICAL EXAM:  GENERAL:  NAD, frail elderly female  NECK: supple, no JVD  CV: S1, S2, RRR  Lungs: decreased BS at bases, no wheezing  Abdomen/GI: soft, scaphoid, NT,ND, BS present  Extremities: surgical site looks clean no ecchymoses dry dressing ( left hip) , no edema on LE  Neuro: AAOx3, clear speech, pt is following commands, moving all extremities         LABS:                                   8.8    18.01 )-----------( 368      ( 06 Jan 2024 04:39 )             26.3   01-06    126<L>  |  87<L>  |  23<H>  ----------------------------<  80  3.8   |  30  |  0.5<L>    Ca    7.9<L>      06 Jan 2024 04:39  Mg     1.7     01-06    TPro  4.8<L>  /  Alb  2.7<L>  /  TBili  0.9  /  DBili  x   /  AST  14  /  ALT  21  /  AlkPhos  83  01-06        Urinalysis Basic - ( 04 Jan 2024 06:20 )    Color: x / Appearance: x / SG: x / pH: x  Gluc: 139 mg/dL / Ketone: x  / Bili: x / Urobili: x   Blood: x / Protein: x / Nitrite: x   Leuk Esterase: x / RBC: x / WBC x   Sq Epi: x / Non Sq Epi: x / Bacteria: x  ABG - ( 04 Jan 2024 09:14 )  pH, Arterial: 7.50  pH, Blood: x     /  pCO2: 39    /  pO2: 81    / HCO3: 30    / Base Excess: 6.7   /  SaO2: 98.6          RADIOLOGY:  < from: Xray Chest 1 View-PORTABLE IMMEDIATE (Xray Chest 1 View-PORTABLE IMMEDIATE .) (01.03.24 @ 09:47) >    Impression:    COPD with basilar opacifications, worsened.    < end of copied text >  < from: [facility] Duplex Lower Ext Vein Scan, Bilat (01.02.24 @ 11:03) >    IMPRESSION:  No evidence of deep venous thrombosis in either lower extremity.    < end of copied text >  < from: CT Angio Chest PE Protocol w/ IV Cont (12.31.23 @ 18:26) >  IMPRESSION:    No CTA evidence of acute pulmonary embolus.    Bilateral lower lobe consolidative opacities which can be seen with   pneumonia. Recommend radiographic follow-up after treatment.    MEDICATIONS  (STANDING):  acetaminophen     Tablet .. 650 milliGRAM(s) Oral every 6 hours  albuterol/ipratropium for Nebulization 3 milliLiter(s) Nebulizer every 6 hours  amLODIPine   Tablet 5 milliGRAM(s) Oral daily  aspirin enteric coated 81 milliGRAM(s) Oral daily  budesonide 160 MICROgram(s)/formoterol 4.5 MICROgram(s) Inhaler 2 Puff(s) Inhalation two times a day  chlorhexidine 2% Cloths 1 Application(s) Topical <User Schedule>  doxycycline IVPB 100 milliGRAM(s) IV Intermittent every 12 hours  doxycycline IVPB      enoxaparin Injectable 40 milliGRAM(s) SubCutaneous every 24 hours  methylPREDNISolone sodium succinate Injectable 40 milliGRAM(s) IV Push two times a day  mupirocin 2% Ointment 1 Application(s) Topical two times a day  piperacillin/tazobactam IVPB.. 3.375 Gram(s) IV Intermittent every 8 hours  polyethylene glycol 3350 17 Gram(s) Oral daily  senna 2 Tablet(s) Oral at bedtime    MEDICATIONS  (PRN):  aluminum hydroxide/magnesium hydroxide/simethicone Suspension 30 milliLiter(s) Oral every 4 hours PRN Dyspepsia  melatonin 3 milliGRAM(s) Oral at bedtime PRN Insomnia  ondansetron Injectable 4 milliGRAM(s) IV Push every 8 hours PRN Nausea and/or Vomiting               83F w/ PMHx COPD, OA, chronic back pain s/p multiple surgeries and recent admission for left intertrochanteric femur fracture s/p ORIF 12/27/2023 brought in from [facility] for shortness of breath, was diagnosed with COVID with suspected underlying hospital acquired PNA, pt was in acute respiratory distress, on high flow oxygen now.   Today pt is comfortable, speaking full sentences, denies SOB, pain, has no specific complaints,  asking for PT     PAST MEDICAL & SURGICAL HISTORY  COPD (chronic obstructive pulmonary disease)    H/O cholelithiasis    Smoker    History of surgery  orif right ankle      SOCIAL HISTORY:  Social History:      ALLERGIES:  No Known Allergies      VITALS:   T(C): 37.1 (06 Jan 2024 11:43), Max: 37.2 (06 Jan 2024 04:00)  T(F): 98.7 (06 Jan 2024 11:43), Max: 99 (06 Jan 2024 04:00)  HR: 78 (06 Jan 2024 11:43) (75 - 101)  BP: 128/62 (06 Jan 2024 11:43) (128/62 - 178/77)  BP(mean): 111 (06 Jan 2024 04:00) (111 - 111)  RR: 20 (06 Jan 2024 11:43) (20 - 20)  SpO2: 100% (06 Jan 2024 11:43) (93% - 100%)    Parameters below as of 06 Jan 2024 04:00  Patient On (Oxygen Delivery Method): nasal cannula  O2 Flow (L/min): 5      PHYSICAL EXAM:  GENERAL:  NAD, frail elderly female  NECK: supple, no JVD  CV: S1, S2, RRR  Lungs: decreased BS at bases, no wheezing  Abdomen/GI: soft, scaphoid, NT,ND, BS present  Extremities: surgical site looks clean no ecchymoses dry dressing ( left hip) , no edema on LE  Neuro: AAOx3, clear speech, pt is following commands, moving all extremities         LABS:                                   8.8    18.01 )-----------( 368      ( 06 Jan 2024 04:39 )             26.3   01-06    126<L>  |  87<L>  |  23<H>  ----------------------------<  80  3.8   |  30  |  0.5<L>    Ca    7.9<L>      06 Jan 2024 04:39  Mg     1.7     01-06    TPro  4.8<L>  /  Alb  2.7<L>  /  TBili  0.9  /  DBili  x   /  AST  14  /  ALT  21  /  AlkPhos  83  01-06        Urinalysis Basic - ( 04 Jan 2024 06:20 )    Color: x / Appearance: x / SG: x / pH: x  Gluc: 139 mg/dL / Ketone: x  / Bili: x / Urobili: x   Blood: x / Protein: x / Nitrite: x   Leuk Esterase: x / RBC: x / WBC x   Sq Epi: x / Non Sq Epi: x / Bacteria: x  ABG - ( 04 Jan 2024 09:14 )  pH, Arterial: 7.50  pH, Blood: x     /  pCO2: 39    /  pO2: 81    / HCO3: 30    / Base Excess: 6.7   /  SaO2: 98.6          RADIOLOGY:  < from: Xray Chest 1 View-PORTABLE IMMEDIATE (Xray Chest 1 View-PORTABLE IMMEDIATE .) (01.03.24 @ 09:47) >    Impression:    COPD with basilar opacifications, worsened.    < end of copied text >  < from: [facility] Duplex Lower Ext Vein Scan, Bilat (01.02.24 @ 11:03) >    IMPRESSION:  No evidence of deep venous thrombosis in either lower extremity.    < end of copied text >  < from: CT Angio Chest PE Protocol w/ IV Cont (12.31.23 @ 18:26) >  IMPRESSION:    No CTA evidence of acute pulmonary embolus.    Bilateral lower lobe consolidative opacities which can be seen with   pneumonia. Recommend radiographic follow-up after treatment.    MEDICATIONS  (STANDING):  acetaminophen     Tablet .. 650 milliGRAM(s) Oral every 6 hours  albuterol/ipratropium for Nebulization 3 milliLiter(s) Nebulizer every 6 hours  amLODIPine   Tablet 5 milliGRAM(s) Oral daily  aspirin enteric coated 81 milliGRAM(s) Oral daily  budesonide 160 MICROgram(s)/formoterol 4.5 MICROgram(s) Inhaler 2 Puff(s) Inhalation two times a day  chlorhexidine 2% Cloths 1 Application(s) Topical <User Schedule>  doxycycline IVPB 100 milliGRAM(s) IV Intermittent every 12 hours  doxycycline IVPB      enoxaparin Injectable 40 milliGRAM(s) SubCutaneous every 24 hours  methylPREDNISolone sodium succinate Injectable 40 milliGRAM(s) IV Push two times a day  mupirocin 2% Ointment 1 Application(s) Topical two times a day  piperacillin/tazobactam IVPB.. 3.375 Gram(s) IV Intermittent every 8 hours  polyethylene glycol 3350 17 Gram(s) Oral daily  senna 2 Tablet(s) Oral at bedtime    MEDICATIONS  (PRN):  aluminum hydroxide/magnesium hydroxide/simethicone Suspension 30 milliLiter(s) Oral every 4 hours PRN Dyspepsia  melatonin 3 milliGRAM(s) Oral at bedtime PRN Insomnia  ondansetron Injectable 4 milliGRAM(s) IV Push every 8 hours PRN Nausea and/or Vomiting

## 2024-01-06 NOTE — PHARMACOTHERAPY INTERVENTION NOTE - COMMENTS
Consistent with ID note, recommended to discontinue the doxycycline order since patient has already received 5 days of doxycycline therapy.    Marianne Rinaldi, PharmD, [facility]  Clinical Pharmacy Specialist, Infectious Diseases  Tele-Antimicrobial Stewardship Program (Tele-ASP)  Tele-ASP Phone: [phone]   Consistent with ID note, recommended to discontinue the doxycycline order since patient has already received 5 days of doxycycline therapy.    Marianne Rinaldi, PharmD, [facility]  Clinical Pharmacy Specialist, Infectious Diseases  Tele-Antimicrobial Stewardship Program (Tele-ASP)  Tele-ASP Phone: [phone]

## 2024-01-06 NOTE — CHART NOTE - NSCHARTNOTEFT_GEN_A_CORE
CEU Transfer Note    Transfer from: CEU  Transfer to: Medicine       CEU COURSE:          ASSESSMENT & PLAN:         For Follow-Up:          Vital Signs Last 24 Hrs  T(C): 37.1 (06 Jan 2024 11:43), Max: 37.2 (06 Jan 2024 04:00)  T(F): 98.7 (06 Jan 2024 11:43), Max: 99 (06 Jan 2024 04:00)  HR: 78 (06 Jan 2024 11:43) (75 - 101)  BP: 128/62 (06 Jan 2024 11:43) (128/62 - 178/77)  BP(mean): 111 (06 Jan 2024 04:00) (111 - 111)  RR: 20 (06 Jan 2024 11:43) (20 - 20)  SpO2: 100% (06 Jan 2024 11:43) (93% - 100%)    Parameters below as of 06 Jan 2024 04:00  Patient On (Oxygen Delivery Method): nasal cannula  O2 Flow (L/min): 5    I&O's Summary    05 Jan 2024 07:01  -  06 Jan 2024 07:00  --------------------------------------------------------  IN: 1710 mL / OUT: 1750 mL / NET: -40 mL          MEDICATIONS  (STANDING):  acetaminophen     Tablet .. 650 milliGRAM(s) Oral every 6 hours  albuterol/ipratropium for Nebulization 3 milliLiter(s) Nebulizer every 6 hours  amLODIPine   Tablet 5 milliGRAM(s) Oral daily  aspirin enteric coated 81 milliGRAM(s) Oral daily  budesonide 160 MICROgram(s)/formoterol 4.5 MICROgram(s) Inhaler 2 Puff(s) Inhalation two times a day  chlorhexidine 2% Cloths 1 Application(s) Topical <User Schedule>  doxycycline IVPB 100 milliGRAM(s) IV Intermittent every 12 hours  doxycycline IVPB      enoxaparin Injectable 40 milliGRAM(s) SubCutaneous every 24 hours  methylPREDNISolone sodium succinate Injectable 40 milliGRAM(s) IV Push two times a day  mupirocin 2% Ointment 1 Application(s) Topical two times a day  piperacillin/tazobactam IVPB.. 3.375 Gram(s) IV Intermittent every 8 hours  polyethylene glycol 3350 17 Gram(s) Oral daily  senna 2 Tablet(s) Oral at bedtime    MEDICATIONS  (PRN):  aluminum hydroxide/magnesium hydroxide/simethicone Suspension 30 milliLiter(s) Oral every 4 hours PRN Dyspepsia  melatonin 3 milliGRAM(s) Oral at bedtime PRN Insomnia  ondansetron Injectable 4 milliGRAM(s) IV Push every 8 hours PRN Nausea and/or Vomiting        LABS                                            8.8                   Neurophils% (auto):   80.4   (01-06 @ 04:39):    18.01)-----------(368          Lymphocytes% (auto):  7.9                                           26.3                   Eosinphils% (auto):   0.1      Manual%: Neutrophils x    ; Lymphocytes x    ; Eosinophils x    ; Bands%: x    ; Blasts x                                    126    |  87     |  23                  Calcium: 7.9   / iCa: x      (01-06 @ 04:39)    ----------------------------<  80        Magnesium: 1.7                              3.8     |  30     |  0.5              Phosphorous: x        TPro  4.8    /  Alb  2.7    /  TBili  0.9    /  DBili  x      /  AST  14     /  ALT  21     /  AlkPhos  83     06 Jan 2024 04:39 CEU Transfer Note    Transfer from: CEU  Transfer to: Medicine       CEU COURSE:  83F w/ PMHx COPD, OA, chronic back pain s/p multiple surgeries and recent admission 12/25 for left intertrochanteric femur fracture s/p ORIF 12/27/2023 brought in from [facility] for shortness of breath. EMS reports that she was hypoxic on room air to the 70s. Patient was initially AOx0, after being on NRB in EMS, patient mental status improving with improved oxygenation. Daughter reports the patient was doing okay yesterday and that this was an acute change.     In the ED, Vitals: BP 84/53, HR 99, RR 22, SpO2 100% on NRB. Found to be Covid positive with CTA chest showing no evidence of acute pulmonary embolus but Bilateral lower lobe consolidative opacities.     Admitted to step down for AHRF/ Severe Sepsis 2/2 COvid Pneumonia/ Superimposed bacterial pneumonia. She was started on Doxycycline and zosyn. MRSA nares positive on mupirocin. Remdesivir held as per daughter. started on IV solumedrol and HFNC. O2 requirements weened to NC.     She is stable for down grade to floor      For Follow-Up:  - ID  - PT/OT       ASSESSMENT & PLAN:   83F w/ PMHx COPD, OA, chronic back pain s/p multiple surgeries and recent admission for left intertrochanteric femur fracture s/p ORIF 12/27/2023 brought in from [facility] for shortness of breath.    #Acute Hypoxic Respiratory Failure and severe sepsis secondary to COVID 19   #Superimposed bacterial pneumonia  - cont Abx; Doxy + Zosyn  - Solumedrol 40 mg BID  - Remdesivir on hold as per patient's daughter's request  - duoneb q6, symbicort  - MRSA nares positive, --> ordered bactroban  - CTA Chest shows: no evidence of acute pulmonary embolus. Bilateral lower lobe consolidative opacities which can be seen with pneumonia  - 1/4 ID f/u-  CRP 46.9, f/u Procalcitonin   - c/w zosyn 3.375 mg q 8 hours; doxycycline 100 mg BID for 5 days (started 1/1, last day today 1/5)   - 1/5- patient noted to be saturating well when HFNC was not in her nose; transition to NC today  - 1/5 AM CXR appears improved on my read    #NSTEMI Type II  - EKG showed sinus tachy w/ PACs  - Trop 208>192 (previously 19)  - trend, monitor repeat  - if remains elevated or worsens, consider cardio consult  - obtaining lipid profile, may need to start statin    #HTN  - d/c IVF  - 1/5 started amlodipine 5 mg qd    #ALAN -- improved    #Hyponatremia  - Na 127, appears chronic    #Chronic Anemia  - Hgb 8.9 (b/l ~10)  - monitor H&H  - f/u o/p    #left intertrochanteric femur fracture s/p ORIF 12/27/2023 /OA/ Chronic Back Pain s/p multiple surgeries  - from rehab  - cont Lovenox  - for pain control added standing Tylenol q6h, if not improving, will add Oxycodone 5 PRN  - PT recommended eventual d/c to rehab  - continue to f/u PT progress    #DVT ppx: Lovenox  #GI ppx: none  #Diet: DASH/TLC   #Activity: IAT, Weight bearing 50% LLE, WBAT RLE  #Code status: DNR/DNI (Trial NIV)  #Disposition: DGTF                Vital Signs Last 24 Hrs  T(C): 37.1 (06 Jan 2024 11:43), Max: 37.2 (06 Jan 2024 04:00)  T(F): 98.7 (06 Jan 2024 11:43), Max: 99 (06 Jan 2024 04:00)  HR: 78 (06 Jan 2024 11:43) (75 - 101)  BP: 128/62 (06 Jan 2024 11:43) (128/62 - 178/77)  BP(mean): 111 (06 Jan 2024 04:00) (111 - 111)  RR: 20 (06 Jan 2024 11:43) (20 - 20)  SpO2: 100% (06 Jan 2024 11:43) (93% - 100%)    Parameters below as of 06 Jan 2024 04:00  Patient On (Oxygen Delivery Method): nasal cannula  O2 Flow (L/min): 5    I&O's Summary    05 Jan 2024 07:01  -  06 Jan 2024 07:00  --------------------------------------------------------  IN: 1710 mL / OUT: 1750 mL / NET: -40 mL          MEDICATIONS  (STANDING):  acetaminophen     Tablet .. 650 milliGRAM(s) Oral every 6 hours  albuterol/ipratropium for Nebulization 3 milliLiter(s) Nebulizer every 6 hours  amLODIPine   Tablet 5 milliGRAM(s) Oral daily  aspirin enteric coated 81 milliGRAM(s) Oral daily  budesonide 160 MICROgram(s)/formoterol 4.5 MICROgram(s) Inhaler 2 Puff(s) Inhalation two times a day  chlorhexidine 2% Cloths 1 Application(s) Topical <User Schedule>  doxycycline IVPB 100 milliGRAM(s) IV Intermittent every 12 hours  doxycycline IVPB      enoxaparin Injectable 40 milliGRAM(s) SubCutaneous every 24 hours  methylPREDNISolone sodium succinate Injectable 40 milliGRAM(s) IV Push two times a day  mupirocin 2% Ointment 1 Application(s) Topical two times a day  piperacillin/tazobactam IVPB.. 3.375 Gram(s) IV Intermittent every 8 hours  polyethylene glycol 3350 17 Gram(s) Oral daily  senna 2 Tablet(s) Oral at bedtime    MEDICATIONS  (PRN):  aluminum hydroxide/magnesium hydroxide/simethicone Suspension 30 milliLiter(s) Oral every 4 hours PRN Dyspepsia  melatonin 3 milliGRAM(s) Oral at bedtime PRN Insomnia  ondansetron Injectable 4 milliGRAM(s) IV Push every 8 hours PRN Nausea and/or Vomiting        LABS                                            8.8                   Neurophils% (auto):   80.4   (01-06 @ 04:39):    18.01)-----------(368          Lymphocytes% (auto):  7.9                                           26.3                   Eosinphils% (auto):   0.1      Manual%: Neutrophils x    ; Lymphocytes x    ; Eosinophils x    ; Bands%: x    ; Blasts x                                    126    |  87     |  23                  Calcium: 7.9   / iCa: x      (01-06 @ 04:39)    ----------------------------<  80        Magnesium: 1.7                              3.8     |  30     |  0.5              Phosphorous: x        TPro  4.8    /  Alb  2.7    /  TBili  0.9    /  DBili  x      /  AST  14     /  ALT  21     /  AlkPhos  83     06 Jan 2024 04:39 CEU Transfer Note    Transfer from: CEU  Transfer to: Medicine       CEU COURSE:  83F w/ PMHx COPD, OA, chronic back pain s/p multiple surgeries and recent admission 12/25 for left intertrochanteric femur fracture s/p ORIF 12/27/2023 brought in from [facility] for shortness of breath. EMS reports that she was hypoxic on room air to the 70s. Patient was initially AOx0, after being on NRB in EMS, patient mental status improving with improved oxygenation. Daughter reports the patient was doing okay yesterday and that this was an acute change.     In the ED, Vitals: BP 84/53, HR 99, RR 22, SpO2 100% on NRB. Found to be Covid positive with CTA chest showing no evidence of acute pulmonary embolus but Bilateral lower lobe consolidative opacities.     Admitted to step down for AHRF/ Severe Sepsis 2/2 COvid Pneumonia/ Superimposed bacterial pneumonia. She was started on Doxycycline and zosyn. MRSA nares positive on mupirocin. Remdesivir held as per daughter. started on IV solumedrol and HFNC. O2 requirements weened to NC.     She is stable for down grade to floor      For Follow-Up:  - ID  - PT/OT       ASSESSMENT & PLAN:   83F w/ PMHx COPD, OA, chronic back pain s/p multiple surgeries and recent admission for left intertrochanteric femur fracture s/p ORIF 12/27/2023 brought in from [facility] for shortness of breath.    #Acute Hypoxic Respiratory Failure and severe sepsis secondary to COVID 19   #Superimposed bacterial pneumonia  - cont Abx; Doxy + Zosyn  - Solumedrol 40 mg BID  - Remdesivir on hold as per patient's daughter's request  - duoneb q6, symbicort  - MRSA nares positive, --> ordered bactroban  - CTA Chest shows: no evidence of acute pulmonary embolus. Bilateral lower lobe consolidative opacities which can be seen with pneumonia  - 1/4 ID f/u-  CRP 46.9, f/u Procalcitonin   - c/w zosyn 3.375 mg q 8 hours; doxycycline 100 mg BID for 5 days (started 1/1, last day today 1/5)   - 1/5- patient noted to be saturating well when HFNC was not in her nose; transition to NC today  - 1/5 AM CXR appears improved on my read    #NSTEMI Type II  - EKG showed sinus tachy w/ PACs  - Trop 208>192 (previously 19)  - trend, monitor repeat  - if remains elevated or worsens, consider cardio consult  - obtaining lipid profile, may need to start statin    #HTN  - d/c IVF  - 1/5 started amlodipine 5 mg qd    #ALAN -- improved    #Hyponatremia  - Na 127, appears chronic    #Chronic Anemia  - Hgb 8.9 (b/l ~10)  - monitor H&H  - f/u o/p    #left intertrochanteric femur fracture s/p ORIF 12/27/2023 /OA/ Chronic Back Pain s/p multiple surgeries  - from rehab  - cont Lovenox  - for pain control added standing Tylenol q6h, if not improving, will add Oxycodone 5 PRN  - PT recommended eventual d/c to rehab  - continue to f/u PT progress    #DVT ppx: Lovenox  #GI ppx: none  #Diet: DASH/TLC   #Activity: IAT, Weight bearing 50% LLE, WBAT RLE  #Code status: DNR/DNI (Trial NIV)  #Disposition: DGTF                Vital Signs Last 24 Hrs  T(C): 37.1 (06 Jan 2024 11:43), Max: 37.2 (06 Jan 2024 04:00)  T(F): 98.7 (06 Jan 2024 11:43), Max: 99 (06 Jan 2024 04:00)  HR: 78 (06 Jan 2024 11:43) (75 - 101)  BP: 128/62 (06 Jan 2024 11:43) (128/62 - 178/77)  BP(mean): 111 (06 Jan 2024 04:00) (111 - 111)  RR: 20 (06 Jan 2024 11:43) (20 - 20)  SpO2: 100% (06 Jan 2024 11:43) (93% - 100%)    Parameters below as of 06 Jan 2024 04:00  Patient On (Oxygen Delivery Method): nasal cannula  O2 Flow (L/min): 5    I&O's Summary    05 Jan 2024 07:01  -  06 Jan 2024 07:00  --------------------------------------------------------  IN: 1710 mL / OUT: 1750 mL / NET: -40 mL          MEDICATIONS  (STANDING):  acetaminophen     Tablet .. 650 milliGRAM(s) Oral every 6 hours  albuterol/ipratropium for Nebulization 3 milliLiter(s) Nebulizer every 6 hours  amLODIPine   Tablet 5 milliGRAM(s) Oral daily  aspirin enteric coated 81 milliGRAM(s) Oral daily  budesonide 160 MICROgram(s)/formoterol 4.5 MICROgram(s) Inhaler 2 Puff(s) Inhalation two times a day  chlorhexidine 2% Cloths 1 Application(s) Topical <User Schedule>  doxycycline IVPB 100 milliGRAM(s) IV Intermittent every 12 hours  doxycycline IVPB      enoxaparin Injectable 40 milliGRAM(s) SubCutaneous every 24 hours  methylPREDNISolone sodium succinate Injectable 40 milliGRAM(s) IV Push two times a day  mupirocin 2% Ointment 1 Application(s) Topical two times a day  piperacillin/tazobactam IVPB.. 3.375 Gram(s) IV Intermittent every 8 hours  polyethylene glycol 3350 17 Gram(s) Oral daily  senna 2 Tablet(s) Oral at bedtime    MEDICATIONS  (PRN):  aluminum hydroxide/magnesium hydroxide/simethicone Suspension 30 milliLiter(s) Oral every 4 hours PRN Dyspepsia  melatonin 3 milliGRAM(s) Oral at bedtime PRN Insomnia  ondansetron Injectable 4 milliGRAM(s) IV Push every 8 hours PRN Nausea and/or Vomiting        LABS                                            8.8                   Neurophils% (auto):   80.4   (01-06 @ 04:39):    18.01)-----------(368          Lymphocytes% (auto):  7.9                                           26.3                   Eosinphils% (auto):   0.1      Manual%: Neutrophils x    ; Lymphocytes x    ; Eosinophils x    ; Bands%: x    ; Blasts x                                    126    |  87     |  23                  Calcium: 7.9   / iCa: x      (01-06 @ 04:39)    ----------------------------<  80        Magnesium: 1.7                              3.8     |  30     |  0.5              Phosphorous: x        TPro  4.8    /  Alb  2.7    /  TBili  0.9    /  DBili  x      /  AST  14     /  ALT  21     /  AlkPhos  83     06 Jan 2024 04:39

## 2024-01-06 NOTE — PROGRESS NOTE ADULT - ATTENDING COMMENTS
Impression    Acute on chronic hypoxemic/ hypercapnic resp failure on HHFNC  Covid pneumonia  Possible aspiration (CT chest noted)  COPD exacerbation  Fluid overload  NTEMI  AMS/ TME  COPD not on Home o2  hyponatremia  Bilateral lung nodules   Chronic back pain  Left intertrochanteric femur fracture Sp ORIF  Current smoker    Impression and plan above have been altered and are my own.

## 2024-01-07 PROCEDURE — 99232 SBSQ HOSP IP/OBS MODERATE 35: CPT

## 2024-01-07 RX ADMIN — Medication 650 MILLIGRAM(S): at 23:38

## 2024-01-07 RX ADMIN — PIPERACILLIN AND TAZOBACTAM 25 GRAM(S): 4; .5 INJECTION, POWDER, LYOPHILIZED, FOR SOLUTION INTRAVENOUS at 22:59

## 2024-01-07 RX ADMIN — Medication 650 MILLIGRAM(S): at 13:14

## 2024-01-07 RX ADMIN — Medication 650 MILLIGRAM(S): at 06:52

## 2024-01-07 RX ADMIN — Medication 40 MILLIGRAM(S): at 07:07

## 2024-01-07 RX ADMIN — Medication 12.5 MILLIGRAM(S): at 07:21

## 2024-01-07 RX ADMIN — PIPERACILLIN AND TAZOBACTAM 25 GRAM(S): 4; .5 INJECTION, POWDER, LYOPHILIZED, FOR SOLUTION INTRAVENOUS at 06:52

## 2024-01-07 RX ADMIN — Medication 650 MILLIGRAM(S): at 17:45

## 2024-01-07 RX ADMIN — Medication 100 MILLIGRAM(S): at 21:59

## 2024-01-07 RX ADMIN — Medication 12.5 MILLIGRAM(S): at 17:46

## 2024-01-07 RX ADMIN — Medication 650 MILLIGRAM(S): at 17:57

## 2024-01-07 RX ADMIN — AMLODIPINE BESYLATE 5 MILLIGRAM(S): 2.5 TABLET ORAL at 06:53

## 2024-01-07 RX ADMIN — Medication 650 MILLIGRAM(S): at 01:24

## 2024-01-07 RX ADMIN — SENNA PLUS 2 TABLET(S): 8.6 TABLET ORAL at 21:59

## 2024-01-07 RX ADMIN — Medication 650 MILLIGRAM(S): at 23:35

## 2024-01-07 RX ADMIN — Medication 100 MILLIGRAM(S): at 08:20

## 2024-01-07 RX ADMIN — Medication 81 MILLIGRAM(S): at 12:55

## 2024-01-07 RX ADMIN — MUPIROCIN 1 APPLICATION(S): 20 OINTMENT TOPICAL at 17:46

## 2024-01-07 RX ADMIN — Medication 650 MILLIGRAM(S): at 12:55

## 2024-01-07 RX ADMIN — PIPERACILLIN AND TAZOBACTAM 25 GRAM(S): 4; .5 INJECTION, POWDER, LYOPHILIZED, FOR SOLUTION INTRAVENOUS at 12:54

## 2024-01-07 NOTE — PROGRESS NOTE ADULT - ASSESSMENT
83F w/ PMHx COPD, OA, chronic back pain s/p multiple surgeries and recent admission for left intertrochanteric femur fracture s/p ORIF 12/27/2023 brought in from [facility] for shortness of breath.    #Acute Hypoxic Respiratory Failure and severe sepsis secondary to COVID 19   #Superimposed bacterial pneumonia  - cont Abx; Doxy + Zosyn  - Remdesivir on hold as per patient's daughter's request  - duoneb q6, symbicort  - MRSA nares positive, --> ordered bactroban  - CTA Chest shows: no evidence of acute pulmonary embolus. Bilateral lower lobe consolidative opacities which can be seen with pneumonia  - tapering solumderol to 40 q24    #NSTEMI Type II  - EKG showed sinus tachy w/ PACs  - Trop 208>192 (previously 19)  - trend, monitor repeat  - if remains elevated or worsens, consider cardio consult  - obtaining lipid profile, may need to start statin  - f/u tte   - cardiology consult: possible need for event recorder vs ILR     #HTN  - d/c IVF  - 1/5 started amlodipine 5 mg qd    #ALAN -- improved    #Hyponatremia  - Na 127, appears chronic    #Chronic Anemia  - Hgb 8.9 (b/l ~10)  - monitor H&H  - f/u o/p    #left intertrochanteric femur fracture s/p ORIF 12/27/2023 /OA/ Chronic Back Pain s/p multiple surgeries  - from rehab  - cont Lovenox  - for pain control added standing Tylenol q6h, if not improving, will add Oxycodone 5 PRN  - PT recommended eventual d/c to rehab  - continue to f/u PT progress    #DVT ppx: Lovenox  #GI ppx: none  #Diet: DASH/TLC   #Activity: IAT, Weight bearing 50% LLE, WBAT RLE  #Code status: DNR/DNI (Trial NIV)   83F w/ PMHx COPD, OA, chronic back pain s/p multiple surgeries and recent admission for left intertrochanteric femur fracture s/p ORIF 12/27/2023 brought in from [facility] for shortness of breath.    #Acute Hypoxic Respiratory Failure and severe sepsis secondary to COVID 19   #Superimposed bacterial pneumonia  - cont Abx; Doxy + Zosyn  - Remdesivir on hold as per patient's daughter's request  - duoneb q6, symbicort  - MRSA nares positive, --> ordered bactroban  - CTA Chest shows: no evidence of acute pulmonary embolus. Bilateral lower lobe consolidative opacities which can be seen with pneumonia  - tapering solumderol to 40 q24    #NSTEMI Type II  - EKG showed sinus tachy w/ PACs  - Trop 208>192 (previously 19)  - trend, monitor repeat  - if remains elevated or worsens, consider cardio consult  - obtaining lipid profile, may need to start statin  - f/u tte   - cardiology consult: possible need for event recorder vs ILR     #HTN  - d/c IVF  - 1/5 started amlodipine 5 mg qd    #ALAN -- improved    #Hyponatremia  - Na 127, appears chronic    #Chronic Anemia  - Hgb 8.9 (b/l ~10)  - monitor H&H  - f/u o/p    #left intertrochanteric femur fracture s/p ORIF 12/27/2023 /OA/ Chronic Back Pain s/p multiple surgeries  - from rehab  - cont Lovenox  - for pain control added standing Tylenol q6h, if not improving, will add Oxycodone 5 PRN  - PT recommended eventual d/c to rehab  - continue to f/u PT progress    #DVT ppx: Lovenox  #GI ppx: none  #Diet: DASH/TLC   #Activity: IAT, Weight bearing 50% LLE, WBAT RLE  #Code status: DNR/DNI (Trial NIV)

## 2024-01-07 NOTE — PROGRESS NOTE ADULT - ASSESSMENT
83F w/ PMHx COPD, OA, chronic back pain s/p multiple surgeries and recent admission for left intertrochanteric femur fracture s/p ORIF 12/27/2023 brought in from [facility] for shortness of breath.    Acute Hypoxic Respiratory Failure and severe sepsis secondary to COVID 19 upon arrival to [facility] Superimposed bacterial pneumonia  NSTEMI Type II  HTN  ALAN   Hyponatremia  Chronic Anemia  left intertrochanteric femur fracture s/p ORIF 12/27/2023 /OA/ Chronic Back Pain s/p multiple surgeries         Continue current care as per medical team and Pulmonary   GI/DVT prophylaxis   Daily ASA 81 EC   Monitor electrolytes and H/H  Keep equal balance   Physical therapy/Rehab   Will F/U    83F w/ PMHx COPD, OA, chronic back pain s/p multiple surgeries and recent admission for left intertrochanteric femur fracture s/p ORIF 12/27/2023 brought in from [facility] for shortness of breath.    Acute Hypoxic Respiratory Failure and severe sepsis secondary to COVID 19 upon arrival to [facility] Superimposed bacterial pneumonia  NSTEMI Type II  HTN  ALAN   Hyponatremia  Chronic Anemia  left intertrochanteric femur fracture s/p ORIF 12/27/2023 /OA/ Chronic Back Pain s/p multiple surgeries         Continue current care as per medical team and Pulmonary   GI/DVT prophylaxis   Daily ASA 81 EC   Monitor electrolytes and H/H  Keep equal balance   Physical therapy/Rehab   Will F/U

## 2024-01-07 NOTE — PROGRESS NOTE ADULT - SUBJECTIVE AND OBJECTIVE BOX
Denies any chest pain or palpitations and her SOB improved since admission       MEDICATIONS  (STANDING):  acetaminophen     Tablet .. 650 milliGRAM(s) Oral every 6 hours  albuterol/ipratropium for Nebulization 3 milliLiter(s) Nebulizer every 6 hours  amLODIPine   Tablet 5 milliGRAM(s) Oral daily  aspirin enteric coated 81 milliGRAM(s) Oral daily  budesonide 160 MICROgram(s)/formoterol 4.5 MICROgram(s) Inhaler 2 Puff(s) Inhalation two times a day  chlorhexidine 2% Cloths 1 Application(s) Topical <User Schedule>  doxycycline IVPB      doxycycline IVPB 100 milliGRAM(s) IV Intermittent every 12 hours  enoxaparin Injectable 40 milliGRAM(s) SubCutaneous every 24 hours  metoprolol tartrate 12.5 milliGRAM(s) Oral every 12 hours  mupirocin 2% Ointment 1 Application(s) Topical two times a day  piperacillin/tazobactam IVPB.. 3.375 Gram(s) IV Intermittent every 8 hours  polyethylene glycol 3350 17 Gram(s) Oral daily  senna 2 Tablet(s) Oral at bedtime  sodium chloride 0.9%. 1000 milliLiter(s) (75 mL/Hr) IV Continuous <Continuous>    MEDICATIONS  (PRN):  aluminum hydroxide/magnesium hydroxide/simethicone Suspension 30 milliLiter(s) Oral every 4 hours PRN Dyspepsia  melatonin 3 milliGRAM(s) Oral at bedtime PRN Insomnia  ondansetron Injectable 4 milliGRAM(s) IV Push every 8 hours PRN Nausea and/or Vomiting            Vital Signs Last 24 Hrs  T(C): 36.7 (07 Jan 2024 14:50), Max: 36.7 (07 Jan 2024 14:50)  T(F): 98.1 (07 Jan 2024 14:50), Max: 98.1 (07 Jan 2024 14:50)  HR: 77 (07 Jan 2024 17:57) (73 - 90)  BP: 120/56 (07 Jan 2024 17:57) (119/59 - 179/74)  BP(mean): --  RR: 18 (07 Jan 2024 17:57) (18 - 19)  SpO2: 98% (07 Jan 2024 17:57) (98% - 99%)    Parameters below as of 07 Jan 2024 17:57  Patient On (Oxygen Delivery Method): nasal cannula  O2 Flow (L/min): 2       REVIEW OF SYSTEMS:  CONSTITUTIONAL: No fever, weight loss, or fatigue  CARDIOLOGY: PAtient denies chest pain, shortness of breath or syncopal episodes.   RESPIRATORY: denies shortness of breath, wheezeing.   NEUROLOGICAL: No weakness, no focal deficits to report.  GI: no BRBPR, no N,V,diarrhea.    PSYCHIATRY: normal mood and affect  HEENT: no nasal discharge, no ecchymosis  SKIN: no ecchymosis, no breakdown  MUSCULOSKELETAL: able to move her left leg and foot       PHYSICAL EXAM:  · CONSTITUTIONAL:	Well-developed, well nourished      ·RESPIRATORY:   airway patent; breath sounds equal; good air movement; respirations non-labored; clear to auscultation bilaterally but decreased bilaterally   · CARDIOVASCULAR	regular rate and rhythm  no rub  + SE murmur  normal PMI  · EXTREMITIES: No cyanosis, clubbing or edema  · VASCULAR: absent PT bilaterally. Left leg mildly tender (improved)   	    LABS:                        8.8    18.01 )-----------( 368      ( 06 Jan 2024 04:39 )             26.3     01-06    126<L>  |  87<L>  |  23<H>  ----------------------------<  80  3.8   |  30  |  0.5<L>    Ca    7.9<L>      06 Jan 2024 04:39  Mg     1.7     01-06    TPro  4.8<L>  /  Alb  2.7<L>  /  TBili  0.9  /  DBili  x   /  AST  14  /  ALT  21  /  AlkPhos  83  01-06            I&O's Summary    06 Jan 2024 07:01  -  07 Jan 2024 07:00  --------------------------------------------------------  IN: 0 mL / OUT: 750 mL / NET: -750 mL      BNP  RADIOLOGY & ADDITIONAL STUDIES:    IMPRESSION AND PLAN:

## 2024-01-07 NOTE — PROGRESS NOTE ADULT - SUBJECTIVE AND OBJECTIVE BOX
SHANELL MORENO  83y  Female      Patient is a 83y old  Female who presents with a chief complaint of SOB (06 Jan 2024 14:12)      INTERVAL HPI/OVERNIGHT EVENTS:      Vital Signs Last 24 Hrs  T(C): 36.2 (07 Jan 2024 04:46), Max: 37.1 (06 Jan 2024 11:43)  T(F): 97.2 (07 Jan 2024 04:46), Max: 98.7 (06 Jan 2024 11:43)  HR: 81 (07 Jan 2024 04:46) (73 - 81)  BP: 119/59 (07 Jan 2024 04:46) (119/59 - 179/74)  RR: 18 (07 Jan 2024 04:46) (18 - 20)  SpO2: 99% (06 Jan 2024 21:00) (99% - 100%)    Parameters below as of 06 Jan 2024 21:00  Patient On (Oxygen Delivery Method): nasal cannula      01-06-24 @ 07:01  -  01-07-24 @ 07:00  --------------------------------------------------------  IN: 0 mL / OUT: 750 mL / NET: -750 mL        PHYSICAL EXAM:        LABS                          8.8    18.01 )-----------( 368      ( 06 Jan 2024 04:39 )             26.3     01-06    126<L>  |  87<L>  |  23<H>  ----------------------------<  80  3.8   |  30  |  0.5<L>    Ca    7.9<L>      06 Jan 2024 04:39  Mg     1.7     01-06    TPro  4.8<L>  /  Alb  2.7<L>  /  TBili  0.9  /  DBili  x   /  AST  14  /  ALT  21  /  AlkPhos  83  01-06        Lactate Trend  01-06 @ 12:37 Lactate:2.5   01-02 @ 12:10 Lactate:1.3                    SAHNELL MORENO  83y  Female      Patient is a 83y old  Female who presents with a chief complaint of SOB (06 Jan 2024 14:12)      INTERVAL HPI/OVERNIGHT EVENTS: No acute overnight events. Pt feels better. No difficulty breathing, chest pain or abdominal pain.       Vital Signs Last 24 Hrs  T(C): 36.2 (07 Jan 2024 04:46), Max: 37.1 (06 Jan 2024 11:43)  T(F): 97.2 (07 Jan 2024 04:46), Max: 98.7 (06 Jan 2024 11:43)  HR: 81 (07 Jan 2024 04:46) (73 - 81)  BP: 119/59 (07 Jan 2024 04:46) (119/59 - 179/74)  RR: 18 (07 Jan 2024 04:46) (18 - 20)  SpO2: 99% (06 Jan 2024 21:00) (99% - 100%)    Parameters below as of 06 Jan 2024 21:00  Patient On (Oxygen Delivery Method): nasal cannula      01-06-24 @ 07:01  -  01-07-24 @ 07:00  --------------------------------------------------------  IN: 0 mL / OUT: 750 mL / NET: -750 mL        PHYSICAL EXAM:  GEN: No acute distress, hard of hearing.   LUNGS: mild wheezing+  HEART: S1/S2 present. RRR.   ABD/ GI: Soft, non-tender, non-distended. Bowel sounds present  EXT: trace edema  NEURO: AAOX3      LABS                          8.8    18.01 )-----------( 368      ( 06 Jan 2024 04:39 )             26.3     01-06    126<L>  |  87<L>  |  23<H>  ----------------------------<  80  3.8   |  30  |  0.5<L>    Ca    7.9<L>      06 Jan 2024 04:39  Mg     1.7     01-06    TPro  4.8<L>  /  Alb  2.7<L>  /  TBili  0.9  /  DBili  x   /  AST  14  /  ALT  21  /  AlkPhos  83  01-06        Lactate Trend  01-06 @ 12:37 Lactate:2.5   01-02 @ 12:10 Lactate:1.3                    SHANELL MORENO  83y  Female      Patient is a 83y old  Female who presents with a chief complaint of SOB (06 Jan 2024 14:12)      INTERVAL HPI/OVERNIGHT EVENTS: No acute overnight events. Pt feels better. No difficulty breathing, chest pain or abdominal pain.       Vital Signs Last 24 Hrs  T(C): 36.2 (07 Jan 2024 04:46), Max: 37.1 (06 Jan 2024 11:43)  T(F): 97.2 (07 Jan 2024 04:46), Max: 98.7 (06 Jan 2024 11:43)  HR: 81 (07 Jan 2024 04:46) (73 - 81)  BP: 119/59 (07 Jan 2024 04:46) (119/59 - 179/74)  RR: 18 (07 Jan 2024 04:46) (18 - 20)  SpO2: 99% (06 Jan 2024 21:00) (99% - 100%)    Parameters below as of 06 Jan 2024 21:00  Patient On (Oxygen Delivery Method): nasal cannula      01-06-24 @ 07:01  -  01-07-24 @ 07:00  --------------------------------------------------------  IN: 0 mL / OUT: 750 mL / NET: -750 mL        PHYSICAL EXAM:  GEN: No acute distress, hard of hearing.   LUNGS: mild wheezing+  HEART: S1/S2 present. RRR.   ABD/ GI: Soft, non-tender, non-distended. Bowel sounds present  EXT: trace edema  NEURO: AAOX3      LABS                          8.8    18.01 )-----------( 368      ( 06 Jan 2024 04:39 )             26.3     01-06    126<L>  |  87<L>  |  23<H>  ----------------------------<  80  3.8   |  30  |  0.5<L>    Ca    7.9<L>      06 Jan 2024 04:39  Mg     1.7     01-06    TPro  4.8<L>  /  Alb  2.7<L>  /  TBili  0.9  /  DBili  x   /  AST  14  /  ALT  21  /  AlkPhos  83  01-06        Lactate Trend  01-06 @ 12:37 Lactate:2.5   01-02 @ 12:10 Lactate:1.3

## 2024-01-07 NOTE — PROGRESS NOTE ADULT - SUBJECTIVE AND OBJECTIVE BOX
24H events:    Patient is a 83y old Female who presents with a chief complaint of SOB (07 Jan 2024 08:39)    Primary diagnosis of 2019 novel coronavirus disease (COVID-19)    Today is hospital day 7d. This morning patient was seen and examined at bedside, resting comfortably in bed.    No acute or major events overnight.      PAST MEDICAL & SURGICAL HISTORY  COPD (chronic obstructive pulmonary disease)    H/O cholelithiasis    Smoker    History of surgery  orif right ankle      SOCIAL HISTORY:  Social History:      ALLERGIES:  No Known Allergies    MEDICATIONS:  STANDING MEDICATIONS  acetaminophen     Tablet .. 650 milliGRAM(s) Oral every 6 hours  albuterol/ipratropium for Nebulization 3 milliLiter(s) Nebulizer every 6 hours  amLODIPine   Tablet 5 milliGRAM(s) Oral daily  aspirin enteric coated 81 milliGRAM(s) Oral daily  budesonide 160 MICROgram(s)/formoterol 4.5 MICROgram(s) Inhaler 2 Puff(s) Inhalation two times a day  chlorhexidine 2% Cloths 1 Application(s) Topical <User Schedule>  doxycycline IVPB      doxycycline IVPB 100 milliGRAM(s) IV Intermittent every 12 hours  enoxaparin Injectable 40 milliGRAM(s) SubCutaneous every 24 hours  metoprolol tartrate 12.5 milliGRAM(s) Oral every 12 hours  mupirocin 2% Ointment 1 Application(s) Topical two times a day  piperacillin/tazobactam IVPB.. 3.375 Gram(s) IV Intermittent every 8 hours  polyethylene glycol 3350 17 Gram(s) Oral daily  senna 2 Tablet(s) Oral at bedtime  sodium chloride 0.9%. 1000 milliLiter(s) IV Continuous <Continuous>    PRN MEDICATIONS  aluminum hydroxide/magnesium hydroxide/simethicone Suspension 30 milliLiter(s) Oral every 4 hours PRN  melatonin 3 milliGRAM(s) Oral at bedtime PRN  ondansetron Injectable 4 milliGRAM(s) IV Push every 8 hours PRN    VITALS:   T(F): 98.1  HR: 90  BP: 139/60  RR: 19  SpO2: 99%    PHYSICAL EXAM:  GENERAL:   (  ) NAD, lying in bed comfortably     (  ) obtunded     (  ) lethargic     (  ) somnolent    HEAD:   (  ) Atraumatic     (  ) hematoma     (  ) laceration (specify location:       )     NECK:  (  ) Supple     (  ) neck stiffness     (  ) nuchal rigidity     (  )  no JVD     (  ) JVD present ( -- cm)    HEART:  Rate -->     (  ) normal rate     (  ) bradycardic     (  ) tachycardic  Rhythm -->     (  ) regular     (  ) regularly irregular     (  ) irregularly irregular  Murmurs -->     (  ) normal s1s2     (  ) systolic murmur     (  ) diastolic murmur     (  ) continuous murmur      (  ) S3 present     (  ) S4 present    LUNGS:   (  )Unlabored respirations     (  ) tachypnea  (  ) B/L air entry     (  ) decreased breath sounds in:  (location     )    (  ) no adventitious sound     (  ) crackles     (  ) wheezing      (  ) rhonchi      (specify location:       )  (  ) chest wall tenderness (specify location:       )    ABDOMEN:   (  ) Soft     (  ) tense   |   (  ) nondistended     (  ) distended   |   (  ) +BS     (  ) hypoactive bowel sounds     (  ) hyperactive bowel sounds  (  ) nontender     (  ) RUQ tenderness     (  ) RLQ tenderness     (  ) LLQ tenderness     (  ) epigastric tenderness     (  ) diffuse tenderness  (  ) Splenomegaly      (  ) Hepatomegaly      (  ) Jaundice     (  ) ecchymosis     EXTREMITIES:  (  ) Normal     (  ) Rash     (  ) ecchymosis     (  ) varicose veins      (  ) pitting edema     (  ) non-pitting edema   (  ) ulceration     (  ) gangrene:     (location:     )    NERVOUS SYSTEM:    (  ) A&Ox3     (  ) confused     (  ) lethargic  CN II-XII:     (  ) Intact     (  ) deficits found     (Specify:     )   Upper extremities:     (  ) no sensorimotor deficits     (  ) weakness     (  ) loss of proprioception/vibration     (  ) loss of touch/temperature (specify:    )  Lower extremities:     (  ) no sensorimotor deficits     (  ) weakness     (  ) loss of proprioception/vibration     (  ) loss of touch/temperature (specify:    )    SKIN:   (  ) No rashes or lesions     (  ) maculopapular rash     (  ) pustules     (  ) vesicles     (  ) ulcer     (  ) ecchymosis     (specify location:     )      LABS:                        8.8    18.01 )-----------( 368      ( 06 Jan 2024 04:39 )             26.3     01-06    126<L>  |  87<L>  |  23<H>  ----------------------------<  80  3.8   |  30  |  0.5<L>    Ca    7.9<L>      06 Jan 2024 04:39  Mg     1.7     01-06    TPro  4.8<L>  /  Alb  2.7<L>  /  TBili  0.9  /  DBili  x   /  AST  14  /  ALT  21  /  AlkPhos  83  01-06      Urinalysis Basic - ( 06 Jan 2024 04:39 )    Color: x / Appearance: x / SG: x / pH: x  Gluc: 80 mg/dL / Ketone: x  / Bili: x / Urobili: x   Blood: x / Protein: x / Nitrite: x   Leuk Esterase: x / RBC: x / WBC x   Sq Epi: x / Non Sq Epi: x / Bacteria: x                RADIOLOGY:

## 2024-01-07 NOTE — PROGRESS NOTE ADULT - ASSESSMENT
83F w/ PMHx COPD, OA, chronic back pain s/p multiple surgeries and recent admission for left intertrochanteric femur fracture s/p ORIF 12/27/2023 brought in from [facility] for shortness of breath.    A/P  #Acute Hypoxic Respiratory Failure/ sepsis POA  secondary to COVID 19 and suspected hospital acquired PNA / h/o COPD   - pt is clinically improving, comfortable on NC now   - c/w pulmonary toilet, chest PT Q 4 hours  - aspiration precautions   - cont Abx; Doxy + Zosyn  - taper Solumedrol 40 mg to Q24 hours    - Remdesivir on hold as per patient's daughter's request  - duoneb q6, symbicort  - pulmonary is following   - c/w isolation for COVID 19     # worsening leukocytosis  - multifactorial, will taper steroids  - c/w broad spectrum Abx  - trend WBC   - monitor fever curve     #NSTEMI Type II  - EKG showed sinus tachy w/ PACs  - Trop 208>192 (previously 19)  -  telemetry monitoring  - on ASA, start Lopressor 12.5 mg Q 12 hours  - check lipid profile, will start statin if indicated   - check 2Decho and BNP  - pt was consulted by cardiology, pt needs OP event monitoring     # Hypovolemic hyponatremia  - pt is hypovolemic on PE  - continue to hold diuretics  (no clear indications for diuretics)  - monitor Na level   - start IV hydration for 24 hours     #ALAN   - resolved  - IV hydration for 24 hours   - maintain fluid balance   - monitor BUN/cr and urine output     #Chronic Anemia  - monitor H/H, keep Hb above 8.0  - no active bleeding noted     #left intertrochanteric femur fracture s/p ORIF 12/27/2023 /OA/ Chronic Back Pain s/p multiple surgeries  - may change Tylenol to standing  - OOB to chair, start  PT     #DVT ppx: Lovenox  #GI ppx: none  #Diet: DASH/TLC   #Code status: DNR/DNI (Trial NIV)  #Disposition: SDU      Pending (specify): start IV hydration for 24 hours, f/u Na level in AM, start BB, taper steroids, trend WBC,  supportive care,, take pt OOB to chair, PT/rehab eval  83F w/ PMHx COPD, OA, chronic back pain s/p multiple surgeries and recent admission for left intertrochanteric femur fracture s/p ORIF 12/27/2023 brought in from [facility] for shortness of breath.    A/P  #Acute Hypoxic Respiratory Failure/ sepsis POA  secondary to COVID 19 and suspected hospital acquired PNA / h/o COPD   - pt is clinically improving, comfortable on NC now   - c/w pulmonary toilet, chest PT Q 4 hours  - aspiration precautions   - cont Abx; Doxy + Zosyn  - taper Solumedrol 40 mg to Q24 hours    - Remdesivir on hold as per patient's daughter's request  - duoneb q6, symbicort  - pulmonary is following   - c/w isolation for COVID 19     # worsening leukocytosis  - multifactorial, will taper steroids  - c/w broad spectrum Abx  - trend WBC   - monitor fever curve     #NSTEMI Type II  - EKG showed sinus tachy w/ PACs  - Trop 208>192 (previously 19)  -  telemetry monitoring  - on ASA, start Lopressor 12.5 mg Q 12 hours  - check lipid profile, will start statin if indicated   - check 2Decho and BNP  - pt was consulted by cardiology, pt needs OP event monitoring     # Hypovolemic hyponatremia  - pt is hypovolemic on PE  - continue to hold diuretics  (no clear indications for diuretics)  - monitor Na level   - start IV hydration for 24 hours     #ALAN   - resolved  - IV hydration for 24 hours   - maintain fluid balance   - monitor BUN/cr and urine output     #Chronic Anemia  - monitor H/H, keep Hb above 8.0  - no active bleeding noted     #left intertrochanteric femur fracture s/p ORIF 12/27/2023 /OA/ Chronic Back Pain s/p multiple surgeries  - may change Tylenol to standing  - OOB to chair, start  PT     #DVT ppx: Lovenox  #GI ppx: none  #Diet: DASH/TLC   #Code status: DNR/DNI (Trial NIV)  #Disposition: SDU      Pending (specify): start IV hydration for 24 hours, f/u Na level in AM, start BB, taper steroids, trend WBC,  supportive care,, take pt OOB to chair, PT/rehab eval  83F w/ PMHx COPD, OA, chronic back pain s/p multiple surgeries and recent admission for left intertrochanteric femur fracture s/p ORIF 12/27/2023 brought in from [facility] for shortness of breath.    A/P  #Acute Hypoxic Respiratory Failure/ sepsis POA  secondary to COVID 19 and suspected hospital acquired PNA / h/o COPD   - pt is clinically improving, comfortable on NC now   - c/w pulmonary toilet, chest PT Q 4 hours  - Aspiration precautions   - cont Abx; Doxy + Zosyn: discuss with ID to switch to PO Abx  - tapered Solumedrol 40 mg to Q24 hours    - Remdesivir on hold as per patient's daughter's request  - duoneb q6, symbicort  - pulmonary is following   - c/w isolation for COVID 19     # Worsening leukocytosis  - multifactorial, will taper steroids  - c/w broad spectrum Abx  - trend WBC   - monitor fever curve     #NSTEMI Type II  - EKG showed sinus tachy w/ PACs  - Trop 208>192 (previously 19)  -  telemetry monitoring  - on ASA, start Lopressor 12.5 mg Q 12 hours  - check lipid profile, will start statin if indicated   - check 2Decho and BNP  - pt was consulted by cardiology, pt needs OP event monitoring     # Hypovolemic hyponatremia  - pt is hypovolemic on PE  - continue to hold diuretics  (no clear indications for diuretics)  - monitor Na level   - start IV hydration for 24 hours     #ALAN   - resolved  - IV hydration for 24 hours   - maintain fluid balance   - monitor BUN/cr and urine output     #Chronic Anemia  - monitor H/H, keep Hb above 8.0  - no active bleeding noted     #left intertrochanteric femur fracture s/p ORIF 12/27/2023 /OA/ Chronic Back Pain s/p multiple surgeries  - may change Tylenol to standing  - OOB to chair, start  PT     #DVT ppx: Lovenox  #GI ppx: none  #Diet: DASH/TLC   #Code status: DNR/DNI (Trial NIV)  #Disposition: SDU      Pending (specify): f/u Na, taper steroids, trend WBC, supportive care, OOB to chair, PT/rehab eval   Plan to dc to [facility] (pt came from [facility]): anticipate in 24-48 hours.  83F w/ PMHx COPD, OA, chronic back pain s/p multiple surgeries and recent admission for left intertrochanteric femur fracture s/p ORIF 12/27/2023 brought in from [facility] for shortness of breath.    A/P  #Acute Hypoxic Respiratory Failure/ sepsis POA  secondary to COVID 19 and suspected hospital acquired PNA / h/o COPD   - pt is clinically improving, comfortable on NC now   - c/w pulmonary toilet, chest PT Q 4 hours  - Aspiration precautions   - cont Abx; Doxy + Zosyn: discuss with ID to switch to PO Abx  - tapered Solumedrol 40 mg to Q24 hours    - Remdesivir on hold as per patient's daughter's request  - duoneb q6, symbicort  - pulmonary is following   - c/w isolation for COVID 19     # Worsening leukocytosis  - multifactorial, will taper steroids  - c/w broad spectrum Abx  - trend WBC   - monitor fever curve     #NSTEMI Type II  - EKG showed sinus tachy w/ PACs  - Trop 208>192 (previously 19)  -  telemetry monitoring  - on ASA, start Lopressor 12.5 mg Q 12 hours  - check lipid profile, will start statin if indicated   - check 2Decho and BNP  - pt was consulted by cardiology, pt needs OP event monitoring     # Hypovolemic hyponatremia  - pt is hypovolemic on PE  - continue to hold diuretics  (no clear indications for diuretics)  - monitor Na level   - start IV hydration for 24 hours     #ALAN   - resolved  - IV hydration for 24 hours   - maintain fluid balance   - monitor BUN/cr and urine output     #Chronic Anemia  - monitor H/H, keep Hb above 8.0  - no active bleeding noted     #left intertrochanteric femur fracture s/p ORIF 12/27/2023 /OA/ Chronic Back Pain s/p multiple surgeries  - may change Tylenol to standing  - OOB to chair, start  PT     #DVT ppx: Lovenox  #GI ppx: none  #Diet: DASH/TLC   #Code status: DNR/DNI (Trial NIV)  #Disposition: SDU      Pending (specify): f/u Na, taper steroids, trend WBC, supportive care, OOB to chair, PT/rehab eval   Plan to dc to [facility] (pt came from [facility]): anticipate in 24-48 hours.

## 2024-01-08 ENCOUNTER — APPOINTMENT (OUTPATIENT)
Dept: PULMONOLOGY | Facility: CLINIC | Age: 84
End: 2024-01-08

## 2024-01-08 PROCEDURE — 99233 SBSQ HOSP IP/OBS HIGH 50: CPT

## 2024-01-08 RX ORDER — PIPERACILLIN AND TAZOBACTAM 4; .5 G/20ML; G/20ML
3.38 INJECTION, POWDER, LYOPHILIZED, FOR SOLUTION INTRAVENOUS ONCE
Refills: 0 | Status: COMPLETED | OUTPATIENT
Start: 2024-01-08 | End: 2024-01-08

## 2024-01-08 RX ORDER — PIPERACILLIN AND TAZOBACTAM 4; .5 G/20ML; G/20ML
3.38 INJECTION, POWDER, LYOPHILIZED, FOR SOLUTION INTRAVENOUS EVERY 8 HOURS
Refills: 0 | Status: DISCONTINUED | OUTPATIENT
Start: 2024-01-09 | End: 2024-01-10

## 2024-01-08 RX ADMIN — Medication 40 MILLIGRAM(S): at 06:55

## 2024-01-08 RX ADMIN — AMLODIPINE BESYLATE 5 MILLIGRAM(S): 2.5 TABLET ORAL at 06:55

## 2024-01-08 RX ADMIN — POLYETHYLENE GLYCOL 3350 17 GRAM(S): 17 POWDER, FOR SOLUTION ORAL at 11:50

## 2024-01-08 RX ADMIN — PIPERACILLIN AND TAZOBACTAM 25 GRAM(S): 4; .5 INJECTION, POWDER, LYOPHILIZED, FOR SOLUTION INTRAVENOUS at 13:17

## 2024-01-08 RX ADMIN — Medication 12.5 MILLIGRAM(S): at 06:55

## 2024-01-08 RX ADMIN — Medication 100 MILLIGRAM(S): at 18:24

## 2024-01-08 RX ADMIN — PIPERACILLIN AND TAZOBACTAM 25 GRAM(S): 4; .5 INJECTION, POWDER, LYOPHILIZED, FOR SOLUTION INTRAVENOUS at 06:54

## 2024-01-08 RX ADMIN — Medication 81 MILLIGRAM(S): at 11:49

## 2024-01-08 RX ADMIN — Medication 650 MILLIGRAM(S): at 12:50

## 2024-01-08 RX ADMIN — Medication 12.5 MILLIGRAM(S): at 18:25

## 2024-01-08 RX ADMIN — PIPERACILLIN AND TAZOBACTAM 25 GRAM(S): 4; .5 INJECTION, POWDER, LYOPHILIZED, FOR SOLUTION INTRAVENOUS at 23:00

## 2024-01-08 RX ADMIN — MUPIROCIN 1 APPLICATION(S): 20 OINTMENT TOPICAL at 06:56

## 2024-01-08 RX ADMIN — MUPIROCIN 1 APPLICATION(S): 20 OINTMENT TOPICAL at 18:25

## 2024-01-08 RX ADMIN — PIPERACILLIN AND TAZOBACTAM 200 GRAM(S): 4; .5 INJECTION, POWDER, LYOPHILIZED, FOR SOLUTION INTRAVENOUS at 18:49

## 2024-01-08 RX ADMIN — Medication 650 MILLIGRAM(S): at 11:48

## 2024-01-08 RX ADMIN — ENOXAPARIN SODIUM 40 MILLIGRAM(S): 100 INJECTION SUBCUTANEOUS at 13:17

## 2024-01-08 RX ADMIN — Medication 650 MILLIGRAM(S): at 07:54

## 2024-01-08 RX ADMIN — CHLORHEXIDINE GLUCONATE 1 APPLICATION(S): 213 SOLUTION TOPICAL at 06:55

## 2024-01-08 RX ADMIN — Medication 650 MILLIGRAM(S): at 18:23

## 2024-01-08 RX ADMIN — Medication 650 MILLIGRAM(S): at 06:54

## 2024-01-08 NOTE — PROGRESS NOTE ADULT - ASSESSMENT
83F w/ PMHx COPD, OA, chronic back pain s/p multiple surgeries and recent admission for left intertrochanteric femur fracture s/p ORIF 12/27/2023 brought in from [facility] for shortness of breath.    #Acute Hypoxic Respiratory Failure and severe sepsis secondary to COVID 19   #Superimposed bacterial pneumonia  - cont Abx, currently on piperacillin/tazobactam for COPD  exacerbation   - Remdesivir on hold as per patient's daughter's request  - duoneb q6, symbicort  - MRSA nares positive, --> ordered bactroban  - CTA Chest shows: no evidence of acute pulmonary embolus. Bilateral lower lobe consolidative opacities which can be seen with pneumonia  - tapering solumderol to 40 mg IV daily       #NSTEMI Type II  - EKG showed sinus tachy w/ PACs  - Trop 208>192 (previously 19)  - trending down , patient has no active chest pain   - Lipid panel within normal   -TTE: LV Ejection Fraction by Agudelo's Method with a biplane EF of 55 %, with Moderate left ventricular hypertrophy  - cardiology consult: possible need for event recorder vs ILR , will follow up with cardiology     #HTN  - d/c IVF  - 1/5 started amlodipine 5 mg qd    #ALAN -- improved    #Hyponatremia  - Na 127, appears chronic  -patient refusing labs       #Chronic Anemia  - Hgb 8.9 (b/l ~10)  - monitor H&H  - f/u o/p    #left intertrochanteric femur fracture s/p ORIF 12/27/2023 /OA/ Chronic Back Pain s/p multiple surgeries  - from rehab  - cont Lovenox  - for pain control added standing Tylenol q6h, if not improving, will add Oxycodone 5 PRN  - PT recommended eventual d/c to rehab  - continue to f/u PT progress    #DVT ppx: Lovenox  #GI ppx: none  #Diet: DASH/TLC   #Activity: IAT, Weight bearing 50% LLE, WBAT RLE  #Code status: DNR/DNI (Trial NIV)   83F w/ PMHx COPD, OA, chronic back pain s/p multiple surgeries and recent admission for left intertrochanteric femur fracture s/p ORIF 12/27/2023 brought in from [facility] for shortness of breath.    #Acute Hypoxic Respiratory Failure and severe sepsis secondary to COVID 19   #Superimposed bacterial pneumonia  - cont Abx, currently on piperacillin/tazobactam for COPD  exacerbation   - Remdesivir on hold as per patient's daughter's request  - duoneb q6, symbicort  - MRSA nares positive, --> ordered bactroban  - CTA Chest shows: no evidence of acute pulmonary embolus. Bilateral lower lobe consolidative opacities which can be seen with pneumonia  - tapering solumderol to 40 mg IV daily       #NSTEMI Type II  - EKG showed sinus tachy w/ PACs  - Trop 208>192 (previously 19)  - trending down , patient has no active chest pain   - Lipid panel within normal   -TTE: LV Ejection Fraction by Agudelo's Method with a biplane EF of 55 %, with Moderate left ventricular hypertrophy  - cardiology consult: possible need for event recorder vs ILR , will follow up with cardiology     #HTN  - d/c IVF  - 1/5 started amlodipine 5 mg qd    #ALAN -- improved    #Hyponatremia  - Na 127, appears chronic  -patient refusing labs       #Chronic Anemia  - Hgb 8.9 (b/l ~10)  - monitor H&H  - f/u o/p    #left intertrochanteric femur fracture s/p ORIF 12/27/2023 /OA/ Chronic Back Pain s/p multiple surgeries  - from rehab  - cont Lovenox  - for pain control added standing Tylenol q6h, if not improving, will add Oxycodone 5 PRN  - PT recommended eventual d/c to rehab  - continue to f/u PT progress    #DVT ppx: Lovenox  #GI ppx: none  #Diet: DASH/TLC   #Activity: IAT, Weight bearing 50% LLE, WBAT RLE  #Code status: DNR/DNI (Trial NIV)

## 2024-01-08 NOTE — PHARMACOTHERAPY INTERVENTION NOTE - COMMENTS
Recommended to discontinue doxycycline 100 mg q12h for PNA since patient has completed 7 days of therapy as per ID. Patient is afebrile.    Ruth Barrow, PharmD   Clinical Pharmacy Specialist, Infectious Diseases  Tele-Antimicrobial Stewardship Program (Tele-ASP)  Tele-ASP Phone: [phone]   Recommended to discontinue doxycycline 100 mg q12h for PNA since patient has completed 7 days of therapy as per ID. Patient is afebrile.    Ruth Barrow, PharmD   Clinical Pharmacy Specialist, Infectious Diseases  Tele-Antimicrobial Stewardship Program (Tele-ASP)  Tele-ASP Phone: [phone]

## 2024-01-08 NOTE — ADVANCED PRACTICE NURSE CONSULT - ASSESSMENT
Reason for Admission: SOB  History of Present Illness:   83F w/ PMHx COPD, OA, chronic back pain s/p multiple surgeries and recent admission for left intertrochanteric femur fracture s/p ORIF 12/27/2023 brought in from [facility] for shortness of breath. EMS reports that she was hypoxic on room air to the 70s. Patient was initially AOx0, after being on NRB in EMS, patient mental status improving with improved oxygenation. Daughter reports the patient was doing okay yesterday and that this was an acute change. Patient currently AOx3, appears short of breath, denying any other complaints at this time.  Allergies and Intolerances:        Allergies:  	No Known Allergies:     Home Medications:   * Patient Currently Takes Medications as of 30-Dec-2023 14:22 documented in Structured Notes  •?	aspirin 81 mg oral delayed release tablet: 1 tab(s) orally once a day  •?	predniSONE 20 mg oral tablet: 2 tab(s) orally once a day  •?	polyethylene glycol 3350 oral powder for reconstitution: 17 gram(s) orally once a day  •?	senna leaf extract oral tablet: 2 tab(s) orally once a day (at bedtime)  •?	enoxaparin: 40 milligram(s) subcutaneous once a day  •?	oxycodone-acetaminophen 5 mg-325 mg oral tablet: 1 tab(s) orally every 6 hours As needed Severe Pain (7 - 10)  •?	Spiriva 18 mcg inhalation capsule: 1 cap(s) inhaled once a day  •?	Advair Diskus 500 mcg-50 mcg inhalation powder: 1 puff(s) inhaled 2 times a day  Patient received in bed, limited mobility, high risk for pressure injury development or progression.  Left hip ORIF stitches to left upper thigh.  Right heel intact at time of assessment.    Wound #1  Type & Location: Skin tear to left upper arm and left shin  Tissue Description: red full thickness   Wound Exudate: moderate, sanguineous    Wound Edge: irregular  Periwound Condition: intact    Wound #2  Type & Location: Left Heel Deep Tissue Injury  Size: ~[id number]  Tissue Description: Blood filled blister  Wound Exudate: None   Wound Edge: intact  Periwound Condition: dry     Wound #3  Type & Location: Sacrococcygeal Deep Tissue Injury with Progression    Size: ~[id number]  Tissue Description: Purple, Maroon with areas of denuded skin as well as yellow.  Wound Exudate: Scant, serosanguineous    Wound Edge: Irregular  Periwound Condition: intact     Reason for Admission: SOB  History of Present Illness:   83F w/ PMHx COPD, OA, chronic back pain s/p multiple surgeries and recent admission for left intertrochanteric femur fracture s/p ORIF 12/27/2023 brought in from [facility] for shortness of breath. EMS reports that she was hypoxic on room air to the 70s. Patient was initially AOx0, after being on NRB in EMS, patient mental status improving with improved oxygenation. Daughter reports the patient was doing okay yesterday and that this was an acute change. Patient currently AOx3, appears short of breath, denying any other complaints at this time.  Allergies and Intolerances:        Allergies:  	No Known Allergies:     Home Medications:   * Patient Currently Takes Medications as of 30-Dec-2023 14:22 documented in Structured Notes  •?	aspirin 81 mg oral delayed release tablet: 1 tab(s) orally once a day  •?	predniSONE 20 mg oral tablet: 2 tab(s) orally once a day  •?	polyethylene glycol 3350 oral powder for reconstitution: 17 gram(s) orally once a day  •?	senna leaf extract oral tablet: 2 tab(s) orally once a day (at bedtime)  •?	enoxaparin: 40 milligram(s) subcutaneous once a day  •?	oxycodone-acetaminophen 5 mg-325 mg oral tablet: 1 tab(s) orally every 6 hours As needed Severe Pain (7 - 10)  •?	Spiriva 18 mcg inhalation capsule: 1 cap(s) inhaled once a day  •?	Advair Diskus 500 mcg-50 mcg inhalation powder: 1 puff(s) inhaled 2 times a day  Patient received in bed, limited mobility, high risk for pressure injury development or progression.  Left hip ORIF stitches to left upper thigh.  Right heel intact at time of assessment.    Wound #1  Type & Location: Skin tear to left upper arm and left shin  Tissue Description: red full thickness   Wound Exudate: moderate, sanguineous    Wound Edge: irregular  Periwound Condition: intact    Wound #2  Type & Location: Left Heel Deep Tissue Injury  Size: ~[id number]  Tissue Description: Blood filled blister  Wound Exudate: None   Wound Edge: intact  Periwound Condition: dry     Wound #3  Type & Location: Sacrococcygeal Deep Tissue Injury with Progression    Size: ~[id number]  Tissue Description: Purple, Maroon with areas of denuded skin as well as yellow.  Wound Exudate: Scant, serosanguineous    Wound Edge: Irregular  Periwound Condition: intact

## 2024-01-08 NOTE — PROGRESS NOTE ADULT - SUBJECTIVE AND OBJECTIVE BOX
83F w/ PMHx COPD, OA, chronic back pain s/p multiple surgeries and recent admission for left intertrochanteric femur fracture s/p ORIF 12/27/2023 brought in from [facility] for shortness of breath, was diagnosed with COVID with suspected underlying hospital acquired PNA, pt was in acute respiratory distress, required  high flow oxygen, clinically improved, comfortable on NC now.   Today pt is c/o cough, doing incentive spirometry, denies chest pain, SOB at rest, has no specific complaints, asking for PT and placement to rehab, daughter at the bedside.     PAST MEDICAL & SURGICAL HISTORY  COPD (chronic obstructive pulmonary disease)    H/O cholelithiasis    Smoker    History of surgery  orif right ankle      SOCIAL HISTORY:  Social History:      ALLERGIES:  No Known Allergies      VITALS:   T(C): 37.2 (08 Jan 2024 15:28), Max: 37.2 (08 Jan 2024 15:28)  T(F): 98.9 (08 Jan 2024 15:28), Max: 98.9 (08 Jan 2024 15:28)  HR: 79 (08 Jan 2024 15:28) (72 - 79)  BP: 115/60 (08 Jan 2024 15:28) (111/54 - 120/68)  BP(mean): --  RR: 18 (08 Jan 2024 15:28) (17 - 18)  SpO2: 95% (08 Jan 2024 08:29) (95% - 98%)    Parameters below as of 08 Jan 2024 08:29  Patient On (Oxygen Delivery Method): nasal cannula  O2 Flow (L/min): 3      PHYSICAL EXAM:  GENERAL:  NAD, frail elderly female  NECK: supple, no JVD  CV: S1, S2, RRR  Lungs: coarse BS sound   Abdomen/GI: soft, scaphoid, NT,ND, BS present  Extremities: surgical site looks clean no ecchymoses dry dressing ( left hip) , no edema on LE  Neuro: AAOx3, clear speech, pt is following commands, moving all extremities         LABS:                    no new labs                 8.8    18.01 )-----------( 368      ( 06 Jan 2024 04:39 )             26.3   01-06    126<L>  |  87<L>  |  23<H>  ----------------------------<  80  3.8   |  30  |  0.5<L>    Ca    7.9<L>      06 Jan 2024 04:39  Mg     1.7     01-06    TPro  4.8<L>  /  Alb  2.7<L>  /  TBili  0.9  /  DBili  x   /  AST  14  /  ALT  21  /  AlkPhos  83  01-06        Urinalysis Basic - ( 04 Jan 2024 06:20 )    Color: x / Appearance: x / SG: x / pH: x  Gluc: 139 mg/dL / Ketone: x  / Bili: x / Urobili: x   Blood: x / Protein: x / Nitrite: x   Leuk Esterase: x / RBC: x / WBC x   Sq Epi: x / Non Sq Epi: x / Bacteria: x  ABG - ( 04 Jan 2024 09:14 )  pH, Arterial: 7.50  pH, Blood: x     /  pCO2: 39    /  pO2: 81    / HCO3: 30    / Base Excess: 6.7   /  SaO2: 98.6          RADIOLOGY:  < from: Xray Chest 1 View-PORTABLE IMMEDIATE (Xray Chest 1 View-PORTABLE IMMEDIATE .) (01.03.24 @ 09:47) >    Impression:    COPD with basilar opacifications, worsened.    < end of copied text >  < from: [facility] Duplex Lower Ext Vein Scan, Bilat (01.02.24 @ 11:03) >    IMPRESSION:  No evidence of deep venous thrombosis in either lower extremity.    < end of copied text >  < from: CT Angio Chest PE Protocol w/ IV Cont (12.31.23 @ 18:26) >  IMPRESSION:    No CTA evidence of acute pulmonary embolus.    Bilateral lower lobe consolidative opacities which can be seen with   pneumonia. Recommend radiographic follow-up after treatment.    < from: TTE Echo Complete w/o Contrast w/ Doppler (01.06.24 @ 12:44) >    Summary:   1. Normal global left ventricular systolic function.   2. LV Ejection Fraction by Agudelo's Method with a biplane EF of 55 %.   3. Moderate left ventricular hypertrophy.   4. Spectral Doppler shows impaired relaxation pattern of left   ventricular myocardial filling (Grade I diastolic dysfunction).   5. Normal left atrial size.   6. Normal right atrial size.   7. Degenerative mitral valve.   8. Mild mitral annular calcification.   9. Mild thickening and calcification of the anterior and posterior   mitral valve leaflets.  10. Mild tricuspid regurgitation.  11. Mild to moderate aortic regurgitation.  12. Sclerotic aortic valve with decreased opening.  13. Estimated pulmonary artery systolic pressure is 52.6 mmHg assuming a   right atrial pressure of 3 mmHg, which is consistent with moderate   pulmonary hypertension.  14. There is severe aortic root calcification.      MEDICATIONS  (STANDING):  acetaminophen     Tablet .. 650 milliGRAM(s) Oral every 6 hours  albuterol/ipratropium for Nebulization 3 milliLiter(s) Nebulizer every 6 hours  amLODIPine   Tablet 5 milliGRAM(s) Oral daily  aspirin enteric coated 81 milliGRAM(s) Oral daily  budesonide 160 MICROgram(s)/formoterol 4.5 MICROgram(s) Inhaler 2 Puff(s) Inhalation two times a day  chlorhexidine 2% Cloths 1 Application(s) Topical <User Schedule>  enoxaparin Injectable 40 milliGRAM(s) SubCutaneous every 24 hours  methylPREDNISolone sodium succinate Injectable 40 milliGRAM(s) IV Push daily  metoprolol tartrate 12.5 milliGRAM(s) Oral every 12 hours  mupirocin 2% Ointment 1 Application(s) Topical two times a day  polyethylene glycol 3350 17 Gram(s) Oral daily  senna 2 Tablet(s) Oral at bedtime  sodium chloride 0.9%. 1000 milliLiter(s) (75 mL/Hr) IV Continuous <Continuous>    MEDICATIONS  (PRN):  aluminum hydroxide/magnesium hydroxide/simethicone Suspension 30 milliLiter(s) Oral every 4 hours PRN Dyspepsia  melatonin 3 milliGRAM(s) Oral at bedtime PRN Insomnia  ondansetron Injectable 4 milliGRAM(s) IV Push every 8 hours PRN Nausea and/or Vomiting     83F w/ PMHx COPD, OA, chronic back pain s/p multiple surgeries and recent admission for left intertrochanteric femur fracture s/p ORIF 12/27/2023 brought in from [facility] for shortness of breath, was diagnosed with COVID with suspected underlying hospital acquired PNA, pt was in acute respiratory distress, required  high flow oxygen, clinically improved, comfortable on NC now.   Today pt is c/o cough, doing incentive spirometry, denies chest pain, SOB at rest, has no specific complaints, asking for PT and placement to rehab, daughter at the bedside.     PAST MEDICAL & SURGICAL HISTORY  COPD (chronic obstructive pulmonary disease)    H/O cholelithiasis    Smoker    History of surgery  orif right ankle      SOCIAL HISTORY:  Social History:      ALLERGIES:  No Known Allergies      VITALS:   T(C): 37.2 (08 Jan 2024 15:28), Max: 37.2 (08 Jan 2024 15:28)  T(F): 98.9 (08 Jan 2024 15:28), Max: 98.9 (08 Jan 2024 15:28)  HR: 79 (08 Jan 2024 15:28) (72 - 79)  BP: 115/60 (08 Jan 2024 15:28) (111/54 - 120/68)  BP(mean): --  RR: 18 (08 Jan 2024 15:28) (17 - 18)  SpO2: 95% (08 Jan 2024 08:29) (95% - 98%)    Parameters below as of 08 Jan 2024 08:29  Patient On (Oxygen Delivery Method): nasal cannula  O2 Flow (L/min): 3      PHYSICAL EXAM:  GENERAL:  NAD, frail elderly female  NECK: supple, no JVD  CV: S1, S2, RRR  Lungs: coarse BS sound   Abdomen/GI: soft, scaphoid, NT,ND, BS present  Extremities: surgical site looks clean no ecchymoses dry dressing ( left hip) , no edema on LE  Neuro: AAOx3, clear speech, pt is following commands, moving all extremities         LABS:                    no new labs                 8.8    18.01 )-----------( 368      ( 06 Jan 2024 04:39 )             26.3   01-06    126<L>  |  87<L>  |  23<H>  ----------------------------<  80  3.8   |  30  |  0.5<L>    Ca    7.9<L>      06 Jan 2024 04:39  Mg     1.7     01-06    TPro  4.8<L>  /  Alb  2.7<L>  /  TBili  0.9  /  DBili  x   /  AST  14  /  ALT  21  /  AlkPhos  83  01-06        Urinalysis Basic - ( 04 Jan 2024 06:20 )    Color: x / Appearance: x / SG: x / pH: x  Gluc: 139 mg/dL / Ketone: x  / Bili: x / Urobili: x   Blood: x / Protein: x / Nitrite: x   Leuk Esterase: x / RBC: x / WBC x   Sq Epi: x / Non Sq Epi: x / Bacteria: x  ABG - ( 04 Jan 2024 09:14 )  pH, Arterial: 7.50  pH, Blood: x     /  pCO2: 39    /  pO2: 81    / HCO3: 30    / Base Excess: 6.7   /  SaO2: 98.6          RADIOLOGY:  < from: Xray Chest 1 View-PORTABLE IMMEDIATE (Xray Chest 1 View-PORTABLE IMMEDIATE .) (01.03.24 @ 09:47) >    Impression:    COPD with basilar opacifications, worsened.    < end of copied text >  < from: VA Duplex Lower Ext Vein Scan, Bilat (01.02.24 @ 11:03) >    IMPRESSION:  No evidence of deep venous thrombosis in either lower extremity.    < end of copied text >  < from: CT Angio Chest PE Protocol w/ IV Cont (12.31.23 @ 18:26) >  IMPRESSION:    No CTA evidence of acute pulmonary embolus.    Bilateral lower lobe consolidative opacities which can be seen with   pneumonia. Recommend radiographic follow-up after treatment.    < from: TTE Echo Complete w/o Contrast w/ Doppler (01.06.24 @ 12:44) >    Summary:   1. Normal global left ventricular systolic function.   2. LV Ejection Fraction by Agudelo's Method with a biplane EF of 55 %.   3. Moderate left ventricular hypertrophy.   4. Spectral Doppler shows impaired relaxation pattern of left   ventricular myocardial filling (Grade I diastolic dysfunction).   5. Normal left atrial size.   6. Normal right atrial size.   7. Degenerative mitral valve.   8. Mild mitral annular calcification.   9. Mild thickening and calcification of the anterior and posterior   mitral valve leaflets.  10. Mild tricuspid regurgitation.  11. Mild to moderate aortic regurgitation.  12. Sclerotic aortic valve with decreased opening.  13. Estimated pulmonary artery systolic pressure is 52.6 mmHg assuming a   right atrial pressure of 3 mmHg, which is consistent with moderate   pulmonary hypertension.  14. There is severe aortic root calcification.      MEDICATIONS  (STANDING):  acetaminophen     Tablet .. 650 milliGRAM(s) Oral every 6 hours  albuterol/ipratropium for Nebulization 3 milliLiter(s) Nebulizer every 6 hours  amLODIPine   Tablet 5 milliGRAM(s) Oral daily  aspirin enteric coated 81 milliGRAM(s) Oral daily  budesonide 160 MICROgram(s)/formoterol 4.5 MICROgram(s) Inhaler 2 Puff(s) Inhalation two times a day  chlorhexidine 2% Cloths 1 Application(s) Topical <User Schedule>  enoxaparin Injectable 40 milliGRAM(s) SubCutaneous every 24 hours  methylPREDNISolone sodium succinate Injectable 40 milliGRAM(s) IV Push daily  metoprolol tartrate 12.5 milliGRAM(s) Oral every 12 hours  mupirocin 2% Ointment 1 Application(s) Topical two times a day  polyethylene glycol 3350 17 Gram(s) Oral daily  senna 2 Tablet(s) Oral at bedtime  sodium chloride 0.9%. 1000 milliLiter(s) (75 mL/Hr) IV Continuous <Continuous>    MEDICATIONS  (PRN):  aluminum hydroxide/magnesium hydroxide/simethicone Suspension 30 milliLiter(s) Oral every 4 hours PRN Dyspepsia  melatonin 3 milliGRAM(s) Oral at bedtime PRN Insomnia  ondansetron Injectable 4 milliGRAM(s) IV Push every 8 hours PRN Nausea and/or Vomiting

## 2024-01-08 NOTE — PROGRESS NOTE ADULT - ASSESSMENT
83F w/ PMHx COPD, OA, chronic back pain s/p multiple surgeries and recent admission for left intertrochanteric femur fracture s/p ORIF 12/27/2023 brought in from [facility] for shortness of breath.    A/P  #Acute Hypoxic Respiratory Failure/ sepsis POA  secondary to COVID 19 and suspected hospital acquired PNA / h/o COPD   - pt is clinically improving, comfortable on NC now   - c/w pulmonary toilet, chest PT Q 4 hours  - aspiration precautions   - cont Abx; Doxy + Zosyn ( reordered today, order was dropped)  - ID follow up for duration of the course  - repeat CBC ( leukocytosis is worsening)   - start po Prednisone 40 mg for 3 more days    - Remdesivir on hold as per patient's daughter's request  - duoneb q6, symbicort  - pulmonary is following   - c/w isolation for COVID 19     # worsening leukocytosis  - multifactorial, will taper steroids  - c/w broad spectrum Abx  - trend WBC   - ID follow up   - monitor fever curve     #NSTEMI Type II/ h/o diastolic CHF/ PHTN  - EKG showed sinus tachy w/ PACs  - Trop 208>192 (previously 19)  -  telemetry monitoring  - on ASA, start Lopressor 12.5 mg Q 12 hours  - check lipid profile, will start statin if indicated   - check 2Decho and BNP  - pt was consulted by cardiology, pt needs OP event monitoring   - fluid restriction 1200 ml in 24 hours   - intake and output  monitoring , daily weight   - check BNP, monitor Na level     # Hyponatremia  - continue to hold diuretics  ( no clear indications for diuretics )  - monitor Na level   - check BNP ( pt has diastolic CHF)     #ALAN   - resolved  - maintain fluid balance   - monitor BUN/cr and urine output       #Chronic Anemia  - monitor H/H, keep Hb above 8.0  - no active bleeding noted     #left intertrochanteric femur fracture s/p ORIF 12/27/2023 /OA/ Chronic Back Pain s/p multiple surgeries  - may change Tylenol to standing  - OOB to chair, start  PT     #DVT ppx: Lovenox  #GI ppx: none  #Diet: DASH/TLC   #Code status: DNR/DNI (Trial NIV)  #Disposition: SDU    #Progress Note Handoff  Pending (specify): start CHF protocol, check BNP, monitor Na level, c/w Abx ( reordered today), f/u repeat CBC , ID follow up for duration of Abx course, start po Prednisone for 3 more days, PT as tolerated, insentive spirometry   Family discussion: I spoke with pt and her daughter at the bedside, they agreed with a plan of care   Disposition: Home___/SNF_x __/Other________/Unknown at this time________   83F w/ PMHx COPD, OA, chronic back pain s/p multiple surgeries and recent admission for left intertrochanteric femur fracture s/p ORIF 12/27/2023 brought in from [facility] for shortness of breath.    A/P  #Acute Hypoxic Respiratory Failure/ sepsis POA  secondary to COVID 19 and suspected hospital acquired PNA / h/o COPD   - pt is clinically improving, comfortable on NC now   - c/w pulmonary toilet, chest PT Q 4 hours  - aspiration precautions   - cont Abx; Doxy + Zosyn ( reordered today, order was dropped)  - ID follow up for duration of the course  - repeat CBC ( leukocytosis is worsening)   - start po Prednisone 40 mg for 3 more days    - Remdesivir on hold as per patient's daughter's request  - duoneb q6, symbicort  - pulmonary is following   - c/w isolation for COVID 19     # worsening leukocytosis  - multifactorial, will taper steroids  - c/w broad spectrum Abx  - trend WBC   - ID follow up   - monitor fever curve     #NSTEMI Type II/ h/o diastolic CHF/ PHTN  - EKG showed sinus tachy w/ PACs  - Trop 208>192 (previously 19)  -  telemetry monitoring  - on ASA, start Lopressor 12.5 mg Q 12 hours  - check lipid profile, will start statin if indicated   - check 2Decho and BNP  - pt was consulted by cardiology, pt needs OP event monitoring   - fluid restriction 1200 ml in 24 hours   - intake and output  monitoring , daily weight   - check BNP, monitor Na level     # Hyponatremia  - continue to hold diuretics  ( no clear indications for diuretics )  - monitor Na level   - check BNP ( pt has diastolic CHF)     #ALAN   - resolved  - maintain fluid balance   - monitor BUN/cr and urine output       #Chronic Anemia  - monitor H/H, keep Hb above 8.0  - no active bleeding noted     #left intertrochanteric femur fracture s/p ORIF 12/27/2023 /OA/ Chronic Back Pain s/p multiple surgeries  - may change Tylenol to standing  - OOB to chair, start  PT     #DVT ppx: Lovenox  #GI ppx: none  #Diet: DASH/TLC   #Code status: DNR/DNI (Trial NIV)  #Disposition: SDU    #Progress Note Handoff  Pending (specify): start CHF protocol, check BNP, monitor Na level, c/w Abx ( reordered today), f/u repeat CBC , ID follow up for duration of Abx course, start po Prednisone for 3 more days, PT as tolerated, insentive spirometry   Family discussion: I spoke with pt and her daughter at the bedside, they agreed with a plan of care   Disposition: Home___/SNF_x __/Other________/Unknown at this time________

## 2024-01-08 NOTE — PROGRESS NOTE ADULT - SUBJECTIVE AND OBJECTIVE BOX
24H events:    Patient is a 83y old Female who presents with a chief complaint of SOB (07 Jan 2024 18:40)    Primary diagnosis of 2019 novel coronavirus disease (COVID-19)    Today is hospital day 8d. This morning patient was seen and examined at bedside, resting comfortably in bed.    No acute or major events overnight.    PAST MEDICAL & SURGICAL HISTORY  COPD (chronic obstructive pulmonary disease)    H/O cholelithiasis    Smoker    History of surgery  orif right ankle      SOCIAL HISTORY:  Social History:      ALLERGIES:  No Known Allergies    MEDICATIONS:  STANDING MEDICATIONS  acetaminophen     Tablet .. 650 milliGRAM(s) Oral every 6 hours  albuterol/ipratropium for Nebulization 3 milliLiter(s) Nebulizer every 6 hours  amLODIPine   Tablet 5 milliGRAM(s) Oral daily  aspirin enteric coated 81 milliGRAM(s) Oral daily  budesonide 160 MICROgram(s)/formoterol 4.5 MICROgram(s) Inhaler 2 Puff(s) Inhalation two times a day  chlorhexidine 2% Cloths 1 Application(s) Topical <User Schedule>  enoxaparin Injectable 40 milliGRAM(s) SubCutaneous every 24 hours  methylPREDNISolone sodium succinate Injectable 40 milliGRAM(s) IV Push daily  metoprolol tartrate 12.5 milliGRAM(s) Oral every 12 hours  mupirocin 2% Ointment 1 Application(s) Topical two times a day  polyethylene glycol 3350 17 Gram(s) Oral daily  senna 2 Tablet(s) Oral at bedtime  sodium chloride 0.9%. 1000 milliLiter(s) IV Continuous <Continuous>    PRN MEDICATIONS  aluminum hydroxide/magnesium hydroxide/simethicone Suspension 30 milliLiter(s) Oral every 4 hours PRN  melatonin 3 milliGRAM(s) Oral at bedtime PRN  ondansetron Injectable 4 milliGRAM(s) IV Push every 8 hours PRN    VITALS:   T(F): 96.8  HR: 73  BP: 111/54  RR: 18  SpO2: 95%    PHYSICAL EXAM:  GENERAL:   ( x ) NAD, lying in bed comfortably     (  ) obtunded     (  ) lethargic     (  ) somnolent    HEAD:   ( x ) Atraumatic     (  ) hematoma     (  ) laceration (specify location:       )     NECK:  ( x ) Supple     (  ) neck stiffness     (  ) nuchal rigidity     (  )  no JVD     (  ) JVD present ( -- cm)    HEART:  Rate -->     (x ) normal rate     (  ) bradycardic     (  ) tachycardic  Rhythm -->     ( x ) regular     (  ) regularly irregular     (  ) irregularly irregular  Murmurs -->     ( x ) normal s1s2     (  ) systolic murmur     (  ) diastolic murmur     (  ) continuous murmur      (  ) S3 present     (  ) S4 present    LUNGS:   ( x )Unlabored respirations     (  ) tachypnea  ( ) B/L air entry     ( x ) decreased breath sounds bilaterally   ( x ) no adventitious sound     (  ) crackles     (  ) wheezing      (  ) rhonchi      (specify location:       )  (  ) chest wall tenderness (specify location:       )    ABDOMEN:   ( x ) Soft     (  ) tense   |   ( x ) nondistended     (  ) distended   |   ( x ) +BS     (  ) hypoactive bowel sounds     (  ) hyperactive bowel sounds  ( x ) nontender     (  ) RUQ tenderness     (  ) RLQ tenderness     (  ) LLQ tenderness     (  ) epigastric tenderness     (  ) diffuse tenderness  (  ) Splenomegaly      (  ) Hepatomegaly      (  ) Jaundice     (  ) ecchymosis     EXTREMITIES:  ( x ) Normal     (  ) Rash     (  ) ecchymosis     (  ) varicose veins      (  ) pitting edema     (  ) non-pitting edema   (  ) ulceration     (  ) gangrene:     (location:     )    NERVOUS SYSTEM:    ( x ) A&Ox3     (  ) confused     (  ) lethargic  CN II-XII:     (x  ) Intact     (  ) deficits found     (Specify:     )   Upper extremities:     (x) no sensorimotor deficits     (  ) weakness     (  ) loss of proprioception/vibration     (  ) loss of touch/temperature (specify:    )  Lower extremities:     ( x ) no sensorimotor deficits     (  ) weakness     (  ) loss of proprioception/vibration     (  ) loss of touch/temperature (specify:    )    LABS:                        RADIOLOGY:

## 2024-01-09 LAB
ALBUMIN SERPL ELPH-MCNC: 2.6 G/DL — LOW (ref 3.5–5.2)
ALBUMIN SERPL ELPH-MCNC: 2.6 G/DL — LOW (ref 3.5–5.2)
ALP SERPL-CCNC: 90 U/L — SIGNIFICANT CHANGE UP (ref 30–115)
ALP SERPL-CCNC: 90 U/L — SIGNIFICANT CHANGE UP (ref 30–115)
ALT FLD-CCNC: 14 U/L — SIGNIFICANT CHANGE UP (ref 0–41)
ALT FLD-CCNC: 14 U/L — SIGNIFICANT CHANGE UP (ref 0–41)
ANION GAP SERPL CALC-SCNC: 5 MMOL/L — LOW (ref 7–14)
ANION GAP SERPL CALC-SCNC: 5 MMOL/L — LOW (ref 7–14)
AST SERPL-CCNC: 12 U/L — SIGNIFICANT CHANGE UP (ref 0–41)
AST SERPL-CCNC: 12 U/L — SIGNIFICANT CHANGE UP (ref 0–41)
BASOPHILS # BLD AUTO: 0 K/UL — SIGNIFICANT CHANGE UP (ref 0–0.2)
BASOPHILS # BLD AUTO: 0 K/UL — SIGNIFICANT CHANGE UP (ref 0–0.2)
BASOPHILS NFR BLD AUTO: 0 % — SIGNIFICANT CHANGE UP (ref 0–1)
BASOPHILS NFR BLD AUTO: 0 % — SIGNIFICANT CHANGE UP (ref 0–1)
BILIRUB SERPL-MCNC: 1.3 MG/DL — HIGH (ref 0.2–1.2)
BILIRUB SERPL-MCNC: 1.3 MG/DL — HIGH (ref 0.2–1.2)
BUN SERPL-MCNC: 21 MG/DL — HIGH (ref 10–20)
BUN SERPL-MCNC: 21 MG/DL — HIGH (ref 10–20)
CALCIUM SERPL-MCNC: 7.9 MG/DL — LOW (ref 8.4–10.4)
CALCIUM SERPL-MCNC: 7.9 MG/DL — LOW (ref 8.4–10.4)
CHLORIDE SERPL-SCNC: 88 MMOL/L — LOW (ref 98–110)
CHLORIDE SERPL-SCNC: 88 MMOL/L — LOW (ref 98–110)
CO2 SERPL-SCNC: 32 MMOL/L — SIGNIFICANT CHANGE UP (ref 17–32)
CO2 SERPL-SCNC: 32 MMOL/L — SIGNIFICANT CHANGE UP (ref 17–32)
CREAT SERPL-MCNC: <0.5 MG/DL — LOW (ref 0.7–1.5)
CREAT SERPL-MCNC: <0.5 MG/DL — LOW (ref 0.7–1.5)
EGFR: 97 ML/MIN/1.73M2 — SIGNIFICANT CHANGE UP
EGFR: 97 ML/MIN/1.73M2 — SIGNIFICANT CHANGE UP
EOSINOPHIL # BLD AUTO: 0.04 K/UL — SIGNIFICANT CHANGE UP (ref 0–0.7)
EOSINOPHIL # BLD AUTO: 0.04 K/UL — SIGNIFICANT CHANGE UP (ref 0–0.7)
EOSINOPHIL NFR BLD AUTO: 0.3 % — SIGNIFICANT CHANGE UP (ref 0–8)
EOSINOPHIL NFR BLD AUTO: 0.3 % — SIGNIFICANT CHANGE UP (ref 0–8)
GLUCOSE SERPL-MCNC: 88 MG/DL — SIGNIFICANT CHANGE UP (ref 70–99)
GLUCOSE SERPL-MCNC: 88 MG/DL — SIGNIFICANT CHANGE UP (ref 70–99)
HCT VFR BLD CALC: 21.6 % — LOW (ref 37–47)
HCT VFR BLD CALC: 21.6 % — LOW (ref 37–47)
HGB BLD-MCNC: 7.4 G/DL — LOW (ref 12–16)
HGB BLD-MCNC: 7.4 G/DL — LOW (ref 12–16)
IMM GRANULOCYTES NFR BLD AUTO: 0.7 % — HIGH (ref 0.1–0.3)
IMM GRANULOCYTES NFR BLD AUTO: 0.7 % — HIGH (ref 0.1–0.3)
LACTATE SERPL-SCNC: 1.1 MMOL/L — SIGNIFICANT CHANGE UP (ref 0.7–2)
LACTATE SERPL-SCNC: 1.1 MMOL/L — SIGNIFICANT CHANGE UP (ref 0.7–2)
LYMPHOCYTES # BLD AUTO: 1.06 K/UL — LOW (ref 1.2–3.4)
LYMPHOCYTES # BLD AUTO: 1.06 K/UL — LOW (ref 1.2–3.4)
LYMPHOCYTES # BLD AUTO: 7.2 % — LOW (ref 20.5–51.1)
LYMPHOCYTES # BLD AUTO: 7.2 % — LOW (ref 20.5–51.1)
MAGNESIUM SERPL-MCNC: 1.7 MG/DL — LOW (ref 1.8–2.4)
MAGNESIUM SERPL-MCNC: 1.7 MG/DL — LOW (ref 1.8–2.4)
MCHC RBC-ENTMCNC: 30.7 PG — SIGNIFICANT CHANGE UP (ref 27–31)
MCHC RBC-ENTMCNC: 30.7 PG — SIGNIFICANT CHANGE UP (ref 27–31)
MCHC RBC-ENTMCNC: 34.3 G/DL — SIGNIFICANT CHANGE UP (ref 32–37)
MCHC RBC-ENTMCNC: 34.3 G/DL — SIGNIFICANT CHANGE UP (ref 32–37)
MCV RBC AUTO: 89.6 FL — SIGNIFICANT CHANGE UP (ref 81–99)
MCV RBC AUTO: 89.6 FL — SIGNIFICANT CHANGE UP (ref 81–99)
MONOCYTES # BLD AUTO: 0.94 K/UL — HIGH (ref 0.1–0.6)
MONOCYTES # BLD AUTO: 0.94 K/UL — HIGH (ref 0.1–0.6)
MONOCYTES NFR BLD AUTO: 6.4 % — SIGNIFICANT CHANGE UP (ref 1.7–9.3)
MONOCYTES NFR BLD AUTO: 6.4 % — SIGNIFICANT CHANGE UP (ref 1.7–9.3)
NEUTROPHILS # BLD AUTO: 12.57 K/UL — HIGH (ref 1.4–6.5)
NEUTROPHILS # BLD AUTO: 12.57 K/UL — HIGH (ref 1.4–6.5)
NEUTROPHILS NFR BLD AUTO: 85.4 % — HIGH (ref 42.2–75.2)
NEUTROPHILS NFR BLD AUTO: 85.4 % — HIGH (ref 42.2–75.2)
NRBC # BLD: 0 /100 WBCS — SIGNIFICANT CHANGE UP (ref 0–0)
NRBC # BLD: 0 /100 WBCS — SIGNIFICANT CHANGE UP (ref 0–0)
PLATELET # BLD AUTO: 369 K/UL — SIGNIFICANT CHANGE UP (ref 130–400)
PLATELET # BLD AUTO: 369 K/UL — SIGNIFICANT CHANGE UP (ref 130–400)
PMV BLD: 9.1 FL — SIGNIFICANT CHANGE UP (ref 7.4–10.4)
PMV BLD: 9.1 FL — SIGNIFICANT CHANGE UP (ref 7.4–10.4)
POTASSIUM SERPL-MCNC: 3.3 MMOL/L — LOW (ref 3.5–5)
POTASSIUM SERPL-MCNC: 3.3 MMOL/L — LOW (ref 3.5–5)
POTASSIUM SERPL-SCNC: 3.3 MMOL/L — LOW (ref 3.5–5)
POTASSIUM SERPL-SCNC: 3.3 MMOL/L — LOW (ref 3.5–5)
PROT SERPL-MCNC: 4.4 G/DL — LOW (ref 6–8)
PROT SERPL-MCNC: 4.4 G/DL — LOW (ref 6–8)
RBC # BLD: 2.41 M/UL — LOW (ref 4.2–5.4)
RBC # BLD: 2.41 M/UL — LOW (ref 4.2–5.4)
RBC # FLD: 14.6 % — HIGH (ref 11.5–14.5)
RBC # FLD: 14.6 % — HIGH (ref 11.5–14.5)
SARS-COV-2 RNA SPEC QL NAA+PROBE: SIGNIFICANT CHANGE UP
SARS-COV-2 RNA SPEC QL NAA+PROBE: SIGNIFICANT CHANGE UP
SODIUM SERPL-SCNC: 125 MMOL/L — LOW (ref 135–146)
SODIUM SERPL-SCNC: 125 MMOL/L — LOW (ref 135–146)
WBC # BLD: 14.72 K/UL — HIGH (ref 4.8–10.8)
WBC # BLD: 14.72 K/UL — HIGH (ref 4.8–10.8)
WBC # FLD AUTO: 14.72 K/UL — HIGH (ref 4.8–10.8)
WBC # FLD AUTO: 14.72 K/UL — HIGH (ref 4.8–10.8)

## 2024-01-09 PROCEDURE — 99232 SBSQ HOSP IP/OBS MODERATE 35: CPT

## 2024-01-09 RX ADMIN — Medication 650 MILLIGRAM(S): at 13:30

## 2024-01-09 RX ADMIN — Medication 100 MILLIGRAM(S): at 06:25

## 2024-01-09 RX ADMIN — Medication 12.5 MILLIGRAM(S): at 06:35

## 2024-01-09 RX ADMIN — Medication 12.5 MILLIGRAM(S): at 18:05

## 2024-01-09 RX ADMIN — PIPERACILLIN AND TAZOBACTAM 25 GRAM(S): 4; .5 INJECTION, POWDER, LYOPHILIZED, FOR SOLUTION INTRAVENOUS at 13:25

## 2024-01-09 RX ADMIN — MUPIROCIN 1 APPLICATION(S): 20 OINTMENT TOPICAL at 06:36

## 2024-01-09 RX ADMIN — PIPERACILLIN AND TAZOBACTAM 25 GRAM(S): 4; .5 INJECTION, POWDER, LYOPHILIZED, FOR SOLUTION INTRAVENOUS at 22:36

## 2024-01-09 RX ADMIN — Medication 650 MILLIGRAM(S): at 06:25

## 2024-01-09 RX ADMIN — AMLODIPINE BESYLATE 5 MILLIGRAM(S): 2.5 TABLET ORAL at 06:25

## 2024-01-09 RX ADMIN — Medication 81 MILLIGRAM(S): at 12:51

## 2024-01-09 RX ADMIN — CHLORHEXIDINE GLUCONATE 1 APPLICATION(S): 213 SOLUTION TOPICAL at 06:35

## 2024-01-09 RX ADMIN — MUPIROCIN 1 APPLICATION(S): 20 OINTMENT TOPICAL at 17:50

## 2024-01-09 RX ADMIN — POLYETHYLENE GLYCOL 3350 17 GRAM(S): 17 POWDER, FOR SOLUTION ORAL at 13:26

## 2024-01-09 RX ADMIN — Medication 650 MILLIGRAM(S): at 01:45

## 2024-01-09 RX ADMIN — Medication 100 MILLIGRAM(S): at 17:50

## 2024-01-09 RX ADMIN — Medication 650 MILLIGRAM(S): at 12:52

## 2024-01-09 RX ADMIN — Medication 650 MILLIGRAM(S): at 01:14

## 2024-01-09 RX ADMIN — PIPERACILLIN AND TAZOBACTAM 25 GRAM(S): 4; .5 INJECTION, POWDER, LYOPHILIZED, FOR SOLUTION INTRAVENOUS at 06:25

## 2024-01-09 RX ADMIN — Medication 650 MILLIGRAM(S): at 18:04

## 2024-01-09 RX ADMIN — ENOXAPARIN SODIUM 40 MILLIGRAM(S): 100 INJECTION SUBCUTANEOUS at 13:26

## 2024-01-09 RX ADMIN — Medication 40 MILLIGRAM(S): at 06:25

## 2024-01-09 NOTE — PROGRESS NOTE ADULT - SUBJECTIVE AND OBJECTIVE BOX
The patient denies any chest pain or palpitations and her SOB feels much better   She can moves her legs and feet and the pain in her left leg much subsided       MEDICATIONS  (STANDING):  acetaminophen     Tablet .. 650 milliGRAM(s) Oral every 6 hours  albuterol/ipratropium for Nebulization 3 milliLiter(s) Nebulizer every 6 hours  amLODIPine   Tablet 5 milliGRAM(s) Oral daily  aspirin enteric coated 81 milliGRAM(s) Oral daily  budesonide 160 MICROgram(s)/formoterol 4.5 MICROgram(s) Inhaler 2 Puff(s) Inhalation two times a day  chlorhexidine 2% Cloths 1 Application(s) Topical <User Schedule>  doxycycline monohydrate Capsule 100 milliGRAM(s) Oral every 12 hours  enoxaparin Injectable 40 milliGRAM(s) SubCutaneous every 24 hours  metoprolol tartrate 12.5 milliGRAM(s) Oral every 12 hours  mupirocin 2% Ointment 1 Application(s) Topical two times a day  piperacillin/tazobactam IVPB.. 3.375 Gram(s) IV Intermittent every 8 hours  polyethylene glycol 3350 17 Gram(s) Oral daily  predniSONE   Tablet 40 milliGRAM(s) Oral daily  senna 2 Tablet(s) Oral at bedtime    MEDICATIONS  (PRN):  aluminum hydroxide/magnesium hydroxide/simethicone Suspension 30 milliLiter(s) Oral every 4 hours PRN Dyspepsia  melatonin 3 milliGRAM(s) Oral at bedtime PRN Insomnia  ondansetron Injectable 4 milliGRAM(s) IV Push every 8 hours PRN Nausea and/or Vomiting            Vital Signs Last 24 Hrs  T(C): 35.9 (09 Jan 2024 20:07), Max: 36.2 (09 Jan 2024 06:29)  T(F): 96.7 (09 Jan 2024 20:07), Max: 97.2 (09 Jan 2024 06:29)  HR: 79 (09 Jan 2024 20:07) (79 - 85)  BP: 120/58 (09 Jan 2024 20:07) (110/61 - 126/59)  BP(mean): --  RR: 18 (09 Jan 2024 20:07) (17 - 18)  SpO2: 95% (09 Jan 2024 08:10) (95% - 95%)    Parameters below as of 09 Jan 2024 08:10  Patient On (Oxygen Delivery Method): nasal cannula  O2 Flow (L/min): 3       REVIEW OF SYSTEMS:  CONSTITUTIONAL: No fever, weight loss, or fatigue  CARDIOLOGY: Patient denies chest pain and her shortness of breath much improved   RESPIRATORY: + shortness of breath    NEUROLOGICAL: No weakness, no focal deficits to report.  GI: no BRBPR, no N,V,diarrhea.    PSYCHIATRY: normal mood and affect  HEENT: no nasal discharge, no ecchymosis  SKIN: no ecchymosis, no breakdown  MUSCULOSKELETAL: Full range of motion x 4         PHYSICAL EXAM:  · CONSTITUTIONAL:	Well-developed, well nourished     ·RESPIRATORY:   airway patent; breath sounds equal; good air movement; respirations non-labored; clear to auscultation bilaterally but decreased at the bases   · CARDIOVASCULAR	regular rate and rhythm  no rub  + SE murmur  normal PMI  · EXTREMITIES: No cyanosis, clubbing or edema  · VASCULAR: absent PT pulses bilaterally   	      LABS:      I&O's Summary    BNP  RADIOLOGY & ADDITIONAL STUDIES:    IMPRESSION AND PLAN:

## 2024-01-09 NOTE — PROGRESS NOTE ADULT - ASSESSMENT
83F w/ PMHx COPD, OA, chronic back pain s/p multiple surgeries and recent admission for left intertrochanteric femur fracture s/p ORIF 12/27/2023 brought in from [facility] for shortness of breath.    A/P  #Acute Hypoxic Respiratory Failure/ sepsis POA  secondary to COVID 19 and suspected hospital acquired PNA / h/o COPD   - pt is clinically improving, con 3 l NC   - c/w pulmonary toilet, chest PT Q 4 hours  - aspiration precautions   - cont Abx; Doxy + Zosyn   - ID follow up for duration of the course  - repeat CBC ( leukocytosis is worsening) was also on steroids   - on po Prednisone 40 mg for 3 more days    - Remdesivir on hold as per patient's daughter's request  - duoneb q6, symbicort  - pulmonary is following   - c/w isolation for COVID 19   follow ID recommendations regarding abx.   monitor oxygenation status.     # worsening leukocytosis, likely steroid induced   - on steroids   - c/w broad spectrum Abx  - trend WBC   - ID follow up   - monitor fever curve     #NSTEMI Type II/ h/o diastolic CHF/ PHTN  - EKG showed sinus tachy w/ PACs  - Trop 208>192 (previously 19)  -  telemetry monitoring  - on ASA, on lopressor   -need OP cardiology follow up.   - fluid restriction 1200 ml in 24 hours   - intake and output  monitoring , daily weight   - check BNP, monitor Na level   echo reported EF 55%, grade 1 diastolic dysfunction, need OP cardiology  repeat BNP pending     # Hyponatremia  - continue to hold diuretics  ( no clear indications for diuretics )  - monitor Na level   - check BNP ( pt has diastolic CHF) repeat pending   CXR in aM follow result     #ALAN   - resolved  - maintain fluid balance   - monitor BUN/cr and urine output       #Chronic Anemia  - monitor H/H, keep Hb above 8.0  - no active bleeding noted     #left intertrochanteric femur fracture s/p ORIF 12/27/2023 /OA/ Chronic Back Pain s/p multiple surgeries  - may change Tylenol to standing  - OOB to chair, PT follow up  need OP ortho follow up     #DVT ppx: Lovenox  #GI ppx: none  #Diet: DASH/TLC   #Code status: DNR/DNI (Trial NIV)  #Disposition: SDU    follow up:   Pending (specify): follow labs, CBC, BMP, lactate, BNP, procalcitonin, check BNP, monitor Na level,, f/u repeat CBC , ID follow up for duration of Abx course, on po Prednisone, PT as tolerated, incentive spirometry   if BNP is persistently elevated need follow up with cardiology as well. may need regular dose of lasix if serum creatinine stays stable. follow cardiology   CM working on placement.    83F w/ PMHx COPD, OA, chronic back pain s/p multiple surgeries and recent admission for left intertrochanteric femur fracture s/p ORIF 12/27/2023 brought in from [facility] for shortness of breath.    A/P  #Acute Hypoxic Respiratory Failure/ sepsis POA  secondary to COVID 19 and suspected hospital acquired PNA / h/o COPD   - pt is clinically improving, con 3 l NC   - c/w pulmonary toilet, chest PT Q 4 hours  - aspiration precautions   - cont Abx; Doxy + Zosyn   - ID follow up for duration of the course  - repeat CBC ( leukocytosis is worsening) was also on steroids   - on po Prednisone 40 mg for 3 more days    - Remdesivir on hold as per patient's daughter's request  - duoneb q6, symbicort  - pulmonary is following   - c/w isolation for COVID 19   follow ID recommendations regarding abx.   monitor oxygenation status.     # worsening leukocytosis, likely steroid induced   - on steroids   - c/w broad spectrum Abx  - trend WBC   - ID follow up   - monitor fever curve     #NSTEMI Type II/ h/o diastolic CHF/ PHTN  - EKG showed sinus tachy w/ PACs  - Trop 208>192 (previously 19)  -  telemetry monitoring  - on ASA, on lopressor   -need OP cardiology follow up.   - fluid restriction 1200 ml in 24 hours   - intake and output  monitoring , daily weight   - check BNP, monitor Na level   echo reported EF 55%, grade 1 diastolic dysfunction, need OP cardiology  repeat BNP pending     # Hyponatremia  - continue to hold diuretics  ( no clear indications for diuretics )  - monitor Na level   - check BNP ( pt has diastolic CHF) repeat pending   CXR in aM follow result     #ALAN   - resolved  - maintain fluid balance   - monitor BUN/cr and urine output       #Chronic Anemia  - monitor H/H, keep Hb above 8.0  - no active bleeding noted     #left intertrochanteric femur fracture s/p ORIF 12/27/2023 /OA/ Chronic Back Pain s/p multiple surgeries  - may change Tylenol to standing  - OOB to chair, PT follow up  need OP ortho follow up     #DVT ppx: Lovenox  #GI ppx: none  #Diet: DASH/TLC   #Code status: DNR/DNI (Trial NIV)  #Disposition: SDU    follow up:   Pending (specify): follow labs, CBC, BMP, lactate, BNP, procalcitonin, check BNP, monitor Na level,, f/u repeat CBC , ID follow up for duration of Abx course, on po Prednisone, PT as tolerated, incentive spirometry   if BNP is persistently elevated need follow up with cardiology as well. may need regular dose of lasix if serum creatinine stays stable. follow cardiology   CM working on placement.

## 2024-01-09 NOTE — PROGRESS NOTE ADULT - SUBJECTIVE AND OBJECTIVE BOX
24H events:    Patient is a 83y old Female who presents with a chief complaint of SOB (09 Jan 2024 12:39)    Primary diagnosis of 2019 novel coronavirus disease (COVID-19)      Today is hospital day 9d. This morning patient was seen and examined at bedside, resting comfortably in bed.    No acute or major events overnight.    PAST MEDICAL & SURGICAL HISTORY  COPD (chronic obstructive pulmonary disease)    H/O cholelithiasis    Smoker    History of surgery  orif right ankle      SOCIAL HISTORY:  Social History:      ALLERGIES:  No Known Allergies    MEDICATIONS:  STANDING MEDICATIONS  acetaminophen     Tablet .. 650 milliGRAM(s) Oral every 6 hours  albuterol/ipratropium for Nebulization 3 milliLiter(s) Nebulizer every 6 hours  amLODIPine   Tablet 5 milliGRAM(s) Oral daily  aspirin enteric coated 81 milliGRAM(s) Oral daily  budesonide 160 MICROgram(s)/formoterol 4.5 MICROgram(s) Inhaler 2 Puff(s) Inhalation two times a day  chlorhexidine 2% Cloths 1 Application(s) Topical <User Schedule>  doxycycline monohydrate Capsule 100 milliGRAM(s) Oral every 12 hours  enoxaparin Injectable 40 milliGRAM(s) SubCutaneous every 24 hours  metoprolol tartrate 12.5 milliGRAM(s) Oral every 12 hours  mupirocin 2% Ointment 1 Application(s) Topical two times a day  piperacillin/tazobactam IVPB.. 3.375 Gram(s) IV Intermittent every 8 hours  polyethylene glycol 3350 17 Gram(s) Oral daily  predniSONE   Tablet 40 milliGRAM(s) Oral daily  senna 2 Tablet(s) Oral at bedtime    PRN MEDICATIONS  aluminum hydroxide/magnesium hydroxide/simethicone Suspension 30 milliLiter(s) Oral every 4 hours PRN  melatonin 3 milliGRAM(s) Oral at bedtime PRN  ondansetron Injectable 4 milliGRAM(s) IV Push every 8 hours PRN    VITALS:   T(F): 96.7  HR: 84  BP: 110/61  RR: 18  SpO2: 95%    PHYSICAL EXAM:  GENERAL:   ( x ) NAD, lying in bed comfortably     (  ) obtunded     (  ) lethargic     (  ) somnolent    HEAD:   (x  ) Atraumatic     (  ) hematoma     (  ) laceration (specify location:       )     NECK:  ( x ) Supple     (  ) neck stiffness     (  ) nuchal rigidity     (  )  no JVD     (  ) JVD present ( -- cm)    HEART:  Rate -->     (x ) normal rate     (  ) bradycardic     (  ) tachycardic  Rhythm -->     (  x) regular     (  ) regularly irregular     (  ) irregularly irregular  Murmurs -->     (x  ) normal s1s2     (  ) systolic murmur     (  ) diastolic murmur     (  ) continuous murmur      (  ) S3 present     (  ) S4 present    LUNGS:   (x  )Unlabored respirations     (  ) tachypnea  (  ) B/L air entry     ( x) decreased breath sounds bilaterally   ( x) no adventitious sound     (  ) crackles     (  ) wheezing      (  ) rhonchi      (specify location:       )  (  ) chest wall tenderness (specify location:       )    ABDOMEN:   (x  ) Soft     (  ) tense   |   ( x ) nondistended     (  ) distended   |   (  x) +BS     (  ) hypoactive bowel sounds     (  ) hyperactive bowel sounds  (x  ) nontender     (  ) RUQ tenderness     (  ) RLQ tenderness     (  ) LLQ tenderness     (  ) epigastric tenderness     (  ) diffuse tenderness  (  ) Splenomegaly      (  ) Hepatomegaly      (  ) Jaundice     (  ) ecchymosis     EXTREMITIES:  (x  ) Normal     (  ) Rash     (  ) ecchymosis     (  ) varicose veins      (  ) pitting edema     (  ) non-pitting edema   (  ) ulceration     (  ) gangrene:     (location:     )    NERVOUS SYSTEM:    (x  ) A&Ox3     (  ) confused     (  ) lethargic  CN II-XII:     (x  ) Intact     (  ) deficits found     (Specify:     )   Upper extremities:     (x  ) no sensorimotor deficits     (  ) weakness     (  ) loss of proprioception/vibration     (  ) loss of touch/temperature (specify:    )  Lower extremities:     ( x ) no sensorimotor deficits     (  ) weakness     (  ) loss of proprioception/vibration     (  ) loss of touch/temperature (specify:    )    SKIN:   (x  ) No rashes or lesions     (  ) maculopapular rash     (  ) pustules     (  ) vesicles     (  ) ulcer     (  ) ecchymosis     (specify location:     )      LABS:                        RADIOLOGY:

## 2024-01-09 NOTE — CHART NOTE - NSCHARTNOTEFT_GEN_A_CORE
Palliative following for GOC.   Goals are currently clear- DNR/DNI with ongoing medical optimization. Patient and daughter want to pursue STR.   Will sign off for now. Please re-consult PRN X [phone]. Palliative following for GOC.   Goals are currently clear- DNR/DNI with ongoing medical optimization. Patient and daughter want to pursue STR.   Will sign off for now. Please re-consult PRN X [phone].

## 2024-01-09 NOTE — PROGRESS NOTE ADULT - ASSESSMENT
83F w/ PMHx COPD, OA, chronic back pain s/p multiple surgeries and recent admission for left intertrochanteric femur fracture s/p ORIF 12/27/2023 brought in from [facility] for shortness of breath.      Acute Hypoxic Respiratory Failure and severe sepsis secondary to COVID 19 with superimposed bacterial pneumonia  NSTEMI Type II  HTN  ALAN (improving)   Chronic Anemia  S/P left eft intertrochanteric femur fracture s/p ORIF 12/27/2023 /OA/ Chronic Back Pain s/p multiple surgeries      Continue current care as per medical and Pulmonary teams   Keep negative balance / Daily weight / Monitor electrolytes   Pulmonary follow up (whether oxygen is needed)   DVT/GI prophylaxis   Daily ASA 81 EC   Physical therapy   Out of bed to chair   Event recorder as out patient   Will F/U      83F w/ PMHx COPD, OA, chronic back pain s/p multiple surgeries and recent admission for left intertrochanteric femur fracture s/p ORIF 12/27/2023 brought in from [facility] for shortness of breath.      Acute Hypoxic Respiratory Failure and severe sepsis secondary to COVID 19 with superimposed bacterial pneumonia  NSTEMI Type II  HTN  ALAN (improving)   Chronic Anemia  S/P left eft intertrochanteric femur fracture s/p ORIF 12/27/2023 /OA/ Chronic Back Pain s/p multiple surgeries      Continue current care as per medical and Pulmonary teams   Keep negative balance / Daily weight / Monitor electrolytes   Pulmonary follow up (whether oxygen is needed)   DVT/GI prophylaxis   Daily ASA 81 EC   Physical therapy   Out of bed to chair   Event recorder as out patient   Will F/U

## 2024-01-09 NOTE — PROGRESS NOTE ADULT - ASSESSMENT
83F w/ PMHx COPD, OA, chronic back pain s/p multiple surgeries and recent admission for left intertrochanteric femur fracture s/p ORIF 12/27/2023 brought in from [facility] for shortness of breath.    #Acute Hypoxic Respiratory Failure and severe sepsis secondary to COVID 19   #Superimposed bacterial pneumonia  - cont Abx, currently on piperacillin/tazobactam for COPD  exacerbation   - Remdesivir on hold as per patient's daughter's request  - duoneb q6, symbicort  - MRSA nares positive, --> ordered bactroban  - CTA Chest shows: no evidence of acute pulmonary embolus. Bilateral lower lobe consolidative opacities which can be seen with pneumonia  - tapering to prednisone 40 mg daily       #NSTEMI Type II  - EKG showed sinus tachy w/ PACs  - Trop 208>192 (previously 19)  - trending down , patient has no active chest pain   - Lipid panel within normal   -TTE: LV Ejection Fraction by Agudelo's Method with a biplane EF of 55 %, with Moderate left ventricular hypertrophy  - cardiology consult: possible need for event recorder vs ILR , will follow up with cardiology     #HTN  - d/c IVF  - 1/5 started amlodipine 5 mg qd    #ALAN -- improved    #Hyponatremia  - Na 127, appears chronic  -patient refusing labs       #Chronic Anemia  - Hgb 8.9 (b/l ~10)  - monitor H&H  - f/u o/p    #left intertrochanteric femur fracture s/p ORIF 12/27/2023 /OA/ Chronic Back Pain s/p multiple surgeries  - from rehab  - cont Lovenox  - for pain control added standing Tylenol q6h, if not improving, will add Oxycodone 5 PRN  - PT recommended eventual d/c to rehab  - continue to f/u PT progress    #DVT ppx: Lovenox  #GI ppx: none  #Diet: DASH/TLC   #Activity: IAT, Weight bearing 50% LLE, WBAT RLE  #Code status: DNR/DNI (Trial NIV)     83F w/ PMHx COPD, OA, chronic back pain s/p multiple surgeries and recent admission for left intertrochanteric femur fracture s/p ORIF 12/27/2023 brought in from [facility] for shortness of breath.    #Acute Hypoxic Respiratory Failure and severe sepsis secondary to COVID 19   #Superimposed bacterial pneumonia  - cont Abx, currently on piperacillin/tazobactam for COPD  exacerbation   - Remdesivir on hold as per patient's daughter's request  - duoneb q6, symbicort  - MRSA nares positive, --> ordered bactroban  - CTA Chest shows: no evidence of acute pulmonary embolus. Bilateral lower lobe consolidative opacities which can be seen with pneumonia  - tapering to prednisone 40 mg daily       #NSTEMI Type II  - EKG showed sinus tachy w/ PACs  - Trop 208>192 (previously 19)  - trending down , patient has no active chest pain   - Lipid panel within normal   -TTE: LV Ejection Fraction by Agudelo's Method with a biplane EF of 55 %, with Moderate left ventricular hypertrophy  - cardiology consult: possible need for event recorder vs ILR , will follow up with cardiology     #HTN  - d/c IVF  - 1/5 started amlodipine 5 mg qd    #ALAN -- improved    #Hyponatremia  - Na 127, appears chronic  -patient refusing labs       #Chronic Anemia  - Hgb 8.9 (b/l ~10)  - monitor H&H  - f/u o/p    #left intertrochanteric femur fracture s/p ORIF 12/27/2023 /OA/ Chronic Back Pain s/p multiple surgeries  - from rehab  - cont Lovenox  - for pain control added standing Tylenol q6h, if not improving, will add Oxycodone 5 PRN  - PT recommended eventual d/c to rehab  - continue to f/u PT progress    #DVT ppx: Lovenox  #GI ppx: none  #Diet: DASH/TLC   #Activity: IAT, Weight bearing 50% LLE, WBAT RLE  #Code status: DNR/DNI (Trial NIV)

## 2024-01-09 NOTE — PROGRESS NOTE ADULT - ASSESSMENT
ASSESSMENT  83F w/ PMHx COPD, OA, chronic back pain s/p multiple surgeries and recent admission for left intertrochanteric femur fracture s/p ORIF 12/27/2023 brought in from [place] for shortness of breath.     IMPRESSION  #Acute Hypoxemic Respiratory failure  #COVID-19, severe -- possible secondary bacterial pneumonia   - CTA Chest 12/31 - negative for PE -- bilateral lower lobe consolidative opacities  - WBC Count: 19.66 K/uL (12.31.23 @ 13:55)  - Procalcitonin, Serum: 0.95 (12.31.23 @ 20:00)  - C-Reactive Protein, Serum: 46.9 mg/L (01.05.24 @ 05:05)  - on 3 L NC     #Femur fracture s/p ORIF 12/27/2023   #Obesity BMI (kg/m2): 21, 21, 21  #Abx allergy: No Known Allergies    RECOMMENDATIONS  - continue zosyn 3.375 mg q 8 hours   - doxycycline 100 mg BID for 5 days   - last day of antibiotics today (would be 10 days of antibiotics)   - trend WBC - suspect from steroids   - monitor for any diarrhea   - wean o2 as tolerated    Please call or message on [employer] Teams if with any questions.  Spectra [id number] ASSESSMENT  83F w/ PMHx COPD, OA, chronic back pain s/p multiple surgeries and recent admission for left intertrochanteric femur fracture s/p ORIF 12/27/2023 brought in from [place] for shortness of breath.     IMPRESSION  #Acute Hypoxemic Respiratory failure  #COVID-19, severe -- possible secondary bacterial pneumonia   - CTA Chest 12/31 - negative for PE -- bilateral lower lobe consolidative opacities  - WBC Count: 19.66 K/uL (12.31.23 @ 13:55)  - Procalcitonin, Serum: 0.95 (12.31.23 @ 20:00)  - C-Reactive Protein, Serum: 46.9 mg/L (01.05.24 @ 05:05)  - on 3 L NC     #Femur fracture s/p ORIF 12/27/2023   #Obesity BMI (kg/m2): 21, 21, 21  #Abx allergy: No Known Allergies    RECOMMENDATIONS  - continue zosyn 3.375 mg q 8 hours   - doxycycline 100 mg BID for 5 days   - last day of antibiotics today (would be 10 days of antibiotics)   - trend WBC - suspect from steroids   - monitor for any diarrhea   - wean o2 as tolerated    Please call or message on Microsoft Teams if with any questions.  Spectra [id number] ASSESSMENT  83F w/ PMHx COPD, OA, chronic back pain s/p multiple surgeries and recent admission for left intertrochanteric femur fracture s/p ORIF 12/27/2023 brought in from [place] for shortness of breath.     IMPRESSION  #Acute Hypoxemic Respiratory failure  #COVID-19, severe -- possible secondary bacterial pneumonia   - CTA Chest 12/31 - negative for PE -- bilateral lower lobe consolidative opacities  - WBC Count: 19.66 K/uL (12.31.23 @ 13:55)  - Procalcitonin, Serum: 0.95 (12.31.23 @ 20:00)  - C-Reactive Protein, Serum: 46.9 mg/L (01.05.24 @ 05:05)  - on 3 L NC     #Femur fracture s/p ORIF 12/27/2023   #Obesity BMI (kg/m2): 21, 21, 21  #Abx allergy: No Known Allergies    RECOMMENDATIONS  - continue zosyn 3.375 mg q 8 hours   - doxycycline 100 mg BID for 5 days   - last day of antibiotics today (would be 10 days of antibiotics)   - trend WBC - potentially  from steroids   - monitor for any diarrhea -- reports increased BM   - wean o2 as tolerated    Please call or message on Microsoft Teams if with any questions.  Spectra [id number] ASSESSMENT  83F w/ PMHx COPD, OA, chronic back pain s/p multiple surgeries and recent admission for left intertrochanteric femur fracture s/p ORIF 12/27/2023 brought in from [place] for shortness of breath.     IMPRESSION  #Acute Hypoxemic Respiratory failure  #COVID-19, severe -- possible secondary bacterial pneumonia   - CTA Chest 12/31 - negative for PE -- bilateral lower lobe consolidative opacities  - WBC Count: 19.66 K/uL (12.31.23 @ 13:55)  - Procalcitonin, Serum: 0.95 (12.31.23 @ 20:00)  - C-Reactive Protein, Serum: 46.9 mg/L (01.05.24 @ 05:05)  - on 3 L NC     #Femur fracture s/p ORIF 12/27/2023   #Obesity BMI (kg/m2): 21, 21, 21  #Abx allergy: No Known Allergies    RECOMMENDATIONS  - continue zosyn 3.375 mg q 8 hours   - doxycycline 100 mg BID for 5 days   - last day of antibiotics today (would be 10 days of antibiotics)   - trend WBC - potentially  from steroids   - monitor for any diarrhea -- reports increased BM   - wean o2 as tolerated    Please call or message on [employer] Teams if with any questions.  Spectra [id number]

## 2024-01-09 NOTE — PROGRESS NOTE ADULT - SUBJECTIVE AND OBJECTIVE BOX
83F w/ PMHx COPD, OA, chronic back pain s/p multiple surgeries and recent admission for left intertrochanteric femur fracture s/p ORIF 12/27/2023 brought in from [facility] for shortness of breath, was diagnosed with COVID with suspected underlying hospital acquired PNA, pt was in acute respiratory distress, required  high flow oxygen, clinically improved, comfortable on NC now.   patient feeling some what better. wants to go to rehab.   no acute issues overnight     PAST MEDICAL & SURGICAL HISTORY  COPD (chronic obstructive pulmonary disease)    H/O cholelithiasis    Smoker    History of surgery  orif right ankle      SOCIAL HISTORY:  Social History:      ALLERGIES:  No Known Allergies      VITALS:   Vital Signs Last 24 Hrs  T(C): 36.2 (09 Jan 2024 06:29), Max: 37.2 (08 Jan 2024 15:28)  T(F): 97.2 (09 Jan 2024 06:29), Max: 98.9 (08 Jan 2024 15:28)  HR: 85 (09 Jan 2024 06:29) (68 - 85)  BP: 126/59 (09 Jan 2024 06:29) (115/60 - 126/59)  BP(mean): --  RR: 17 (09 Jan 2024 06:29) (17 - 18)  SpO2: 95% (09 Jan 2024 08:10) (95% - 95%)    Parameters below as of 09 Jan 2024 08:10  Patient On (Oxygen Delivery Method): nasal cannula  O2 Flow (L/min): 3      PHYSICAL EXAM:  GENERAL:  NAD, frail elderly female  NECK: supple, no JVD  CV: S1, S2, RRR  Lungs: coarse BS sound   Abdomen/GI: soft, scaphoid, NT,ND, BS present  Extremities: surgical site looks clean no ecchymoses dry dressing ( left hip) , no edema on LE  Neuro: AAOx3, clear speech, pt is following commands, moving all extremities         LABS:                     Urinalysis Basic - ( 04 Jan 2024 06:20 )    Color: x / Appearance: x / SG: x / pH: x  Gluc: 139 mg/dL / Ketone: x  / Bili: x / Urobili: x   Blood: x / Protein: x / Nitrite: x   Leuk Esterase: x / RBC: x / WBC x   Sq Epi: x / Non Sq Epi: x / Bacteria: x  ABG - ( 04 Jan 2024 09:14 )  pH, Arterial: 7.50  pH, Blood: x     /  pCO2: 39    /  pO2: 81    / HCO3: 30    / Base Excess: 6.7   /  SaO2: 98.6          RADIOLOGY:  < from: Xray Chest 1 View-PORTABLE IMMEDIATE (Xray Chest 1 View-PORTABLE IMMEDIATE .) (01.03.24 @ 09:47) >    Impression:    COPD with basilar opacifications, worsened.    < end of copied text >  < from: [facility] Duplex Lower Ext Vein Scan, Bilat (01.02.24 @ 11:03) >    IMPRESSION:  No evidence of deep venous thrombosis in either lower extremity.    < end of copied text >  < from: CT Angio Chest PE Protocol w/ IV Cont (12.31.23 @ 18:26) >  IMPRESSION:    No CTA evidence of acute pulmonary embolus.    Bilateral lower lobe consolidative opacities which can be seen with   pneumonia. Recommend radiographic follow-up after treatment.    < from: TTE Echo Complete w/o Contrast w/ Doppler (01.06.24 @ 12:44) >    Summary:   1. Normal global left ventricular systolic function.   2. LV Ejection Fraction by Agudelo's Method with a biplane EF of 55 %.   3. Moderate left ventricular hypertrophy.   4. Spectral Doppler shows impaired relaxation pattern of left   ventricular myocardial filling (Grade I diastolic dysfunction).   5. Normal left atrial size.   6. Normal right atrial size.   7. Degenerative mitral valve.   8. Mild mitral annular calcification.   9. Mild thickening and calcification of the anterior and posterior   mitral valve leaflets.  10. Mild tricuspid regurgitation.  11. Mild to moderate aortic regurgitation.  12. Sclerotic aortic valve with decreased opening.  13. Estimated pulmonary artery systolic pressure is 52.6 mmHg assuming a   right atrial pressure of 3 mmHg, which is consistent with moderate   pulmonary hypertension.  14. There is severe aortic root calcification.      MEDICATIONS  (STANDING):  acetaminophen     Tablet .. 650 milliGRAM(s) Oral every 6 hours  albuterol/ipratropium for Nebulization 3 milliLiter(s) Nebulizer every 6 hours  amLODIPine   Tablet 5 milliGRAM(s) Oral daily  aspirin enteric coated 81 milliGRAM(s) Oral daily  budesonide 160 MICROgram(s)/formoterol 4.5 MICROgram(s) Inhaler 2 Puff(s) Inhalation two times a day  chlorhexidine 2% Cloths 1 Application(s) Topical <User Schedule>  enoxaparin Injectable 40 milliGRAM(s) SubCutaneous every 24 hours  methylPREDNISolone sodium succinate Injectable 40 milliGRAM(s) IV Push daily  metoprolol tartrate 12.5 milliGRAM(s) Oral every 12 hours  mupirocin 2% Ointment 1 Application(s) Topical two times a day  polyethylene glycol 3350 17 Gram(s) Oral daily  senna 2 Tablet(s) Oral at bedtime  sodium chloride 0.9%. 1000 milliLiter(s) (75 mL/Hr) IV Continuous <Continuous>    MEDICATIONS  (PRN):  aluminum hydroxide/magnesium hydroxide/simethicone Suspension 30 milliLiter(s) Oral every 4 hours PRN Dyspepsia  melatonin 3 milliGRAM(s) Oral at bedtime PRN Insomnia  ondansetron Injectable 4 milliGRAM(s) IV Push every 8 hours PRN Nausea and/or Vomiting     83F w/ PMHx COPD, OA, chronic back pain s/p multiple surgeries and recent admission for left intertrochanteric femur fracture s/p ORIF 12/27/2023 brought in from [facility] for shortness of breath, was diagnosed with COVID with suspected underlying hospital acquired PNA, pt was in acute respiratory distress, required  high flow oxygen, clinically improved, comfortable on NC now.   patient feeling some what better. wants to go to rehab.   no acute issues overnight     PAST MEDICAL & SURGICAL HISTORY  COPD (chronic obstructive pulmonary disease)    H/O cholelithiasis    Smoker    History of surgery  orif right ankle      SOCIAL HISTORY:  Social History:      ALLERGIES:  No Known Allergies      VITALS:   Vital Signs Last 24 Hrs  T(C): 36.2 (09 Jan 2024 06:29), Max: 37.2 (08 Jan 2024 15:28)  T(F): 97.2 (09 Jan 2024 06:29), Max: 98.9 (08 Jan 2024 15:28)  HR: 85 (09 Jan 2024 06:29) (68 - 85)  BP: 126/59 (09 Jan 2024 06:29) (115/60 - 126/59)  BP(mean): --  RR: 17 (09 Jan 2024 06:29) (17 - 18)  SpO2: 95% (09 Jan 2024 08:10) (95% - 95%)    Parameters below as of 09 Jan 2024 08:10  Patient On (Oxygen Delivery Method): nasal cannula  O2 Flow (L/min): 3      PHYSICAL EXAM:  GENERAL:  NAD, frail elderly female  NECK: supple, no JVD  CV: S1, S2, RRR  Lungs: coarse BS sound   Abdomen/GI: soft, scaphoid, NT,ND, BS present  Extremities: surgical site looks clean no ecchymoses dry dressing ( left hip) , no edema on LE  Neuro: AAOx3, clear speech, pt is following commands, moving all extremities         LABS:                     Urinalysis Basic - ( 04 Jan 2024 06:20 )    Color: x / Appearance: x / SG: x / pH: x  Gluc: 139 mg/dL / Ketone: x  / Bili: x / Urobili: x   Blood: x / Protein: x / Nitrite: x   Leuk Esterase: x / RBC: x / WBC x   Sq Epi: x / Non Sq Epi: x / Bacteria: x  ABG - ( 04 Jan 2024 09:14 )  pH, Arterial: 7.50  pH, Blood: x     /  pCO2: 39    /  pO2: 81    / HCO3: 30    / Base Excess: 6.7   /  SaO2: 98.6          RADIOLOGY:  < from: Xray Chest 1 View-PORTABLE IMMEDIATE (Xray Chest 1 View-PORTABLE IMMEDIATE .) (01.03.24 @ 09:47) >    Impression:    COPD with basilar opacifications, worsened.    < end of copied text >  < from: [facility] Duplex Lower Ext Vein Scan, Bilat (01.02.24 @ 11:03) >    IMPRESSION:  No evidence of deep venous thrombosis in either lower extremity.    < end of copied text >  < from: CT Angio Chest PE Protocol w/ IV Cont (12.31.23 @ 18:26) >  IMPRESSION:    No CTA evidence of acute pulmonary embolus.    Bilateral lower lobe consolidative opacities which can be seen with   pneumonia. Recommend radiographic follow-up after treatment.    < from: TTE Echo Complete w/o Contrast w/ Doppler (01.06.24 @ 12:44) >    Summary:   1. Normal global left ventricular systolic function.   2. LV Ejection Fraction by Agudelo's Method with a biplane EF of 55 %.   3. Moderate left ventricular hypertrophy.   4. Spectral Doppler shows impaired relaxation pattern of left   ventricular myocardial filling (Grade I diastolic dysfunction).   5. Normal left atrial size.   6. Normal right atrial size.   7. Degenerative mitral valve.   8. Mild mitral annular calcification.   9. Mild thickening and calcification of the anterior and posterior   mitral valve leaflets.  10. Mild tricuspid regurgitation.  11. Mild to moderate aortic regurgitation.  12. Sclerotic aortic valve with decreased opening.  13. Estimated pulmonary artery systolic pressure is 52.6 mmHg assuming a   right atrial pressure of 3 mmHg, which is consistent with moderate   pulmonary hypertension.  14. There is severe aortic root calcification.      MEDICATIONS  (STANDING):  acetaminophen     Tablet .. 650 milliGRAM(s) Oral every 6 hours  albuterol/ipratropium for Nebulization 3 milliLiter(s) Nebulizer every 6 hours  amLODIPine   Tablet 5 milliGRAM(s) Oral daily  aspirin enteric coated 81 milliGRAM(s) Oral daily  budesonide 160 MICROgram(s)/formoterol 4.5 MICROgram(s) Inhaler 2 Puff(s) Inhalation two times a day  chlorhexidine 2% Cloths 1 Application(s) Topical <User Schedule>  enoxaparin Injectable 40 milliGRAM(s) SubCutaneous every 24 hours  methylPREDNISolone sodium succinate Injectable 40 milliGRAM(s) IV Push daily  metoprolol tartrate 12.5 milliGRAM(s) Oral every 12 hours  mupirocin 2% Ointment 1 Application(s) Topical two times a day  polyethylene glycol 3350 17 Gram(s) Oral daily  senna 2 Tablet(s) Oral at bedtime  sodium chloride 0.9%. 1000 milliLiter(s) (75 mL/Hr) IV Continuous <Continuous>    MEDICATIONS  (PRN):  aluminum hydroxide/magnesium hydroxide/simethicone Suspension 30 milliLiter(s) Oral every 4 hours PRN Dyspepsia  melatonin 3 milliGRAM(s) Oral at bedtime PRN Insomnia  ondansetron Injectable 4 milliGRAM(s) IV Push every 8 hours PRN Nausea and/or Vomiting

## 2024-01-09 NOTE — PROGRESS NOTE ADULT - TIME BILLING
time spent on review of labs, imaging studies, old records, obtaining history, personally examining patient, counselling and communicating with patient, entering orders for medications/tests/etc, discussions with other health care providers, documentation in electronic health records, independent interpretation of labs, imaging/procedure results and care coordination.
I have personally seen and examined this patient.    I have reviewed all pertinent clinical information and reviewed all relevant imaging and diagnostic studies personally.   I counseled the patient about diagnostic testing and treatment plan. All questions were answered.   I discussed recommendations with the primary team.
time spent on review of labs, imaging studies, old records, obtaining history, personally examining patient, counselling and communicating with patient, entering orders for medications/tests/etc, discussions with other health care providers, documentation in electronic health records, independent interpretation of labs, imaging/procedure results and care coordination.
I have personally seen and examined this patient.    I have reviewed all pertinent clinical information and reviewed all relevant imaging and diagnostic studies personally.   I counseled the patient about diagnostic testing and treatment plan. All questions were answered.   I discussed recommendations with the primary team.

## 2024-01-09 NOTE — PROVIDER CONTACT NOTE (OTHER) - ACTION/TREATMENT ORDERED:
Daughter made aware RN not allowed to draw blood from midline. Dr. Young made aware of refusal of labs and daughters request

## 2024-01-09 NOTE — PROVIDER CONTACT NOTE (OTHER) - SITUATION
pt daughter is refusing blood draw by phlebotomist. Requesting to be drawn from midline or by US guidance

## 2024-01-09 NOTE — PROGRESS NOTE ADULT - SUBJECTIVE AND OBJECTIVE BOX
SHANELL MORENO  83y, Female  Allergy: No Known Allergies      LOS  9d    CHIEF COMPLAINT: SOB (09 Jan 2024 12:10)      INTERVAL EVENTS/HPI  - No acute events overnight  - T(F): , Max: 98.9 (01-08-24 @ 15:28)  - Denies any worsening symptoms  - Tolerating medication  -    -      ROS  General: Denies rigors, nightsweats  HEENT: Denies headache, rhinorrhea, sore throat, eye pain  CV: Denies CP, palpitations  PULM: Denies wheezing, hemoptysis  GI: Denies hematemesis, hematochezia, melena  : Denies discharge, hematuria  MSK: Denies arthralgias, myalgias  SKIN: Denies rash, lesions  NEURO: Denies paresthesias, weakness  PSYCH: Denies depression, anxiety    VITALS:  T(F): 97.2, Max: 98.9 (01-08-24 @ 15:28)  HR: 85  BP: 126/59  RR: 17Vital Signs Last 24 Hrs  T(C): 36.2 (09 Jan 2024 06:29), Max: 37.2 (08 Jan 2024 15:28)  T(F): 97.2 (09 Jan 2024 06:29), Max: 98.9 (08 Jan 2024 15:28)  HR: 85 (09 Jan 2024 06:29) (68 - 85)  BP: 126/59 (09 Jan 2024 06:29) (115/60 - 126/59)  BP(mean): --  RR: 17 (09 Jan 2024 06:29) (17 - 18)  SpO2: 95% (09 Jan 2024 08:10) (95% - 95%)    Parameters below as of 09 Jan 2024 08:10  Patient On (Oxygen Delivery Method): nasal cannula  O2 Flow (L/min): 3      PHYSICAL EXAM:  Gen: NAD, resting in bed  HEENT: Normocephalic, atraumatic  Neck: supple, no lymphadenopathy  CV: Regular rate & regular rhythm  Lungs: decreased BS at bases, no fremitus  Abdomen: Soft, BS present  Ext: Warm, well perfused  Neuro: non focal, awake  Skin: no rash, no erythema  Lines: no phlebitis    FH: Non-contributory  Social Hx: Non-contributory    TESTS & MEASUREMENTS:                  Culture - Blood (collected 12-31-23 @ 13:55)  Source: .Blood Blood-Peripheral  Final Report (01-06-24 @ 02:00):    No growth at 5 days    Culture - Blood (collected 12-31-23 @ 13:55)  Source: .Blood Blood-Peripheral  Final Report (01-06-24 @ 02:00):    No growth at 5 days        Lactate, Blood: 2.5 mmol/L (01-06-24 @ 12:37)      INFECTIOUS DISEASES TESTING  MRSA PCR Result.: Positive (01-02-24 @ 23:30)  Procalcitonin, Serum: 0.95 (12-31-23 @ 20:00)  COVID-19 PCR: NotDetec (12-29-23 @ 16:48)      INFLAMMATORY MARKERS  C-Reactive Protein, Serum: 46.9 mg/L (01-05-24 @ 05:05)      RADIOLOGY & ADDITIONAL TESTS:  I have personally reviewed the last available Chest xray  CXR      CT      CARDIOLOGY TESTING  12 Lead ECG:   Ventricular Rate 122 BPM    Atrial Rate 86 BPM    QRS Duration 122 ms    Q-T Interval 310 ms    QTC Calculation(Bazett) 441 ms    R Axis 125 degrees    T Axis 56 degrees    Diagnosis Line Atrial flutter with variable A-V block  Right bundle branch block  Abnormal ECG    Confirmed by Licha Wong MD ([id number]) on 1/1/2024 11:32:55 AM (12-31-23 @ 18:36)  12 Lead ECG:   Ventricular Rate 136 BPM    Atrial Rate 144 BPM    QRS Duration 132 ms    Q-T Interval 312 ms    QTC Calculation(Bazett) 469 ms    R Axis 38 degrees    T Axis 53 degrees    Diagnosis Line *** Poor data quality, interpretation may be adversely affected  Undetermined rhythm  Possible Atrial flutter  Indeterminate axis  Right bundle branch block  Abnormal ECG    Confirmed by Licha Wong MD [id number]) on 1/1/2024 11:06:47 AM (12-31-23 @ 14:08)      MEDICATIONS  acetaminophen     Tablet .. 650 Oral every 6 hours  albuterol/ipratropium for Nebulization 3 Nebulizer every 6 hours  amLODIPine   Tablet 5 Oral daily  aspirin enteric coated 81 Oral daily  budesonide 160 MICROgram(s)/formoterol 4.5 MICROgram(s) Inhaler 2 Inhalation two times a day  chlorhexidine 2% Cloths 1 Topical <User Schedule>  doxycycline monohydrate Capsule 100 Oral every 12 hours  enoxaparin Injectable 40 SubCutaneous every 24 hours  metoprolol tartrate 12.5 Oral every 12 hours  mupirocin 2% Ointment 1 Topical two times a day  piperacillin/tazobactam IVPB.. 3.375 IV Intermittent every 8 hours  polyethylene glycol 3350 17 Oral daily  predniSONE   Tablet 40 Oral daily  senna 2 Oral at bedtime      WEIGHT  Weight (kg): 49.3 (01-06-24 @ 04:00)      ANTIBIOTICS:  doxycycline monohydrate Capsule 100 milliGRAM(s) Oral every 12 hours  piperacillin/tazobactam IVPB.. 3.375 Gram(s) IV Intermittent every 8 hours      All available historical records have been reviewed       SHNAELL MORENO  83y, Female  Allergy: No Known Allergies      LOS  9d    CHIEF COMPLAINT: SOB (09 Jan 2024 12:10)      INTERVAL EVENTS/HPI  - No acute events overnight  - T(F): , Max: 98.9 (01-08-24 @ 15:28)  - Denies any worsening symptoms  - Tolerating medication  -    -      ROS  General: Denies rigors, nightsweats  HEENT: Denies headache, rhinorrhea, sore throat, eye pain  CV: Denies CP, palpitations  PULM: Denies wheezing, hemoptysis  GI: Denies hematemesis, hematochezia, melena  : Denies discharge, hematuria  MSK: Denies arthralgias, myalgias  SKIN: Denies rash, lesions  NEURO: Denies paresthesias, weakness  PSYCH: Denies depression, anxiety    VITALS:  T(F): 97.2, Max: 98.9 (01-08-24 @ 15:28)  HR: 85  BP: 126/59  RR: 17Vital Signs Last 24 Hrs  T(C): 36.2 (09 Jan 2024 06:29), Max: 37.2 (08 Jan 2024 15:28)  T(F): 97.2 (09 Jan 2024 06:29), Max: 98.9 (08 Jan 2024 15:28)  HR: 85 (09 Jan 2024 06:29) (68 - 85)  BP: 126/59 (09 Jan 2024 06:29) (115/60 - 126/59)  BP(mean): --  RR: 17 (09 Jan 2024 06:29) (17 - 18)  SpO2: 95% (09 Jan 2024 08:10) (95% - 95%)    Parameters below as of 09 Jan 2024 08:10  Patient On (Oxygen Delivery Method): nasal cannula  O2 Flow (L/min): 3      PHYSICAL EXAM:  Gen: NAD, resting in bed  HEENT: Normocephalic, atraumatic  Neck: supple, no lymphadenopathy  CV: Regular rate & regular rhythm  Lungs: decreased BS at bases, no fremitus  Abdomen: Soft, BS present  Ext: Warm, well perfused  Neuro: non focal, awake  Skin: no rash, no erythema  Lines: no phlebitis    FH: Non-contributory  Social Hx: Non-contributory    TESTS & MEASUREMENTS:                  Culture - Blood (collected 12-31-23 @ 13:55)  Source: .Blood Blood-Peripheral  Final Report (01-06-24 @ 02:00):    No growth at 5 days    Culture - Blood (collected 12-31-23 @ 13:55)  Source: .Blood Blood-Peripheral  Final Report (01-06-24 @ 02:00):    No growth at 5 days        Lactate, Blood: 2.5 mmol/L (01-06-24 @ 12:37)      INFECTIOUS DISEASES TESTING  MRSA PCR Result.: Positive (01-02-24 @ 23:30)  Procalcitonin, Serum: 0.95 (12-31-23 @ 20:00)  COVID-19 PCR: NotDetec (12-29-23 @ 16:48)      INFLAMMATORY MARKERS  C-Reactive Protein, Serum: 46.9 mg/L (01-05-24 @ 05:05)      RADIOLOGY & ADDITIONAL TESTS:  I have personally reviewed the last available Chest xray  CXR      CT      CARDIOLOGY TESTING  12 Lead ECG:   Ventricular Rate 122 BPM    Atrial Rate 86 BPM    QRS Duration 122 ms    Q-T Interval 310 ms    QTC Calculation(Bazett) 441 ms    R Axis 125 degrees    T Axis 56 degrees    Diagnosis Line Atrial flutter with variable A-V block  Right bundle branch block  Abnormal ECG    Confirmed by Licha Wong MD [id number]) on 1/1/2024 11:32:55 AM (12-31-23 @ 18:36)  12 Lead ECG:   Ventricular Rate 136 BPM    Atrial Rate 144 BPM    QRS Duration 132 ms    Q-T Interval 312 ms    QTC Calculation(Bazett) 469 ms    R Axis 38 degrees    T Axis 53 degrees    Diagnosis Line *** Poor data quality, interpretation may be adversely affected  Undetermined rhythm  Possible Atrial flutter  Indeterminate axis  Right bundle branch block  Abnormal ECG    Confirmed by Licha Wong MD [id number]) on 1/1/2024 11:06:47 AM (12-31-23 @ 14:08)      MEDICATIONS  acetaminophen     Tablet .. 650 Oral every 6 hours  albuterol/ipratropium for Nebulization 3 Nebulizer every 6 hours  amLODIPine   Tablet 5 Oral daily  aspirin enteric coated 81 Oral daily  budesonide 160 MICROgram(s)/formoterol 4.5 MICROgram(s) Inhaler 2 Inhalation two times a day  chlorhexidine 2% Cloths 1 Topical <User Schedule>  doxycycline monohydrate Capsule 100 Oral every 12 hours  enoxaparin Injectable 40 SubCutaneous every 24 hours  metoprolol tartrate 12.5 Oral every 12 hours  mupirocin 2% Ointment 1 Topical two times a day  piperacillin/tazobactam IVPB.. 3.375 IV Intermittent every 8 hours  polyethylene glycol 3350 17 Oral daily  predniSONE   Tablet 40 Oral daily  senna 2 Oral at bedtime      WEIGHT  Weight (kg): 49.3 (01-06-24 @ 04:00)      ANTIBIOTICS:  doxycycline monohydrate Capsule 100 milliGRAM(s) Oral every 12 hours  piperacillin/tazobactam IVPB.. 3.375 Gram(s) IV Intermittent every 8 hours      All available historical records have been reviewed       SHANELL MORENO  83y, Female  Allergy: No Known Allergies      LOS  9d    CHIEF COMPLAINT: SOB (09 Jan 2024 12:10)      INTERVAL EVENTS/HPI  - No acute events overnight  - T(F): , Max: 98.9 (01-08-24 @ 15:28)  - says she is having BM frequently, but denies татьяна diarrhea   -      ROS  General: Denies rigors, nightsweats  HEENT: Denies headache, rhinorrhea, sore throat, eye pain  CV: Denies CP, palpitations  PULM: Denies wheezing, hemoptysis  GI: Denies hematemesis, hematochezia, melena  : Denies discharge, hematuria  MSK: Denies arthralgias, myalgias  SKIN: Denies rash, lesions  NEURO: Denies paresthesias, weakness  PSYCH: Denies depression, anxiety    VITALS:  T(F): 97.2, Max: 98.9 (01-08-24 @ 15:28)  HR: 85  BP: 126/59  RR: 17Vital Signs Last 24 Hrs  T(C): 36.2 (09 Jan 2024 06:29), Max: 37.2 (08 Jan 2024 15:28)  T(F): 97.2 (09 Jan 2024 06:29), Max: 98.9 (08 Jan 2024 15:28)  HR: 85 (09 Jan 2024 06:29) (68 - 85)  BP: 126/59 (09 Jan 2024 06:29) (115/60 - 126/59)  BP(mean): --  RR: 17 (09 Jan 2024 06:29) (17 - 18)  SpO2: 95% (09 Jan 2024 08:10) (95% - 95%)    Parameters below as of 09 Jan 2024 08:10  Patient On (Oxygen Delivery Method): nasal cannula  O2 Flow (L/min): 3      PHYSICAL EXAM:  Gen: NAD, resting in bed  HEENT: Normocephalic, atraumatic  Neck: supple, no lymphadenopathy  CV: Regular rate & regular rhythm  Lungs: decreased BS at bases, no fremitus  Abdomen: Soft, BS present  Ext: Warm, well perfused  Neuro: non focal, awake  Skin: no rash, no erythema  Lines: no phlebitis    FH: Non-contributory  Social Hx: Non-contributory    TESTS & MEASUREMENTS:                  Culture - Blood (collected 12-31-23 @ 13:55)  Source: .Blood Blood-Peripheral  Final Report (01-06-24 @ 02:00):    No growth at 5 days    Culture - Blood (collected 12-31-23 @ 13:55)  Source: .Blood Blood-Peripheral  Final Report (01-06-24 @ 02:00):    No growth at 5 days        Lactate, Blood: 2.5 mmol/L (01-06-24 @ 12:37)      INFECTIOUS DISEASES TESTING  MRSA PCR Result.: Positive (01-02-24 @ 23:30)  Procalcitonin, Serum: 0.95 (12-31-23 @ 20:00)  COVID-19 PCR: NotDetec (12-29-23 @ 16:48)      INFLAMMATORY MARKERS  C-Reactive Protein, Serum: 46.9 mg/L (01-05-24 @ 05:05)      RADIOLOGY & ADDITIONAL TESTS:  I have personally reviewed the last available Chest xray  CXR      CT      CARDIOLOGY TESTING  12 Lead ECG:   Ventricular Rate 122 BPM    Atrial Rate 86 BPM    QRS Duration 122 ms    Q-T Interval 310 ms    QTC Calculation(Bazett) 441 ms    R Axis 125 degrees    T Axis 56 degrees    Diagnosis Line Atrial flutter with variable A-V block  Right bundle branch block  Abnormal ECG    Confirmed by Licha Wong MD ([id number]) on 1/1/2024 11:32:55 AM (12-31-23 @ 18:36)  12 Lead ECG:   Ventricular Rate 136 BPM    Atrial Rate 144 BPM    QRS Duration 132 ms    Q-T Interval 312 ms    QTC Calculation(Bazett) 469 ms    R Axis 38 degrees    T Axis 53 degrees    Diagnosis Line *** Poor data quality, interpretation may be adversely affected  Undetermined rhythm  Possible Atrial flutter  Indeterminate axis  Right bundle branch block  Abnormal ECG    Confirmed by Licha Wong MD [id number]) on 1/1/2024 11:06:47 AM (12-31-23 @ 14:08)      MEDICATIONS  acetaminophen     Tablet .. 650 Oral every 6 hours  albuterol/ipratropium for Nebulization 3 Nebulizer every 6 hours  amLODIPine   Tablet 5 Oral daily  aspirin enteric coated 81 Oral daily  budesonide 160 MICROgram(s)/formoterol 4.5 MICROgram(s) Inhaler 2 Inhalation two times a day  chlorhexidine 2% Cloths 1 Topical <User Schedule>  doxycycline monohydrate Capsule 100 Oral every 12 hours  enoxaparin Injectable 40 SubCutaneous every 24 hours  metoprolol tartrate 12.5 Oral every 12 hours  mupirocin 2% Ointment 1 Topical two times a day  piperacillin/tazobactam IVPB.. 3.375 IV Intermittent every 8 hours  polyethylene glycol 3350 17 Oral daily  predniSONE   Tablet 40 Oral daily  senna 2 Oral at bedtime      WEIGHT  Weight (kg): 49.3 (01-06-24 @ 04:00)      ANTIBIOTICS:  doxycycline monohydrate Capsule 100 milliGRAM(s) Oral every 12 hours  piperacillin/tazobactam IVPB.. 3.375 Gram(s) IV Intermittent every 8 hours      All available historical records have been reviewed

## 2024-01-10 ENCOUNTER — TRANSCRIPTION ENCOUNTER (OUTPATIENT)
Age: 84
End: 2024-01-10

## 2024-01-10 VITALS
HEART RATE: 77 BPM | RESPIRATION RATE: 18 BRPM | SYSTOLIC BLOOD PRESSURE: 129 MMHG | TEMPERATURE: 96 F | DIASTOLIC BLOOD PRESSURE: 58 MMHG

## 2024-01-10 LAB
ALBUMIN SERPL ELPH-MCNC: 2.9 G/DL — LOW (ref 3.5–5.2)
ALBUMIN SERPL ELPH-MCNC: 2.9 G/DL — LOW (ref 3.5–5.2)
ALP SERPL-CCNC: 101 U/L — SIGNIFICANT CHANGE UP (ref 30–115)
ALP SERPL-CCNC: 101 U/L — SIGNIFICANT CHANGE UP (ref 30–115)
ALT FLD-CCNC: 16 U/L — SIGNIFICANT CHANGE UP (ref 0–41)
ALT FLD-CCNC: 16 U/L — SIGNIFICANT CHANGE UP (ref 0–41)
ANION GAP SERPL CALC-SCNC: 10 MMOL/L — SIGNIFICANT CHANGE UP (ref 7–14)
ANION GAP SERPL CALC-SCNC: 10 MMOL/L — SIGNIFICANT CHANGE UP (ref 7–14)
AST SERPL-CCNC: 14 U/L — SIGNIFICANT CHANGE UP (ref 0–41)
AST SERPL-CCNC: 14 U/L — SIGNIFICANT CHANGE UP (ref 0–41)
BASOPHILS # BLD AUTO: 0 K/UL — SIGNIFICANT CHANGE UP (ref 0–0.2)
BASOPHILS # BLD AUTO: 0 K/UL — SIGNIFICANT CHANGE UP (ref 0–0.2)
BASOPHILS NFR BLD AUTO: 0 % — SIGNIFICANT CHANGE UP (ref 0–1)
BASOPHILS NFR BLD AUTO: 0 % — SIGNIFICANT CHANGE UP (ref 0–1)
BILIRUB SERPL-MCNC: 2.1 MG/DL — HIGH (ref 0.2–1.2)
BILIRUB SERPL-MCNC: 2.1 MG/DL — HIGH (ref 0.2–1.2)
BUN SERPL-MCNC: 19 MG/DL — SIGNIFICANT CHANGE UP (ref 10–20)
BUN SERPL-MCNC: 19 MG/DL — SIGNIFICANT CHANGE UP (ref 10–20)
CALCIUM SERPL-MCNC: 7.7 MG/DL — LOW (ref 8.4–10.5)
CALCIUM SERPL-MCNC: 7.7 MG/DL — LOW (ref 8.4–10.5)
CHLORIDE SERPL-SCNC: 88 MMOL/L — LOW (ref 98–110)
CHLORIDE SERPL-SCNC: 88 MMOL/L — LOW (ref 98–110)
CO2 SERPL-SCNC: 29 MMOL/L — SIGNIFICANT CHANGE UP (ref 17–32)
CO2 SERPL-SCNC: 29 MMOL/L — SIGNIFICANT CHANGE UP (ref 17–32)
CREAT ?TM UR-MCNC: 44 MG/DL — SIGNIFICANT CHANGE UP
CREAT ?TM UR-MCNC: 44 MG/DL — SIGNIFICANT CHANGE UP
CREAT SERPL-MCNC: 0.6 MG/DL — LOW (ref 0.7–1.5)
CREAT SERPL-MCNC: 0.6 MG/DL — LOW (ref 0.7–1.5)
EGFR: 88 ML/MIN/1.73M2 — SIGNIFICANT CHANGE UP
EGFR: 88 ML/MIN/1.73M2 — SIGNIFICANT CHANGE UP
EOSINOPHIL # BLD AUTO: 0 K/UL — SIGNIFICANT CHANGE UP (ref 0–0.7)
EOSINOPHIL # BLD AUTO: 0 K/UL — SIGNIFICANT CHANGE UP (ref 0–0.7)
EOSINOPHIL NFR BLD AUTO: 0 % — SIGNIFICANT CHANGE UP (ref 0–8)
EOSINOPHIL NFR BLD AUTO: 0 % — SIGNIFICANT CHANGE UP (ref 0–8)
GLUCOSE SERPL-MCNC: 183 MG/DL — HIGH (ref 70–99)
GLUCOSE SERPL-MCNC: 183 MG/DL — HIGH (ref 70–99)
HCT VFR BLD CALC: 27.7 % — LOW (ref 37–47)
HCT VFR BLD CALC: 27.7 % — LOW (ref 37–47)
HGB BLD-MCNC: 9.5 G/DL — LOW (ref 12–16)
HGB BLD-MCNC: 9.5 G/DL — LOW (ref 12–16)
IRON SATN MFR SERPL: 33 % — SIGNIFICANT CHANGE UP (ref 15–50)
IRON SATN MFR SERPL: 33 % — SIGNIFICANT CHANGE UP (ref 15–50)
IRON SATN MFR SERPL: 59 UG/DL — SIGNIFICANT CHANGE UP (ref 35–150)
IRON SATN MFR SERPL: 59 UG/DL — SIGNIFICANT CHANGE UP (ref 35–150)
LYMPHOCYTES # BLD AUTO: 0 % — LOW (ref 20.5–51.1)
LYMPHOCYTES # BLD AUTO: 0 % — LOW (ref 20.5–51.1)
LYMPHOCYTES # BLD AUTO: 0 K/UL — LOW (ref 1.2–3.4)
LYMPHOCYTES # BLD AUTO: 0 K/UL — LOW (ref 1.2–3.4)
MAGNESIUM SERPL-MCNC: 2.4 MG/DL — SIGNIFICANT CHANGE UP (ref 1.8–2.4)
MAGNESIUM SERPL-MCNC: 2.4 MG/DL — SIGNIFICANT CHANGE UP (ref 1.8–2.4)
MCHC RBC-ENTMCNC: 29.9 PG — SIGNIFICANT CHANGE UP (ref 27–31)
MCHC RBC-ENTMCNC: 29.9 PG — SIGNIFICANT CHANGE UP (ref 27–31)
MCHC RBC-ENTMCNC: 34.3 G/DL — SIGNIFICANT CHANGE UP (ref 32–37)
MCHC RBC-ENTMCNC: 34.3 G/DL — SIGNIFICANT CHANGE UP (ref 32–37)
MCV RBC AUTO: 87.1 FL — SIGNIFICANT CHANGE UP (ref 81–99)
MCV RBC AUTO: 87.1 FL — SIGNIFICANT CHANGE UP (ref 81–99)
MONOCYTES # BLD AUTO: 0 K/UL — LOW (ref 0.1–0.6)
MONOCYTES # BLD AUTO: 0 K/UL — LOW (ref 0.1–0.6)
MONOCYTES NFR BLD AUTO: 0 % — LOW (ref 1.7–9.3)
MONOCYTES NFR BLD AUTO: 0 % — LOW (ref 1.7–9.3)
NEUTROPHILS # BLD AUTO: 19.91 K/UL — HIGH (ref 1.4–6.5)
NEUTROPHILS # BLD AUTO: 19.91 K/UL — HIGH (ref 1.4–6.5)
NEUTROPHILS NFR BLD AUTO: 100 % — HIGH (ref 42.2–75.2)
NEUTROPHILS NFR BLD AUTO: 100 % — HIGH (ref 42.2–75.2)
OSMOLALITY UR: 616 MOS/KG — SIGNIFICANT CHANGE UP (ref 50–1200)
OSMOLALITY UR: 616 MOS/KG — SIGNIFICANT CHANGE UP (ref 50–1200)
PLATELET # BLD AUTO: 394 K/UL — SIGNIFICANT CHANGE UP (ref 130–400)
PLATELET # BLD AUTO: 394 K/UL — SIGNIFICANT CHANGE UP (ref 130–400)
PMV BLD: 9.3 FL — SIGNIFICANT CHANGE UP (ref 7.4–10.4)
PMV BLD: 9.3 FL — SIGNIFICANT CHANGE UP (ref 7.4–10.4)
POTASSIUM SERPL-MCNC: 3.8 MMOL/L — SIGNIFICANT CHANGE UP (ref 3.5–5)
POTASSIUM SERPL-MCNC: 3.8 MMOL/L — SIGNIFICANT CHANGE UP (ref 3.5–5)
POTASSIUM SERPL-SCNC: 3.8 MMOL/L — SIGNIFICANT CHANGE UP (ref 3.5–5)
POTASSIUM SERPL-SCNC: 3.8 MMOL/L — SIGNIFICANT CHANGE UP (ref 3.5–5)
PROT ?TM UR-MCNC: 30 MG/DLG/24H — SIGNIFICANT CHANGE UP
PROT ?TM UR-MCNC: 30 MG/DLG/24H — SIGNIFICANT CHANGE UP
PROT SERPL-MCNC: 4.9 G/DL — LOW (ref 6–8)
PROT SERPL-MCNC: 4.9 G/DL — LOW (ref 6–8)
PROT/CREAT UR-RTO: 0.7 RATIO — HIGH (ref 0–0.2)
PROT/CREAT UR-RTO: 0.7 RATIO — HIGH (ref 0–0.2)
RBC # BLD: 3.18 M/UL — LOW (ref 4.2–5.4)
RBC # BLD: 3.18 M/UL — LOW (ref 4.2–5.4)
RBC # FLD: 15.2 % — HIGH (ref 11.5–14.5)
RBC # FLD: 15.2 % — HIGH (ref 11.5–14.5)
SODIUM SERPL-SCNC: 127 MMOL/L — LOW (ref 135–146)
SODIUM SERPL-SCNC: 127 MMOL/L — LOW (ref 135–146)
SODIUM UR-SCNC: 84 MMOL/L — SIGNIFICANT CHANGE UP
SODIUM UR-SCNC: 84 MMOL/L — SIGNIFICANT CHANGE UP
TIBC SERPL-MCNC: 181 UG/DL — LOW (ref 220–430)
TIBC SERPL-MCNC: 181 UG/DL — LOW (ref 220–430)
UIBC SERPL-MCNC: 122 UG/DL — SIGNIFICANT CHANGE UP (ref 110–370)
UIBC SERPL-MCNC: 122 UG/DL — SIGNIFICANT CHANGE UP (ref 110–370)
UUN UR-MCNC: 1028 MG/DL — SIGNIFICANT CHANGE UP
UUN UR-MCNC: 1028 MG/DL — SIGNIFICANT CHANGE UP
WBC # BLD: 19.91 K/UL — HIGH (ref 4.8–10.8)
WBC # BLD: 19.91 K/UL — HIGH (ref 4.8–10.8)
WBC # FLD AUTO: 19.91 K/UL — HIGH (ref 4.8–10.8)
WBC # FLD AUTO: 19.91 K/UL — HIGH (ref 4.8–10.8)

## 2024-01-10 PROCEDURE — 99239 HOSP IP/OBS DSCHRG MGMT >30: CPT

## 2024-01-10 PROCEDURE — 71045 X-RAY EXAM CHEST 1 VIEW: CPT | Mod: 26

## 2024-01-10 RX ORDER — POTASSIUM CHLORIDE 20 MEQ
20 PACKET (EA) ORAL
Refills: 0 | Status: COMPLETED | OUTPATIENT
Start: 2024-01-10 | End: 2024-01-10

## 2024-01-10 RX ORDER — AMLODIPINE BESYLATE 2.5 MG/1
1 TABLET ORAL
Qty: 0 | Refills: 0 | DISCHARGE
Start: 2024-01-10

## 2024-01-10 RX ORDER — POTASSIUM CHLORIDE 20 MEQ
20 PACKET (EA) ORAL
Refills: 0 | Status: DISCONTINUED | OUTPATIENT
Start: 2024-01-10 | End: 2024-01-10

## 2024-01-10 RX ORDER — POTASSIUM CHLORIDE 20 MEQ
10 PACKET (EA) ORAL
Refills: 0 | Status: DISCONTINUED | OUTPATIENT
Start: 2024-01-10 | End: 2024-01-10

## 2024-01-10 RX ORDER — MAGNESIUM SULFATE 500 MG/ML
2 VIAL (ML) INJECTION ONCE
Refills: 0 | Status: COMPLETED | OUTPATIENT
Start: 2024-01-10 | End: 2024-01-10

## 2024-01-10 RX ORDER — METOPROLOL TARTRATE 50 MG
12.5 TABLET ORAL
Qty: 0 | Refills: 0 | DISCHARGE
Start: 2024-01-10

## 2024-01-10 RX ADMIN — Medication 650 MILLIGRAM(S): at 12:01

## 2024-01-10 RX ADMIN — AMLODIPINE BESYLATE 5 MILLIGRAM(S): 2.5 TABLET ORAL at 06:26

## 2024-01-10 RX ADMIN — Medication 650 MILLIGRAM(S): at 06:25

## 2024-01-10 RX ADMIN — POLYETHYLENE GLYCOL 3350 17 GRAM(S): 17 POWDER, FOR SOLUTION ORAL at 12:01

## 2024-01-10 RX ADMIN — Medication 20 MILLIEQUIVALENT(S): at 06:33

## 2024-01-10 RX ADMIN — Medication 100 MILLIGRAM(S): at 06:25

## 2024-01-10 RX ADMIN — Medication 40 MILLIGRAM(S): at 06:25

## 2024-01-10 RX ADMIN — ENOXAPARIN SODIUM 40 MILLIGRAM(S): 100 INJECTION SUBCUTANEOUS at 12:02

## 2024-01-10 RX ADMIN — Medication 20 MILLIEQUIVALENT(S): at 06:32

## 2024-01-10 RX ADMIN — PIPERACILLIN AND TAZOBACTAM 25 GRAM(S): 4; .5 INJECTION, POWDER, LYOPHILIZED, FOR SOLUTION INTRAVENOUS at 07:14

## 2024-01-10 RX ADMIN — Medication 25 GRAM(S): at 06:31

## 2024-01-10 RX ADMIN — Medication 81 MILLIGRAM(S): at 12:01

## 2024-01-10 RX ADMIN — CHLORHEXIDINE GLUCONATE 1 APPLICATION(S): 213 SOLUTION TOPICAL at 06:32

## 2024-01-10 RX ADMIN — Medication 12.5 MILLIGRAM(S): at 06:25

## 2024-01-10 NOTE — DISCHARGE NOTE PROVIDER - ATTENDING DISCHARGE PHYSICAL EXAMINATION:
PHYSICAL EXAM:  GENERAL: NAD, frail   HEAD:  Atraumatic, Normocephalic  EYES:  conjunctiva and sclera clear  NECK: Supple, No JVD  CHEST/LUNG: Clear to auscultation bilaterally; No wheeze  HEART: Regular rate and rhythm; No murmurs, rubs, or gallops  ABDOMEN: Soft, Nontender, Nondistended; Bowel sounds present  EXTREMITIES:  2+ Peripheral Pulses, No clubbing, cyanosis, or edema  PSYCH: AAOx3  NEUROLOGY: non-focal  SKIN: No rashes or lesions

## 2024-01-10 NOTE — DISCHARGE NOTE PROVIDER - PROVIDER TOKENS
PROVIDER:[TOKEN:[[id number]],FOLLOWUP:[2 weeks]],PROVIDER:[TOKEN:[[id number]],FOLLOWUP:[2 weeks]] PROVIDER:[TOKEN:[[id number]],FOLLOWUP:[2 weeks]],PROVIDER:[TOKEN:[[id number]],FOLLOWUP:[2 weeks]]

## 2024-01-10 NOTE — PROGRESS NOTE ADULT - ATTENDING COMMENTS
83F w/ PMHx COPD, OA, chronic back pain s/p multiple surgeries and recent admission for left intertrochanteric femur fracture s/p ORIF 12/27/2023 brought in from [facility] for shortness of breath.      #Acute Hypoxic Respiratory Failure and severe sepsis secondary to COVID 19   #Superimposed bacterial pneumonia  -s/p course of doxycycline and tazocin for copd exacerbation   - Remdesivir on hold as per patient's daughter's request  - duoneb q6, symbicort  - MRSA nares positive, --> ordered bactroban  - CTA Chest shows: no evidence of acute pulmonary embolus. Bilateral lower lobe consolidative opacities which can be seen with pneumonia  - tapering to prednisone 40 mg daily for 3 more days       #NSTEMI Type II  - EKG showed sinus tachy w/ PACs  - Trop 208>192 (previously 19)  - trending down , patient has no active chest pain   - Lipid panel within normal   - TTE: LV Ejection Fraction by Agudelo's Method with a biplane EF of 55 %, with Moderate left ventricular hypertrophy  - cardiology consulted - outpatient event monitor     #HTN  - amlodipine 5 mg qd    #ALAN -- improved    #Hyponatremia, suspect SIADH   - Na 125  - BMP BID   - urine studies pending    #Chronic Anemia  - monitor H&H    #left intertrochanteric femur fracture s/p ORIF 12/27/2023 /OA/ Chronic Back Pain s/p multiple surgeries  - from rehab  - cont Lovenox  - for pain control added standing Tylenol q6h, if not improving, will add Oxycodone 5 PRN  - PT recommended eventual d/c to rehab  - continue to f/u PT progress    #DVT ppx: Lovenox      Pending: hyponatremia w/u   Plan of care d/w pt and daughter   Dispo: STR 83F w/ PMHx COPD, OA, chronic back pain s/p multiple surgeries and recent admission for left intertrochanteric femur fracture s/p ORIF 12/27/2023 brought in from [facility] for shortness of breath.      #Acute Hypoxic Respiratory Failure and severe sepsis secondary to COVID 19   #Superimposed bacterial pneumonia  -s/p course of doxycycline and tazocin for copd exacerbation   - Remdesivir on hold as per patient's daughter's request  - duoneb q6, symbicort  - MRSA nares positive, --> ordered bactroban  - CTA Chest shows: no evidence of acute pulmonary embolus. Bilateral lower lobe consolidative opacities which can be seen with pneumonia  - tapering to prednisone 40 mg daily for 3 more days       #NSTEMI Type II  - EKG showed sinus tachy w/ PACs  - Trop 208>192 (previously 19)  - trending down , patient has no active chest pain   - Lipid panel within normal   - TTE: LV Ejection Fraction by Agudelo's Method with a biplane EF of 55 %, with Moderate left ventricular hypertrophy  - cardiology consulted - outpatient event monitor     #HTN  - amlodipine 5 mg qd    #ALAN -- improved    #Hyponatremia, suspect SIADH   - Na 125  - BMP BID   - urine studies pending    #Chronic Anemia  - monitor H&H    #left intertrochanteric femur fracture s/p ORIF 12/27/2023 /OA/ Chronic Back Pain s/p multiple surgeries  - from rehab  - cont Lovenox  - for pain control added standing Tylenol q6h, if not improving, will add Oxycodone 5 PRN  - PT recommended eventual d/c to rehab  - continue to f/u PT progress    #DVT ppx: Lovenox      Pending: hyponatremia w/u   Plan of care d/w pt and daughter   Dispo: STR

## 2024-01-10 NOTE — DISCHARGE NOTE PROVIDER - NSDCMRMEDTOKEN_GEN_ALL_CORE_FT
Advair Diskus 500 mcg-50 mcg inhalation powder: 1 puff(s) inhaled 2 times a day  aspirin 81 mg oral delayed release tablet: 1 tab(s) orally once a day  oxycodone-acetaminophen 5 mg-325 mg oral tablet: 1 tab(s) orally every 6 hours As needed Severe Pain (7 - 10)  polyethylene glycol 3350 oral powder for reconstitution: 17 gram(s) orally once a day  predniSONE 20 mg oral tablet: 2 tab(s) orally once a day  senna leaf extract oral tablet: 2 tab(s) orally once a day (at bedtime)  Spiriva 18 mcg inhalation capsule: 1 cap(s) inhaled once a day   Advair Diskus 500 mcg-50 mcg inhalation powder: 1 puff(s) inhaled 2 times a day  amLODIPine 5 mg oral tablet: 1 tab(s) orally once a day  aspirin 81 mg oral delayed release tablet: 1 tab(s) orally once a day  metoprolol: 12.5 milligram(s) orally twice a day before breakfast and dinner  oxycodone-acetaminophen 5 mg-325 mg oral tablet: 1 tab(s) orally every 6 hours As needed Severe Pain (7 - 10)  polyethylene glycol 3350 oral powder for reconstitution: 17 gram(s) orally once a day  predniSONE 20 mg oral tablet: 2 tab(s) orally once a day please  take for 3 more days  senna leaf extract oral tablet: 2 tab(s) orally once a day (at bedtime)  Spiriva 18 mcg inhalation capsule: 1 cap(s) inhaled once a day

## 2024-01-10 NOTE — DISCHARGE NOTE NURSING/CASE MANAGEMENT/SOCIAL WORK - NSDCPEFALRISK_GEN_ALL_CORE
For information on Fall & Injury Prevention, visit: [email or web address] OR  [email or web address] OR  https://www.cdc.gov/steadi/patient.html For information on Fall & Injury Prevention, visit: [email or web address]/[email or web address]-mobility OR  [email or web address]-injury OR  https://www.cdc.gov/steadi/patient.html

## 2024-01-10 NOTE — DISCHARGE NOTE PROVIDER - NSDCCPCAREPLAN_GEN_ALL_CORE_FT
PRINCIPAL DISCHARGE DIAGNOSIS  Diagnosis: 2019 novel coronavirus disease (COVID-19)  Assessment and Plan of Treatment: You have been diagnosedwith Covid -19 infection .  The covid infection caused you to have a decompensation inyour respiratory status and a superimposed bacterial infection   COVID-19 stands for "coronavirus disease 2019." It is caused by a virus called SARS-CoV-2. The virus first appeared in late 2019 and quickly spread around the world.  There are different "variants," or strains, of the virus that causes COVID-19. Some variants seem to spread more easily than the original virus. Certain variants might also make people sicker than others. In the [place], most cases of COVID-19 are from the "Omicron" variants.  If you have  severe illness with trouble breathing and low oxygen levels, or if you have other health problems, you might need to stay in the hospital. While you are there, you will most likely be in a special isolation room. Only medical staff will be allowed in the room, and they will have to wear special gowns, gloves, masks, and eye protection.  The doctors and nurses can monitor and support your breathing and other body functions and make you as comfortable as possible. In the hospital, treatment might include:  ?Extra oxygen  ?A breathing tube and machine to help you breathe (ventilator)  ?Antiviral medicines, steroids, or other medicines to treat the infection  ?Medicines to help prevent blood clots  ?Medicines to help with symptoms      SECONDARY DISCHARGE DIAGNOSES  Diagnosis: SOB (shortness of breath)  Assessment and Plan of Treatment:     Diagnosis: Hyponatremia  Assessment and Plan of Treatment:     Diagnosis: PNA (pneumonia)  Assessment and Plan of Treatment:     Diagnosis: Tachycardia  Assessment and Plan of Treatment:     Diagnosis: Sepsis  Assessment and Plan of Treatment:

## 2024-01-10 NOTE — DISCHARGE NOTE NURSING/CASE MANAGEMENT/SOCIAL WORK - PATIENT PORTAL LINK FT
You can access the FollowMyHealth Patient Portal offered by  by registering at the following website: [email or web address]. By joining [employer]’s FollowMyHealth portal, you will also be able to view your health information using other applications (apps) compatible with our system. You can access the FollowMyHealth Patient Portal offered by [facility] by registering at the following website: [email or web address]. By joining [employer]’s FollowMyHealth portal, you will also be able to view your health information using other applications (apps) compatible with our system. detailed exam

## 2024-01-10 NOTE — DISCHARGE NOTE PROVIDER - HOSPITAL COURSE
83F w/ PMHx COPD, OA, chronic back pain s/p multiple surgeries and recent admission for left intertrochanteric femur fracture s/p ORIF 12/27/2023 brought in from [facility] for shortness of breath.    Patient was found to have Acute Hypoxic Respiratory Failure and severe sepsis secondary to COVID 19 and Superimposed bacterial pneumonia  - CTA Chest shows: no evidence of acute pulmonary embolus. Bilateral lower lobe consolidative opacities which can be seen with pneumonia  Patient took a course of  zosyn and steroids , remdesevir was discontinued as by daughter request       Patient had elevation in her troponmi      #NSTEMI Type II  - EKG showed sinus tachy w/ PACs  - Trop 208>192 (previously 19)  - trending down , patient has no active chest pain   - Lipid panel within normal   -TTE: LV Ejection Fraction by Agudelo's Method with a biplane EF of 55 %, with Moderate left ventricular hypertrophy  - cardiology consult: possible need for event recorder vs ILR , will follow up with cardiology     #HTN  - d/c IVF  - 1/5 started amlodipine 5 mg qd    #ALAN -- improved    #Hyponatremia  - Na 127, appears chronic  -patient refusing labs       #Chronic Anemia  - Hgb 8.9 (b/l ~10)  - monitor H&H  - f/u o/p    #left intertrochanteric femur fracture s/p ORIF 12/27/2023 /OA/ Chronic Back Pain s/p multiple surgeries  - from rehab  - cont Lovenox  - for pain control added standing Tylenol q6h, if not improving, will add Oxycodone 5 PRN  - PT recommended eventual d/c to rehab  - continue to f/u PT progress   83F w/ PMHx COPD, OA, chronic back pain s/p multiple surgeries and recent admission for left intertrochanteric femur fracture s/p ORIF 12/27/2023 brought in from [facility] for shortness of breath.    Patient was found to have Acute Hypoxic Respiratory Failure and severe sepsis secondary to COVID 19 and Superimposed bacterial pneumonia  - CTA Chest shows: no evidence of acute pulmonary embolus. Bilateral lower lobe consolidative opacities which can be seen with pneumonia  Patient took a course of  zosyn and steroids , remdesevir was discontinued as by daughter request       Patient had elevation in her troponmi      #NSTEMI Type II  - EKG showed sinus tachy w/ PACs  - Trop 208>192 (previously 19)  - trending down , patient has no active chest pain   - Lipid panel within normal   -TTE: LV Ejection Fraction by Agudelo's Method with a biplane EF of 55 %, with Moderate left ventricular hypertrophy  - cardiology consult: possible need for event recorder vs ILR , will follow up with cardiology     #HTN  - d/c IVF  - 1/5 started amlodipine 5 mg qd    #ALAN -- improved    #Hyponatremia  - Na 127, appears chronic  -patient refusing labs       #Chronic Anemia  - Hgb 8.9 (b/l ~10)  - monitor H&H  - f/u o/p    #left intertrochanteric femur fracture s/p ORIF 12/27/2023 /OA/ Chronic Back Pain s/p multiple surgeries  - from rehab  - cont Lovenox  - for pain control added standing Tylenol q6h, if not improving, will add Oxycodone 5 PRN  - PT recommended eventual d/c to rehab  - continue to f/u PT progress   83F w/ PMHx COPD, OA, chronic back pain s/p multiple surgeries and recent admission for left intertrochanteric femur fracture s/p ORIF 12/27/2023 brought in from [facility] for shortness of breath.    Patient was found to have Acute Hypoxic Respiratory Failure and severe sepsis secondary to COVID 19 and Superimposed bacterial pneumonia    CTA Chest shows: no evidence of acute pulmonary embolus. Bilateral lower lobe consolidative opacities which can be seen with pneumonia  Patient took a course of  zosyn and doxycycline and steroids , remdesevir was discontinued as by daughter request       Patient had elevation in her troponin on admission, seen by cardiology   Most likely NStemi TYPE II , started on metoprolol 12.5 BID   TTE: LV Ejection Fraction by Agudelo's Method with a biplane EF of 55 %, with Moderate left ventricular hypertrophy    PAtient was also started  on amlodipine 5mg Daily for high BP   Patient has a history of left intertrochanteric femur fracture s/p ORIF 12/27/2023   Patient seen by physical therapy       Patient seen and examined   not in acute distress 83F w/ PMHx COPD, OA, chronic back pain s/p multiple surgeries and recent admission for left intertrochanteric femur fracture s/p ORIF 12/27/2023 brought in from [facility] for shortness of breath.    Patient was found to have Acute Hypoxic Respiratory Failure and severe sepsis secondary to COVID 19 and Superimposed bacterial pneumonia    CTA Chest shows: no evidence of acute pulmonary embolus. Bilateral lower lobe consolidative opacities which can be seen with pneumonia  Patient took a course of  zosyn and doxycycline and steroids , remdesevir was discontinued as by daughter request       Patient had elevation in her troponin on admission, seen by cardiology   Most likely NStemi TYPE II , started on metoprolol 12.5 BID   TTE: LV Ejection Fraction by Agudelo's Method with a biplane EF of 55 %, with Moderate left ventricular hypertrophy    PAtient was also started  on amlodipine 5mg Daily for high BP   Patient has a history of left intertrochanteric femur fracture s/p ORIF 12/27/2023   Patient seen by physical therapy       Patient seen and examined   not in acute distress 83F w/ PMHx COPD, OA, chronic back pain s/p multiple surgeries and recent admission for left intertrochanteric femur fracture s/p ORIF 12/27/2023 brought in from [facility] for shortness of breath.    Patient was found to have Acute Hypoxic Respiratory Failure and severe sepsis secondary to COVID 19 and Superimposed bacterial pneumonia    CTA Chest shows: no evidence of acute pulmonary embolus. Bilateral lower lobe consolidative opacities which can be seen with pneumonia  Patient took a course of  zosyn and doxycycline and steroids , remdesevir was discontinued as by daughter request       Patient had elevation in her troponin on admission, seen by cardiology   Most likely NStemi TYPE II , started on metoprolol 12.5 BID   TTE: LV Ejection Fraction by Agudelo's Method with a biplane EF of 55 %, with Moderate left ventricular hypertrophy    PAtient was also started  on amlodipine 5mg Daily for high BP   Patient has a history of left intertrochanteric femur fracture s/p ORIF 12/27/2023   Patient seen by physical therapy       Patient seen and examined   not in acute distress 83F w/ PMHx COPD, OA, chronic back pain s/p multiple surgeries and recent admission for left intertrochanteric femur fracture s/p ORIF 12/27/2023 brought in from [facility] for shortness of breath.    Patient was found to have Acute Hypoxic Respiratory Failure and severe sepsis secondary to COVID 19 and Superimposed bacterial pneumonia    CTA Chest shows: no evidence of acute pulmonary embolus. Bilateral lower lobe consolidative opacities which can be seen with pneumonia  Patient took a course of  zosyn and doxycycline and steroids , remdesevir was discontinued as by daughter request       Patient had elevation in her troponin on admission, seen by cardiology   Most likely NStemi TYPE II , started on metoprolol 12.5 BID   TTE: LV Ejection Fraction by Agudelo's Method with a biplane EF of 55 %, with Moderate left ventricular hypertrophy    PAtient was also started  on amlodipine 5mg Daily for high BP   Patient has a history of left intertrochanteric femur fracture s/p ORIF 12/27/2023   Patient seen by physical therapy       Patient seen and examined   not in acute distress 83F w/ PMHx COPD, OA, chronic back pain s/p multiple surgeries and recent admission for left intertrochanteric femur fracture s/p ORIF 12/27/2023 brought in from [facility] for shortness of breath.    Patient was found to have Acute Hypoxic Respiratory Failure and severe sepsis secondary to COVID 19 and Superimposed bacterial pneumonia    CTA Chest shows: no evidence of acute pulmonary embolus. Bilateral lower lobe consolidative opacities which can be seen with pneumonia  Patient took a course of  zosyn and doxycycline and steroids , remdesevir was discontinued as by daughter request       Patient had elevation in her troponin on admission, seen by cardiology   Most likely NStemi TYPE II , started on metoprolol 12.5 BID   TTE: LV Ejection Fraction by Agudelo's Method with a biplane EF of 55 %, with Moderate left ventricular hypertrophy    PAtient was also started  on amlodipine 5mg Daily for high BP   Patient has a history of left intertrochanteric femur fracture s/p ORIF 12/27/2023   Patient seen by physical therapy       Patient seen and examined   not in acute distress       Attending addendum: Spoke to daughter at bedside regarding plan of care regarding anemia and hyponatremia, both of which are stable. Patient and family want to be discharged as soon as possible. At this point, stable enough to follow up as outpatient regarding hyponatremia and anemia. 83F w/ PMHx COPD, OA, chronic back pain s/p multiple surgeries and recent admission for left intertrochanteric femur fracture s/p ORIF 12/27/2023 brought in from [facility] for shortness of breath.    Patient was found to have Acute Hypoxic Respiratory Failure and severe sepsis secondary to COVID 19 and Superimposed bacterial pneumonia    CTA Chest shows: no evidence of acute pulmonary embolus. Bilateral lower lobe consolidative opacities which can be seen with pneumonia  Patient took a course of  zosyn and doxycycline and steroids , remdesevir was discontinued as by daughter request       Patient had elevation in her troponin on admission, seen by cardiology   Most likely NStemi TYPE II , started on metoprolol 12.5 BID   TTE: LV Ejection Fraction by Agudelo's Method with a biplane EF of 55 %, with Moderate left ventricular hypertrophy    PAtient was also started  on amlodipine 5mg Daily for high BP   Patient has a history of left intertrochanteric femur fracture s/p ORIF 12/27/2023   Patient seen by physical therapy       Patient seen and examined   not in acute distress       Attending addendum: Spoke to daughter at bedside regarding plan of care regarding anemia and hyponatremia, both of which are stable. Patient and family want to be discharged as soon as possible. At this point, stable enough to follow up as outpatient regarding hyponatremia and anemia.

## 2024-01-10 NOTE — DISCHARGE NOTE PROVIDER - CARE PROVIDER_API CALL
Blayne Swain  Internal Medicine  [address], [address]  [place] [address]  Phone: [phone]  Fax: [phone]  Follow Up Time: 2 weeks    Stevan Garcia  Interventional Cardiology  [address] [place] [address]  Phone: [phone]  Fax: [phone]  Follow Up Time: 2 weeks   Blayne Swain  Internal Medicine  [address], [address]  [place] [address]  Phone: [phone]  Fax: [phone]  Follow Up Time: 2 weeks    Stevan Garcia  Interventional Cardiology  [address] [place] [address]  Phone: [phone]  Fax: [phone]  Follow Up Time: 2 weeks

## 2024-01-10 NOTE — PROGRESS NOTE ADULT - PROVIDER SPECIALTY LIST ADULT
Cardiology
Cardiology
Hospitalist
Infectious Disease
Internal Medicine
Critical Care
Cardiology
Cardiology
Hospitalist
Hospitalist
Infectious Disease
Internal Medicine
Pulmonology
Pulmonology
Critical Care
Hospitalist
Palliative Care
Critical Care
Hospitalist
Hospitalist
Internal Medicine
Internal Medicine
Hospitalist
Palliative Care
Hospitalist
Internal Medicine
Palliative Care

## 2024-01-10 NOTE — DISCHARGE NOTE PROVIDER - CARE PROVIDERS DIRECT ADDRESSES
,[email or web address]_Unknown [email or web address],[email or web address]

## 2024-01-10 NOTE — PROGRESS NOTE ADULT - SUBJECTIVE AND OBJECTIVE BOX
24H events:    Patient is a 83y old Female who presents with a chief complaint of SOB (10 Keith 2024 07:59)    Primary diagnosis of 2019 novel coronavirus disease (COVID-19)    Today is hospital day 10d. This morning patient was seen and examined at bedside, resting comfortably in bed.    No acute or major events overnight.    PAST MEDICAL & SURGICAL HISTORY  COPD (chronic obstructive pulmonary disease)    H/O cholelithiasis    Smoker    History of surgery  orif right ankle      SOCIAL HISTORY:  Social History:      ALLERGIES:  No Known Allergies    MEDICATIONS:  STANDING MEDICATIONS  acetaminophen     Tablet .. 650 milliGRAM(s) Oral every 6 hours  albuterol/ipratropium for Nebulization 3 milliLiter(s) Nebulizer every 6 hours  amLODIPine   Tablet 5 milliGRAM(s) Oral daily  aspirin enteric coated 81 milliGRAM(s) Oral daily  budesonide 160 MICROgram(s)/formoterol 4.5 MICROgram(s) Inhaler 2 Puff(s) Inhalation two times a day  chlorhexidine 2% Cloths 1 Application(s) Topical <User Schedule>  enoxaparin Injectable 40 milliGRAM(s) SubCutaneous every 24 hours  metoprolol tartrate 12.5 milliGRAM(s) Oral every 12 hours  mupirocin 2% Ointment 1 Application(s) Topical two times a day  polyethylene glycol 3350 17 Gram(s) Oral daily  predniSONE   Tablet 40 milliGRAM(s) Oral daily  senna 2 Tablet(s) Oral at bedtime    PRN MEDICATIONS  aluminum hydroxide/magnesium hydroxide/simethicone Suspension 30 milliLiter(s) Oral every 4 hours PRN  melatonin 3 milliGRAM(s) Oral at bedtime PRN  ondansetron Injectable 4 milliGRAM(s) IV Push every 8 hours PRN    VITALS:   T(F): 96.8  HR: 83  BP: 122/63  RR: 18  SpO2: 95%    PHYSICAL EXAM:  GENERAL:   (x  ) NAD, lying in bed comfortably     (  ) obtunded     (  ) lethargic     (  ) somnolent    HEAD:   ( x ) Atraumatic     (  ) hematoma     (  ) laceration (specify location:       )     NECK:  (x  ) Supple     (  ) neck stiffness     (  ) nuchal rigidity     (  )  no JVD     (  ) JVD present ( -- cm)    HEART:  Rate -->     (x ) normal rate     (  ) bradycardic     (  ) tachycardic  Rhythm -->     ( xx ) regular     (  ) regularly irregular     (  ) irregularly irregular  Murmurs -->     (x  ) normal s1s2     (  ) systolic murmur     (  ) diastolic murmur     (  ) continuous murmur      (  ) S3 present     (  ) S4 present    LUNGS:   (x  )Unlabored respirations     (  ) tachypnea  (  ) B/L air entry     (x  ) decreased breath sounds bilaterally    ( x ) no adventitious sound     (  ) crackles     (  ) wheezing      (  ) rhonchi      (specify location:       )  (  ) chest wall tenderness (specify location:       )    ABDOMEN:   (x  ) Soft     (  ) tense   |   ( x ) nondistended     (  ) distended   |   ( x ) +BS     (  ) hypoactive bowel sounds     (  ) hyperactive bowel sounds  (x  ) nontender     (  ) RUQ tenderness     (  ) RLQ tenderness     (  ) LLQ tenderness     (  ) epigastric tenderness     (  ) diffuse tenderness  (  ) Splenomegaly      (  ) Hepatomegaly      (  ) Jaundice     (  ) ecchymosis     EXTREMITIES:  (x  ) Normal     (  ) Rash     (  ) ecchymosis     (  ) varicose veins      (  ) pitting edema     (  ) non-pitting edema   (  ) ulceration     (  ) gangrene:     (location:     )    NERVOUS SYSTEM:    ( x ) A&Ox3     (  ) confused     (  ) lethargic  CN II-XII:     (x  ) Intact     (  ) deficits found     (Specify:     )   Upper extremities:     (x  ) no sensorimotor deficits     (  ) weakness     (  ) loss of proprioception/vibration     (  ) loss of touch/temperature (specify:    )  Lower extremities:     ( x ) no sensorimotor deficits     (  ) weakness     (  ) loss of proprioception/vibration     (  ) loss of touch/temperature (specify:    )    SKIN:   (x  ) No rashes or lesions     (  ) maculopapular rash     (  ) pustules     (  ) vesicles     (  ) ulcer     (  ) ecchymosis     (specify location:     )      LABS:                        7.4    14.72 )-----------( 369      ( 09 Jan 2024 22:20 )             21.6     01-09    125<L>  |  88<L>  |  21<H>  ----------------------------<  88  3.3<L>   |  32  |  <0.5<L>    Ca    7.9<L>      09 Jan 2024 22:20  Mg     1.7     01-09    TPro  4.4<L>  /  Alb  2.6<L>  /  TBili  1.3<H>  /  DBili  x   /  AST  12  /  ALT  14  /  AlkPhos  90  01-09      Urinalysis Basic - ( 09 Jan 2024 22:20 )    Color: x / Appearance: x / SG: x / pH: x  Gluc: 88 mg/dL / Ketone: x  / Bili: x / Urobili: x   Blood: x / Protein: x / Nitrite: x   Leuk Esterase: x / RBC: x / WBC x   Sq Epi: x / Non Sq Epi: x / Bacteria: x        Lactate, Blood: 1.1 mmol/L (01-09-24 @ 22:21)          RADIOLOGY:

## 2024-01-10 NOTE — PROGRESS NOTE ADULT - ASSESSMENT
83F w/ PMHx COPD, OA, chronic back pain s/p multiple surgeries and recent admission for left intertrochanteric femur fracture s/p ORIF 12/27/2023 brought in from [facility] for shortness of breath.    #Acute Hypoxic Respiratory Failure and severe sepsis secondary to COVID 19   #Superimposed bacterial pneumonia  -s/p course of doxycyclin and tazocin for copd exacerbation   - Remdesivir on hold as per patient's daughter's request  - duoneb q6, symbicort  - MRSA nares positive, --> ordered bactroban  - CTA Chest shows: no evidence of acute pulmonary embolus. Bilateral lower lobe consolidative opacities which can be seen with pneumonia  - tapering to prednisone 40 mg daily for 3 more days       #NSTEMI Type II  - EKG showed sinus tachy w/ PACs  - Trop 208>192 (previously 19)  - trending down , patient has no active chest pain   - Lipid panel within normal   -TTE: LV Ejection Fraction by Agudelo's Method with a biplane EF of 55 %, with Moderate left ventricular hypertrophy  - cardiology consult: possible need for event recorder vs ILR , will follow up with cardiology     #HTN  - d/c IVF  - 1/5 started amlodipine 5 mg qd    #ALAN -- improved    #Hyponatremia  - Na 125,   -urine studies pending    #Chronic Anemia  - Hgb 8.9 (b/l ~10)  - monitor H&H  - f/u o/p    #left intertrochanteric femur fracture s/p ORIF 12/27/2023 /OA/ Chronic Back Pain s/p multiple surgeries  - from rehab  - cont Lovenox  - for pain control added standing Tylenol q6h, if not improving, will add Oxycodone 5 PRN  - PT recommended eventual d/c to rehab  - continue to f/u PT progress    #DVT ppx: Lovenox  #GI ppx: none  #Diet: DASH/TLC   #Activity: IAT, Weight bearing 50% LLE, WBAT RLE  #Code status: DNR/DNI (Trial NIV)       83F w/ PMHx COPD, OA, chronic back pain s/p multiple surgeries and recent admission for left intertrochanteric femur fracture s/p ORIF 12/27/2023 brought in from [facility] for shortness of breath.    #Acute Hypoxic Respiratory Failure and severe sepsis secondary to COVID 19   #Superimposed bacterial pneumonia  -s/p course of doxycyclin and tazocin for copd exacerbation   - Remdesivir on hold as per patient's daughter's request  - duoneb q6, symbicort  - MRSA nares positive, --> ordered bactroban  - CTA Chest shows: no evidence of acute pulmonary embolus. Bilateral lower lobe consolidative opacities which can be seen with pneumonia  - tapering to prednisone 40 mg daily for 3 more days       #NSTEMI Type II  - EKG showed sinus tachy w/ PACs  - Trop 208>192 (previously 19)  - trending down , patient has no active chest pain   - Lipid panel within normal   -TTE: LV Ejection Fraction by Agudelo's Method with a biplane EF of 55 %, with Moderate left ventricular hypertrophy  - cardiology consult: possible need for event recorder vs ILR , will follow up with cardiology     #HTN  - d/c IVF  - 1/5 started amlodipine 5 mg qd    #ALAN -- improved    #Hyponatremia  - Na 125,   -urine studies pending    #Chronic Anemia  - Hgb 8.9 (b/l ~10)  - monitor H&H  - f/u o/p    #left intertrochanteric femur fracture s/p ORIF 12/27/2023 /OA/ Chronic Back Pain s/p multiple surgeries  - from rehab  - cont Lovenox  - for pain control added standing Tylenol q6h, if not improving, will add Oxycodone 5 PRN  - PT recommended eventual d/c to rehab  - continue to f/u PT progress    #DVT ppx: Lovenox  #GI ppx: none  #Diet: DASH/TLC   #Activity: IAT, Weight bearing 50% LLE, WBAT RLE  #Code status: DNR/DNI (Trial NIV)

## 2024-01-16 DIAGNOSIS — R91.8 OTHER NONSPECIFIC ABNORMAL FINDING OF LUNG FIELD: ICD-10-CM

## 2024-01-16 DIAGNOSIS — J44.0 CHRONIC OBSTRUCTIVE PULMONARY DISEASE WITH (ACUTE) LOWER RESPIRATORY INFECTION: ICD-10-CM

## 2024-01-16 DIAGNOSIS — F17.210 NICOTINE DEPENDENCE, CIGARETTES, UNCOMPLICATED: ICD-10-CM

## 2024-01-16 DIAGNOSIS — U07.1 COVID-19: ICD-10-CM

## 2024-01-16 DIAGNOSIS — Z79.52 LONG TERM (CURRENT) USE OF SYSTEMIC STEROIDS: ICD-10-CM

## 2024-01-16 DIAGNOSIS — Z87.898 PERSONAL HISTORY OF OTHER SPECIFIED CONDITIONS: ICD-10-CM

## 2024-01-16 DIAGNOSIS — J96.02 ACUTE RESPIRATORY FAILURE WITH HYPERCAPNIA: ICD-10-CM

## 2024-01-16 DIAGNOSIS — J12.82 PNEUMONIA DUE TO CORONAVIRUS DISEASE 2019: ICD-10-CM

## 2024-01-16 DIAGNOSIS — Z22.322 CARRIER OR SUSPECTED CARRIER OF METHICILLIN RESISTANT STAPHYLOCOCCUS AUREUS: ICD-10-CM

## 2024-01-16 DIAGNOSIS — G92.8 OTHER TOXIC ENCEPHALOPATHY: ICD-10-CM

## 2024-01-16 DIAGNOSIS — D64.9 ANEMIA, UNSPECIFIED: ICD-10-CM

## 2024-01-16 DIAGNOSIS — G89.29 OTHER CHRONIC PAIN: ICD-10-CM

## 2024-01-16 DIAGNOSIS — E66.9 OBESITY, UNSPECIFIED: ICD-10-CM

## 2024-01-16 DIAGNOSIS — A41.9 SEPSIS, UNSPECIFIED ORGANISM: ICD-10-CM

## 2024-01-16 DIAGNOSIS — Z79.82 LONG TERM (CURRENT) USE OF ASPIRIN: ICD-10-CM

## 2024-01-16 DIAGNOSIS — N17.9 ACUTE KIDNEY FAILURE, UNSPECIFIED: ICD-10-CM

## 2024-01-16 DIAGNOSIS — E22.2 SYNDROME OF INAPPROPRIATE SECRETION OF ANTIDIURETIC HORMONE: ICD-10-CM

## 2024-01-16 DIAGNOSIS — R65.21 SEVERE SEPSIS WITH SEPTIC SHOCK: ICD-10-CM

## 2024-01-16 DIAGNOSIS — Z66 DO NOT RESUSCITATE: ICD-10-CM

## 2024-01-16 DIAGNOSIS — I21.A1 MYOCARDIAL INFARCTION TYPE 2: ICD-10-CM

## 2024-01-16 DIAGNOSIS — J96.01 ACUTE RESPIRATORY FAILURE WITH HYPOXIA: ICD-10-CM

## 2024-01-16 DIAGNOSIS — M54.9 DORSALGIA, UNSPECIFIED: ICD-10-CM

## 2024-01-16 DIAGNOSIS — J15.9 UNSPECIFIED BACTERIAL PNEUMONIA: ICD-10-CM

## 2024-01-17 ENCOUNTER — TRANSCRIPTION ENCOUNTER (OUTPATIENT)
Age: 84
End: 2024-01-17

## 2024-01-23 ENCOUNTER — APPOINTMENT (OUTPATIENT)
Dept: ORTHOPEDIC SURGERY | Facility: ASSISTED LIVING FACILITY | Age: 84
End: 2024-01-23
Payer: MEDICARE

## 2024-01-23 PROCEDURE — 99024 POSTOP FOLLOW-UP VISIT: CPT

## 2024-01-24 ENCOUNTER — TRANSCRIPTION ENCOUNTER (OUTPATIENT)
Age: 84
End: 2024-01-24

## 2024-01-27 NOTE — H&P ADULT - HISTORY OF PRESENT ILLNESS
76 yo F with COPD not on home O2 presents from PMD's office s/p follow up visit, found to have low pulse oximetry, in the low 60s as reported by daughter, as well as worsening SOB not improved on PO prednisone. Patient endorses noted wheezing for the past week, as well as SOB associated with baseline productive cough of whitish sputum, patient denies changes in sputum color or frequency. Patient denies constitutional symptoms, sick contacts or recent travel. Patient is currently 96% on room air, denies current SOB, is able to speak in full sentences. Patient was started on Prednisone taper last Wednesday, at 60mg Q3days, then 40mg Q3days, did not complete last two days of 20mg daily.    In ED, received 125mg of Solumedrol IV x1, Albuterol treatment x1.    CODE STATUS: DNR/DNI    PMD: Dr. Adam  Pulmonologist: Dr. Cohn (last seen more than 4 years ago, lost to follow up) not applicable (Male) Xray Chest 1 View AP/PA

## 2024-01-31 ENCOUNTER — TRANSCRIPTION ENCOUNTER (OUTPATIENT)
Age: 84
End: 2024-01-31

## 2024-02-06 ENCOUNTER — APPOINTMENT (OUTPATIENT)
Dept: ORTHOPEDIC SURGERY | Facility: ASSISTED LIVING FACILITY | Age: 84
End: 2024-02-06
Payer: MEDICARE

## 2024-02-06 PROCEDURE — 99024 POSTOP FOLLOW-UP VISIT: CPT

## 2024-02-07 ENCOUNTER — TRANSCRIPTION ENCOUNTER (OUTPATIENT)
Age: 84
End: 2024-02-07

## 2024-02-21 ENCOUNTER — TRANSCRIPTION ENCOUNTER (OUTPATIENT)
Age: 84
End: 2024-02-21

## 2024-02-28 ENCOUNTER — TRANSCRIPTION ENCOUNTER (OUTPATIENT)
Age: 84
End: 2024-02-28

## 2024-03-06 ENCOUNTER — TRANSCRIPTION ENCOUNTER (OUTPATIENT)
Age: 84
End: 2024-03-06

## 2024-03-13 ENCOUNTER — TRANSCRIPTION ENCOUNTER (OUTPATIENT)
Age: 84
End: 2024-03-13

## 2024-03-20 ENCOUNTER — TRANSCRIPTION ENCOUNTER (OUTPATIENT)
Age: 84
End: 2024-03-20

## 2024-03-27 ENCOUNTER — TRANSCRIPTION ENCOUNTER (OUTPATIENT)
Age: 84
End: 2024-03-27

## 2024-04-02 ENCOUNTER — APPOINTMENT (OUTPATIENT)
Dept: ORTHOPEDIC SURGERY | Facility: ASSISTED LIVING FACILITY | Age: 84
End: 2024-04-02
Payer: MEDICARE

## 2024-04-02 DIAGNOSIS — S72.145A NONDISPLACED INTERTROCHANTERIC FRACTURE OF LEFT FEMUR, INITIAL ENCOUNTER FOR CLOSED FRACTURE: ICD-10-CM

## 2024-04-02 PROCEDURE — 99305 1ST NF CARE MODERATE MDM 35: CPT

## 2024-04-04 ENCOUNTER — TRANSCRIPTION ENCOUNTER (OUTPATIENT)
Age: 84
End: 2024-04-04

## 2024-04-09 PROBLEM — S72.145A CLOSED NONDISPLACED INTERTROCHANTERIC FRACTURE OF LEFT FEMUR, INITIAL ENCOUNTER: Status: ACTIVE | Noted: 2024-01-23

## 2024-04-10 ENCOUNTER — TRANSCRIPTION ENCOUNTER (OUTPATIENT)
Age: 84
End: 2024-04-10

## 2024-04-17 ENCOUNTER — TRANSCRIPTION ENCOUNTER (OUTPATIENT)
Age: 84
End: 2024-04-17

## 2024-05-05 NOTE — PATIENT PROFILE ADULT - LEGAL HELP
no
You can access the FollowMyHealth Patient Portal offered by [facility] by registering at the following website: [email or web address]. By joining [employer]’s FollowMyHealth portal, you will also be able to view your health information using other applications (apps) compatible with our system.

## 2024-09-09 NOTE — H&P PST ADULT - COMFORT LEVEL, ACCEPTABLE
Refill request      Medication: methocarbamol (ROBAXIN) 500 MG tablet    Sig: Take 1 tablet (500 mg) by mouth 4 times daily    Dispensed: 30  Refills: 0    Last prescribed to patient: 8/19/24     Last clinic appointment: 8/19/24  Next clinic appointment: none listed      Preferred pharmacy:    [place], [place] - [address] [place] [address] [phone]    Refill request routed to the provider to review.        5